# Patient Record
Sex: MALE | Race: WHITE | Employment: OTHER | URBAN - METROPOLITAN AREA
[De-identification: names, ages, dates, MRNs, and addresses within clinical notes are randomized per-mention and may not be internally consistent; named-entity substitution may affect disease eponyms.]

---

## 2017-01-19 ENCOUNTER — OFFICE VISIT (OUTPATIENT)
Dept: ENT CLINIC | Age: 67
End: 2017-01-19

## 2017-01-19 VITALS
DIASTOLIC BLOOD PRESSURE: 98 MMHG | RESPIRATION RATE: 18 BRPM | SYSTOLIC BLOOD PRESSURE: 167 MMHG | WEIGHT: 156 LBS | BODY MASS INDEX: 23.64 KG/M2 | HEIGHT: 68 IN

## 2017-01-19 DIAGNOSIS — R13.19 ESOPHAGEAL DYSPHAGIA: Primary | ICD-10-CM

## 2017-01-19 DIAGNOSIS — R49.0 HOARSENESS, CHRONIC: ICD-10-CM

## 2017-01-19 PROCEDURE — 4004F PT TOBACCO SCREEN RCVD TLK: CPT | Performed by: OTOLARYNGOLOGY

## 2017-01-19 PROCEDURE — 99203 OFFICE O/P NEW LOW 30 MIN: CPT | Performed by: OTOLARYNGOLOGY

## 2017-01-19 PROCEDURE — 3017F COLORECTAL CA SCREEN DOC REV: CPT | Performed by: OTOLARYNGOLOGY

## 2017-01-19 PROCEDURE — G8420 CALC BMI NORM PARAMETERS: HCPCS | Performed by: OTOLARYNGOLOGY

## 2017-01-19 PROCEDURE — G8427 DOCREV CUR MEDS BY ELIG CLIN: HCPCS | Performed by: OTOLARYNGOLOGY

## 2017-01-19 PROCEDURE — G8484 FLU IMMUNIZE NO ADMIN: HCPCS | Performed by: OTOLARYNGOLOGY

## 2017-01-19 RX ORDER — PROPRANOLOL HYDROCHLORIDE 10 MG/1
10 TABLET ORAL 4 TIMES DAILY
COMMUNITY
End: 2019-03-15

## 2017-01-19 RX ORDER — CLONIDINE HYDROCHLORIDE 0.1 MG/1
0.3 TABLET ORAL 2 TIMES DAILY
Status: ON HOLD | COMMUNITY
End: 2022-06-12 | Stop reason: HOSPADM

## 2017-01-19 RX ORDER — ASPIRIN 325 MG
650 TABLET ORAL 2 TIMES DAILY PRN
Status: ON HOLD | COMMUNITY
End: 2020-12-31 | Stop reason: SINTOL

## 2017-01-19 RX ORDER — BUDESONIDE AND FORMOTEROL FUMARATE DIHYDRATE 80; 4.5 UG/1; UG/1
2 AEROSOL RESPIRATORY (INHALATION) 2 TIMES DAILY
Status: ON HOLD | COMMUNITY
End: 2019-03-15

## 2017-01-19 RX ORDER — TRAZODONE HYDROCHLORIDE 50 MG/1
50 TABLET ORAL WEEKLY
COMMUNITY
End: 2019-03-15

## 2017-01-19 RX ORDER — OMEPRAZOLE 10 MG/1
10 CAPSULE, DELAYED RELEASE ORAL DAILY
Status: ON HOLD | COMMUNITY
End: 2019-03-15

## 2017-01-19 ASSESSMENT — ENCOUNTER SYMPTOMS
VOICE CHANGE: 0
RESPIRATORY NEGATIVE: 1
TROUBLE SWALLOWING: 1
FACIAL SWELLING: 0
EYES NEGATIVE: 1
ALLERGIC/IMMUNOLOGIC NEGATIVE: 1

## 2019-03-15 ENCOUNTER — HOSPITAL ENCOUNTER (INPATIENT)
Age: 69
LOS: 1 days | Discharge: HOME | DRG: 193 | End: 2019-03-16
Attending: FAMILY MEDICINE | Admitting: HOSPITALIST
Payer: COMMERCIAL

## 2019-03-15 ENCOUNTER — APPOINTMENT (OUTPATIENT)
Dept: GENERAL RADIOLOGY | Age: 69
DRG: 193 | End: 2019-03-15
Payer: COMMERCIAL

## 2019-03-15 ENCOUNTER — APPOINTMENT (OUTPATIENT)
Dept: CT IMAGING | Age: 69
DRG: 193 | End: 2019-03-15
Payer: COMMERCIAL

## 2019-03-15 DIAGNOSIS — R09.02 HYPOXEMIA: ICD-10-CM

## 2019-03-15 DIAGNOSIS — J18.9 PNEUMONIA OF RIGHT LOWER LOBE DUE TO INFECTIOUS ORGANISM: ICD-10-CM

## 2019-03-15 DIAGNOSIS — A41.9 SEPTICEMIA (HCC): Primary | ICD-10-CM

## 2019-03-15 DIAGNOSIS — E87.20 LACTIC ACIDOSIS: ICD-10-CM

## 2019-03-15 LAB
A/G RATIO: 1.4 (ref 1.1–2.2)
ALBUMIN SERPL-MCNC: 3.9 G/DL (ref 3.4–5)
ALP BLD-CCNC: 93 U/L (ref 40–129)
ALT SERPL-CCNC: 16 U/L (ref 10–40)
ANION GAP SERPL CALCULATED.3IONS-SCNC: 15 MMOL/L (ref 3–16)
AST SERPL-CCNC: 15 U/L (ref 15–37)
BASE EXCESS VENOUS: 1.5 MMOL/L
BASOPHILS ABSOLUTE: 0.2 K/UL (ref 0–0.2)
BASOPHILS RELATIVE PERCENT: 0.7 %
BILIRUB SERPL-MCNC: <0.2 MG/DL (ref 0–1)
BILIRUBIN URINE: NEGATIVE
BLOOD, URINE: NEGATIVE
BUN BLDV-MCNC: 13 MG/DL (ref 7–20)
CALCIUM SERPL-MCNC: 9.5 MG/DL (ref 8.3–10.6)
CARBOXYHEMOGLOBIN: 3.3 %
CHLORIDE BLD-SCNC: 100 MMOL/L (ref 99–110)
CLARITY: CLEAR
CO2: 23 MMOL/L (ref 21–32)
COLOR: YELLOW
CREAT SERPL-MCNC: 1 MG/DL (ref 0.8–1.3)
EKG ATRIAL RATE: 106 BPM
EKG DIAGNOSIS: NORMAL
EKG P AXIS: 84 DEGREES
EKG P-R INTERVAL: 114 MS
EKG Q-T INTERVAL: 326 MS
EKG QRS DURATION: 80 MS
EKG QTC CALCULATION (BAZETT): 433 MS
EKG R AXIS: 74 DEGREES
EKG T AXIS: 48 DEGREES
EKG VENTRICULAR RATE: 106 BPM
EOSINOPHILS ABSOLUTE: 0.4 K/UL (ref 0–0.6)
EOSINOPHILS RELATIVE PERCENT: 1.7 %
GFR AFRICAN AMERICAN: >60
GFR NON-AFRICAN AMERICAN: >60
GLOBULIN: 2.8 G/DL
GLUCOSE BLD-MCNC: 114 MG/DL (ref 70–99)
GLUCOSE URINE: NEGATIVE MG/DL
HCO3 VENOUS: 24 MMOL/L (ref 23–29)
HCT VFR BLD CALC: 45.3 % (ref 40.5–52.5)
HEMOGLOBIN: 14.8 G/DL (ref 13.5–17.5)
KETONES, URINE: NEGATIVE MG/DL
L. PNEUMOPHILA SEROGP 1 UR AG: NORMAL
LACTIC ACID: 2.5 MMOL/L (ref 0.4–2)
LACTIC ACID: 2.9 MMOL/L (ref 0.4–2)
LACTIC ACID: 5.7 MMOL/L (ref 0.4–2)
LACTIC ACID: 5.9 MMOL/L (ref 0.4–2)
LEUKOCYTE ESTERASE, URINE: NEGATIVE
LYMPHOCYTES ABSOLUTE: 3.2 K/UL (ref 1–5.1)
LYMPHOCYTES RELATIVE PERCENT: 12.5 %
MCH RBC QN AUTO: 30 PG (ref 26–34)
MCHC RBC AUTO-ENTMCNC: 32.8 G/DL (ref 31–36)
MCV RBC AUTO: 91.7 FL (ref 80–100)
METHEMOGLOBIN VENOUS: 0.7 %
MICROSCOPIC EXAMINATION: NORMAL
MONOCYTES ABSOLUTE: 1.1 K/UL (ref 0–1.3)
MONOCYTES RELATIVE PERCENT: 4.3 %
NEUTROPHILS ABSOLUTE: 20.3 K/UL (ref 1.7–7.7)
NEUTROPHILS RELATIVE PERCENT: 80.8 %
NITRITE, URINE: NEGATIVE
O2 CONTENT, VEN: 20 ML/DL
O2 SAT, VEN: 97 %
O2 THERAPY: ABNORMAL
PCO2, VEN: 34.9 MMHG (ref 40–50)
PDW BLD-RTO: 14.7 % (ref 12.4–15.4)
PH UA: 6 (ref 5–8)
PH VENOUS: 7.46 (ref 7.35–7.45)
PLATELET # BLD: 203 K/UL (ref 135–450)
PMV BLD AUTO: 8.6 FL (ref 5–10.5)
PO2, VEN: 142 MMHG
POTASSIUM SERPL-SCNC: 4.4 MMOL/L (ref 3.5–5.1)
PRO-BNP: 9 PG/ML (ref 0–124)
PROCALCITONIN: 0.23 NG/ML (ref 0–0.15)
PROTEIN UA: NEGATIVE MG/DL
RAPID INFLUENZA  B AGN: NEGATIVE
RAPID INFLUENZA A AGN: NEGATIVE
RBC # BLD: 4.94 M/UL (ref 4.2–5.9)
SODIUM BLD-SCNC: 138 MMOL/L (ref 136–145)
SPECIFIC GRAVITY UA: 1.02 (ref 1–1.03)
STREP PNEUMONIAE ANTIGEN, URINE: NORMAL
TCO2 CALC VENOUS: 26 MMOL/L
TOTAL PROTEIN: 6.7 G/DL (ref 6.4–8.2)
TROPONIN: <0.01 NG/ML
URINE REFLEX TO CULTURE: NORMAL
URINE TYPE: NORMAL
UROBILINOGEN, URINE: 0.2 E.U./DL
WBC # BLD: 25.2 K/UL (ref 4–11)

## 2019-03-15 PROCEDURE — 87040 BLOOD CULTURE FOR BACTERIA: CPT

## 2019-03-15 PROCEDURE — 87633 RESP VIRUS 12-25 TARGETS: CPT

## 2019-03-15 PROCEDURE — 2580000003 HC RX 258: Performed by: HOSPITALIST

## 2019-03-15 PROCEDURE — 85025 COMPLETE CBC W/AUTO DIFF WBC: CPT

## 2019-03-15 PROCEDURE — 93005 ELECTROCARDIOGRAM TRACING: CPT | Performed by: NURSE PRACTITIONER

## 2019-03-15 PROCEDURE — 1200000000 HC SEMI PRIVATE

## 2019-03-15 PROCEDURE — 6370000000 HC RX 637 (ALT 250 FOR IP): Performed by: HOSPITALIST

## 2019-03-15 PROCEDURE — 6360000004 HC RX CONTRAST MEDICATION: Performed by: NURSE PRACTITIONER

## 2019-03-15 PROCEDURE — 82803 BLOOD GASES ANY COMBINATION: CPT

## 2019-03-15 PROCEDURE — 94760 N-INVAS EAR/PLS OXIMETRY 1: CPT

## 2019-03-15 PROCEDURE — 87486 CHLMYD PNEUM DNA AMP PROBE: CPT

## 2019-03-15 PROCEDURE — 87798 DETECT AGENT NOS DNA AMP: CPT

## 2019-03-15 PROCEDURE — 92610 EVALUATE SWALLOWING FUNCTION: CPT

## 2019-03-15 PROCEDURE — 2580000003 HC RX 258: Performed by: NURSE PRACTITIONER

## 2019-03-15 PROCEDURE — 2700000000 HC OXYGEN THERAPY PER DAY

## 2019-03-15 PROCEDURE — 36415 COLL VENOUS BLD VENIPUNCTURE: CPT

## 2019-03-15 PROCEDURE — 73030 X-RAY EXAM OF SHOULDER: CPT

## 2019-03-15 PROCEDURE — 6370000000 HC RX 637 (ALT 250 FOR IP): Performed by: NURSE PRACTITIONER

## 2019-03-15 PROCEDURE — 94762 N-INVAS EAR/PLS OXIMTRY CONT: CPT

## 2019-03-15 PROCEDURE — 84145 PROCALCITONIN (PCT): CPT

## 2019-03-15 PROCEDURE — 71260 CT THORAX DX C+: CPT

## 2019-03-15 PROCEDURE — 96375 TX/PRO/DX INJ NEW DRUG ADDON: CPT

## 2019-03-15 PROCEDURE — 99255 IP/OBS CONSLTJ NEW/EST HI 80: CPT | Performed by: INTERNAL MEDICINE

## 2019-03-15 PROCEDURE — 87581 M.PNEUMON DNA AMP PROBE: CPT

## 2019-03-15 PROCEDURE — 6360000002 HC RX W HCPCS: Performed by: NURSE PRACTITIONER

## 2019-03-15 PROCEDURE — 94664 DEMO&/EVAL PT USE INHALER: CPT

## 2019-03-15 PROCEDURE — 96365 THER/PROPH/DIAG IV INF INIT: CPT

## 2019-03-15 PROCEDURE — 83880 ASSAY OF NATRIURETIC PEPTIDE: CPT

## 2019-03-15 PROCEDURE — 96367 TX/PROPH/DG ADDL SEQ IV INF: CPT

## 2019-03-15 PROCEDURE — 87449 NOS EACH ORGANISM AG IA: CPT

## 2019-03-15 PROCEDURE — 99285 EMERGENCY DEPT VISIT HI MDM: CPT

## 2019-03-15 PROCEDURE — 80053 COMPREHEN METABOLIC PANEL: CPT

## 2019-03-15 PROCEDURE — 6370000000 HC RX 637 (ALT 250 FOR IP): Performed by: INTERNAL MEDICINE

## 2019-03-15 PROCEDURE — 81003 URINALYSIS AUTO W/O SCOPE: CPT

## 2019-03-15 PROCEDURE — 6360000002 HC RX W HCPCS: Performed by: HOSPITALIST

## 2019-03-15 PROCEDURE — 93010 ELECTROCARDIOGRAM REPORT: CPT | Performed by: INTERNAL MEDICINE

## 2019-03-15 PROCEDURE — 83605 ASSAY OF LACTIC ACID: CPT

## 2019-03-15 PROCEDURE — 84484 ASSAY OF TROPONIN QUANT: CPT

## 2019-03-15 PROCEDURE — 71046 X-RAY EXAM CHEST 2 VIEWS: CPT

## 2019-03-15 PROCEDURE — 87804 INFLUENZA ASSAY W/OPTIC: CPT

## 2019-03-15 PROCEDURE — 94640 AIRWAY INHALATION TREATMENT: CPT

## 2019-03-15 RX ORDER — CETIRIZINE HYDROCHLORIDE 10 MG/1
10 TABLET ORAL DAILY
Status: DISCONTINUED | OUTPATIENT
Start: 2019-03-15 | End: 2019-03-16 | Stop reason: HOSPADM

## 2019-03-15 RX ORDER — SODIUM CHLORIDE 9 MG/ML
INJECTION, SOLUTION INTRAVENOUS CONTINUOUS
Status: DISCONTINUED | OUTPATIENT
Start: 2019-03-15 | End: 2019-03-16 | Stop reason: HOSPADM

## 2019-03-15 RX ORDER — CLONIDINE HYDROCHLORIDE 0.1 MG/1
0.1 TABLET ORAL DAILY
Status: DISCONTINUED | OUTPATIENT
Start: 2019-03-15 | End: 2019-03-16

## 2019-03-15 RX ORDER — BUTALBITAL, ACETAMINOPHEN AND CAFFEINE 50; 325; 40 MG/1; MG/1; MG/1
1 TABLET ORAL EVERY 4 HOURS PRN
COMMUNITY

## 2019-03-15 RX ORDER — GABAPENTIN 300 MG/1
300 CAPSULE ORAL 2 TIMES DAILY
COMMUNITY
End: 2020-12-29

## 2019-03-15 RX ORDER — ATORVASTATIN CALCIUM 10 MG/1
10 TABLET, FILM COATED ORAL DAILY
COMMUNITY
End: 2020-12-29

## 2019-03-15 RX ORDER — SODIUM CHLORIDE 0.9 % (FLUSH) 0.9 %
10 SYRINGE (ML) INJECTION PRN
Status: DISCONTINUED | OUTPATIENT
Start: 2019-03-15 | End: 2019-03-16 | Stop reason: HOSPADM

## 2019-03-15 RX ORDER — BUTALBITAL, ACETAMINOPHEN AND CAFFEINE 50; 325; 40 MG/1; MG/1; MG/1
1 TABLET ORAL EVERY 4 HOURS PRN
Status: DISCONTINUED | OUTPATIENT
Start: 2019-03-15 | End: 2019-03-16 | Stop reason: HOSPADM

## 2019-03-15 RX ORDER — IPRATROPIUM BROMIDE AND ALBUTEROL SULFATE 2.5; .5 MG/3ML; MG/3ML
1 SOLUTION RESPIRATORY (INHALATION) ONCE
Status: COMPLETED | OUTPATIENT
Start: 2019-03-15 | End: 2019-03-15

## 2019-03-15 RX ORDER — IPRATROPIUM BROMIDE AND ALBUTEROL SULFATE 2.5; .5 MG/3ML; MG/3ML
1 SOLUTION RESPIRATORY (INHALATION)
Status: DISCONTINUED | OUTPATIENT
Start: 2019-03-15 | End: 2019-03-16 | Stop reason: HOSPADM

## 2019-03-15 RX ORDER — PREDNISONE 20 MG/1
40 TABLET ORAL DAILY
Status: DISCONTINUED | OUTPATIENT
Start: 2019-03-15 | End: 2019-03-15

## 2019-03-15 RX ORDER — 0.9 % SODIUM CHLORIDE 0.9 %
1000 INTRAVENOUS SOLUTION INTRAVENOUS ONCE
Status: DISCONTINUED | OUTPATIENT
Start: 2019-03-15 | End: 2019-03-15

## 2019-03-15 RX ORDER — ASPIRIN 325 MG
325 TABLET ORAL DAILY
Status: DISCONTINUED | OUTPATIENT
Start: 2019-03-15 | End: 2019-03-16 | Stop reason: HOSPADM

## 2019-03-15 RX ORDER — OMEPRAZOLE 40 MG/1
40 CAPSULE, DELAYED RELEASE ORAL 2 TIMES DAILY
COMMUNITY

## 2019-03-15 RX ORDER — ONDANSETRON 2 MG/ML
4 INJECTION INTRAMUSCULAR; INTRAVENOUS EVERY 6 HOURS PRN
Status: DISCONTINUED | OUTPATIENT
Start: 2019-03-15 | End: 2019-03-16 | Stop reason: HOSPADM

## 2019-03-15 RX ORDER — ACETAMINOPHEN 325 MG/1
650 TABLET ORAL EVERY 4 HOURS PRN
Status: DISCONTINUED | OUTPATIENT
Start: 2019-03-15 | End: 2019-03-16 | Stop reason: HOSPADM

## 2019-03-15 RX ORDER — ZOLPIDEM TARTRATE 5 MG/1
5 TABLET ORAL NIGHTLY PRN
Status: DISCONTINUED | OUTPATIENT
Start: 2019-03-15 | End: 2019-03-16

## 2019-03-15 RX ORDER — METHYLPREDNISOLONE SODIUM SUCCINATE 125 MG/2ML
125 INJECTION, POWDER, LYOPHILIZED, FOR SOLUTION INTRAMUSCULAR; INTRAVENOUS ONCE
Status: COMPLETED | OUTPATIENT
Start: 2019-03-15 | End: 2019-03-15

## 2019-03-15 RX ORDER — ALBUTEROL SULFATE 2.5 MG/3ML
2.5 SOLUTION RESPIRATORY (INHALATION) ONCE
Status: COMPLETED | OUTPATIENT
Start: 2019-03-15 | End: 2019-03-15

## 2019-03-15 RX ORDER — 0.9 % SODIUM CHLORIDE 0.9 %
30 INTRAVENOUS SOLUTION INTRAVENOUS ONCE
Status: COMPLETED | OUTPATIENT
Start: 2019-03-15 | End: 2019-03-15

## 2019-03-15 RX ORDER — HYDROXYZINE 50 MG/1
50 TABLET, FILM COATED ORAL EVERY 8 HOURS PRN
COMMUNITY
End: 2020-12-29

## 2019-03-15 RX ORDER — QUETIAPINE FUMARATE 25 MG/1
100 TABLET, FILM COATED ORAL NIGHTLY
COMMUNITY
End: 2020-12-29

## 2019-03-15 RX ORDER — ZOLPIDEM TARTRATE 10 MG/1
10 TABLET ORAL NIGHTLY PRN
COMMUNITY
End: 2020-12-29

## 2019-03-15 RX ORDER — SODIUM CHLORIDE 0.9 % (FLUSH) 0.9 %
10 SYRINGE (ML) INJECTION EVERY 12 HOURS SCHEDULED
Status: DISCONTINUED | OUTPATIENT
Start: 2019-03-15 | End: 2019-03-16 | Stop reason: HOSPADM

## 2019-03-15 RX ORDER — PANTOPRAZOLE SODIUM 40 MG/1
40 TABLET, DELAYED RELEASE ORAL
Status: DISCONTINUED | OUTPATIENT
Start: 2019-03-15 | End: 2019-03-16 | Stop reason: HOSPADM

## 2019-03-15 RX ORDER — NICOTINE 21 MG/24HR
1 PATCH, TRANSDERMAL 24 HOURS TRANSDERMAL DAILY
Status: DISCONTINUED | OUTPATIENT
Start: 2019-03-15 | End: 2019-03-16 | Stop reason: HOSPADM

## 2019-03-15 RX ORDER — CLONAZEPAM 0.5 MG/1
0.5 TABLET ORAL DAILY PRN
Status: ON HOLD | COMMUNITY
End: 2022-06-11

## 2019-03-15 RX ORDER — LACTOBACILLUS RHAMNOSUS GG 10B CELL
1 CAPSULE ORAL 2 TIMES DAILY WITH MEALS
Status: DISCONTINUED | OUTPATIENT
Start: 2019-03-15 | End: 2019-03-16 | Stop reason: HOSPADM

## 2019-03-15 RX ADMIN — IPRATROPIUM BROMIDE AND ALBUTEROL SULFATE 1 AMPULE: .5; 3 SOLUTION RESPIRATORY (INHALATION) at 11:37

## 2019-03-15 RX ADMIN — PREDNISONE 40 MG: 20 TABLET ORAL at 08:38

## 2019-03-15 RX ADMIN — SODIUM CHLORIDE: 9 INJECTION, SOLUTION INTRAVENOUS at 19:08

## 2019-03-15 RX ADMIN — METHYLPREDNISOLONE SODIUM SUCCINATE 125 MG: 125 INJECTION, POWDER, FOR SOLUTION INTRAMUSCULAR; INTRAVENOUS at 02:57

## 2019-03-15 RX ADMIN — BUTALBITAL, ACETAMINOPHEN, AND CAFFEINE 1 TABLET: 50; 325; 40 TABLET ORAL at 23:24

## 2019-03-15 RX ADMIN — IPRATROPIUM BROMIDE AND ALBUTEROL SULFATE 1 AMPULE: .5; 3 SOLUTION RESPIRATORY (INHALATION) at 08:55

## 2019-03-15 RX ADMIN — IPRATROPIUM BROMIDE AND ALBUTEROL SULFATE 1 AMPULE: .5; 3 SOLUTION RESPIRATORY (INHALATION) at 15:51

## 2019-03-15 RX ADMIN — SODIUM CHLORIDE: 9 INJECTION, SOLUTION INTRAVENOUS at 11:00

## 2019-03-15 RX ADMIN — CEFTRIAXONE 1 G: 1 INJECTION, POWDER, FOR SOLUTION INTRAMUSCULAR; INTRAVENOUS at 03:01

## 2019-03-15 RX ADMIN — SODIUM CHLORIDE 2331 ML: 9 INJECTION, SOLUTION INTRAVENOUS at 03:30

## 2019-03-15 RX ADMIN — ACETAMINOPHEN 650 MG: 325 TABLET, FILM COATED ORAL at 20:41

## 2019-03-15 RX ADMIN — CLONIDINE HYDROCHLORIDE 0.1 MG: 0.1 TABLET ORAL at 08:38

## 2019-03-15 RX ADMIN — ONDANSETRON 4 MG: 2 INJECTION INTRAMUSCULAR; INTRAVENOUS at 17:27

## 2019-03-15 RX ADMIN — ACETAMINOPHEN 650 MG: 325 TABLET, FILM COATED ORAL at 05:42

## 2019-03-15 RX ADMIN — CEFTRIAXONE 1 G: 1 INJECTION, POWDER, FOR SOLUTION INTRAMUSCULAR; INTRAVENOUS at 23:24

## 2019-03-15 RX ADMIN — ASPIRIN 325 MG ORAL TABLET 325 MG: 325 PILL ORAL at 08:38

## 2019-03-15 RX ADMIN — PANTOPRAZOLE SODIUM 40 MG: 40 TABLET, DELAYED RELEASE ORAL at 05:37

## 2019-03-15 RX ADMIN — Medication 10 ML: at 08:39

## 2019-03-15 RX ADMIN — ENOXAPARIN SODIUM 40 MG: 40 INJECTION SUBCUTANEOUS at 08:38

## 2019-03-15 RX ADMIN — CETIRIZINE HYDROCHLORIDE 10 MG: 10 TABLET, FILM COATED ORAL at 18:06

## 2019-03-15 RX ADMIN — MOMETASONE FUROATE AND FORMOTEROL FUMARATE DIHYDRATE 2 PUFF: 100; 5 AEROSOL RESPIRATORY (INHALATION) at 08:55

## 2019-03-15 RX ADMIN — SODIUM CHLORIDE 1000 ML: 9 INJECTION, SOLUTION INTRAVENOUS at 02:59

## 2019-03-15 RX ADMIN — Medication 2 MG: at 23:31

## 2019-03-15 RX ADMIN — Medication 1 CAPSULE: at 17:30

## 2019-03-15 RX ADMIN — ACETAMINOPHEN 650 MG: 325 TABLET, FILM COATED ORAL at 10:20

## 2019-03-15 RX ADMIN — IPRATROPIUM BROMIDE AND ALBUTEROL SULFATE 1 AMPULE: .5; 3 SOLUTION RESPIRATORY (INHALATION) at 02:19

## 2019-03-15 RX ADMIN — BUTALBITAL, ACETAMINOPHEN, AND CAFFEINE 1 TABLET: 50; 325; 40 TABLET ORAL at 10:18

## 2019-03-15 RX ADMIN — ALBUTEROL SULFATE 2.5 MG: 2.5 SOLUTION RESPIRATORY (INHALATION) at 02:19

## 2019-03-15 RX ADMIN — AZITHROMYCIN DIHYDRATE 500 MG: 500 INJECTION, POWDER, LYOPHILIZED, FOR SOLUTION INTRAVENOUS at 03:49

## 2019-03-15 RX ADMIN — BUTALBITAL, ACETAMINOPHEN, AND CAFFEINE 1 TABLET: 50; 325; 40 TABLET ORAL at 19:02

## 2019-03-15 RX ADMIN — ZOLPIDEM TARTRATE 5 MG: 5 TABLET ORAL at 20:37

## 2019-03-15 RX ADMIN — IOPAMIDOL 75 ML: 755 INJECTION, SOLUTION INTRAVENOUS at 03:11

## 2019-03-15 ASSESSMENT — ENCOUNTER SYMPTOMS
NAUSEA: 0
DIARRHEA: 0
VOMITING: 0
BACK PAIN: 0
ABDOMINAL DISTENTION: 0
ABDOMINAL PAIN: 0
COUGH: 1
SHORTNESS OF BREATH: 1
CONSTIPATION: 0

## 2019-03-15 ASSESSMENT — PAIN DESCRIPTION - ORIENTATION
ORIENTATION: INNER
ORIENTATION: RIGHT
ORIENTATION: RIGHT

## 2019-03-15 ASSESSMENT — PAIN DESCRIPTION - FREQUENCY
FREQUENCY: INTERMITTENT
FREQUENCY: CONTINUOUS
FREQUENCY: CONTINUOUS

## 2019-03-15 ASSESSMENT — PAIN DESCRIPTION - PAIN TYPE
TYPE: ACUTE PAIN;CHRONIC PAIN
TYPE: ACUTE PAIN
TYPE: ACUTE PAIN;CHRONIC PAIN
TYPE: ACUTE PAIN

## 2019-03-15 ASSESSMENT — PAIN SCALES - GENERAL
PAINLEVEL_OUTOF10: 7
PAINLEVEL_OUTOF10: 10
PAINLEVEL_OUTOF10: 4
PAINLEVEL_OUTOF10: 10
PAINLEVEL_OUTOF10: 5
PAINLEVEL_OUTOF10: 8
PAINLEVEL_OUTOF10: 10
PAINLEVEL_OUTOF10: 4
PAINLEVEL_OUTOF10: 5
PAINLEVEL_OUTOF10: 9

## 2019-03-15 ASSESSMENT — PAIN DESCRIPTION - PROGRESSION
CLINICAL_PROGRESSION: NOT CHANGED
CLINICAL_PROGRESSION: RAPIDLY IMPROVING

## 2019-03-15 ASSESSMENT — PAIN DESCRIPTION - ONSET
ONSET: AWAKENED FROM SLEEP
ONSET: ON-GOING

## 2019-03-15 ASSESSMENT — PAIN DESCRIPTION - DESCRIPTORS
DESCRIPTORS: CONSTANT;DISCOMFORT;DULL;HEADACHE
DESCRIPTORS: SHARP
DESCRIPTORS: THROBBING

## 2019-03-15 ASSESSMENT — PAIN DESCRIPTION - LOCATION
LOCATION: SHOULDER
LOCATION: HEAD
LOCATION: HEAD
LOCATION: SHOULDER

## 2019-03-15 ASSESSMENT — PAIN - FUNCTIONAL ASSESSMENT: PAIN_FUNCTIONAL_ASSESSMENT: ACTIVITIES ARE NOT PREVENTED

## 2019-03-16 VITALS
BODY MASS INDEX: 26.3 KG/M2 | HEIGHT: 68 IN | SYSTOLIC BLOOD PRESSURE: 153 MMHG | WEIGHT: 173.5 LBS | DIASTOLIC BLOOD PRESSURE: 88 MMHG | HEART RATE: 92 BPM | RESPIRATION RATE: 16 BRPM | OXYGEN SATURATION: 94 % | TEMPERATURE: 97.8 F

## 2019-03-16 LAB
ANION GAP SERPL CALCULATED.3IONS-SCNC: 11 MMOL/L (ref 3–16)
BUN BLDV-MCNC: 9 MG/DL (ref 7–20)
CALCIUM SERPL-MCNC: 8.3 MG/DL (ref 8.3–10.6)
CHLORIDE BLD-SCNC: 110 MMOL/L (ref 99–110)
CO2: 25 MMOL/L (ref 21–32)
CREAT SERPL-MCNC: 0.8 MG/DL (ref 0.8–1.3)
GFR AFRICAN AMERICAN: >60
GFR NON-AFRICAN AMERICAN: >60
GLUCOSE BLD-MCNC: 114 MG/DL (ref 70–99)
HCT VFR BLD CALC: 42.9 % (ref 40.5–52.5)
HEMOGLOBIN: 13.9 G/DL (ref 13.5–17.5)
LACTIC ACID: 2.1 MMOL/L (ref 0.4–2)
MCH RBC QN AUTO: 29.9 PG (ref 26–34)
MCHC RBC AUTO-ENTMCNC: 32.4 G/DL (ref 31–36)
MCV RBC AUTO: 92.1 FL (ref 80–100)
PDW BLD-RTO: 15.5 % (ref 12.4–15.4)
PLATELET # BLD: 184 K/UL (ref 135–450)
PMV BLD AUTO: 8.6 FL (ref 5–10.5)
POTASSIUM REFLEX MAGNESIUM: 3.8 MMOL/L (ref 3.5–5.1)
RBC # BLD: 4.66 M/UL (ref 4.2–5.9)
REPORT: NORMAL
RESPIRATORY PANEL PCR: NORMAL
SODIUM BLD-SCNC: 146 MMOL/L (ref 136–145)
WBC # BLD: 19.7 K/UL (ref 4–11)

## 2019-03-16 PROCEDURE — 85027 COMPLETE CBC AUTOMATED: CPT

## 2019-03-16 PROCEDURE — 6370000000 HC RX 637 (ALT 250 FOR IP): Performed by: NURSE PRACTITIONER

## 2019-03-16 PROCEDURE — 2580000003 HC RX 258: Performed by: NURSE PRACTITIONER

## 2019-03-16 PROCEDURE — 6370000000 HC RX 637 (ALT 250 FOR IP): Performed by: HOSPITALIST

## 2019-03-16 PROCEDURE — 2580000003 HC RX 258: Performed by: HOSPITALIST

## 2019-03-16 PROCEDURE — 94760 N-INVAS EAR/PLS OXIMETRY 1: CPT

## 2019-03-16 PROCEDURE — 99232 SBSQ HOSP IP/OBS MODERATE 35: CPT | Performed by: NURSE PRACTITIONER

## 2019-03-16 PROCEDURE — 6370000000 HC RX 637 (ALT 250 FOR IP): Performed by: INTERNAL MEDICINE

## 2019-03-16 PROCEDURE — 80048 BASIC METABOLIC PNL TOTAL CA: CPT

## 2019-03-16 PROCEDURE — 94640 AIRWAY INHALATION TREATMENT: CPT

## 2019-03-16 PROCEDURE — 6360000002 HC RX W HCPCS: Performed by: HOSPITALIST

## 2019-03-16 PROCEDURE — 36415 COLL VENOUS BLD VENIPUNCTURE: CPT

## 2019-03-16 PROCEDURE — 83605 ASSAY OF LACTIC ACID: CPT

## 2019-03-16 RX ORDER — CLONAZEPAM 0.5 MG/1
0.5 TABLET ORAL 2 TIMES DAILY PRN
Status: DISCONTINUED | OUTPATIENT
Start: 2019-03-16 | End: 2019-03-16 | Stop reason: HOSPADM

## 2019-03-16 RX ORDER — DIPHENHYDRAMINE HCL 25 MG
50 TABLET ORAL EVERY 6 HOURS PRN
Status: DISCONTINUED | OUTPATIENT
Start: 2019-03-16 | End: 2019-03-16 | Stop reason: HOSPADM

## 2019-03-16 RX ORDER — ZOLPIDEM TARTRATE 5 MG/1
10 TABLET ORAL NIGHTLY PRN
Status: DISCONTINUED | OUTPATIENT
Start: 2019-03-16 | End: 2019-03-16 | Stop reason: HOSPADM

## 2019-03-16 RX ORDER — PANTOPRAZOLE SODIUM 40 MG/1
40 TABLET, DELAYED RELEASE ORAL
Qty: 30 TABLET | Refills: 3 | Status: SHIPPED | OUTPATIENT
Start: 2019-03-16 | End: 2020-12-29

## 2019-03-16 RX ORDER — QUETIAPINE FUMARATE 100 MG/1
100 TABLET, FILM COATED ORAL NIGHTLY
Status: DISCONTINUED | OUTPATIENT
Start: 2019-03-16 | End: 2019-03-16 | Stop reason: HOSPADM

## 2019-03-16 RX ORDER — NICOTINE 21 MG/24HR
1 PATCH, TRANSDERMAL 24 HOURS TRANSDERMAL DAILY
Qty: 30 PATCH | Refills: 1 | Status: SHIPPED | OUTPATIENT
Start: 2019-03-17 | End: 2020-12-29

## 2019-03-16 RX ORDER — CETIRIZINE HYDROCHLORIDE 10 MG/1
10 TABLET ORAL DAILY
Qty: 10 TABLET | Refills: 0 | Status: SHIPPED | OUTPATIENT
Start: 2019-03-17 | End: 2019-03-27

## 2019-03-16 RX ORDER — CLONIDINE HYDROCHLORIDE 0.1 MG/1
0.2 TABLET ORAL EVERY 4 HOURS PRN
Status: DISCONTINUED | OUTPATIENT
Start: 2019-03-16 | End: 2019-03-16 | Stop reason: HOSPADM

## 2019-03-16 RX ORDER — LACTOBACILLUS RHAMNOSUS GG 10B CELL
1 CAPSULE ORAL 2 TIMES DAILY WITH MEALS
Qty: 14 CAPSULE | Refills: 0 | Status: SHIPPED | OUTPATIENT
Start: 2019-03-16 | End: 2019-03-23

## 2019-03-16 RX ORDER — GABAPENTIN 300 MG/1
300 CAPSULE ORAL 2 TIMES DAILY
Status: DISCONTINUED | OUTPATIENT
Start: 2019-03-16 | End: 2019-03-16 | Stop reason: HOSPADM

## 2019-03-16 RX ORDER — LEVOFLOXACIN 500 MG/1
500 TABLET, FILM COATED ORAL DAILY
Qty: 7 TABLET | Refills: 0 | Status: SHIPPED | OUTPATIENT
Start: 2019-03-16 | End: 2019-03-23

## 2019-03-16 RX ORDER — BUDESONIDE AND FORMOTEROL FUMARATE DIHYDRATE 80; 4.5 UG/1; UG/1
2 AEROSOL RESPIRATORY (INHALATION) 2 TIMES DAILY
Qty: 1 INHALER | Refills: 3 | Status: SHIPPED | OUTPATIENT
Start: 2019-03-16

## 2019-03-16 RX ADMIN — SODIUM CHLORIDE: 9 INJECTION, SOLUTION INTRAVENOUS at 08:46

## 2019-03-16 RX ADMIN — PANTOPRAZOLE SODIUM 40 MG: 40 TABLET, DELAYED RELEASE ORAL at 04:19

## 2019-03-16 RX ADMIN — IPRATROPIUM BROMIDE AND ALBUTEROL SULFATE 1 AMPULE: .5; 3 SOLUTION RESPIRATORY (INHALATION) at 11:47

## 2019-03-16 RX ADMIN — SODIUM CHLORIDE: 9 INJECTION, SOLUTION INTRAVENOUS at 00:07

## 2019-03-16 RX ADMIN — GABAPENTIN 300 MG: 300 CAPSULE ORAL at 09:41

## 2019-03-16 RX ADMIN — AZITHROMYCIN MONOHYDRATE 500 MG: 500 INJECTION, POWDER, LYOPHILIZED, FOR SOLUTION INTRAVENOUS at 00:07

## 2019-03-16 RX ADMIN — CLONIDINE HYDROCHLORIDE 0.1 MG: 0.1 TABLET ORAL at 06:26

## 2019-03-16 RX ADMIN — ASPIRIN 325 MG ORAL TABLET 325 MG: 325 PILL ORAL at 08:40

## 2019-03-16 RX ADMIN — Medication 1 CAPSULE: at 08:40

## 2019-03-16 RX ADMIN — ENOXAPARIN SODIUM 40 MG: 40 INJECTION SUBCUTANEOUS at 08:41

## 2019-03-16 RX ADMIN — BUTALBITAL, ACETAMINOPHEN, AND CAFFEINE 1 TABLET: 50; 325; 40 TABLET ORAL at 13:10

## 2019-03-16 RX ADMIN — SODIUM CHLORIDE: 9 INJECTION, SOLUTION INTRAVENOUS at 08:42

## 2019-03-16 RX ADMIN — MOMETASONE FUROATE AND FORMOTEROL FUMARATE DIHYDRATE 2 PUFF: 100; 5 AEROSOL RESPIRATORY (INHALATION) at 08:05

## 2019-03-16 RX ADMIN — IPRATROPIUM BROMIDE AND ALBUTEROL SULFATE 1 AMPULE: .5; 3 SOLUTION RESPIRATORY (INHALATION) at 08:05

## 2019-03-16 RX ADMIN — CLONIDINE HYDROCHLORIDE 0.2 MG: 0.1 TABLET ORAL at 09:41

## 2019-03-16 RX ADMIN — CETIRIZINE HYDROCHLORIDE 10 MG: 10 TABLET, FILM COATED ORAL at 08:41

## 2019-03-16 ASSESSMENT — PAIN SCALES - GENERAL
PAINLEVEL_OUTOF10: 0
PAINLEVEL_OUTOF10: 4

## 2019-03-20 LAB
BLOOD CULTURE, ROUTINE: NORMAL
CULTURE, BLOOD 2: NORMAL

## 2020-11-03 PROBLEM — J18.9 PNEUMONIA: Status: RESOLVED | Noted: 2019-03-15 | Resolved: 2020-11-03

## 2020-12-29 ENCOUNTER — APPOINTMENT (OUTPATIENT)
Dept: CT IMAGING | Age: 70
End: 2020-12-29
Payer: COMMERCIAL

## 2020-12-29 ENCOUNTER — HOSPITAL ENCOUNTER (INPATIENT)
Age: 70
LOS: 1 days | Discharge: STILL ADMITTED OR ADMINISTRATIVE | DRG: 871 | End: 2020-12-30
Attending: STUDENT IN AN ORGANIZED HEALTH CARE EDUCATION/TRAINING PROGRAM | Admitting: STUDENT IN AN ORGANIZED HEALTH CARE EDUCATION/TRAINING PROGRAM
Payer: COMMERCIAL

## 2020-12-29 ENCOUNTER — APPOINTMENT (OUTPATIENT)
Dept: GENERAL RADIOLOGY | Age: 70
DRG: 871 | End: 2020-12-29
Payer: COMMERCIAL

## 2020-12-29 ENCOUNTER — HOSPITAL ENCOUNTER (EMERGENCY)
Age: 70
Discharge: HOME | End: 2020-12-29
Attending: EMERGENCY MEDICINE
Payer: COMMERCIAL

## 2020-12-29 VITALS
HEART RATE: 125 BPM | TEMPERATURE: 99.6 F | OXYGEN SATURATION: 100 % | DIASTOLIC BLOOD PRESSURE: 96 MMHG | SYSTOLIC BLOOD PRESSURE: 157 MMHG | HEIGHT: 67 IN | WEIGHT: 178.79 LBS | BODY MASS INDEX: 28.06 KG/M2 | RESPIRATION RATE: 35 BRPM

## 2020-12-29 DIAGNOSIS — J18.9 PNEUMONIA OF BOTH LUNGS DUE TO INFECTIOUS ORGANISM, UNSPECIFIED PART OF LUNG: Primary | ICD-10-CM

## 2020-12-29 DIAGNOSIS — R93.89 ABNORMAL CT SCAN: ICD-10-CM

## 2020-12-29 DIAGNOSIS — J96.01 ACUTE RESPIRATORY FAILURE WITH HYPOXIA AND HYPERCAPNIA (HCC): Primary | ICD-10-CM

## 2020-12-29 DIAGNOSIS — Z53.29 LEFT AGAINST MEDICAL ADVICE: ICD-10-CM

## 2020-12-29 DIAGNOSIS — R00.0 TACHYCARDIA: ICD-10-CM

## 2020-12-29 DIAGNOSIS — J96.02 ACUTE RESPIRATORY FAILURE WITH HYPOXIA AND HYPERCAPNIA (HCC): Primary | ICD-10-CM

## 2020-12-29 DIAGNOSIS — R91.8 GROUND GLASS OPACITY PRESENT ON IMAGING OF LUNG: ICD-10-CM

## 2020-12-29 PROBLEM — A41.9 SEPSIS (HCC): Status: ACTIVE | Noted: 2020-12-29

## 2020-12-29 LAB
A/G RATIO: 1.2 (ref 1.1–2.2)
ALBUMIN SERPL-MCNC: 4 G/DL (ref 3.4–5)
ALP BLD-CCNC: 127 U/L (ref 40–129)
ALT SERPL-CCNC: 26 U/L (ref 10–40)
ANION GAP SERPL CALCULATED.3IONS-SCNC: 11 MMOL/L (ref 3–16)
ANION GAP SERPL CALCULATED.3IONS-SCNC: 12 MMOL/L (ref 3–16)
AST SERPL-CCNC: 26 U/L (ref 15–37)
BASE EXCESS VENOUS: 3.1 MMOL/L
BASE EXCESS VENOUS: 3.2 MMOL/L
BASOPHILS ABSOLUTE: 0.1 K/UL (ref 0–0.2)
BASOPHILS ABSOLUTE: 0.1 K/UL (ref 0–0.2)
BASOPHILS RELATIVE PERCENT: 0.5 %
BASOPHILS RELATIVE PERCENT: 0.7 %
BILIRUB SERPL-MCNC: <0.2 MG/DL (ref 0–1)
BILIRUBIN URINE: NEGATIVE
BLOOD, URINE: NEGATIVE
BUN BLDV-MCNC: 8 MG/DL (ref 7–20)
BUN BLDV-MCNC: 9 MG/DL (ref 7–20)
CALCIUM SERPL-MCNC: 8.9 MG/DL (ref 8.3–10.6)
CALCIUM SERPL-MCNC: 9 MG/DL (ref 8.3–10.6)
CARBOXYHEMOGLOBIN: 1 %
CARBOXYHEMOGLOBIN: 1.6 %
CHLORIDE BLD-SCNC: 102 MMOL/L (ref 99–110)
CHLORIDE BLD-SCNC: 102 MMOL/L (ref 99–110)
CLARITY: CLEAR
CO2: 26 MMOL/L (ref 21–32)
CO2: 28 MMOL/L (ref 21–32)
COLOR: YELLOW
CREAT SERPL-MCNC: 0.8 MG/DL (ref 0.8–1.3)
CREAT SERPL-MCNC: 0.8 MG/DL (ref 0.8–1.3)
EOSINOPHILS ABSOLUTE: 0.1 K/UL (ref 0–0.6)
EOSINOPHILS ABSOLUTE: 0.1 K/UL (ref 0–0.6)
EOSINOPHILS RELATIVE PERCENT: 0.3 %
EOSINOPHILS RELATIVE PERCENT: 0.7 %
GFR AFRICAN AMERICAN: >60
GFR AFRICAN AMERICAN: >60
GFR NON-AFRICAN AMERICAN: >60
GFR NON-AFRICAN AMERICAN: >60
GLOBULIN: 3.3 G/DL
GLUCOSE BLD-MCNC: 109 MG/DL (ref 70–99)
GLUCOSE BLD-MCNC: 120 MG/DL (ref 70–99)
GLUCOSE URINE: NEGATIVE MG/DL
HCO3 VENOUS: 32 MMOL/L (ref 23–29)
HCO3 VENOUS: 33 MMOL/L (ref 23–29)
HCT VFR BLD CALC: 47.2 % (ref 40.5–52.5)
HCT VFR BLD CALC: 48.4 % (ref 40.5–52.5)
HEMOGLOBIN: 15.4 G/DL (ref 13.5–17.5)
HEMOGLOBIN: 15.6 G/DL (ref 13.5–17.5)
KETONES, URINE: NEGATIVE MG/DL
LACTIC ACID: 2 MMOL/L (ref 0.4–2)
LEUKOCYTE ESTERASE, URINE: NEGATIVE
LYMPHOCYTES ABSOLUTE: 2.2 K/UL (ref 1–5.1)
LYMPHOCYTES ABSOLUTE: 2.4 K/UL (ref 1–5.1)
LYMPHOCYTES RELATIVE PERCENT: 11.6 %
LYMPHOCYTES RELATIVE PERCENT: 13.4 %
MCH RBC QN AUTO: 29.6 PG (ref 26–34)
MCH RBC QN AUTO: 30.3 PG (ref 26–34)
MCHC RBC AUTO-ENTMCNC: 31.8 G/DL (ref 31–36)
MCHC RBC AUTO-ENTMCNC: 33 G/DL (ref 31–36)
MCV RBC AUTO: 91.7 FL (ref 80–100)
MCV RBC AUTO: 93 FL (ref 80–100)
METHEMOGLOBIN VENOUS: 0.4 %
METHEMOGLOBIN VENOUS: 0.6 %
MICROSCOPIC EXAMINATION: NORMAL
MONOCYTES ABSOLUTE: 0.9 K/UL (ref 0–1.3)
MONOCYTES ABSOLUTE: 1.3 K/UL (ref 0–1.3)
MONOCYTES RELATIVE PERCENT: 5.4 %
MONOCYTES RELATIVE PERCENT: 6.2 %
NEUTROPHILS ABSOLUTE: 13 K/UL (ref 1.7–7.7)
NEUTROPHILS ABSOLUTE: 16.9 K/UL (ref 1.7–7.7)
NEUTROPHILS RELATIVE PERCENT: 79.8 %
NEUTROPHILS RELATIVE PERCENT: 81.4 %
NITRITE, URINE: NEGATIVE
O2 CONTENT, VEN: 16 ML/DL
O2 CONTENT, VEN: 19 ML/DL
O2 SAT, VEN: 76 %
O2 SAT, VEN: 83 %
O2 THERAPY: ABNORMAL
O2 THERAPY: ABNORMAL
PCO2, VEN: 63.9 MMHG (ref 40–50)
PCO2, VEN: 69.3 MMHG (ref 40–50)
PDW BLD-RTO: 15.4 % (ref 12.4–15.4)
PDW BLD-RTO: 15.5 % (ref 12.4–15.4)
PH UA: 5.5 (ref 5–8)
PH VENOUS: 7.28 (ref 7.35–7.45)
PH VENOUS: 7.31 (ref 7.35–7.45)
PLATELET # BLD: 246 K/UL (ref 135–450)
PLATELET # BLD: 251 K/UL (ref 135–450)
PMV BLD AUTO: 9.1 FL (ref 5–10.5)
PMV BLD AUTO: 9.2 FL (ref 5–10.5)
PO2, VEN: 43 MMHG
PO2, VEN: 50 MMHG
POTASSIUM REFLEX MAGNESIUM: 4.4 MMOL/L (ref 3.5–5.1)
POTASSIUM SERPL-SCNC: 4.5 MMOL/L (ref 3.5–5.1)
PRO-BNP: 51 PG/ML (ref 0–124)
PRO-BNP: 86 PG/ML (ref 0–124)
PROCALCITONIN: 0.06 NG/ML (ref 0–0.15)
PROTEIN UA: NEGATIVE MG/DL
RAPID INFLUENZA  B AGN: NEGATIVE
RAPID INFLUENZA A AGN: NEGATIVE
RBC # BLD: 5.14 M/UL (ref 4.2–5.9)
RBC # BLD: 5.2 M/UL (ref 4.2–5.9)
SARS-COV-2, NAAT: NOT DETECTED
SARS-COV-2, NAAT: NOT DETECTED
SODIUM BLD-SCNC: 140 MMOL/L (ref 136–145)
SODIUM BLD-SCNC: 141 MMOL/L (ref 136–145)
SPECIFIC GRAVITY UA: 1.02 (ref 1–1.03)
TCO2 CALC VENOUS: 34 MMOL/L
TCO2 CALC VENOUS: 35 MMOL/L
TOTAL PROTEIN: 7.3 G/DL (ref 6.4–8.2)
TROPONIN: <0.01 NG/ML
TROPONIN: <0.01 NG/ML
URINE REFLEX TO CULTURE: NORMAL
URINE TYPE: NORMAL
UROBILINOGEN, URINE: 0.2 E.U./DL
WBC # BLD: 16.2 K/UL (ref 4–11)
WBC # BLD: 20.8 K/UL (ref 4–11)

## 2020-12-29 PROCEDURE — 85025 COMPLETE CBC W/AUTO DIFF WBC: CPT

## 2020-12-29 PROCEDURE — 84145 PROCALCITONIN (PCT): CPT

## 2020-12-29 PROCEDURE — 81003 URINALYSIS AUTO W/O SCOPE: CPT

## 2020-12-29 PROCEDURE — 1200000000 HC SEMI PRIVATE

## 2020-12-29 PROCEDURE — 6370000000 HC RX 637 (ALT 250 FOR IP): Performed by: NURSE PRACTITIONER

## 2020-12-29 PROCEDURE — 84484 ASSAY OF TROPONIN QUANT: CPT

## 2020-12-29 PROCEDURE — 72125 CT NECK SPINE W/O DYE: CPT

## 2020-12-29 PROCEDURE — 87040 BLOOD CULTURE FOR BACTERIA: CPT

## 2020-12-29 PROCEDURE — 71045 X-RAY EXAM CHEST 1 VIEW: CPT

## 2020-12-29 PROCEDURE — 93005 ELECTROCARDIOGRAM TRACING: CPT | Performed by: EMERGENCY MEDICINE

## 2020-12-29 PROCEDURE — 2580000003 HC RX 258: Performed by: EMERGENCY MEDICINE

## 2020-12-29 PROCEDURE — 83880 ASSAY OF NATRIURETIC PEPTIDE: CPT

## 2020-12-29 PROCEDURE — 70450 CT HEAD/BRAIN W/O DYE: CPT

## 2020-12-29 PROCEDURE — 83605 ASSAY OF LACTIC ACID: CPT

## 2020-12-29 PROCEDURE — 6360000002 HC RX W HCPCS: Performed by: NURSE PRACTITIONER

## 2020-12-29 PROCEDURE — 96365 THER/PROPH/DIAG IV INF INIT: CPT

## 2020-12-29 PROCEDURE — 71250 CT THORAX DX C-: CPT

## 2020-12-29 PROCEDURE — 99282 EMERGENCY DEPT VISIT SF MDM: CPT

## 2020-12-29 PROCEDURE — 94640 AIRWAY INHALATION TREATMENT: CPT

## 2020-12-29 PROCEDURE — 82803 BLOOD GASES ANY COMBINATION: CPT

## 2020-12-29 PROCEDURE — 99285 EMERGENCY DEPT VISIT HI MDM: CPT

## 2020-12-29 PROCEDURE — 2580000003 HC RX 258: Performed by: NURSE PRACTITIONER

## 2020-12-29 PROCEDURE — 87804 INFLUENZA ASSAY W/OPTIC: CPT

## 2020-12-29 PROCEDURE — U0002 COVID-19 LAB TEST NON-CDC: HCPCS

## 2020-12-29 PROCEDURE — 80053 COMPREHEN METABOLIC PANEL: CPT

## 2020-12-29 PROCEDURE — 36415 COLL VENOUS BLD VENIPUNCTURE: CPT

## 2020-12-29 RX ORDER — DOXYCYCLINE 100 MG/1
100 TABLET ORAL 2 TIMES DAILY
Qty: 20 TABLET | Refills: 0 | Status: SHIPPED | OUTPATIENT
Start: 2020-12-29 | End: 2020-12-29 | Stop reason: SDUPTHER

## 2020-12-29 RX ORDER — ALBUTEROL SULFATE 90 UG/1
2 AEROSOL, METERED RESPIRATORY (INHALATION) EVERY 6 HOURS PRN
Status: DISCONTINUED | OUTPATIENT
Start: 2020-12-29 | End: 2020-12-29 | Stop reason: HOSPADM

## 2020-12-29 RX ORDER — CYCLOBENZAPRINE HCL 10 MG
10 TABLET ORAL 3 TIMES DAILY PRN
Status: ON HOLD | COMMUNITY
Start: 2019-01-23 | End: 2022-06-11

## 2020-12-29 RX ORDER — IPRATROPIUM BROMIDE AND ALBUTEROL SULFATE 2.5; .5 MG/3ML; MG/3ML
1 SOLUTION RESPIRATORY (INHALATION) ONCE
Status: DISCONTINUED | OUTPATIENT
Start: 2020-12-29 | End: 2020-12-29

## 2020-12-29 RX ORDER — CLONAZEPAM 0.5 MG/1
1 TABLET ORAL ONCE
Status: COMPLETED | OUTPATIENT
Start: 2020-12-29 | End: 2020-12-29

## 2020-12-29 RX ORDER — IPRATROPIUM BROMIDE AND ALBUTEROL SULFATE 2.5; .5 MG/3ML; MG/3ML
1 SOLUTION RESPIRATORY (INHALATION) ONCE
Status: COMPLETED | OUTPATIENT
Start: 2020-12-29 | End: 2020-12-29

## 2020-12-29 RX ORDER — DOXYCYCLINE 100 MG/1
100 TABLET ORAL 2 TIMES DAILY
Qty: 20 TABLET | Refills: 0 | Status: SHIPPED | OUTPATIENT
Start: 2020-12-29 | End: 2021-01-08

## 2020-12-29 RX ORDER — ALBUTEROL SULFATE 90 UG/1
2 AEROSOL, METERED RESPIRATORY (INHALATION) EVERY 6 HOURS PRN
COMMUNITY
Start: 2018-10-16

## 2020-12-29 RX ORDER — 0.9 % SODIUM CHLORIDE 0.9 %
1000 INTRAVENOUS SOLUTION INTRAVENOUS ONCE
Status: COMPLETED | OUTPATIENT
Start: 2020-12-29 | End: 2020-12-29

## 2020-12-29 RX ORDER — METHYLPREDNISOLONE SODIUM SUCCINATE 125 MG/2ML
125 INJECTION, POWDER, LYOPHILIZED, FOR SOLUTION INTRAMUSCULAR; INTRAVENOUS ONCE
Status: DISCONTINUED | OUTPATIENT
Start: 2020-12-29 | End: 2020-12-29 | Stop reason: HOSPADM

## 2020-12-29 RX ORDER — DOXEPIN HYDROCHLORIDE 25 MG/1
25 CAPSULE ORAL NIGHTLY
COMMUNITY

## 2020-12-29 RX ADMIN — CEFTRIAXONE 1 G: 1 INJECTION, POWDER, FOR SOLUTION INTRAMUSCULAR; INTRAVENOUS at 22:43

## 2020-12-29 RX ADMIN — CLONAZEPAM 1 MG: 0.5 TABLET ORAL at 22:44

## 2020-12-29 RX ADMIN — AZITHROMYCIN MONOHYDRATE 500 MG: 500 INJECTION, POWDER, LYOPHILIZED, FOR SOLUTION INTRAVENOUS at 23:36

## 2020-12-29 RX ADMIN — IPRATROPIUM BROMIDE AND ALBUTEROL SULFATE 1 AMPULE: .5; 3 SOLUTION RESPIRATORY (INHALATION) at 22:58

## 2020-12-29 RX ADMIN — SODIUM CHLORIDE 1000 ML: 9 INJECTION, SOLUTION INTRAVENOUS at 05:28

## 2020-12-29 ASSESSMENT — ENCOUNTER SYMPTOMS
WHEEZING: 0
DIARRHEA: 0
BACK PAIN: 0
CHOKING: 0
STRIDOR: 0
APNEA: 0
EYE REDNESS: 0
CHEST TIGHTNESS: 0
CONSTIPATION: 0
EYE DISCHARGE: 0
COLOR CHANGE: 0
COUGH: 1
EYE PAIN: 0
BLOOD IN STOOL: 0
SHORTNESS OF BREATH: 1
NAUSEA: 0
EYE ITCHING: 0
PHOTOPHOBIA: 0
VOMITING: 0
ABDOMINAL PAIN: 0
RECTAL PAIN: 0
ABDOMINAL DISTENTION: 0
ANAL BLEEDING: 0

## 2020-12-29 ASSESSMENT — PAIN DESCRIPTION - PROGRESSION
CLINICAL_PROGRESSION: NOT CHANGED
CLINICAL_PROGRESSION: GRADUALLY WORSENING

## 2020-12-29 ASSESSMENT — PAIN DESCRIPTION - FREQUENCY: FREQUENCY: CONTINUOUS

## 2020-12-29 ASSESSMENT — PAIN DESCRIPTION - PAIN TYPE: TYPE: CHRONIC PAIN

## 2020-12-29 ASSESSMENT — PAIN DESCRIPTION - DESCRIPTORS: DESCRIPTORS: DISCOMFORT

## 2020-12-29 ASSESSMENT — PAIN DESCRIPTION - LOCATION: LOCATION: HEAD

## 2020-12-29 NOTE — ED NOTES
Patient aware of the risks of signing out, aware he may return at any time. Patient verbalizes understanding.      Dilip Rossi RN  12/29/20 0600

## 2020-12-29 NOTE — ED NOTES
Patient alert and oriented x4. Patient states he does not want to receive any medications for his respiratory status. MD at bedside for refusal. Patient states he wants to leave and follow up with his pulmonologist. Patient states \"I came in here for a head injury and you people are running me through the gammut, I am not happy. \"     Dilip Rossi RN  12/29/20 5486

## 2020-12-29 NOTE — ED PROVIDER NOTES
[address] [facility]      Pt Name: Justino Lopez  MRN: [id number]  [place] 1950  Date of evaluation: 12/29/2020  Provider: MD Jeana Anderson       Chief Complaint   Patient presents with    Headache     pt came in with a headache that started 4 hours ago after \"horseplay with his son\". pt states he hit the back of his head on a wall       HISTORY OF PRESENT ILLNESS    Justino Lopez is a 78 y.o. male who presents to the emergency department with head trauma. Patient states he was roughhousing with family and accidentally hit the back of his head on the wall. States has had head pain since. No loss of consciousness. No nausea vomiting. Patient endorses chronic shortness of breath. Has a history of COPD. States he is supposed to wear oxygen but does not. States he does have oxygen at home. Endorses chronic cough. Dry cough. No production. Endorses acute on chronic shortness of breath. States this has been going on the last few days. No chest pain. No other associated symptoms. Nursing Notes were reviewed. Including nursing noted for FM, Surgical History, Past Medical History, Social History, vitals, and allergies; agree with all. REVIEW OF SYSTEMS       Review of Systems   Constitutional: Positive for activity change, appetite change, chills, fatigue and fever. Negative for diaphoresis and unexpected weight change. HENT: Negative for congestion, dental problem, drooling, ear discharge and ear pain. Eyes: Negative for photophobia, pain, discharge, redness, itching and visual disturbance. Respiratory: Positive for cough and shortness of breath. Negative for apnea, choking, chest tightness, wheezing and stridor. Cardiovascular: Negative for chest pain, palpitations and leg swelling.    Gastrointestinal: Negative for abdominal distention, abdominal pain, anal bleeding, blood in stool, constipation, diarrhea, nausea, rectal pain and vomiting. Endocrine: Negative for cold intolerance and heat intolerance. Genitourinary: Negative for decreased urine volume and urgency. Musculoskeletal: Negative for arthralgias and back pain. Skin: Negative for color change and pallor. Neurological: Positive for headaches. Negative for dizziness and facial asymmetry. Hematological: Negative for adenopathy. Does not bruise/bleed easily. Psychiatric/Behavioral: Negative for agitation, behavioral problems, confusion and decreased concentration. Except as noted above the remainder of the review of systems was reviewed and negative. PAST MEDICAL HISTORY     Past Medical History:   Diagnosis Date    Anxiety     Bipolar 1 disorder ([facility] [address]     Community acquired bacterial pneumonia 6/12/2015    COPD [facility] obstructive pulmonary disease) ([facility])     Depression     Fibromyalgia     Headache(784.0)     Hyperlipidemia     Manic depressive disease manic phase ([facility])     PTSD (post-traumatic stress disorder)     Seizures ([facility])     Tardive dyskinesia        SURGICAL HISTORY       Past Surgical History:   Procedure Laterality Date    MOUTH SURGERY  05/2013       CURRENT MEDICATIONS       Previous Medications    ASPIRIN 325 MG TABLET    Take 650 mg by mouth 2 times daily as needed for Pain or Fever Indications: doesnt take every day     ATORVASTATIN (LIPITOR) 10 MG TABLET    Take 10 mg by mouth daily    BUDESONIDE-FORMOTEROL (SYMBICORT) 80-4.5 MCG/ACT AERO    Inhale 2 puffs into the lungs 2 times daily    BUTALBITAL-ACETAMINOPHEN-CAFFEINE (FIORICET, ESGIC) -40 MG PER TABLET    Take 1 tablet by mouth every 4 hours as needed for Headaches    CLONAZEPAM (KLONOPIN) 0.5 MG TABLET    Take 0.5 mg by mouth daily as needed for Anxiety.     CLONIDINE (CATAPRES) 0.1 MG TABLET    Take 0.2 mg by mouth every 4 hours as needed for High Blood Pressure (anxiety / tremor)     GABAPENTIN (NEURONTIN) 300 MG CAPSULE Take 300 mg by mouth 2 times daily. HYDROXYZINE (ATARAX) 50 MG TABLET    Take 50 mg by mouth every 8 hours as needed for Itching or Anxiety    MOMETASONE-FORMOTEROL (DULERA) 200-5 MCG/ACT INHALER    Inhale 2 puffs into the lungs every 12 hours    NICOTINE (NICODERM CQ) 21 MG/24HR    Place 1 patch onto the skin daily    OMEPRAZOLE (PRILOSEC) 40 MG DELAYED RELEASE CAPSULE    Take 40 mg by mouth daily    ONDANSETRON (ZOFRAN) 4 MG TABLET    Take 1 tablet by mouth every 8 hours as needed for Nausea or Vomiting. PANTOPRAZOLE (PROTONIX) 40 MG TABLET    Take 1 tablet by mouth every morning (before breakfast)    QUETIAPINE (SEROQUEL) 25 MG TABLET    Take 100 mg by mouth nightly    ZOLPIDEM (AMBIEN) 10 MG TABLET    Take 10 mg by mouth nightly as needed for Sleep.         ALLERGIES     Prednisone, Serotonin reuptake inhibitors (ssris), and Tricyclic antidepressants    FAMILY HISTORY        Family History   Problem Relation Age of Onset    Mental Illness Mother     Mental Illness Father        SOCIAL HISTORY       Social History     Socioeconomic History    Marital status:      Spouse name: None    Number of children: None    Years of education: None    Highest education level: None   Occupational History    None   Social Needs    Financial resource strain: None    Food insecurity     Worry: None     Inability: None    Transportation needs     Medical: None     Non-medical: None   Tobacco Use    Smoking status: Current Every Day Smoker     Packs/day: 0.00     Years: 42.00     Pack years: 0.00     Types: Cigarettes    Smokeless tobacco: Never Used   Substance and Sexual Activity    Alcohol use: No     Comment: hasn't drunk in 10-12 years    Drug use: No    Sexual activity: Never   Lifestyle    Physical activity     Days per week: None     Minutes per session: None    Stress: None   Relationships    Social connections     Talks on phone: None     Gets together: None     Attends Bahai service: None     Active member of club or organization: None     Attends meetings of clubs or organizations: None     Relationship status: None    Intimate partner violence     Fear of current or ex partner: None     Emotionally abused: None     Physically abused: None     Forced sexual activity: None   Other Topics Concern    None   Social History Narrative    None       PHYSICAL EXAM       ED Triage Vitals [12/29/20 0444]   BP Temp Temp Source Pulse Resp SpO2 Height Weight   (!) 147/93 99.6 °F (37.6 °C) Oral 124 28 (!) 78 % 5' 7\" (1.702 m) 178 lb 12.7 oz (81.1 kg)       Physical Exam  Vitals signs and nursing note reviewed. Constitutional:       General: He is not in acute distress. Appearance: He is well-developed. He is ill-appearing. He is not diaphoretic. HENT:      Head: Normocephalic and atraumatic. Eyes:      General:         Right eye: No discharge. Left eye: No discharge. Pupils: Pupils are equal, round, and reactive to light. Neck:      Musculoskeletal: Normal range of motion. Thyroid: No thyromegaly. Trachea: No tracheal deviation. Cardiovascular:      Rate and Rhythm: Regular rhythm. Tachycardia present. Heart sounds: No murmur. Pulmonary:      Breath sounds: Wheezing present. No rales. Chest:      Chest wall: No tenderness. Abdominal:      General: There is no distension. Palpations: Abdomen is soft. There is no mass. Tenderness: There is no abdominal tenderness. There is no guarding or rebound. Musculoskeletal: Normal range of motion. General: No tenderness or deformity. Skin:     General: Skin is warm. Coloration: Skin is not pale. Findings: No erythema or rash. Neurological:      Mental Status: He is alert. Cranial Nerves: No cranial nerve deficit. Motor: No abnormal muscle tone. Coordination: Coordination normal.   Psychiatric:         Speech: Speech normal. He is communicative.  Speech is not rapid and pressured, delayed or tangential.         Behavior: Behavior is agitated. Thought Content: Thought content is not paranoid or delusional. Thought content does not include homicidal or suicidal ideation. Thought content does not include homicidal or suicidal plan. Cognition and Memory: Cognition and memory normal.         Judgment: Judgment is not impulsive or inappropriate. DIAGNOSTIC RESULTS     EKG: All EKG's are interpreted by the Emergency Department Physician who either signs or Co-signs this chart in the absence of acardiologist.    EKG shows sinus tachycardia normal axis deviation no ectopy no acute ST changes    RADIOLOGY:   Non-plain film images such as CT, Ultrasoundand MRI are read by the radiologist. Plain radiographic images are visualized and preliminarily interpreted by the emergency physician with the below findings:    CT head CT chest shows  Impression   1. Bilateral lower lung lobe and right middle lobe multifocal ill-defined   ground-glass consolidation.  Differential diagnostic considerations include   viral pneumonia, opportunistic infection, hypersensitivity pneumonitis, drug   toxicity.  Recommend clinical correlation. 2. Moderate centrilobular emphysema. 3. No evidence of acute chest trauma. Impression   1. No evidence of acute intracranial abnormality. 2. No acute abnormality of the cervical spine. 3. Mild cerebral white matter chronic microvascular ischemic disease. 4. Generalized reversal of cervical lordosis, as discussed above. 5. C3 on C4, C4 on C5 anterolisthesis each measuring 4 mm, likely   degenerative. 6. C5/C6 mild, C6/C7 borderline central canal stenosis secondary to   encroachment by posterior disc osteophyte complex.      ED BEDSIDE ULTRASOUND:   Performed by ED Physician - none    LABS:  Labs Reviewed   CBC WITH AUTO DIFFERENTIAL - Abnormal; Notable for the following components:       Result Value    WBC 16.2 (*)     Neutrophils Absolute 13.0 (*)     All other components within normal limits    Narrative:     Performed at:  [facility]  [address] [place], [place] [address]   Phone [phone]   BASIC METABOLIC PANEL W/ REFLEX TO MG FOR LOW K - Abnormal; Notable for the following components:    Glucose 120 (*)     All other components within normal limits    Narrative:     Performed at:  [facility]  [address] [place], [place] [address]   Phone [phone]   BLOOD GAS, VENOUS - Abnormal; Notable for the following components:    pH, Jonny 7.307 (*)     pCO2, Jonny 63.9 (*)     HCO3, Venous 32 (*)     All other components within normal limits    Narrative:     Performed at:  [facility]  [address] [place], [place] [address]   Phone [phone]   RAPID INFLUENZA A/B ANTIGENS   TROPONIN    Narrative:     Performed at:  [facility]  [address] [place], [place] [address]   Phone [phone]   BRAIN NATRIURETIC PEPTIDE    Narrative:     Performed at:  [facility]  [address] [place], [place] [address]   Phone [phone]     All other labs were withinnormal range or not returned as of this dictation. EMERGENCY DEPARTMENT COURSE and DIFFERENTIAL DIAGNOSIS/MDM:     PMH, Surgical Hx, FH, Social Hx reviewed by myself (ETOH usage, Tobacco usage, Drug usage reviewed by myself, no pertinent Hx)- No Pertinent Hx     Old records were reviewed by me    82-year-old with shortness of breath found to be hypoxic and hypercapnic. Patient was 78% on room air. Patient has low-grade temperature. Patient is also tachycardic. Sinus tachycardic. Patient does have tachypnea. Patient placed on 4 L nasal cannula with oxygen saturation improving. Patient given fluids for tachycardia and evidence of dehydration. Patient does have a leukocytosis.   CT shows groundglass opacities. I went back into the room discussed with patient about the possibility of admission. Patient states he does not want to stay and wants to leave the hospital.  Patient understands the risk of this. Patient has medical decision capacity. He is alert oriented with no signs of disorientation's or altered mental status. He is an adult and can make his own decisions. I stressed the importance of him staying in hearing to medical vice but he wants to leave. Discussed close PCP follow-up and also that he can return at any time he wants. Patient refused breathing treatment and steroids. Did write him prescription for antibiotics if he decides to take it. He got very upset and told me there is no way he would take it. States he is leaving. Knows that if he leaves he could potentially die or worse. I discussed the importance of complying and adhering to medical advice. Nevertheless, pt continues to refuse the recommendation for admission and adamantly expresses a desire to leave at this time. Pt verbalized a clear understanding that this decision could possibly lead to serious consequences. Pt accepts responsibility for any harm or any deterioration in their condition that could result from leaving against medical advice at this time. As a responsible adult, pt expresses a clear understanding and willingness to accept responsibility for this AMA decision. More importantly, pt understands that while this decision is final in the moment, they may return to the ED at anytime for any reason. The patient has decided to leave against medical advice. The patient is awake and alert, non-intoxicated, non-suicidal, nonpsychotic. There is no medical condition that prevents or inhibits their understanding of the risks/benefits of their decision. The patient understands the risks and benefits of their decision and has been given the opportunity to ask questions about their medical condition. CRITICAL CARE TIME   Total Critical Caretime was 39 minutes, excluding separately reportable procedures. There was a high probability of clinically significant/life threatening deterioration in the patient's condition which required my urgent intervention. PROCEDURES:  Unlessotherwise noted below, none    FINAL IMPRESSION      1. Acute respiratory failure with hypoxia and hypercapnia (HCC)    2. Ground glass opacity present on imaging of lung    3. Tachycardia    4. Left against medical advice    5.  Abnormal CT scan          DISPOSITION/PLAN   DISPOSITION      Patient left AGAINST MEDICAL ADVICE.    (Please note that portions ofthis note were completed with a voice recognition program.  Efforts were made to edit the dictations but occasionally words are mis-transcribed.)    Davian Leggett MD(electronically signed)  Attending Emergency Physician        Davian Leggett MD  12/29/20 2383

## 2020-12-29 NOTE — ED NOTES
Bed: B-05  Expected date: 12/29/20  Expected time: 6:35 PM  Means of arrival: [facility]  Comments:  Jose Martin Ballesteros RN  12/29/20 6515

## 2020-12-29 NOTE — ED NOTES
Patient assisted from ED via wheelchair. AMA form signed and discussed with patient. All questions answered. Patient verbalizes understanding of discharge instructions. Patient assisted into vehicle driven by spouse.      Christina Bennett RN  12/29/20 3409

## 2020-12-30 ENCOUNTER — HOSPITAL ENCOUNTER (INPATIENT)
Age: 70
LOS: 1 days | Discharge: HOME | DRG: 194 | End: 2021-01-01
Attending: EMERGENCY MEDICINE | Admitting: STUDENT IN AN ORGANIZED HEALTH CARE EDUCATION/TRAINING PROGRAM
Payer: COMMERCIAL

## 2020-12-30 ENCOUNTER — APPOINTMENT (OUTPATIENT)
Dept: GENERAL RADIOLOGY | Age: 70
DRG: 194 | End: 2020-12-30
Payer: COMMERCIAL

## 2020-12-30 VITALS
OXYGEN SATURATION: 90 % | BODY MASS INDEX: 27.45 KG/M2 | WEIGHT: 175.27 LBS | SYSTOLIC BLOOD PRESSURE: 148 MMHG | TEMPERATURE: 98.3 F | RESPIRATION RATE: 18 BRPM | DIASTOLIC BLOOD PRESSURE: 89 MMHG | HEART RATE: 118 BPM

## 2020-12-30 DIAGNOSIS — J18.9 PNEUMONIA DUE TO ORGANISM: Primary | ICD-10-CM

## 2020-12-30 LAB
A/G RATIO: 1.3 (ref 1.1–2.2)
ALBUMIN SERPL-MCNC: 3.9 G/DL (ref 3.4–5)
ALP BLD-CCNC: 117 U/L (ref 40–129)
ALT SERPL-CCNC: 25 U/L (ref 10–40)
ANION GAP SERPL CALCULATED.3IONS-SCNC: 10 MMOL/L (ref 3–16)
AST SERPL-CCNC: 27 U/L (ref 15–37)
BASOPHILS ABSOLUTE: 0.1 K/UL (ref 0–0.2)
BASOPHILS RELATIVE PERCENT: 0.6 %
BILIRUB SERPL-MCNC: 0.3 MG/DL (ref 0–1)
BUN BLDV-MCNC: 7 MG/DL (ref 7–20)
CALCIUM SERPL-MCNC: 8.6 MG/DL (ref 8.3–10.6)
CHLORIDE BLD-SCNC: 101 MMOL/L (ref 99–110)
CO2: 30 MMOL/L (ref 21–32)
CREAT SERPL-MCNC: 0.8 MG/DL (ref 0.8–1.3)
EOSINOPHILS ABSOLUTE: 0 K/UL (ref 0–0.6)
EOSINOPHILS RELATIVE PERCENT: 0.3 %
GFR AFRICAN AMERICAN: >60
GFR NON-AFRICAN AMERICAN: >60
GLOBULIN: 2.9 G/DL
GLUCOSE BLD-MCNC: 107 MG/DL (ref 70–99)
HCT VFR BLD CALC: 44.6 % (ref 40.5–52.5)
HEMOGLOBIN: 14.6 G/DL (ref 13.5–17.5)
LACTIC ACID, SEPSIS: 1.3 MMOL/L (ref 0.4–1.9)
LACTIC ACID: 1.6 MMOL/L (ref 0.4–2)
LYMPHOCYTES ABSOLUTE: 2.1 K/UL (ref 1–5.1)
LYMPHOCYTES RELATIVE PERCENT: 14 %
MCH RBC QN AUTO: 30.1 PG (ref 26–34)
MCHC RBC AUTO-ENTMCNC: 32.6 G/DL (ref 31–36)
MCV RBC AUTO: 92.2 FL (ref 80–100)
MONOCYTES ABSOLUTE: 0.9 K/UL (ref 0–1.3)
MONOCYTES RELATIVE PERCENT: 6.4 %
NEUTROPHILS ABSOLUTE: 11.5 K/UL (ref 1.7–7.7)
NEUTROPHILS RELATIVE PERCENT: 78.7 %
PDW BLD-RTO: 15.3 % (ref 12.4–15.4)
PLATELET # BLD: 211 K/UL (ref 135–450)
PMV BLD AUTO: 9.3 FL (ref 5–10.5)
POTASSIUM REFLEX MAGNESIUM: 4.2 MMOL/L (ref 3.5–5.1)
PROCALCITONIN: 0.07 NG/ML (ref 0–0.15)
RBC # BLD: 4.84 M/UL (ref 4.2–5.9)
SODIUM BLD-SCNC: 141 MMOL/L (ref 136–145)
TOTAL PROTEIN: 6.8 G/DL (ref 6.4–8.2)
WBC # BLD: 14.6 K/UL (ref 4–11)

## 2020-12-30 PROCEDURE — 6360000002 HC RX W HCPCS: Performed by: NURSE PRACTITIONER

## 2020-12-30 PROCEDURE — 36415 COLL VENOUS BLD VENIPUNCTURE: CPT

## 2020-12-30 PROCEDURE — 87040 BLOOD CULTURE FOR BACTERIA: CPT

## 2020-12-30 PROCEDURE — 82803 BLOOD GASES ANY COMBINATION: CPT

## 2020-12-30 PROCEDURE — 2580000003 HC RX 258: Performed by: NURSE PRACTITIONER

## 2020-12-30 PROCEDURE — 80053 COMPREHEN METABOLIC PANEL: CPT

## 2020-12-30 PROCEDURE — 94640 AIRWAY INHALATION TREATMENT: CPT

## 2020-12-30 PROCEDURE — 83605 ASSAY OF LACTIC ACID: CPT

## 2020-12-30 PROCEDURE — 85025 COMPLETE CBC W/AUTO DIFF WBC: CPT

## 2020-12-30 PROCEDURE — 6370000000 HC RX 637 (ALT 250 FOR IP): Performed by: NURSE PRACTITIONER

## 2020-12-30 PROCEDURE — 84484 ASSAY OF TROPONIN QUANT: CPT

## 2020-12-30 PROCEDURE — 84145 PROCALCITONIN (PCT): CPT

## 2020-12-30 PROCEDURE — 94760 N-INVAS EAR/PLS OXIMETRY 1: CPT

## 2020-12-30 PROCEDURE — 2700000000 HC OXYGEN THERAPY PER DAY

## 2020-12-30 PROCEDURE — 71045 X-RAY EXAM CHEST 1 VIEW: CPT

## 2020-12-30 PROCEDURE — 99282 EMERGENCY DEPT VISIT SF MDM: CPT

## 2020-12-30 PROCEDURE — 83880 ASSAY OF NATRIURETIC PEPTIDE: CPT

## 2020-12-30 RX ORDER — DOXEPIN HYDROCHLORIDE 10 MG/1
10 CAPSULE ORAL NIGHTLY
Status: DISCONTINUED | OUTPATIENT
Start: 2020-12-30 | End: 2020-12-30 | Stop reason: HOSPADM

## 2020-12-30 RX ORDER — ACETAMINOPHEN 325 MG/1
650 TABLET ORAL EVERY 6 HOURS PRN
Status: DISCONTINUED | OUTPATIENT
Start: 2020-12-30 | End: 2020-12-30 | Stop reason: HOSPADM

## 2020-12-30 RX ORDER — SODIUM CHLORIDE 0.9 % (FLUSH) 0.9 %
10 SYRINGE (ML) INJECTION PRN
Status: DISCONTINUED | OUTPATIENT
Start: 2020-12-30 | End: 2020-12-30 | Stop reason: HOSPADM

## 2020-12-30 RX ORDER — SODIUM CHLORIDE 9 MG/ML
INJECTION, SOLUTION INTRAVENOUS CONTINUOUS
Status: DISCONTINUED | OUTPATIENT
Start: 2020-12-30 | End: 2020-12-30 | Stop reason: HOSPADM

## 2020-12-30 RX ORDER — ALBUTEROL SULFATE 2.5 MG/3ML
2.5 SOLUTION RESPIRATORY (INHALATION) EVERY 4 HOURS PRN
Status: DISCONTINUED | OUTPATIENT
Start: 2020-12-30 | End: 2020-12-30 | Stop reason: HOSPADM

## 2020-12-30 RX ORDER — ALBUTEROL SULFATE 90 UG/1
2 AEROSOL, METERED RESPIRATORY (INHALATION) EVERY 4 HOURS PRN
Status: DISCONTINUED | OUTPATIENT
Start: 2020-12-30 | End: 2020-12-30 | Stop reason: CLARIF

## 2020-12-30 RX ORDER — LACTOBACILLUS RHAMNOSUS GG 10B CELL
1 CAPSULE ORAL
Status: DISCONTINUED | OUTPATIENT
Start: 2020-12-30 | End: 2020-12-30 | Stop reason: HOSPADM

## 2020-12-30 RX ORDER — SODIUM CHLORIDE 0.9 % (FLUSH) 0.9 %
10 SYRINGE (ML) INJECTION EVERY 12 HOURS SCHEDULED
Status: DISCONTINUED | OUTPATIENT
Start: 2020-12-30 | End: 2020-12-30 | Stop reason: HOSPADM

## 2020-12-30 RX ORDER — BUTALBITAL, ACETAMINOPHEN AND CAFFEINE 50; 325; 40 MG/1; MG/1; MG/1
1 TABLET ORAL 3 TIMES DAILY PRN
Status: DISCONTINUED | OUTPATIENT
Start: 2020-12-30 | End: 2020-12-30 | Stop reason: HOSPADM

## 2020-12-30 RX ORDER — BUDESONIDE AND FORMOTEROL FUMARATE DIHYDRATE 80; 4.5 UG/1; UG/1
2 AEROSOL RESPIRATORY (INHALATION) 2 TIMES DAILY
Status: DISCONTINUED | OUTPATIENT
Start: 2020-12-30 | End: 2020-12-30 | Stop reason: HOSPADM

## 2020-12-30 RX ORDER — TRIAZOLAM 0.12 MG/1
500 TABLET ORAL 2 TIMES DAILY
Status: DISCONTINUED | OUTPATIENT
Start: 2020-12-30 | End: 2020-12-30 | Stop reason: CLARIF

## 2020-12-30 RX ORDER — CLONAZEPAM 0.5 MG/1
0.5 TABLET ORAL DAILY PRN
Status: DISCONTINUED | OUTPATIENT
Start: 2020-12-30 | End: 2020-12-30 | Stop reason: HOSPADM

## 2020-12-30 RX ORDER — PANTOPRAZOLE SODIUM 40 MG/1
40 TABLET, DELAYED RELEASE ORAL
Status: DISCONTINUED | OUTPATIENT
Start: 2020-12-30 | End: 2020-12-30 | Stop reason: HOSPADM

## 2020-12-30 RX ORDER — CLONIDINE HYDROCHLORIDE 0.1 MG/1
0.2 TABLET ORAL EVERY 4 HOURS PRN
Status: DISCONTINUED | OUTPATIENT
Start: 2020-12-30 | End: 2020-12-30 | Stop reason: HOSPADM

## 2020-12-30 RX ORDER — IPRATROPIUM BROMIDE AND ALBUTEROL SULFATE 2.5; .5 MG/3ML; MG/3ML
1 SOLUTION RESPIRATORY (INHALATION) EVERY 4 HOURS PRN
Status: DISCONTINUED | OUTPATIENT
Start: 2020-12-30 | End: 2020-12-30 | Stop reason: HOSPADM

## 2020-12-30 RX ORDER — LORAZEPAM 0.5 MG/1
0.5 TABLET ORAL 2 TIMES DAILY
Status: DISCONTINUED | OUTPATIENT
Start: 2020-12-30 | End: 2020-12-30 | Stop reason: HOSPADM

## 2020-12-30 RX ORDER — ACETAMINOPHEN 650 MG/1
650 SUPPOSITORY RECTAL EVERY 6 HOURS PRN
Status: DISCONTINUED | OUTPATIENT
Start: 2020-12-30 | End: 2020-12-30 | Stop reason: HOSPADM

## 2020-12-30 RX ADMIN — ACETAMINOPHEN 650 MG: 325 TABLET ORAL at 02:42

## 2020-12-30 RX ADMIN — BUTALBITAL, ACETAMINOPHEN, AND CAFFEINE 1 TABLET: 50; 325; 40 TABLET ORAL at 04:42

## 2020-12-30 RX ADMIN — Medication 1 CAPSULE: at 08:22

## 2020-12-30 RX ADMIN — LORAZEPAM 0.5 MG: 0.5 TABLET ORAL at 04:42

## 2020-12-30 RX ADMIN — ACETAMINOPHEN 650 MG: 325 TABLET ORAL at 10:25

## 2020-12-30 RX ADMIN — CLONAZEPAM 0.5 MG: 0.5 TABLET ORAL at 10:25

## 2020-12-30 RX ADMIN — Medication 10 ML: at 02:42

## 2020-12-30 RX ADMIN — PANTOPRAZOLE SODIUM 40 MG: 40 TABLET, DELAYED RELEASE ORAL at 05:49

## 2020-12-30 RX ADMIN — ENOXAPARIN SODIUM 40 MG: 40 INJECTION SUBCUTANEOUS at 08:22

## 2020-12-30 RX ADMIN — SODIUM CHLORIDE: 9 INJECTION, SOLUTION INTRAVENOUS at 02:42

## 2020-12-30 RX ADMIN — BUDESONIDE AND FORMOTEROL FUMARATE DIHYDRATE 2 PUFF: 80; 4.5 AEROSOL RESPIRATORY (INHALATION) at 08:29

## 2020-12-30 ASSESSMENT — PAIN DESCRIPTION - PROGRESSION
CLINICAL_PROGRESSION: NOT CHANGED

## 2020-12-30 ASSESSMENT — PAIN DESCRIPTION - PAIN TYPE
TYPE: CHRONIC PAIN

## 2020-12-30 ASSESSMENT — PAIN DESCRIPTION - FREQUENCY
FREQUENCY: CONTINUOUS

## 2020-12-30 ASSESSMENT — PAIN SCALES - GENERAL
PAINLEVEL_OUTOF10: 3
PAINLEVEL_OUTOF10: 3
PAINLEVEL_OUTOF10: 8
PAINLEVEL_OUTOF10: 8
PAINLEVEL_OUTOF10: 5

## 2020-12-30 ASSESSMENT — PAIN DESCRIPTION - DESCRIPTORS
DESCRIPTORS: ACHING;HEADACHE
DESCRIPTORS: ACHING;HEADACHE

## 2020-12-30 ASSESSMENT — PAIN DESCRIPTION - ORIENTATION
ORIENTATION: MID

## 2020-12-30 ASSESSMENT — PAIN DESCRIPTION - ONSET
ONSET: ON-GOING

## 2020-12-30 ASSESSMENT — PAIN DESCRIPTION - LOCATION: LOCATION: HEAD

## 2020-12-30 ASSESSMENT — PAIN - FUNCTIONAL ASSESSMENT
PAIN_FUNCTIONAL_ASSESSMENT: PREVENTS OR INTERFERES SOME ACTIVE ACTIVITIES AND ADLS
PAIN_FUNCTIONAL_ASSESSMENT: PREVENTS OR INTERFERES SOME ACTIVE ACTIVITIES AND ADLS

## 2020-12-30 NOTE — PLAN OF CARE
Problem: Falls - Risk of:  Goal: Will remain free from falls  Description: Will remain free from falls  12/30/2020 1101 by Low Hernandez RN  Outcome: Ongoing  Note: Patient will remain free from falls. Will continue to monitor. 12/30/2020 0457 by Estrada Orona RN  Outcome: Ongoing  Note: Fall risk assessment completed. Fall precautions in place. Call light within reach. Pt educated on calling for assistance before getting up. Walkway free of clutter. Will continue to monitor. Goal: Absence of physical injury  Description: Absence of physical injury  12/30/2020 1101 by Low Hernandez RN  Outcome: Ongoing  Note: Patient remained free from physical injury this shift. Will continue to monitor. 12/30/2020 0457 by Estrada Orona RN  Outcome: Ongoing     Problem: Pain:  Goal: Pain level will decrease  Description: Pain level will decrease  12/30/2020 1101 by Low Hernandez RN  Outcome: Ongoing  Note: Pt assessed for pain. Pt in pain and assessed with 8/10 pain rating scale. Pt given prescribed analgesic for pain. (See eMar) Pt satisfied with pain relief thus far. Will reassess and continue to monitor. 12/30/2020 0457 by Estrada Orona RN  Outcome: Ongoing  Note: Pt assessed for pain. Pt in pain and assessed with 0-10 pain rating scale. Pt given prescribed analgesic for pain. (See eMar) Pt satisfied with pain relief thus far. Will reassess and continue to monitor. Goal: Control of acute pain  Description: Control of acute pain  12/30/2020 1101 by Low Hernandez RN  Outcome: Ongoing  Note: Pt assessed for pain. Pt in pain and assessed with 8/10 pain rating scale. Pt given prescribed analgesic for pain. (See eMar) Pt satisfied with pain relief thus far. Will reassess and continue to monitor.     12/30/2020 0457 by Estrada Orona RN  Outcome: Ongoing  Goal: Control of chronic pain  Description: Control of chronic pain  12/30/2020 1101 by Low Hernandez RN  Outcome: Ongoing  Note: Pt assessed for pain. Pt in pain and assessed with 8/10 pain rating scale. Pt given prescribed analgesic for pain. (See eMar) Pt satisfied with pain relief thus far. Will reassess and continue to monitor.     12/30/2020 0457 by Estrada Orona RN  Outcome: Ongoing

## 2020-12-30 NOTE — CARE COORDINATION
INITIAL CASE MANAGEMENT ASSESSMENT    Reviewed chart, met with patient to assess possible discharge needs. Explained Case Management role/services. Living Situation: lives at home with his wife    ADLs: independent     DME: O2 at home -prn -does not remember name of O2 company    PT/OT Recs: up ad jorgito in room     Active Services: one     Transportation: wife will transport home at [employer]     Medications: no issues    PCP: Dr Clay Mckeon     PLAN/COMMENTS: patient lives at home with his wife- CM met with patient and his wife at bedside to discuss dc needs. Wife verbalizing need for \"help\" at home  - CM inquired about this and the wife simply states that she needs help to make sure her  is ok when she leaves the house - she also states that she needs sleep and she just doesn't get any when her  gets up w/ c/o \"HA\" in the middle of the night. Explained to pt and his wife that Medicare would allow for a visiting nurse but they would only provide skilled visit -for 2-3 visits per week for only maybe 45 minutes  - they are interested in skilled home care if ordered . CM discussed COA services and provided them w/ COA brochure - pt and his wife were receptive. CM also provided private duty pamphlet for review . Patient stating that he would like to go home today - states he \"wants to sleep and he can't sleep here in the hospital\" He states he will leave AMA if the MD does not release him. Informed bedside RN     The Plan for Transition of Care is related to the following treatment goals: home    The Patient and/or patient representative was provided with a choice of provider and agrees   with the discharge plan. [x] Yes [] No    Freedom of choice list was provided with basic dialogue that supports the patient's individualized plan of care/goals, treatment preferences and shares the quality data associated with the providers.  [x] Yes [] No         SW/CM provided contact information for patient or family to call with any questions. SW/CM will follow and assist as needed.   Electronically signed by Cj Juarez on 12/30/2020 at 3:13 PM  [phone]

## 2020-12-30 NOTE — ED PROVIDER NOTES
EKG: Sinus tachycardia, rate of 106, nonspecific ST-T changes. Rhythm strip shows sinus tachycardia with a rate of 106, AK interval 120 ms, QRS of 80 ms with no other ectopy as interpreted by me. This compared to an EKG done today, no significant change noted.       Alexia Ferraro MD  12/29/20 4789

## 2020-12-30 NOTE — PROGRESS NOTES
Hospitalist Progress Note      PCP: Dallin Yan    Date of Admission: 12/29/2020    Chief Complaint: SOB    Hospital Course: 70m copd, O2 dep with inc sob, admitted septic with RLL PNA, covid neg    Subjective: Patient is seen on O2 per NC, no distress, denies worsening sob, chest pain. Complains of isome difficulty swallowing, but denies unintentional weight losss. Onset of symptoms < 24 hrs. Medications:  Reviewed    Infusion Medications    sodium chloride 100 mL/hr at 12/30/20 0242     Scheduled Medications    budesonide-formoterol  2 puff Inhalation BID    doxepin  10 mg Oral Nightly    pantoprazole  40 mg Oral QAM AC    sodium chloride flush  10 mL Intravenous 2 times per day    enoxaparin  40 mg Subcutaneous Daily    lactobacillus  1 capsule Oral Daily with breakfast    cefTRIAXone (ROCEPHIN) IV  1 g Intravenous Q24H    azithromycin  500 mg Intravenous Q24H    LORazepam  0.5 mg Oral BID     PRN Meds: clonazePAM, cloNIDine, sodium chloride flush, acetaminophen **OR** acetaminophen, ipratropium-albuterol, butalbital-acetaminophen-caffeine, albuterol      Intake/Output Summary (Last 24 hours) at 12/30/2020 0906  Last data filed at 12/30/2020 0904  Gross per 24 hour   Intake 120 ml   Output --   Net 120 ml       Physical Exam Performed:    BP (!) 147/84   Pulse 118   Temp 98.9 °F (37.2 °C) (Oral)   Resp 18   Wt 175 lb 4.3 oz (79.5 kg)   SpO2 92%   BMI 27.45 kg/m²     General appearance: No apparent distress, appears stated age and cooperative. HEENT: Pupils equal, round, and reactive to light. Conjunctivae/corneas clear. Neck: Supple, with full range of motion. No jugular venous distention. Trachea midline. Respiratory:  Normal respiratory effort. No use of accessory muscles, no intercostal retractions, equal excursion bilat  Cardiovascular: Regular rate and rhythm    Abdomen: Soft, non-tender, non-distended   Musculoskeletal: No clubbing, cyanosis or edema bilaterally.  No calf tenderness  Skin: Skin color, texture, turgor normal.  No rashes or lesions. Labs:   Recent Labs     12/29/20  0502 12/29/20 1911 12/30/20  0533   WBC 16.2* 20.8* 14.6*   HGB 15.6 15.4 14.6   HCT 47.2 48.4 44.6    251 211     Recent Labs     12/29/20  0502 12/29/20  1911 12/30/20  0533    140 141   K 4.4 4.5 4.2    102 101   CO2 28 26 30   BUN 9 8 7   CREATININE 0.8 0.8 0.8   CALCIUM 8.9 9.0 8.6     Recent Labs     12/29/20  1911 12/30/20  0533   AST 26 27   ALT 26 25   BILITOT <0.2 0.3   ALKPHOS 127 117     No results for input(s): INR in the last 72 hours. Recent Labs     12/29/20  0502 12/29/20  1911   TROPONINI <0.01 <0.01       Urinalysis:      Lab Results   Component Value Date    NITRU Negative 12/29/2020    WBCUA 4 06/05/2016    RBCUA 2 06/05/2016    BLOODU Negative 12/29/2020    SPECGRAV 1.025 12/29/2020    GLUCOSEU Negative 12/29/2020    GLUCOSEU NEGATIVE 04/27/2012       Radiology:  XR CHEST PORTABLE   Final Result   Opacity at the right lung base suggestive of focal airspace   disease/pneumonitis. Assessment/Plan:    Active Hospital Problems    Diagnosis Date Noted    Sepsis ([facility] [address] [A41.9] 12/29/2020     1) Sepsis   - started on IV CAP coverage  - follow up blood cultures  - IVFs    2) COPD  - continue symbicort, HHN    3) Dysphagia  - advised slp eval          Dispo - advised patient continued hospitalization given septic presentation, however at this time patient insists on leaving, discussed with patient and his wife, answered all questions to their satisfaction.     Lana Garcia MD

## 2020-12-30 NOTE — Clinical Note
Patient Class: Observation [104]   REQUIRED: Diagnosis: Community acquired pneumonia of right lower lobe of lung [0891312]   Estimated Length of Stay: Estimated stay of less than 2 midnights   Admitting Provider: Marjorie Ruiz [[id number]]   Telemetry Bed Required?: No

## 2020-12-30 NOTE — PROGRESS NOTES
Pt arrived to unit at [address] [place] vis stretcher from ED. Pt is alert and oriented X4. Pt oriented to unit and to room. Pt oriented to call light and to phone. White board updated. Pt denies any further needs at this time. Will continue to monitor and assess.      Electronically signed by Dennise Perez RN on 12/30/2020 at 5:04 AM

## 2020-12-30 NOTE — FLOWSHEET NOTE
12/30/20 1130   Encounter Summary   Services provided to: Family   Referral/Consult From: Nurse   Support System Spouse   Continue Visiting   (12/30 Support - AD discussion, hailey COFFMAN)   Complexity of Encounter Moderate   Length of Encounter 15 minutes   Routine   Type Initial   Assessment Coping   Intervention Explored feelings, thoughts, concerns;Nurtured hope;Warriors Mark   Outcome Expressed feelings/needs/concerns   Advance Directives (For Healthcare)   Healthcare Directive No, patient does not have an advance directive for healthcare treatment   Advance Directives Documents given;Documents explained;Pt. not interested at this time

## 2020-12-30 NOTE — PROGRESS NOTES
Patient insisted upon leaving. MD and charge nurse, Marcel Ravi, notified. Dr. James Espinoza spoke with patient. The patient continued to consist upon leaving. He refused to sign AMA papers. Charge nurse, Marcel Ravi, was in the room with this RN and witnessed the patient refusal to sign the [facility] paperwork.      Electronically signed by Cecy Suh RN on 12/30/2020 at 2:54 PM   Electronically signed by Cirilo Jones RN on 12/30/2020 at 3:10 PM

## 2020-12-30 NOTE — PROGRESS NOTES
Patient resting in bed when this RN entered the room this morning. Patient A&O X4. Assessment completed and medication administered. See MAR. Up X1 with blossom, 2/4 bed rails up, bed in lowest position, fall precautions in place, call light within reach. Patient denies any additional requests or needs at this time. Will cont to monitor and reassess.     Electronically signed by Cecy Suh RN on 12/30/2020 at 11:04 AM

## 2020-12-30 NOTE — PROGRESS NOTES
Writer accompanied RN into room to be the witness to the [facility] paperwork. Patient became very belligerent when writer was explaining to him that \"if you feel like you need additional care, please come to the ED or any other ED in the event you feel you need care\" by stating repeatedly \"don't hold that gun to my head\". When writer asked for an explanation, patient declined at this time. Patient declined to sign the AMA paperwork at this time. Upon discharge, patient sought out writer to inform me that \"I have contacted my  and coercion is illegal\".   Electronically signed by Rizwan Mccarty RN on 12/30/2020 at 3:15 PM

## 2020-12-30 NOTE — ED NOTES
RN to bedside to change abx, patient O2 sat 80's removed himself from O2. Replaced, 4L/NC at this time.  Tolerating well     Dale Wallace RN  12/29/20 9313

## 2020-12-30 NOTE — ED PROVIDER NOTES
[address]  [address] [address]  Dept: [phone]  Loc: [address] ENCOUNTER        This patient was not seen or evaluated by the attending physician. I evaluated this patient, the attending physician was available for consultation. CHIEF COMPLAINT    Chief Complaint   Patient presents with    Fatigue     85% RA but wears o2 at home fatigue x 1 day; seen this morning in the ED       HPI    Bula Osler is a 78 y.o. male who presents to the emergency department with complaints of shortness of breath at home. The patient is supposed to wear oxygen at home but he refuses. At any rate he is complaining of shortness of breath so he came back to the emergency department. He denies chest pain, lightheadedness, dizziness. He has a chronic cough. He has a history of COPD. He was evaluated in the emergency department this morning and it was recommended that he be admitted for pneumonia but the patient left [address] at that time. He is now willing to take antibiotics and to be admitted. REVIEW OF SYSTEMS    Cardiac: no chest pain, no palpitations, no syncope  Respiratory: see HPI, + cough  Neurologic: no syncope or LOC  GI: no vomiting, no diarrhea  General: denies fever  All other systems reviewed and are negative.     PAST MEDICAL & SURGICAL HISTORY    Past Medical History:   Diagnosis Date    Anxiety     Bipolar 1 disorder ([address]     Community acquired bacterial pneumonia 6/12/2015    COPD (chronic obstructive pulmonary disease) (HCC)     Depression     Fibromyalgia     Headache(784.0)     Hyperlipidemia     Manic depressive disease manic phase (HCC)     PTSD (post-traumatic stress disorder)     Seizures (HCC)     Tardive dyskinesia      Past Surgical History:   Procedure Laterality Date    MOUTH SURGERY  05/2013       CURRENT MEDICATIONS  (may include discharge medications prescribed in the ED)  Current Outpatient Rx   Medication Sig Dispense Refill    doxycycline monohydrate (ADOXA) 100 MG tablet Take 1 tablet by mouth 2 times daily for 10 days 20 tablet 0    cyclobenzaprine (FLEXERIL) 10 MG tablet Take 10 mg by mouth 3 times daily as needed      triazolam (HALCION) 0.25 MG tablet Take 2 tablets by mouth 2 times daily. 9 pm and 2 am      albuterol sulfate HFA (PROVENTIL HFA) 108 (90 Base) MCG/ACT inhaler Inhale 2 puffs into the lungs every 6 hours as needed      doxepin (SINEQUAN) 10 MG capsule Take 10 mg by mouth nightly      budesonide-formoterol (SYMBICORT) 80-4.5 MCG/ACT AERO Inhale 2 puffs into the lungs 2 times daily 1 Inhaler 3    butalbital-acetaminophen-caffeine (FIORICET, ESGIC) -40 MG per tablet Take 1 tablet by mouth every 4 hours as needed for Headaches      omeprazole (PRILOSEC) 40 MG delayed release capsule Take 40 mg by mouth 2 times daily       clonazePAM (KLONOPIN) 0.5 MG tablet Take 0.5 mg by mouth daily as needed for Anxiety.  cloNIDine (CATAPRES) 0.1 MG tablet Take 0.2 mg by mouth every 4 hours as needed for High Blood Pressure (anxiety / tremor)       aspirin 325 MG tablet Take 650 mg by mouth 2 times daily as needed for Pain or Fever Indications: doesnt take every day       ondansetron (ZOFRAN) 4 MG tablet Take 1 tablet by mouth every 8 hours as needed for Nausea or Vomiting.  30 tablet 0       ALLERGIES    Allergies   Allergen Reactions    Prednisone Anxiety    Serotonin Reuptake Inhibitors (Ssris) Anxiety    Tricyclic Antidepressants Anxiety       SOCIAL & FAMILY HISTORY    Social History     Socioeconomic History    Marital status:      Spouse name: Not on file    Number of children: Not on file    Years of education: Not on file    Highest education level: Not on file   Occupational History    Not on file   Social Needs    Financial resource strain: Not on file    Food insecurity     Worry: Not on file     Inability: Not on file    Transportation needs     Medical: Not on file     Non-medical: Not on file   Tobacco Use    Smoking status: Current Every Day Smoker     Packs/day: 0.00     Years: 42.00     Pack years: 0.00     Types: Cigarettes    Smokeless tobacco: Never Used   Substance and Sexual Activity    Alcohol use: No     Comment: hasn't drunk in 10-12 years    Drug use: No    Sexual activity: Never   Lifestyle    Physical activity     Days per week: Not on file     Minutes per session: Not on file    Stress: Not on file   Relationships    Social connections     Talks on phone: Not on file     Gets together: Not on file     Attends Advent service: Not on file     Active member of club or organization: Not on file     Attends meetings of clubs or organizations: Not on file     Relationship status: Not on file    Intimate partner violence     Fear of current or ex partner: Not on file     Emotionally abused: Not on file     Physically abused: Not on file     Forced sexual activity: Not on file   Other Topics Concern    Not on file   Social History Narrative    Not on file     Family History   Problem Relation Age of Onset    Mental Illness Mother     Mental Illness Father        PHYSICAL EXAM    VITAL SIGNS: /75   Pulse 107   Temp 99.3 °F (37.4 °C) (Oral)   Resp 23   Wt 183 lb 10.3 oz (83.3 kg)   SpO2 93%   BMI 28.76 kg/m²    Constitutional:  Well developed, frail appearing male who appears older than stated age, no acute distress   HENT:  Atraumatic, dry mucus membranes  Neck: supple, no JVD   Respiratory: Rhonchi auscultated in the bilateral lower bases. Expiratory wheezing throughout. Respirations are even and unlabored. He is mildly tachypneic breathing at around 24-25 times a minute. No distress. Cardiovascular: Tachycardic rate with a regular rhythm.   S1-S2  Vascular: Radial and DP pulses 2+ and equal bilaterally  GI:  Soft, nontender, normal bowel sounds  Musculoskeletal:  no lower extremity edema, no lower extremity asymmetry, no calf tenderness, no thigh tenderness, no acute deformities  Integument:  Skin is warm and dry, no petechiae   Neurologic:  Alert & oriented x3, no slurred speech  Psych: Periods of agitation. No hallucinations    EKG      Twelve-lead EKG reviewed by myself and interpreted by Dr. Jame Betancourt. Ventricular rate 106 bpm, KS interval 120 ms, QRS duration 80 ms,  ms  There is no ST elevation or depression noted. Interpretation is a sinus tachycardia. Today's tracing was compared to the one taken earlier this morning at 6:27 AM.  He had a ventricular rate of 123 bpm otherwise no significant changes noted. RADIOLOGY/PROCEDURES    XR CHEST PORTABLE   Final Result   Opacity at the right lung base suggestive of focal airspace   disease/pneumonitis.            Labs Reviewed   CBC WITH AUTO DIFFERENTIAL - Abnormal; Notable for the following components:       Result Value    WBC 20.8 (*)     RDW 15.5 (*)     Neutrophils Absolute 16.9 (*)     All other components within normal limits    Narrative:     Performed at:  [address] [place] [address]   Phone [phone]   COMPREHENSIVE METABOLIC PANEL - Abnormal; Notable for the following components:    Glucose 109 (*)     All other components within normal limits    Narrative:     Performed at:  [address] [place] [address]   Phone [phone]   BLOOD GAS, VENOUS - Abnormal; Notable for the following components:    pH, Jonny 7.279 (*)     pCO2, Jonny 69.3 (*)     HCO3, Venous 33 (*)     All other components within normal limits    Narrative:     Performed at:  [facility]  [address] [facility] [place] [address]   Phone [phone]   CULTURE, BLOOD 1   CULTURE, BLOOD 2   LACTIC ACID, PLASMA    Narrative:     Performed at:  [facility]  [address] [facility] [place], [place] [address]   Phone [phone]   PROCALCITONIN    Narrative:     Performed at:  [facility]  [address] [place], [place] [address]   Phone [phone]   URINE RT REFLEX TO CULTURE    Narrative:     Performed at:  [facility]  [address] [facility] [place], [place] [address]   Phone [phone]   TROPONIN    Narrative:     Performed at:  [facility]  [address] [facility] [place], [place] [address]   Phone [phone]   BRAIN NATRIURETIC PEPTIDE    Narrative:     Performed at:  [facility]  [address] [facility] [place], [place] [address]   Phone [phone]    Narrative:     Performed at:  [facility]  [address] [facility] [place], [place] [address]   Phone [phone]   POCT GLUCOSE         ED [address] [facility]    Pertinent Labs & Imaging studies reviewed and interpreted. (See chart for details)    See chart for details of medications given during the ED stay. Vitals:    12/29/20 1849 12/29/20 1857   BP: 133/75    Pulse: 108 107   Resp: 23    Temp: 99.3 °F (37.4 °C)    TempSrc: Oral    SpO2: 93%    Weight: 183 lb 10.3 oz (83.3 kg)      Medications   azithromycin (ZITHROMAX) 500 mg in D5W 250ml addavial (has no administration in time range)   cefTRIAXone (ROCEPHIN) 1 g IVPB in 50 mL D5W minibag (has no administration in time range)   clonazePAM (KLONOPIN) tablet 1 mg (has no administration in time range)   ipratropium-albuterol (DUONEB) nebulizer solution 1 ampule (has no administration in time range)     I have seen and evaluated this patient. My attending physician was available for consultation. Differential diagnosis includes but is not limited to Covid, pneumonia, influenza, heart failure, COPD, ACS, other.   He is a mildly ill-appearing male coming in tachypneic and tachycardic and around 87% on room air. O2 at 2 L per nasal cannula was applied. He left AGAINST MEDICAL ADVICE this morning refusing to take any of the prescriptions or antibiotics. He is now returning because he is ready to take the treatment. He states he was just scared earlier. He has a worsened leukocytosis of over 20,000 with a left shift. This morning his white count was 16,000. CMP is unremarkable with a glucose of 109. proBNP is 86. Troponin is less than 0.01. Blood cultures x2 was drawn in the ED. Negative Covid. Negative influenza. Is unremarkable for infection at this time. CT of the chest without contrast that was performed at 5:25 AM shows   1. Bilateral lower lung lobe and right middle lobe multifocal ill-defined   ground-glass consolidation.  Differential diagnostic considerations include   viral pneumonia, opportunistic infection, hypersensitivity pneumonitis, drug   toxicity.  Recommend clinical correlation. 2. Moderate centrilobular emphysema. 3. No evidence of acute chest trauma. He was started on azithromycin and Rocephin. I gave him his nighttime dose of Klonopin. He is currently on O2 at 2 L per nasal cannula. He does take the oxygen off and he desats into the high 80s. He is willing to stay at this time. Perfect serve out to the hospitalist for admission. CRITICAL CARE NOTE:  There was a high probability of clinically significant life-threatening deterioration of the patient's condition requiring my urgent intervention. Total critical care time is 21 minutes. This includes multiple reevaluations, vital sign monitoring, pulse oximetry monitoring, telemetry monitoring, clinical response to the IV medications, reviewing the nursing notes, consultation time, dictation/documentation time, and interpretation of the labwork. (This time excludes time spent performing procedures). FINAL IMPRESSION    1.  Pneumonia of both lungs due to infectious organism, unspecified part of lung        PLAN  Admission to the hospital    (Please note that this note was completed with a voice recognition program.  Every attempt was made to edit the dictations, but inevitably there remain words that are mis-transcribed.)       DANDRE Palacios - CNP  12/29/20 [id number] [facility] APRN - CNP  12/29/20 9930

## 2020-12-30 NOTE — PROGRESS NOTES
4 Eyes Skin Assessment     NAME:  Aleks Quinn  YOB: 1950  MEDICAL RECORD NUMBER:  [id number]    The patient is being assess for  Admission    I agree that 2 RN's have performed a thorough Head to Toe Skin Assessment on the patient. ALL assessment sites listed below have been assessed. Areas assessed by both nurses:    Head, Face, Ears, Shoulders, Back, Chest, Arms, Elbows, Hands, Sacrum. Buttock, Coccyx, Ischium and Legs. Feet and Heels        Does the Patient have a Wound?  No noted wound(s)       Kip Prevention initiated:  NA   Wound Care Orders initiated:  NA    Pressure Injury (Stage 3,4, Unstageable, DTI, NWPT, and Complex wounds) if present place consult order under [de-identified] NA    New and Established Ostomies if present place consult order under : NA      Nurse 1 eSignature: Electronically signed by Gunnar Hussein RN on 12/30/20 at 5:05 AM EST    **SHARE this note so that the co-signing nurse is able to place an eSignature**    Nurse 2 eSignature: {Esignature:[id number]}

## 2020-12-30 NOTE — H&P
Hospital Medicine History & Physical      PCP: Virgle Morning    Date of Admission: 12/29/2020    Date of Service: Pt seen/examined on 12/29/2020 and Admitted to Inpatient     Chief Complaint:  Fatigue, SOB       History Of Present Illness: The patient is a 78 y.o. male who presents to [facility] with PMHx: COPD and Oxygen dependent at home. However he states self endorses that he does not use the oxygen appropriately and he has no idea how much oxygen he supposed to be using or when he supposed to be using it. Self endorses that he is quite noncompliant. RA pulse ox 85%. Fatigue increasing over the day. Seen in ED early this morning, but he left AMA from the ED. Then returned with worsening sx's. ED workup: RLL PNA, Leukocytosis, tachypneic, tachycardic, COVID Negative. No evidence of severe sepsis or septic shock. No lactic acidosis. No encephalopathy. Patient was started on Rocephin and azithromycin for CAP. Nebulizer treatments were administered. Steroids were not initiated because the patient is highly sensitive and becomes extremely anxious. On my exam: Patient is awake and alert, tachypneic at a rate of 30 with shallow breathing. Breath sounds decreased right lower base with crackles in the left lower base. Oxygen saturation 95 to 96% on 3 L nasal cannula. He can speak in full sentences but is dyspneic. He is a bit cantankerous. He believes that we are trying to admit him out of being incentivized for Covid. Self endorses he and his wife continue to smoke.   His pulmonologist is affiliated with [facility].    CODE STATUS full  Past Medical History:        Diagnosis Date    Anxiety     B[facility] 1 disorder ([facility] [address]     Community acquired bacterial pneumonia 6/12/2015    COPD (chronic obstructive pulmonary disease) ([facility] [address]     Depression     Fibromyalgia     Headache(784.0)     Hyperlipidemia     Manic depressive disease manic phase (HCC)     PTSD (post-traumatic stress disorder)     Seizures (HCC)     Tardive dyskinesia        Past Surgical History:        Procedure Laterality Date    MOUTH SURGERY  05/2013       Medications Prior to Admission:    Prior to Admission medications    Medication Sig Start Date End Date Taking? Authorizing Provider   doxycycline monohydrate (ADOXA) 100 MG tablet Take 1 tablet by mouth 2 times daily for 10 days 12/29/20 1/8/21 Yes Cee Ordoñez MD   cyclobenzaprine (FLEXERIL) 10 MG tablet Take 10 mg by mouth 3 times daily as needed 1/23/19  Yes Historical Provider, MD   triazolam (HALCION) 0.25 MG tablet Take 2 tablets by mouth 2 times daily. 9 pm and 2 am 2/4/20  Yes Historical Provider, MD   albuterol sulfate HFA (PROVENTIL HFA) 108 (90 Base) MCG/ACT inhaler Inhale 2 puffs into the lungs every 6 hours as needed 10/16/18  Yes Historical Provider, MD   doxepin (SINEQUAN) 10 MG capsule Take 10 mg by mouth nightly   Yes Historical Provider, MD   budesonide-formoterol (SYMBICORT) 80-4.5 MCG/ACT AERO Inhale 2 puffs into the lungs 2 times daily 3/16/19  Yes Ivis Gum, APRN - NP   butalbital-acetaminophen-caffeine (FIORICET, ESGIC) -40 MG per tablet Take 1 tablet by mouth every 4 hours as needed for Headaches   Yes Historical Provider, MD   omeprazole (PRILOSEC) 40 MG delayed release capsule Take 40 mg by mouth 2 times daily    Yes Historical Provider, MD   clonazePAM (KLONOPIN) 0.5 MG tablet Take 0.5 mg by mouth daily as needed for Anxiety.    Yes Historical Provider, MD   cloNIDine (CATAPRES) 0.1 MG tablet Take 0.2 mg by mouth every 4 hours as needed for High Blood Pressure (anxiety / tremor)    Yes Historical Provider, MD   aspirin 325 MG tablet Take 650 mg by mouth 2 times daily as needed for Pain or Fever Indications: doesnt take every day    Yes Historical Provider, MD   ondansetron (ZOFRAN) 4 MG tablet Take 1 tablet by mouth every 8 hours as needed for Nausea or Vomiting. 1/29/15  Yes Sis Navarro MD       Allergies:  Prednisone, Serotonin reuptake inhibitors (ssris), and Tricyclic antidepressants    Social History:  The patient currently lives at home with wife. TOBACCO:   reports that he has been smoking cigarettes. He has been smoking about 0.00 packs per day for the past 42.00 years. He has never used smokeless tobacco.  ETOH:   reports no history of alcohol use. Family History:  Reviewed in detail and negative for DM, Early CAD, Cancer, CVA. Positive as follows:        Problem Relation Age of Onset    Mental Illness Mother     Mental Illness Father        REVIEW OF SYSTEMS:   Positive for shortness of breath and fatigue and as noted in the HPI. All other systems reviewed and negative. PHYSICAL EXAM:    /87   Pulse 120   Temp 99 °F (37.2 °C) (Axillary)   Resp 25   Wt 174 lb 6.1 oz (79.1 kg)   SpO2 98%   BMI 27.31 kg/m²     General appearance: No apparent distress appears stated age and cooperative. HEENT Normal cephalic, atraumatic without obvious deformity. Pupils equal, round, and reactive to light. Extra ocular muscles intact. Conjunctivae/corneas clear. Neck: Supple, No jugular venous distention/bruits. Trachea midline without thyromegaly or adenopathy with full range of motion. Lungs: Rapid shallow regular: Rate 30s, breath sounds decreased left lower base with crackles and decreased right lower base posteriorly. Speaks in full sentences with conversational dyspnea. Heart: Regular rate and rhythm with Normal S1/S2 without murmurs, rubs or gallops, point of maximum impulse non-displaced  Abdomen: Soft, non-tender or non-distended without rigidity or guarding and positive bowel sounds all four quadrants. Extremities: No clubbing, cyanosis, or edema bilaterally. Full range of motion without deformity and normal gait intact.   Skin: Skin color, texture, turgor normal.  No rashes or lesions. Neurologic: Alert and oriented X 3, neurovascularly intact with sensory/motor intact upper extremities/lower extremities, bilaterally. Cranial nerves: II-XII intact, grossly non-focal.  Mental status: Alert, oriented, thought content appropriate. Capillary Refill: Acceptable  < 3 seconds  Peripheral Pulses: +3 Easily felt, not easily obliterated with pressure      CXR:  I have reviewed the CXR with the following interpretation: Right lower lobe consolidation  EKG:  I have reviewed the EKG with the following interpretation: Narrow complex sinus tachycardia rate 123. Negative for ST elevation or depression. Unchanged from March 15, 2019. CBC   Recent Labs     12/29/20  0502 12/29/20 1911   WBC 16.2* 20.8*   HGB 15.6 15.4   HCT 47.2 48.4    251      RENAL  Recent Labs     12/29/20  0502 12/29/20 1911    140   K 4.4 4.5    102   CO2 28 26   BUN 9 8   CREATININE 0.8 0.8     LFT'S  Recent Labs     12/29/20 1911   AST 26   ALT 26   BILITOT <0.2   ALKPHOS 127     COAG  No results for input(s): INR in the last 72 hours.   CARDIAC ENZYMES  Recent Labs     12/29/20  0502 12/29/20 1911   TROPONINI <0.01 <0.01       U/A:    Lab Results   Component Value Date    COLORU Yellow 12/29/2020    WBCUA 4 06/05/2016    RBCUA 2 06/05/2016    CLARITYU Clear 12/29/2020    SPECGRAV 1.025 12/29/2020    LEUKOCYTESUR Negative 12/29/2020    BLOODU Negative 12/29/2020    GLUCOSEU Negative 12/29/2020    GLUCOSEU NEGATIVE 04/27/2012       ABG    Lab Results   Component Value Date    XFD8JRE 25.4 10/10/2013    BEART -1.4 10/10/2013    W6WKBXLY 93.4 10/10/2013    PHART 7.301 10/10/2013    THGBART 13.8 07/17/2012    JYX2OEO 53.2 10/10/2013    PO2ART 74.7 10/10/2013    TJX2ZSM 27.1 10/10/2013           Active Hospital Problems    Diagnosis Date Noted    Sepsis [facility]) [A41.9] 12/29/2020         PHYSICIANS CERTIFICATION:    I certify that Suzan Mann is expected to be hospitalized for more than 2 midnights based on the following assessment and plan:      ASSESSMENT/PLAN:    Sepsis: 2/2->  pneumonia  No evidence of severe sepsis or septic shock  Blood cultures x2 in process  Lactic acid 1.6  Procalcitonin 0.07  Covid negative  Leukocytosis: 20.8 with left shift: 16.9    CAP: Right lower lobe  Rocephin and azithromycin  Nebulizer and steroid inhaler  Oxygen therapy titrate maintain saturation greater than 92%    COPD likely with chronic respiratory failure  Oxygen dependent but noncompliant  Supportive treatment: Nebulizers, steroid inhaler  Patient intolerant of oral steroids  VBG: pH 7.27/69.3/43    Anxiety/depression/PTSD/manic/bipolar: Continue psych meds per home regimen          DVT Prophylaxis: Lovenox  Diet: DIET CARDIAC;  Code Status: Full Code  PT/OT Eval Status: We will likely need to be consulted to determine his level of independence and needs at home    Dispo -admit, inpatient       Linda Frye, DANDRE - CNP    Thank you Waymond Apgar for the opportunity to be involved in this patient's care. If you have any questions or concerns please feel free to contact me at [phone].

## 2020-12-30 NOTE — ED PROVIDER NOTES
EKG shows sinus tachycardia there is mild junctional ST depression this is completely normal though no axis deviation no significant ST changes     Carlos Snell MD  12/29/20 [id number]

## 2020-12-31 ENCOUNTER — APPOINTMENT (OUTPATIENT)
Dept: GENERAL RADIOLOGY | Age: 70
DRG: 194 | End: 2020-12-31
Payer: COMMERCIAL

## 2020-12-31 PROBLEM — J18.9 PNEUMONIA: Status: ACTIVE | Noted: 2020-12-31

## 2020-12-31 LAB
ANION GAP SERPL CALCULATED.3IONS-SCNC: 9 MMOL/L (ref 3–16)
BASE EXCESS VENOUS: 4.9 MMOL/L
BASOPHILS ABSOLUTE: 0.1 K/UL (ref 0–0.2)
BASOPHILS RELATIVE PERCENT: 0.8 %
BUN BLDV-MCNC: 7 MG/DL (ref 7–20)
CALCIUM SERPL-MCNC: 8.5 MG/DL (ref 8.3–10.6)
CARBOXYHEMOGLOBIN: 0.5 %
CHLORIDE BLD-SCNC: 103 MMOL/L (ref 99–110)
CO2: 30 MMOL/L (ref 21–32)
CREAT SERPL-MCNC: 0.7 MG/DL (ref 0.8–1.3)
EKG ATRIAL RATE: 106 BPM
EKG ATRIAL RATE: 123 BPM
EKG DIAGNOSIS: NORMAL
EKG DIAGNOSIS: NORMAL
EKG P AXIS: 101 DEGREES
EKG P AXIS: 59 DEGREES
EKG P-R INTERVAL: 120 MS
EKG P-R INTERVAL: 122 MS
EKG Q-T INTERVAL: 292 MS
EKG Q-T INTERVAL: 318 MS
EKG QRS DURATION: 80 MS
EKG QRS DURATION: 80 MS
EKG QTC CALCULATION (BAZETT): 418 MS
EKG QTC CALCULATION (BAZETT): 422 MS
EKG R AXIS: 80 DEGREES
EKG R AXIS: 80 DEGREES
EKG T AXIS: 64 DEGREES
EKG T AXIS: 65 DEGREES
EKG VENTRICULAR RATE: 106 BPM
EKG VENTRICULAR RATE: 123 BPM
EOSINOPHILS ABSOLUTE: 0.1 K/UL (ref 0–0.6)
EOSINOPHILS RELATIVE PERCENT: 1.1 %
GFR AFRICAN AMERICAN: >60
GFR NON-AFRICAN AMERICAN: >60
GLUCOSE BLD-MCNC: 104 MG/DL (ref 70–99)
HCO3 VENOUS: 34 MMOL/L (ref 23–29)
HCT VFR BLD CALC: 46.3 % (ref 40.5–52.5)
HEMOGLOBIN: 14.9 G/DL (ref 13.5–17.5)
LYMPHOCYTES ABSOLUTE: 2.8 K/UL (ref 1–5.1)
LYMPHOCYTES RELATIVE PERCENT: 21.6 %
MCH RBC QN AUTO: 30 PG (ref 26–34)
MCHC RBC AUTO-ENTMCNC: 32.3 G/DL (ref 31–36)
MCV RBC AUTO: 92.8 FL (ref 80–100)
METHEMOGLOBIN VENOUS: 0.3 %
MONOCYTES ABSOLUTE: 1 K/UL (ref 0–1.3)
MONOCYTES RELATIVE PERCENT: 7.4 %
NEUTROPHILS ABSOLUTE: 9.1 K/UL (ref 1.7–7.7)
NEUTROPHILS RELATIVE PERCENT: 69.1 %
O2 CONTENT, VEN: 20 ML/DL
O2 SAT, VEN: 92 %
O2 THERAPY: ABNORMAL
PCO2, VEN: 66.4 MMHG (ref 40–50)
PDW BLD-RTO: 15.7 % (ref 12.4–15.4)
PH VENOUS: 7.31 (ref 7.35–7.45)
PLATELET # BLD: 211 K/UL (ref 135–450)
PMV BLD AUTO: 9.2 FL (ref 5–10.5)
PO2, VEN: 68 MMHG
POTASSIUM REFLEX MAGNESIUM: 4.1 MMOL/L (ref 3.5–5.1)
PRO-BNP: 121 PG/ML (ref 0–124)
PROCALCITONIN: 0.07 NG/ML (ref 0–0.15)
RBC # BLD: 4.98 M/UL (ref 4.2–5.9)
SODIUM BLD-SCNC: 142 MMOL/L (ref 136–145)
TCO2 CALC VENOUS: 36 MMOL/L
TROPONIN: <0.01 NG/ML
WBC # BLD: 13.2 K/UL (ref 4–11)

## 2020-12-31 PROCEDURE — 94761 N-INVAS EAR/PLS OXIMETRY MLT: CPT

## 2020-12-31 PROCEDURE — 74230 X-RAY XM SWLNG FUNCJ C+: CPT

## 2020-12-31 PROCEDURE — 93010 ELECTROCARDIOGRAM REPORT: CPT | Performed by: INTERNAL MEDICINE

## 2020-12-31 PROCEDURE — 6370000000 HC RX 637 (ALT 250 FOR IP): Performed by: NURSE PRACTITIONER

## 2020-12-31 PROCEDURE — 94640 AIRWAY INHALATION TREATMENT: CPT

## 2020-12-31 PROCEDURE — 36415 COLL VENOUS BLD VENIPUNCTURE: CPT

## 2020-12-31 PROCEDURE — G0378 HOSPITAL OBSERVATION PER HR: HCPCS

## 2020-12-31 PROCEDURE — 6360000002 HC RX W HCPCS: Performed by: NURSE PRACTITIONER

## 2020-12-31 PROCEDURE — 2580000003 HC RX 258: Performed by: INTERNAL MEDICINE

## 2020-12-31 PROCEDURE — 87449 NOS EACH ORGANISM AG IA: CPT

## 2020-12-31 PROCEDURE — 2700000000 HC OXYGEN THERAPY PER DAY

## 2020-12-31 PROCEDURE — 94660 CPAP INITIATION&MGMT: CPT

## 2020-12-31 PROCEDURE — 6360000002 HC RX W HCPCS: Performed by: INTERNAL MEDICINE

## 2020-12-31 PROCEDURE — 1200000000 HC SEMI PRIVATE

## 2020-12-31 PROCEDURE — 92611 MOTION FLUOROSCOPY/SWALLOW: CPT

## 2020-12-31 PROCEDURE — 94150 VITAL CAPACITY TEST: CPT

## 2020-12-31 PROCEDURE — 87641 MR-STAPH DNA AMP PROBE: CPT

## 2020-12-31 PROCEDURE — 2580000003 HC RX 258: Performed by: NURSE PRACTITIONER

## 2020-12-31 PROCEDURE — 92610 EVALUATE SWALLOWING FUNCTION: CPT

## 2020-12-31 PROCEDURE — 84145 PROCALCITONIN (PCT): CPT

## 2020-12-31 RX ORDER — CYCLOBENZAPRINE HCL 10 MG
10 TABLET ORAL 3 TIMES DAILY PRN
Status: DISCONTINUED | OUTPATIENT
Start: 2020-12-31 | End: 2021-01-01 | Stop reason: HOSPADM

## 2020-12-31 RX ORDER — ASPIRIN 325 MG
650 TABLET ORAL 2 TIMES DAILY PRN
Status: DISCONTINUED | OUTPATIENT
Start: 2020-12-31 | End: 2021-01-01 | Stop reason: HOSPADM

## 2020-12-31 RX ORDER — BUTALBITAL, ACETAMINOPHEN AND CAFFEINE 50; 325; 40 MG/1; MG/1; MG/1
1 TABLET ORAL EVERY 4 HOURS PRN
Status: DISCONTINUED | OUTPATIENT
Start: 2020-12-31 | End: 2021-01-01 | Stop reason: HOSPADM

## 2020-12-31 RX ORDER — DOXYCYCLINE HYCLATE 100 MG
100 TABLET ORAL 2 TIMES DAILY
Status: DISCONTINUED | OUTPATIENT
Start: 2020-12-31 | End: 2021-01-01 | Stop reason: HOSPADM

## 2020-12-31 RX ORDER — SODIUM CHLORIDE 0.9 % (FLUSH) 0.9 %
10 SYRINGE (ML) INJECTION PRN
Status: DISCONTINUED | OUTPATIENT
Start: 2020-12-31 | End: 2021-01-01 | Stop reason: HOSPADM

## 2020-12-31 RX ORDER — CLONIDINE HYDROCHLORIDE 0.1 MG/1
0.2 TABLET ORAL EVERY 4 HOURS PRN
Status: DISCONTINUED | OUTPATIENT
Start: 2020-12-31 | End: 2021-01-01 | Stop reason: HOSPADM

## 2020-12-31 RX ORDER — PROMETHAZINE HYDROCHLORIDE 25 MG/1
12.5 TABLET ORAL EVERY 6 HOURS PRN
Status: DISCONTINUED | OUTPATIENT
Start: 2020-12-31 | End: 2021-01-01 | Stop reason: HOSPADM

## 2020-12-31 RX ORDER — PROCHLORPERAZINE EDISYLATE 5 MG/ML
10 INJECTION INTRAMUSCULAR; INTRAVENOUS EVERY 6 HOURS PRN
Status: DISCONTINUED | OUTPATIENT
Start: 2020-12-31 | End: 2021-01-01 | Stop reason: HOSPADM

## 2020-12-31 RX ORDER — POLYETHYLENE GLYCOL 3350 17 G/17G
17 POWDER, FOR SOLUTION ORAL DAILY PRN
Status: DISCONTINUED | OUTPATIENT
Start: 2020-12-31 | End: 2021-01-01 | Stop reason: HOSPADM

## 2020-12-31 RX ORDER — CLONAZEPAM 0.5 MG/1
0.5 TABLET ORAL DAILY PRN
Status: DISCONTINUED | OUTPATIENT
Start: 2020-12-31 | End: 2021-01-01 | Stop reason: HOSPADM

## 2020-12-31 RX ORDER — DOXEPIN HYDROCHLORIDE 10 MG/1
10 CAPSULE ORAL NIGHTLY
Status: DISCONTINUED | OUTPATIENT
Start: 2020-12-31 | End: 2021-01-01 | Stop reason: HOSPADM

## 2020-12-31 RX ORDER — ACETAMINOPHEN 650 MG/1
650 SUPPOSITORY RECTAL EVERY 6 HOURS PRN
Status: DISCONTINUED | OUTPATIENT
Start: 2020-12-31 | End: 2021-01-01 | Stop reason: HOSPADM

## 2020-12-31 RX ORDER — ONDANSETRON 2 MG/ML
4 INJECTION INTRAMUSCULAR; INTRAVENOUS EVERY 6 HOURS PRN
Status: DISCONTINUED | OUTPATIENT
Start: 2020-12-31 | End: 2021-01-01 | Stop reason: HOSPADM

## 2020-12-31 RX ORDER — ALBUTEROL SULFATE 2.5 MG/3ML
2.5 SOLUTION RESPIRATORY (INHALATION) EVERY 4 HOURS PRN
Status: DISCONTINUED | OUTPATIENT
Start: 2020-12-31 | End: 2021-01-01 | Stop reason: HOSPADM

## 2020-12-31 RX ORDER — NICOTINE 21 MG/24HR
1 PATCH, TRANSDERMAL 24 HOURS TRANSDERMAL DAILY
Status: DISCONTINUED | OUTPATIENT
Start: 2020-12-31 | End: 2021-01-01 | Stop reason: HOSPADM

## 2020-12-31 RX ORDER — IPRATROPIUM BROMIDE AND ALBUTEROL SULFATE 2.5; .5 MG/3ML; MG/3ML
1 SOLUTION RESPIRATORY (INHALATION) EVERY 4 HOURS PRN
Status: DISCONTINUED | OUTPATIENT
Start: 2020-12-31 | End: 2021-01-01 | Stop reason: HOSPADM

## 2020-12-31 RX ORDER — ACETAMINOPHEN 325 MG/1
650 TABLET ORAL EVERY 6 HOURS PRN
Status: DISCONTINUED | OUTPATIENT
Start: 2020-12-31 | End: 2021-01-01 | Stop reason: HOSPADM

## 2020-12-31 RX ORDER — SODIUM CHLORIDE 0.9 % (FLUSH) 0.9 %
10 SYRINGE (ML) INJECTION EVERY 12 HOURS SCHEDULED
Status: DISCONTINUED | OUTPATIENT
Start: 2020-12-31 | End: 2021-01-01 | Stop reason: HOSPADM

## 2020-12-31 RX ORDER — BUDESONIDE AND FORMOTEROL FUMARATE DIHYDRATE 80; 4.5 UG/1; UG/1
2 AEROSOL RESPIRATORY (INHALATION) 2 TIMES DAILY
Status: DISCONTINUED | OUTPATIENT
Start: 2020-12-31 | End: 2021-01-01 | Stop reason: HOSPADM

## 2020-12-31 RX ORDER — PANTOPRAZOLE SODIUM 40 MG/1
40 TABLET, DELAYED RELEASE ORAL
Status: DISCONTINUED | OUTPATIENT
Start: 2020-12-31 | End: 2021-01-01 | Stop reason: HOSPADM

## 2020-12-31 RX ADMIN — DOXYCYCLINE HYCLATE 100 MG: 100 TABLET, COATED ORAL at 20:47

## 2020-12-31 RX ADMIN — ENOXAPARIN SODIUM 40 MG: 40 INJECTION SUBCUTANEOUS at 08:14

## 2020-12-31 RX ADMIN — BUDESONIDE AND FORMOTEROL FUMARATE DIHYDRATE 2 PUFF: 80; 4.5 AEROSOL RESPIRATORY (INHALATION) at 08:29

## 2020-12-31 RX ADMIN — BUDESONIDE AND FORMOTEROL FUMARATE DIHYDRATE 2 PUFF: 80; 4.5 AEROSOL RESPIRATORY (INHALATION) at 22:21

## 2020-12-31 RX ADMIN — CYCLOBENZAPRINE 10 MG: 10 TABLET, FILM COATED ORAL at 03:00

## 2020-12-31 RX ADMIN — BUTALBITAL, ACETAMINOPHEN, AND CAFFEINE 1 TABLET: 50; 325; 40 TABLET ORAL at 08:14

## 2020-12-31 RX ADMIN — CLONAZEPAM 0.5 MG: 0.5 TABLET ORAL at 03:36

## 2020-12-31 RX ADMIN — BUTALBITAL, ACETAMINOPHEN, AND CAFFEINE 1 TABLET: 50; 325; 40 TABLET ORAL at 21:55

## 2020-12-31 RX ADMIN — PROMETHAZINE HYDROCHLORIDE 12.5 MG: 25 TABLET ORAL at 20:47

## 2020-12-31 RX ADMIN — PROCHLORPERAZINE EDISYLATE 10 MG: 5 INJECTION INTRAMUSCULAR; INTRAVENOUS at 14:55

## 2020-12-31 RX ADMIN — PANTOPRAZOLE SODIUM 40 MG: 40 TABLET, DELAYED RELEASE ORAL at 05:03

## 2020-12-31 RX ADMIN — DOXYCYCLINE HYCLATE 100 MG: 100 TABLET, COATED ORAL at 01:56

## 2020-12-31 RX ADMIN — SODIUM CHLORIDE, PRESERVATIVE FREE 10 ML: 5 INJECTION INTRAVENOUS at 08:15

## 2020-12-31 RX ADMIN — CLONIDINE HYDROCHLORIDE 0.2 MG: 0.1 TABLET ORAL at 18:29

## 2020-12-31 RX ADMIN — SODIUM CHLORIDE, PRESERVATIVE FREE 10 ML: 5 INJECTION INTRAVENOUS at 20:47

## 2020-12-31 RX ADMIN — DOXEPIN HYDROCHLORIDE 10 MG: 10 CAPSULE ORAL at 20:47

## 2020-12-31 RX ADMIN — DOXYCYCLINE HYCLATE 100 MG: 100 TABLET, COATED ORAL at 08:13

## 2020-12-31 RX ADMIN — CEFTRIAXONE 1 G: 1 INJECTION, POWDER, FOR SOLUTION INTRAMUSCULAR; INTRAVENOUS at 11:17

## 2020-12-31 RX ADMIN — BUTALBITAL, ACETAMINOPHEN, AND CAFFEINE 1 TABLET: 50; 325; 40 TABLET ORAL at 01:56

## 2020-12-31 RX ADMIN — DOXEPIN HYDROCHLORIDE 10 MG: 10 CAPSULE ORAL at 01:56

## 2020-12-31 RX ADMIN — SODIUM CHLORIDE, PRESERVATIVE FREE 10 ML: 5 INJECTION INTRAVENOUS at 11:18

## 2020-12-31 RX ADMIN — CLONIDINE HYDROCHLORIDE 0.2 MG: 0.1 TABLET ORAL at 05:03

## 2020-12-31 RX ADMIN — CLONIDINE HYDROCHLORIDE 0.2 MG: 0.1 TABLET ORAL at 23:35

## 2020-12-31 ASSESSMENT — PAIN SCALES - GENERAL
PAINLEVEL_OUTOF10: 10
PAINLEVEL_OUTOF10: 9
PAINLEVEL_OUTOF10: 0

## 2020-12-31 ASSESSMENT — PAIN DESCRIPTION - ORIENTATION
ORIENTATION: MID

## 2020-12-31 ASSESSMENT — PAIN DESCRIPTION - PROGRESSION
CLINICAL_PROGRESSION: NOT CHANGED

## 2020-12-31 ASSESSMENT — PAIN DESCRIPTION - PAIN TYPE: TYPE: CHRONIC PAIN

## 2020-12-31 ASSESSMENT — PAIN DESCRIPTION - FREQUENCY: FREQUENCY: CONTINUOUS

## 2020-12-31 ASSESSMENT — PAIN DESCRIPTION - DESCRIPTORS
DESCRIPTORS: THROBBING
DESCRIPTORS: THROBBING

## 2020-12-31 ASSESSMENT — PAIN DESCRIPTION - LOCATION
LOCATION: HEAD
LOCATION: HEAD

## 2020-12-31 ASSESSMENT — PAIN DESCRIPTION - ONSET: ONSET: ON-GOING

## 2020-12-31 NOTE — PLAN OF CARE
Problem: Pain:  Goal: Pain level will decrease  Description: Pain level will decrease  Outcome: Ongoing  Note: Able to rate pain using a 1-10 scale, medicated per prn orders, see MAR.  Able to verbalize a reduction in pain and/or able to fall asleep and remain asleep without any s/s of pain

## 2020-12-31 NOTE — PROGRESS NOTES
Pt admitted to room 3275 via stretcher. Able to slide into bed independently with extra time. Pt admitted with  COPD and pneumonia. On 3L O2, is home dependent although pt admits he does not wear his O2 as he always should. Lungs diminished with crackles at the bases. HR regular. Abdomen with some bloating and discomfort. Pt reports BM yesterday. Bowel sounds are active. Pt c/o headache that is chronic, medicated per PRN orders. Also requesting a nicotine patch, perfect serve sent to MD. Oriented to call light and room, pt verbalizes understanding. Urinal placed at the bedside per pt request. Will continue to monitor.  Isaura Martines RN

## 2020-12-31 NOTE — ED NOTES
ED SBAR report provider to Ruth Bender RN. Patient to be transported to Room 3275 via stretcher by transport tech. Patient transported with bedside cardiac monitor and with IV medications infusing. IV site clean, dry, and intact. MEWS score and pain assessed as 0/10 and documented. Patient's score on the STARKS Fall scale reviewed with receiving RN.  Updated patient on plan of care.'     Jeffry Lujan RN  12/31/20 [id number]

## 2020-12-31 NOTE — ED PROVIDER NOTES
[facility]      Pt Name: Dao Cerna  MRN: [id number]  [place] 1950  Date of evaluation: 12/30/2020  Provider: Zee Rubi MD    CHIEF COMPLAINT       Chief Complaint   Patient presents with    Other     Left AMA earlier today, pt states they wanted to admit him for sepsis due to pneumonia       HISTORY OF PRESENT ILLNESS    Dao Cerna is a 78 y.o. male who presents to the emergency department with shortness of breath. Patient has left AMA twice in the last 2 days. States each time he was discharged to home became short of breath again so came back. Endorses a dry cough. States has general fatigue. States the doctors are trying to kill him. He has been given antibiotics twice now patient has not taken them. Denies any other associated symptoms. Nursing Notes were reviewed. Including nursing noted for FM, Surgical History, Past Medical History, Social History, vitals, and allergies; agree with all. REVIEW OF SYSTEMS       Review of Systems   Constitutional: Positive for activity change, appetite change, chills and fatigue. Negative for diaphoresis, fever and unexpected weight change. HENT: Negative for congestion, dental problem, drooling, ear discharge and ear pain. Eyes: Negative for photophobia, pain, discharge, redness, itching and visual disturbance. Respiratory: Positive for cough and shortness of breath. Negative for apnea, choking, chest tightness, wheezing and stridor. Cardiovascular: Negative for chest pain, palpitations and leg swelling. Gastrointestinal: Negative for abdominal distention, abdominal pain, anal bleeding, blood in stool, constipation, diarrhea, nausea, rectal pain and vomiting. Endocrine: Negative for cold intolerance and heat intolerance. Genitourinary: Negative for decreased urine volume and urgency. Musculoskeletal: Negative for arthralgias and back pain.    Skin: Negative for color change and pallor. Neurological: Negative for dizziness and facial asymmetry. Hematological: Negative for adenopathy. Does not bruise/bleed easily. Psychiatric/Behavioral: Negative for agitation, behavioral problems, confusion and decreased concentration. Except as noted above the remainder of the review of systems was reviewed and negative. PAST MEDICAL HISTORY     Past Medical History:   Diagnosis Date    Anxiety     [facility] [address]     Community acquired bacterial pneumonia 6/12/2015    COPD (chronic obstructive pulmonary disease) (HCC)     Depression     Fibromyalgia     Headache(784.0)     Hyperlipidemia     Manic depressive disease manic phase (HCC)     PTSD (post-traumatic stress disorder)     Seizures ([facility] [address]     Tardive dyskinesia        SURGICAL HISTORY       Past Surgical History:   Procedure Laterality Date    MOUTH SURGERY  05/2013       CURRENT MEDICATIONS       Current Discharge Medication List      CONTINUE these medications which have NOT CHANGED    Details   cyclobenzaprine (FLEXERIL) 10 MG tablet Take 10 mg by mouth 3 times daily as needed      triazolam (HALCION) 0.25 MG tablet Take 2 tablets by mouth 2 times daily. 9 pm and 2 am      albuterol sulfate HFA (PROVENTIL HFA) 108 (90 Base) MCG/ACT inhaler Inhale 2 puffs into the lungs every 6 hours as needed      budesonide-formoterol (SYMBICORT) 80-4.5 MCG/ACT AERO Inhale 2 puffs into the lungs 2 times daily  Qty: 1 Inhaler, Refills: 3      butalbital-acetaminophen-caffeine (FIORICET, ESGIC) -40 MG per tablet Take 1 tablet by mouth every 4 hours as needed for Headaches      omeprazole (PRILOSEC) 40 MG delayed release capsule Take 40 mg by mouth 2 times daily       clonazePAM (KLONOPIN) 0.5 MG tablet Take 0.5 mg by mouth daily as needed for Anxiety.       cloNIDine (CATAPRES) 0.1 MG tablet Take 0.2 mg by mouth every 4 hours as needed for High Blood Pressure (anxiety / tremor)       ondansetron (ZOFRAN) 4 MG tablet Take 1 tablet by mouth every 8 hours as needed for Nausea or Vomiting.   Qty: 30 tablet, Refills: 0      doxycycline monohydrate (ADOXA) 100 MG tablet Take 1 tablet by mouth 2 times daily for 10 days  Qty: 20 tablet, Refills: 0      doxepin (SINEQUAN) 10 MG capsule Take 10 mg by mouth nightly             ALLERGIES     Prednisone, Serotonin reuptake inhibitors (ssris), and Tricyclic antidepressants    FAMILY HISTORY        Family History   Problem Relation Age of Onset    Mental Illness Mother     Mental Illness Father        SOCIAL HISTORY       Social History     Socioeconomic History    Marital status:      Spouse name: None    Number of children: None    Years of education: None    Highest education level: None   Occupational History    None   Social Needs    Financial resource strain: None    Food insecurity     Worry: None     Inability: None    Transportation needs     Medical: None     Non-medical: None   Tobacco Use    Smoking status: Current Every Day Smoker     Packs/day: 0.00     Years: 42.00     Pack years: 0.00     Types: Cigarettes    Smokeless tobacco: Never Used   Substance and Sexual Activity    Alcohol use: No     Comment: hasn't drunk in 10-12 years    Drug use: No    Sexual activity: Never   Lifestyle    Physical activity     Days per week: None     Minutes per session: None    Stress: None   Relationships    Social connections     Talks on phone: None     Gets together: None     Attends Mandaeism service: None     Active member of club or organization: None     Attends meetings of clubs or organizations: None     Relationship status: None    Intimate partner violence     Fear of current or ex partner: None     Emotionally abused: None     Physically abused: None     Forced sexual activity: None   Other Topics Concern    None   Social History Narrative    None       PHYSICAL EXAM       ED Triage Vitals [12/30/20 2208]   BP Temp Temp Source Pulse Resp SpO2 Height Weight (!) 158/91 98.1 °F (36.7 °C) Temporal 115 20 92 % 5' 7\" (1.702 m) 177 lb 7.5 oz (80.5 kg)       Physical Exam  Vitals signs and nursing note reviewed. Constitutional:       General: He is not in acute distress. Appearance: He is well-developed. He is not diaphoretic. HENT:      Head: Normocephalic and atraumatic. Eyes:      General:         Right eye: No discharge. Left eye: No discharge. Pupils: Pupils are equal, round, and reactive to light. Neck:      Musculoskeletal: Normal range of motion. Thyroid: No thyromegaly. Trachea: No tracheal deviation. Cardiovascular:      Rate and Rhythm: Normal rate and regular rhythm. Heart sounds: No murmur. Pulmonary:      Breath sounds: Wheezing present. No rales. Chest:      Chest wall: No tenderness. Abdominal:      General: There is no distension. Palpations: Abdomen is soft. There is no mass. Tenderness: There is no abdominal tenderness. There is no guarding or rebound. Musculoskeletal: Normal range of motion. General: No tenderness or deformity. Skin:     General: Skin is warm. Coloration: Skin is not pale. Findings: No erythema or rash. Neurological:      Mental Status: He is alert. Cranial Nerves: No cranial nerve deficit. Motor: No abnormal muscle tone.       Coordination: Coordination normal.         DIAGNOSTIC RESULTS     RADIOLOGY:   Non-plain film images such as CT, Ultrasoundand MRI are read by the radiologist. Plain radiographic images are visualized and preliminarily interpreted by the emergency physician with the below findings:    Chest x-ray shows bibasilar airspace infiltrate    ED BEDSIDE ULTRASOUND:   Performed by ED Physician - none    LABS:  Labs Reviewed   CBC WITH AUTO DIFFERENTIAL - Abnormal; Notable for the following components:       Result Value    WBC 13.2 (*)     RDW 15.7 (*)     Neutrophils Absolute 9.1 (*)     All other components within normal limits Narrative:     Performed at:  [facility]  [address] [facility] [address]   Phone [phone]   BASIC METABOLIC PANEL W/ REFLEX TO MG FOR LOW K - Abnormal; Notable for the following components:    Glucose 104 (*)     CREATININE 0.7 (*)     All other components within normal limits    Narrative:     Performed at:  [facility]  [address] [facility] [address]   Phone [phone]   BLOOD GAS, VENOUS - Abnormal; Notable for the following components:    pH, Jonny 7.314 (*)     pCO2, Jonny 66.4 (*)     HCO3, Venous 34 (*)     All other components within normal limits    Narrative:     Performed at:  [facility]  [address] [facility] [address]   Phone [phone]   TROPONIN    Narrative:     Performed at:  [facility]  [address] [facility] [address]   Phone [phone]   BRAIN NATRIURETIC PEPTIDE    Narrative:     Performed at:  [facility]  [address] [facility] [address]   Phone [phone]       All other labs were withinnormal range or not returned as of this dictation. EMERGENCY DEPARTMENT COURSE and DIFFERENTIAL DIAGNOSIS/MDM:     PMH, Surgical Hx, FH, Social Hx reviewed by myself (ETOH usage, Tobacco usage, Drug usage reviewed by myself, no pertinent Hx)- No Pertinent Hx     Old records were reviewed by me    Patient admitted for further inpatient evaluation. We'll defer to hospitalist for antibiotic coverage. CRITICAL CARE TIME   Total Critical Caretime was 39 minutes, excluding separately reportable procedures. There was a high probability of clinically significant/life threatening deterioration in the patient's condition which required my urgent intervention. PROCEDURES:  Unlessotherwise noted below, none    FINAL IMPRESSION      1.  Pneumonia due to organism DISPOSITION/PLAN   DISPOSITION Admitted 12/31/2020 12:00:26 AM    (Please note that portions ofthis note were completed with a voice recognition program.  Efforts were made to edit the dictations but occasionally words are mis-transcribed.)    Rosa Rodriguez MD(electronically signed)  Attending Emergency Physician        Rosa Rodriguez MD  12/31/20 5366

## 2020-12-31 NOTE — PROGRESS NOTES
Speech Language Pathology  Facility/Department: [address] [facility]   CLINICAL BEDSIDE SWALLOW EVALUATION    NAME: Maureen Monte  : 1950  MRN: [id number]    ADMISSION DATE: 2020  ADMITTING DIAGNOSIS: Community acquired pneumonia of right lower lobe of lung [J18.9]     Maureen Monte has Tardive dyskinesia; Community acquired bacterial pneumonia; Esophageal dysphagia; Hoarseness, chronic; Acute respiratory failure with hypoxia ([address] Community acquired pneumonia of right lower lobe of lung; Centrilobular emphysema ([address] Lactic acidosis; Postnasal drip; Tobacco use; and Sepsis ([address] on their problem list.    ONSET DATE: 2020    CHART REVIEW:  2020 admitted with pneumonia    2020 Internal Medicine:  HOSPITAL COURSE:  78 y. o. male who presents to [facility] with PMH COPD and Oxygen dependent at home, depression, bipolar, HLD, seizures.  However he states self endorses that he does not use the oxygen appropriately and he has no idea how much oxygen he supposed to be using or when he supposed to be using it.  Self endorses that he is quite noncompliant. RA pulse ox 85%. Fatigue increasing over the day. Seen in ED early this morning, but he left AMA from the ED. Then returned with worsening sx's. Was admitted and then discharged the afternoon of  b/c the patient insisted. He did not  antibiotic RX sent to pharmacy because he said he did not know it was there or prescribed. He contacted his brother, whom is a MD, discussed his condition with him. His brother encouraged Nisa Wallace to return and complete the recommended course of care. ED work-up this evening indicates improvement and WBC from 20-13. Metabolic panel is unremarkable.  VBG is improved from pH 7.272 pH 7.3.  He remains tachypneic and he is continually requiring oxygen therapy at 2 to 3 L and pulse rate remains mildly elevated at 106.    2020 CERVICAL SPINE CT:  Impression   1.  No evidence of acute intracranial abnormality. 2. No acute abnormality of the cervical spine. 3. Mild cerebral white matter chronic microvascular ischemic disease. 4. Generalized reversal of cervical lordosis, as discussed above. 5. C3 on C4, C4 on C5 anterolisthesis each measuring 4 mm, likely   degenerative. 6. C5/C6 mild, C6/C7 borderline central canal stenosis secondary to   encroachment by posterior disc osteophyte complex.         12/29/2020 CT CHEST:  Impression   1. Bilateral lower lung lobe and right middle lobe multifocal ill-defined   ground-glass consolidation.  Differential diagnostic considerations include   viral pneumonia, opportunistic infection, hypersensitivity pneumonitis, drug   toxicity.  Recommend clinical correlation. 2. Moderate centrilobular emphysema. 3. No evidence of acute chest trauma. 12/30/2020 CXR:  Impression   Bibasilar airspace opacity as seen on the prior study, which may reflect   underlying pneumonia in the correct clinical setting.  Follow-up imaging   resolution is recommended. Date of Eval: 12/31/2020  Evaluating Therapist: Shamika Alan    Current Diet level:  Current Diet : Regular  Current Liquid Diet : Thin      Primary Complaint  Patient Complaint: patient reports difficulty swallowing characterized by coughing with solids; patient does endorse prior stricture with dilatation approximately 3 years ago as well as hiatal hernia and GERD    Pain:  Pain: 0    Reason for Referral  Wilfred Leblanc was referred for a bedside swallow evaluation to assess the efficiency of his swallow function, identify signs and symptoms of aspiration and make recommendations regarding safe dietary consistencies, effective compensatory strategies, and safe eating environment. Impression  Dysphagia Diagnosis: Mild oral stage dysphagia  · Accepted and tolerated evaluation at bedside.     · Patient alert, cooperative, pleasant; oriented x4; follows dx; verbally responsive and appropriate. · Patient with h/o prior clinical swallow evaluation with recs for regular/thin as well as h/o esophageal involvements (stricture, hiatal hernia, GERD). Patient with c/o solid > liquid dysphagia. · Mild oral stage dysphagia characterized by decreased mastication r/t edentulism. Clinically, pharyngeal phase appears grossly WFL; however, delayed swallow, decreased laryngeal elevation, and/or decreased pharyngeal peristalsis, etc cannot be r/o clinically. · MBS may be beneficial prior to proceeding with GI consult to r/o pharyngeal residue and/or penetration/aspiration during the swallow - if MBS is indicated, please order. MBS can be completed as OP if patient prefers OP work-up. If MBS is deemed not indicated or if MBS is completed without significant pharyngeal impairments, it is recommended to consider GI consult/follow-up. Dysphagia Outcome Severity Scale: Level 5: Mild dysphagia- Distant supervision. May need one diet consistency restricted     Treatment Plan  Requires SLP Intervention: Yes  Duration/Frequency of Treatment: ST to follow-up 1-3 times for dysphaiga during acute admission unless otherwise noted  D/C Recommendations: To be determined    Recommended Diet and Intervention  Diet Solids Recommendation: Regular  Liquid Consistency Recommendation: Thin  Recommended Form of Meds: PO  Compensatory Swallowing Strategies: Upright as possible for all oral intake;Remain upright for 30-45 minutes after meals;Eat/Feed slowly; Small bites/sips;Swallow 2 times per bite/sip    Therapeutic Interventions: Diet tolerance monitoring;Patient/Family education      Treatment/Goals  Dysphagia Goals: The patient will tolerate recommended diet without observed clinical signs of aspiration; The patient/caregiver will demonstrate understanding of compensatory strategies for improved swallowing safety. General  Chart Reviewed: Yes  Behavior/Cognition: Alert; Cooperative;Pleasant mood  Communication Observation: Functional  Follows Directions: Simple  Dentition: Edentulous(accustomed to edentulous PO intake)  Patient Positioning: Upright in bed  Baseline Vocal Quality: Normal  Volitional Cough: Strong  Consistencies Administered: Dysphagia Pureed (Dysphagia I); Honey - straw;Nectar - straw; Thin - straw;Dysphagia Soft and Bite-Sized (Dysphagia III); Reg solid    Vision/Hearing  Vision: Within Functional Limits  Hearing: Within functional limits    Oral Motor Deficits  Labial ROM: (reduced)  Labial Strength: Reduced  Lingual ROM: (reduced)  Lingual Strength: Reduced  Mandible: Impaired    Oral Phase Dysfunction  Impaired Mastication: Soft Solid; Reg Solid(appears prolonged but adequate despite edentulism)     Indicators of Pharyngeal Phase Dysfunction  Pharyngeal Phase: (appears grossly WFL, but with potential for delayed swallow and/or reduced laryngeal elevation)  Throat Clearing - Delayed: Honey - straw(only noted with honey thick liquid trials - suspect increased potential for esophageal involvements > pharyngeal involvements d/t nature of observed dysphagia with denser consistencies and patient reported increased difficulty with coarse solids)  Pharyngeal Phase - COMMENT: MBS may be beneficial prior to proceeding with GI consult to r/o pharyngeal residue and/or penetration/aspiration during the swallow - if MBS is indicated, please order. MBS can be completed as OP if patient prefers OP work-up. If MBS is deemed not indicated or if MBS is completed without significant pharyngeal impairments, it is recommended to consider GI consult/follow-up    Prognosis  Prognosis for safe diet advancement: good  Consulted and agree with results and recommendations: Patient; Family member;RN;MD  Family member consulted: wife    Education  Patient Education: completed on results/recs/plan  Patient Education Response: Verbalizes understanding     Safety Devices in place: Yes  Type of devices:  All fall risk precautions in place         Therapy Time  SLP Individual Minutes  Time In: 1225  Time Out: [address]  Minutes: [address] [place], [id number] [facility], [id number]  Speech-Language Pathologist  12/31/2020 1:10 PM

## 2020-12-31 NOTE — PROCEDURES
INSTRUMENTAL SWALLOW REPORT  MODIFIED BARIUM SWALLOW    NAME: Estrella Kenny   : 1950  MRN: [id number]       Date of Eval: 2020     Ordering Physician: Apple Calzada  Radiologist: Artis    Referring Diagnosis: oropharyngeal dysphagia; r 13.12    Estrella Kenny has a past medical history of Anxiety, Bipolar 1 disorder ([facility] Community acquired bacterial pneumonia, COPD (chronic obstructive pulmonary disease) ([address] Depression, Fibromyalgia, Headache(784.0), Hyperlipidemia, Manic depressive disease manic phase ([facility] PTSD (post-traumatic stress disorder), Seizures ([facility] and Tardive dyskinesia. He has a past surgical history that includes Mouth surgery (2013). Current Diet Solid Consistency: Regular  Current Diet Liquid Consistency: Thin      CHART REVIEW:  2020 admitted with pneumonia     2020 Internal Medicine:  HOSPITAL COURSE:  78 y. o. male who presents to [facility] with PMH COPD and Oxygen dependent at home, depression, bipolar, HLD, seizures.  However he states self endorses that he does not use the oxygen appropriately and he has no idea how much oxygen he supposed to be using or when he supposed to be using it.  Self endorses that he is quite noncompliant. RA pulse ox 85%. Fatigue increasing over the day. Seen in ED early this morning, but he left AMA from the ED. Then returned with worsening sx's. Was admitted and then discharged the afternoon of  b/c the patient insisted. He did not  antibiotic RX sent to pharmacy because he said he did not know it was there or prescribed. He contacted his brother, whom is a MD, discussed his condition with him. His brother encouraged Candelario Kraus to return and complete the recommended course of care. ED work-up this evening indicates improvement and WBC from 20-13.  Metabolic panel is unremarkable.  VBG is improved from pH 7.272 pH 7.3.  He remains tachypneic and he is continually requiring oxygen therapy at 2 to 3 L and pulse rate remains mildly elevated at 106.     12/29/2020 CERVICAL SPINE CT:  Impression   1. No evidence of acute intracranial abnormality. 2. No acute abnormality of the cervical spine. 3. Mild cerebral white matter chronic microvascular ischemic disease. 4. Generalized reversal of cervical lordosis, as discussed above. 5. C3 on C4, C4 on C5 anterolisthesis each measuring 4 mm, likely   degenerative. 6. C5/C6 mild, C6/C7 borderline central canal stenosis secondary to   encroachment by posterior disc osteophyte complex.          12/29/2020 CT CHEST:  Impression   1. Bilateral lower lung lobe and right middle lobe multifocal ill-defined   ground-glass consolidation.  Differential diagnostic considerations include   viral pneumonia, opportunistic infection, hypersensitivity pneumonitis, drug   toxicity.  Recommend clinical correlation. 2. Moderate centrilobular emphysema. 3. No evidence of acute chest trauma.      12/30/2020 CXR:  Impression   Bibasilar airspace opacity as seen on the prior study, which may reflect   underlying pneumonia in the correct clinical setting.  Follow-up imaging   resolution is recommended.        Patient Complaints/Reason for Referral:  · Liset Amor was referred for a MBS to assess the efficiency of his/her swallow function, assess for aspiration, and to make recommendations regarding safe dietary consistencies, effective compensatory strategies, and safe eating environment. · Patient complaints: patient reports difficulty swallowing characterized by coughing with solids; patient does endorse prior stricture with dilatation approximately 3 years ago as well as hiatal hernia and GERD    Onset of problem:   Date of Onset: 12/30/2020    Pain   Patient Currently in Pain: No    Behavior/Cognition/Vision/Hearing:  Behavior/Cognition: Alert; Cooperative;Pleasant mood  Vision: Within Functional Limits  Hearing: Within functional limits    Patient Position: Lateral   Patient Degrees: Fully upright in VSE chair  Consistencies Administered: Dysphagia Pureed (Dysphagia I); Dysphagia Minced and Moist (Dysphagia II); Dysphagia Soft and Bite-Sized (Dysphagia III); Reg solid; Nectar cup; Thin cup; Thin straw    Impressions:  Treatment Dx and ICD 10: oropharyngeal dysphagia; r 13.12    Mild oral stage dysphagia characterized by decreased mastication r/t edentulism. · Mastication does appear to be adequate despite prolonged duration. Mild pharyngeal stage dysphagia characterized by mildly delayed swallow and decreased pharyngeal peristalsis. · No premature spillage or pooling. · Minimal vallecular residue post-swallow across trials is cleared spontaneously. · No observed penetration/aspiration. Suspect reported symptoms of dysphagia may be r/t prior esophageal involvements. Recommend patient follow-up with established GI specialist for re-assessment/examination. Dysphagia Outcome Severity Scale: Level 5: Mild dysphagia- Distant supervision. May need one diet consistency restricted  Penetration-Aspiration Scale (PAS): 1 - Material does not enter the airway    Recommended Diet:  Solid consistency: Regular  Liquid consistency: Thin  Liquid administration via: Cup;Straw  Medication administration: PO  Compensatory Swallowing Strategies: Upright as possible for all oral intake;Remain upright for 30-45 minutes after meals;Eat/Feed slowly; Small bites/sips;Swallow 2 times per bite/sip    Recommendations/Treatment  Requires SLP Intervention: Yes  Duration/Frequency of Treatment: ST to follow-up 1-3 times for dysphagia during acute admission unless otherwise noted  Therapeutic Interventions: Diet tolerance monitoring;Patient/Family education  D/C Recommendations: (suspect no skilled ST needs upon discharge)    Education:   Patient Education: completed on results/recs/plan  Patient Education Response: Verbalizes understanding    Prognosis  Prognosis for safe diet advancement: good    Safety Devices in place: Yes  Type of devices: All fall risk precautions in place    Goals:    Dysphagia Goals: The patient will tolerate recommended diet without observed clinical signs of aspiration; The patient/caregiver will demonstrate understanding of compensatory strategies for improved swallowing safety. Oral Preparation / Oral Phase  Oral Phase - Major Contributing Deficits  Poor Mastication: Mechanical soft solid; Soft solid;Reg solid(prolonged but adequate d/t edentulism)    Pharyngeal Phase  Pharyngeal Phase - Major Contributing Deficits  Delayed Swallow Initiation: All  Reduced Pharyngeal Peristalsis: All  Pharyngeal Residue - Valleculae: (minimal post-swallow across all trials; clears spontaneously)    Upper Esophageal Phase  Esophageal Screen: (no apparent imps for upper 1/3 - patient with prior h/o esophageal involvements - rec GI follow-up with established GI MD)      Therapy Time:   Individual   Time In 1400   Time Out 1445   Minutes 45       [address], [id number]  Speech-Language Pathologist  12/31/2020, 2:45 PM

## 2020-12-31 NOTE — PROGRESS NOTES
Hospitalist Progress Note      PCP: Marysue Runner, MD    Date of Admission: 12/30/2020    Chief Complaint:   Chief Complaint   Patient presents with    Other     Left AMA earlier today, pt states they wanted to admit him for sepsis due to pneumonia     Hospital Course: 78 y.o. male who presents to [facility] with PMH COPD and Oxygen dependent at home, depression, bipolar, HLD, seizures.  However he states self endorses that he does not use the oxygen appropriately and he has no idea how much oxygen he supposed to be using or when he supposed to be using it.  Self endorses that he is quite noncompliant.   RA pulse ox 85%. Fatigue increasing over the day. Seen in ED early this morning, but he left AMA from the ED. Then returned with worsening sx's. Was admitted and then discharged the afternoon of 12/30 b/c the patient insisted. He did not  antibiotic RX sent to pharmacy because he said he did not know it was there or prescribed.      He contacted his brother, whom is a MD, discussed his condition with him. His brother encouraged Rah Lopez to return and complete the recommended course of care. ED work-up this evening indicates improvement and WBC from 20-13. Metabolic panel is unremarkable. VBG is improved from pH 7.272 pH 7.3. He remains tachypneic and he is continually requiring oxygen therapy at 2 to 3 L and pulse rate remains mildly elevated at 106.       Subjective: Feels better, still some SOB but breathing improved. Headache this AM. Notes coughing with eating. Apologetic for behavior yesterday, says that is not like him.      Medications:  Reviewed    Infusion Medications   Scheduled Medications    budesonide-formoterol  2 puff Inhalation BID    doxycycline hyclate  100 mg Oral BID    doxepin  10 mg Oral Nightly    pantoprazole  40 mg Oral QAM AC    triazolam  500 mcg Oral BID    sodium chloride flush  10 mL Intravenous 2 times per day    enoxaparin  40 mg Subcutaneous Daily    nicotine  1 patch Transdermal Daily     PRN Meds: albuterol, aspirin, butalbital-acetaminophen-caffeine, clonazePAM, cloNIDine, cyclobenzaprine, sodium chloride flush, promethazine **OR** ondansetron, polyethylene glycol, acetaminophen **OR** acetaminophen, ipratropium-albuterol      Intake/Output Summary (Last 24 hours) at 12/31/2020 0857  Last data filed at 12/31/2020 0616  Gross per 24 hour   Intake 360 ml   Output 400 ml   Net -40 ml       Physical Exam Performed:    BP (!) 150/74   Pulse 118   Temp 98.5 °F (36.9 °C) (Oral)   Resp 17   Ht 5' 7\" (1.702 m)   Wt 177 lb 7.5 oz (80.5 kg)   SpO2 95%   BMI 27.80 kg/m²     General appearance: No apparent distress, appears stated age and cooperative. HEENT: Pupils equal, round, and reactive to light. Conjunctivae/corneas clear. Neck: Supple, with full range of motion. Trachea midline. Respiratory:  Normal respiratory effort. Clear to auscultation, bilaterally without Rales/Wheezes/Rhonchi. Cardiovascular: Regular rate and rhythm with normal S1/S2 without murmurs, rubs or gallops. Abdomen: Soft, non-tender, non-distended with normal bowel sounds. Musculoskeletal: No clubbing, cyanosis or edema bilaterally. Full range of motion without deformity. Skin: Skin color, texture, turgor normal.  No rashes or lesions. Neurologic:  Neurovascularly intact without any focal sensory/motor deficits.  Cranial nerves: II-XII intact, grossly non-focal.  Psychiatric: Alert and oriented, thought content appropriate, normal insight  Capillary Refill: Brisk,< 3 seconds   Peripheral Pulses: +2 palpable, equal bilaterally       Labs:   Recent Labs     12/29/20 1911 12/30/20  0533 12/30/20  2358   WBC 20.8* 14.6* 13.2*   HGB 15.4 14.6 14.9   HCT 48.4 44.6 46.3    211 211     Recent Labs     12/29/20 1911 12/30/20  0533 12/30/20  2358    141 142   K 4.5 4.2 4.1    101 103   CO2 26 30 30   BUN 8 7 7   CREATININE 0.8 0.8 0.7*   CALCIUM 9.0 8.6 8.5 Recent Labs     12/29/20  1911 12/30/20  0533   AST 26 27   ALT 26 25   BILITOT <0.2 0.3   ALKPHOS 127 117     No results for input(s): INR in the last 72 hours. Recent Labs     12/29/20  0502 12/29/20  1911 12/30/20  2358   TROPONINI <0.01 <0.01 <0.01       Urinalysis:      Lab Results   Component Value Date    NITRU Negative 12/29/2020    WBCUA 4 06/05/2016    RBCUA 2 06/05/2016    BLOODU Negative 12/29/2020    SPECGRAV 1.025 12/29/2020    GLUCOSEU Negative 12/29/2020    GLUCOSEU NEGATIVE 04/27/2012       Radiology:  XR CHEST PORTABLE   Final Result   Bibasilar airspace opacity as seen on the prior study, which may reflect   underlying pneumonia in the correct clinical setting. Follow-up imaging   resolution is recommended. Assessment/Plan:    Active Hospital Problems    Diagnosis    Community acquired pneumonia of right lower lobe of lung [J18.9]     CAP  Right lower lobe: dx at prior admission, left AMA. COVID negative. Continue Doxycycline, ceftriaxone  Nebulizer and steroid inhaler  Oxygen therapy titrate maintain saturation greater than 92%  WBC: 20.8-> 14 -> 13    Left shift improvement: 16.9->9.1  He is afebrile. No evidence of severe sepsis or septic shock. Clinical impression has improved from yesterday.      COPD likely with chronic respiratory failure  Oxygen dependent but noncompliant  Supportive treatment: Nebulizers, steroid inhaler  Patient intolerant of oral steroids  VBG: pH 7.27/69.3/43/33 -> improvement-> pH: 7.3/66/68/34    Dysphagia  SLP eval-- suspect esophageal.   - MBS, pending results may benefit from GI eval inpatient vs outpatient     Anxiety/depression/PTSD/manic/bipolar: Continue psych meds per home regimen     DVT Prophylaxis: Lovenox  Diet: DIET GENERAL;  Code Status: Full Code    PT/OT Eval Status: ordered    Dispo - pending    Huyen Griffin MD    This note was transcribed using [id number] [employer]. Please disregard any translational errors.

## 2020-12-31 NOTE — H&P
Hospital Medicine History & Physical      PCP: Karen Jiang MD    Date of Admission: 12/30/2020    Date of Service: Pt seen/examined on 12/30/2020 and Admitted to Inpatient  Placed in Observation. Chief Complaint:  PNA, told to come back by his brother, who is a MD      History Of Present Illness: The patient is a 78 y.o. male who presents to [facility] with COPD and Oxygen dependent at home. However he states self endorses that he does not use the oxygen appropriately and he has no idea how much oxygen he supposed to be using or when he supposed to be using it. Self endorses that he is quite noncompliant. RA pulse ox 85%. Fatigue increasing over the day. Seen in ED early this morning, but he left AMA from the ED. Then returned with worsening sx's. Was admitted and then discharged the afternoon of 12/30 b/c the patient insisted. He did not  antibiotic RX sent to pharmacy because he said he did not know it was there or prescribed. He contacted his brother, whom is a MD, discussed his condition with him. His brother encouraged Deann Garcia to return and complete the recommended course of care. On my exam this evening he is quite pleasant in comparison to last evening. And clinically he remains tachypneic and oxygen BUT appears nearly 50-75% improved. He is able to sit up, carry conversation without evidence of conversational dyspnea and BS improved throughout, but remains decreased bilateral bases: R > L. ED work-up this evening indicates improvement and WBC from 20-13. Metabolic panel is unremarkable. VBG is improved from pH 7.272 pH 7.3. He remains tachypneic and he is continually requiring oxygen therapy at 2 to 3 L and pulse rate remains mildly elevated at 106.     Past Medical History:        Diagnosis Date    Anxiety     Bipolar 1 disorder ([facility]     Community acquired bacterial pneumonia 6/12/2015    COPD [facility]ch[facility] obstructive [facility] disease) [facility]     Depression     Fibromyalgia     Headache(784.0)     Hyperlipidemia     Manic depressive disease manic phase ([facility])     PTSD (post-traumatic stress disorder)     Seizures ([facility]     Tardive dyskinesia        Past Surgical History:        Procedure Laterality Date    MOUTH SURGERY  05/2013       Medications Prior to Admission:    Prior to Admission medications    Medication Sig Start Date End Date Taking? Authorizing Provider   doxycycline monohydrate (ADOXA) 100 MG tablet Take 1 tablet by mouth 2 times daily for 10 days 12/29/20 1/8/21  Albert Kendall MD   cyclobenzaprine (FLEXERIL) 10 MG tablet Take 10 mg by mouth 3 times daily as needed 1/23/19   Historical Provider, MD   triazolam (HALCION) 0.25 MG tablet Take 2 tablets by mouth 2 times daily. 9 pm and 2 am 2/4/20   Historical Provider, MD   albuterol sulfate HFA (PROVENTIL HFA) 108 (90 Base) MCG/ACT inhaler Inhale 2 puffs into the lungs every 6 hours as needed 10/16/18   Historical Provider, MD   doxepin (SINEQUAN) 10 MG capsule Take 10 mg by mouth nightly    Historical Provider, MD   budesonide-formoterol (SYMBICORT) 80-4.5 MCG/ACT AERO Inhale 2 puffs into the lungs 2 times daily 3/16/19   DANDRE Carson - NP   butalbital-acetaminophen-caffeine (FIORICET, ESGIC) -40 MG per tablet Take 1 tablet by mouth every 4 hours as needed for Headaches    Historical Provider, MD   omeprazole (PRILOSEC) 40 MG delayed release capsule Take 40 mg by mouth 2 times daily     Historical Provider, MD   clonazePAM (KLONOPIN) 0.5 MG tablet Take 0.5 mg by mouth daily as needed for Anxiety.     Historical Provider, MD   cloNIDine (CATAPRES) 0.1 MG tablet Take 0.2 mg by mouth every 4 hours as needed for High Blood Pressure (anxiety / tremor)     Historical Provider, MD   aspirin 325 MG tablet Take 650 mg by mouth 2 times daily as needed for Pain or Fever Indications: doesnt take every day     Historical Provider, MD   ondansetron (ZOFRAN) 4 MG tablet Take 1 tablet by mouth every 8 hours as needed for Nausea or Vomiting. 1/29/15   Cherelle Nevarez MD       Allergies:  Prednisone, Serotonin reuptake inhibitors (ssris), and Tricyclic antidepressants    Social History:  The patient currently lives at home with wife. TOBACCO:   reports that he has been smoking cigarettes. He has been smoking about 0.00 packs per day for the past 42.00 years. He has never used smokeless tobacco.  ETOH:   reports no history of alcohol use. Family History:  Reviewed in detail and negative for DM, Early CAD, Cancer, CVA. Positive as follows:        Problem Relation Age of Onset    Mental Illness Mother     Mental Illness Father        REVIEW OF SYSTEMS:   Positive for shortness of breath and as noted in the HPI. All other systems reviewed and negative. PHYSICAL EXAM:    BP (!) 160/99   Pulse 109   Temp 97.9 °F (36.6 °C)   Resp 23   Ht 5' 7\" (1.702 m)   Wt 177 lb 7.5 oz (80.5 kg)   SpO2 95%   BMI 27.80 kg/m²     General appearance: No apparent distress appears stated age and cooperative. HEENT Normal cephalic, atraumatic without obvious deformity. Pupils equal, round, and reactive to light. Extra ocular muscles intact. Conjunctivae/corneas clear. Neck: Supple, No jugular venous distention/bruits. Trachea midline without thyromegaly or adenopathy with full range of motion. Lungs: Positive tachypnea rate 30s. Can speak in full sentences without evidence of distress conversational or exertional dyspnea. Pulse oximetry 9596 on 2 to 3 L nasal cannula. Lung fields decreased bilateral bases with crackles. Right greater than left.    Heart: Mild tachycardia regular  rhythm with Normal S1/S2 without murmurs, rubs or gallops, point of maximum impulse non-displaced  Abdomen: Soft, non-tender or non-distended without rigidity or guarding and positive bowel sounds all four quadrants. Extremities: No clubbing, cyanosis, or edema bilaterally. Full range of motion without deformity and normal gait intact. Skin: Skin color, texture, turgor normal.  No rashes or lesions. Neurologic: Alert and oriented X 3, neurovascularly intact with sensory/motor intact upper extremities/lower extremities, bilaterally. Cranial nerves: II-XII intact, grossly non-focal.  Mental status: Alert, oriented, thought content appropriate. Capillary Refill: Acceptable  < 3 seconds  Peripheral Pulses: +3 Easily felt, not easily obliterated with pressure      CXR:  I have reviewed the CXR with the following interpretation: Right lower lobe pneumonia  EKG:  I have reviewed the EKG with the following interpretation: N/A    CBC   Recent Labs     12/29/20 1911 12/30/20  0533 12/30/20  2358   WBC 20.8* 14.6* 13.2*   HGB 15.4 14.6 14.9   HCT 48.4 44.6 46.3    211 211      RENAL  Recent Labs     12/29/20 1911 12/30/20  0533 12/30/20  2358    141 142   K 4.5 4.2 4.1    101 103   CO2 26 30 30   BUN 8 7 7   CREATININE 0.8 0.8 0.7*     LFT'S  Recent Labs     12/29/20 1911 12/30/20  0533   AST 26 27   ALT 26 25   BILITOT <0.2 0.3   ALKPHOS 127 117     COAG  No results for input(s): INR in the last 72 hours.   CARDIAC ENZYMES  Recent Labs     12/29/20  0502 12/29/20 1911 12/30/20  2358   TROPONINI <0.01 <0.01 <0.01       U/A:    Lab Results   Component Value Date    COLORU Yellow 12/29/2020    WBCUA 4 06/05/2016    RBCUA 2 06/05/2016    CLARITYU Clear 12/29/2020    SPECGRAV 1.025 12/29/2020    LEUKOCYTESUR Negative 12/29/2020    BLOODU Negative 12/29/2020    GLUCOSEU Negative 12/29/2020    GLUCOSEU NEGATIVE 04/27/2012       ABG    Lab Results   Component Value Date    BAC4VRW 25.4 10/10/2013    BEART -1.4 10/10/2013    Q4AQIHMQ 93.4 10/10/2013    PHART 7.301 10/10/2013    THGBART 13.8 07/17/2012    BZK9GGV 53.2 10/10/2013    PO2ART 74.7 10/10/2013    GPY9TMU 27.1 10/10/2013           Active Hospital Problems    Diagnosis Date Noted    Community acquired pneumonia of right lower lobe of lung [J18.9]          PHYSICIANS CERTIFICATION:    I certify that Ilya Dempsey is expected to be hospitalized for less than than 2 midnights based on the following assessment and plan:      ASSESSMENT/PLAN:    CAP: Right lower lobe: dx yesterday  Continue Doxycycline  Nebulizer and steroid inhaler  Oxygen therapy titrate maintain saturation greater than 92%  WBC: 20.8-> 14 -> 13    Left shift improvement: 16.9->9.1  He is afebrile. No evidence of severe sepsis or septic shock. Clinical impression has improved from yesterday.      COPD likely with chronic respiratory failure  Oxygen dependent but noncompliant  Supportive treatment: Nebulizers, steroid inhaler  Patient intolerant of oral steroids  VBG: pH 7.27/69.3/43/33 -> improvement-> pH: 7.3/66/68/34     Anxiety/depression/PTSD/manic/bipolar: Continue psych meds per home regimen       DVT Prophylaxis: Lovenox  Diet: DIET GENERAL;  Code Status: Full Code  PT/OT Eval Status: Independent    Dispo - Admit, obs       Nikki Frye, APRN - CNP    Thank you Zen Dorman MD for the opportunity to be involved in this patient's care. If you have any questions or concerns please feel free to contact me at [phone].

## 2020-12-31 NOTE — ED NOTES
Called pt's wife and updated her on pt status. Denies questions.      Frankey Janus, RN  12/31/20 [id number]

## 2020-12-31 NOTE — ED TRIAGE NOTES
Pt arrived to ED after leaving AMA yesterday (12/30/20) after his family convinced him to come back. Pt is complaining of \"not feeling well\" after being diagnosed with sepsis due to previously diagnosed pneumonia. VS noted, blood pressure elevated and stable. Patient A&Ox4. Respirations easy and unlabored. Skin warm and dry and appropriate for ethnicity. No acute distress noted at this time. Patient placed on continuous telemetry and pulse ox upon arrival to room.

## 2021-01-01 VITALS
DIASTOLIC BLOOD PRESSURE: 80 MMHG | TEMPERATURE: 98.3 F | RESPIRATION RATE: 18 BRPM | HEART RATE: 79 BPM | OXYGEN SATURATION: 96 % | WEIGHT: 175.27 LBS | SYSTOLIC BLOOD PRESSURE: 118 MMHG | BODY MASS INDEX: 27.51 KG/M2 | HEIGHT: 67 IN

## 2021-01-01 LAB
ALBUMIN SERPL-MCNC: 3.4 G/DL (ref 3.4–5)
ANION GAP SERPL CALCULATED.3IONS-SCNC: 12 MMOL/L (ref 3–16)
BUN BLDV-MCNC: 7 MG/DL (ref 7–20)
CALCIUM SERPL-MCNC: 8.4 MG/DL (ref 8.3–10.6)
CHLORIDE BLD-SCNC: 97 MMOL/L (ref 99–110)
CO2: 29 MMOL/L (ref 21–32)
CREAT SERPL-MCNC: 0.7 MG/DL (ref 0.8–1.3)
GFR AFRICAN AMERICAN: >60
GFR NON-AFRICAN AMERICAN: >60
GLUCOSE BLD-MCNC: 105 MG/DL (ref 70–99)
HCT VFR BLD CALC: 42.4 % (ref 40.5–52.5)
HEMOGLOBIN: 14.1 G/DL (ref 13.5–17.5)
L. PNEUMOPHILA SEROGP 1 UR AG: NORMAL
MAGNESIUM: 2.1 MG/DL (ref 1.8–2.4)
MCH RBC QN AUTO: 30.5 PG (ref 26–34)
MCHC RBC AUTO-ENTMCNC: 33.3 G/DL (ref 31–36)
MCV RBC AUTO: 91.7 FL (ref 80–100)
MRSA SCREEN RT-PCR: NORMAL
PDW BLD-RTO: 15.1 % (ref 12.4–15.4)
PHOSPHORUS: 1.9 MG/DL (ref 2.5–4.9)
PLATELET # BLD: 175 K/UL (ref 135–450)
PMV BLD AUTO: 8.9 FL (ref 5–10.5)
POTASSIUM SERPL-SCNC: 4 MMOL/L (ref 3.5–5.1)
RBC # BLD: 4.63 M/UL (ref 4.2–5.9)
SODIUM BLD-SCNC: 138 MMOL/L (ref 136–145)
STREP PNEUMONIAE ANTIGEN, URINE: NORMAL
WBC # BLD: 10.7 K/UL (ref 4–11)

## 2021-01-01 PROCEDURE — 6360000002 HC RX W HCPCS: Performed by: INTERNAL MEDICINE

## 2021-01-01 PROCEDURE — 94640 AIRWAY INHALATION TREATMENT: CPT

## 2021-01-01 PROCEDURE — 85027 COMPLETE CBC AUTOMATED: CPT

## 2021-01-01 PROCEDURE — 6370000000 HC RX 637 (ALT 250 FOR IP): Performed by: NURSE PRACTITIONER

## 2021-01-01 PROCEDURE — 2580000003 HC RX 258: Performed by: NURSE PRACTITIONER

## 2021-01-01 PROCEDURE — 2700000000 HC OXYGEN THERAPY PER DAY

## 2021-01-01 PROCEDURE — 87205 SMEAR GRAM STAIN: CPT

## 2021-01-01 PROCEDURE — 6360000002 HC RX W HCPCS: Performed by: NURSE PRACTITIONER

## 2021-01-01 PROCEDURE — 2580000003 HC RX 258: Performed by: INTERNAL MEDICINE

## 2021-01-01 PROCEDURE — 80069 RENAL FUNCTION PANEL: CPT

## 2021-01-01 PROCEDURE — 36415 COLL VENOUS BLD VENIPUNCTURE: CPT

## 2021-01-01 PROCEDURE — 94760 N-INVAS EAR/PLS OXIMETRY 1: CPT

## 2021-01-01 PROCEDURE — 83735 ASSAY OF MAGNESIUM: CPT

## 2021-01-01 PROCEDURE — 87070 CULTURE OTHR SPECIMN AEROBIC: CPT

## 2021-01-01 RX ADMIN — CEFTRIAXONE 1 G: 1 INJECTION, POWDER, FOR SOLUTION INTRAMUSCULAR; INTRAVENOUS at 10:29

## 2021-01-01 RX ADMIN — CLONAZEPAM 0.5 MG: 0.5 TABLET ORAL at 07:39

## 2021-01-01 RX ADMIN — SODIUM CHLORIDE, PRESERVATIVE FREE 10 ML: 5 INJECTION INTRAVENOUS at 08:58

## 2021-01-01 RX ADMIN — PANTOPRAZOLE SODIUM 40 MG: 40 TABLET, DELAYED RELEASE ORAL at 06:44

## 2021-01-01 RX ADMIN — DOXYCYCLINE HYCLATE 100 MG: 100 TABLET, COATED ORAL at 07:39

## 2021-01-01 RX ADMIN — BUDESONIDE AND FORMOTEROL FUMARATE DIHYDRATE 2 PUFF: 80; 4.5 AEROSOL RESPIRATORY (INHALATION) at 09:33

## 2021-01-01 RX ADMIN — ONDANSETRON 4 MG: 2 INJECTION INTRAMUSCULAR; INTRAVENOUS at 13:02

## 2021-01-01 RX ADMIN — ENOXAPARIN SODIUM 40 MG: 40 INJECTION SUBCUTANEOUS at 07:40

## 2021-01-01 RX ADMIN — BUTALBITAL, ACETAMINOPHEN, AND CAFFEINE 1 TABLET: 50; 325; 40 TABLET ORAL at 06:46

## 2021-01-01 ASSESSMENT — PAIN DESCRIPTION - ONSET: ONSET: ON-GOING

## 2021-01-01 ASSESSMENT — PAIN DESCRIPTION - ORIENTATION: ORIENTATION: MID

## 2021-01-01 ASSESSMENT — PAIN DESCRIPTION - DESCRIPTORS: DESCRIPTORS: THROBBING

## 2021-01-01 ASSESSMENT — PAIN DESCRIPTION - PAIN TYPE: TYPE: CHRONIC PAIN

## 2021-01-01 NOTE — DISCHARGE SUMMARY
Hospitalist Discharge Summary    Patient ID:  Bula Osler  [id number]  47 y.o.  1950    Admit date: 12/30/2020    Discharge date: 1/1/2021    Disposition: home    Admission Diagnoses:   Patient Active Problem List   Diagnosis    Tardive dyskinesia    Community acquired bacterial pneumonia    Esophageal dysphagia    Hoarseness, chronic    [facility] with hypoxia [facility]    Community acquired pneumonia of right lower lobe of lung    Centrilobular emphysema (H[facility])    Lactic acidosis    Postnasal drip    Tobacco use    Sepsis [facility]    Pneumonia       Discharge Diagnoses: Active Problems:    Community acquired pneumonia of right lower lobe of lung    Pneumonia  Resolved Problems:    * No resolved hospital problems. *      Code Status:  Full Code    Condition:  Stable    Discharge Diet: Diet:  DIET GENERAL;    PCP to do list:  GI follow up for dysphagia, follow up CXR    Hospital Course:     Pneumonia, chronic hypoxic resp failure  Initially presented 12/30 for PNA, sepsis but left AMA twice. Did get intermittent doses of abx in the ED and during very short prior admission before leaving AMA. Says he spoke to his brother who is a physician who told him to return for admission. On repeat presentation he was no longer septic, WBC had significantly improved, blood gas improved and clinically overall doing better. He was treated with ceftriaxone and doxy with ongoing improvement. Procal negative. He has O2 at home which he mostly wears only at night but agreeable to monitor with pulse ox at home and wear during the day if needed. Zulmea Pavon for discharge. Will complete course of doxycycline. Dysphagia  Noted coughing with eating. Seen by SLP with MBS which was overall unremarkable. Does have history of unspecified esophageal issues. He will follow up with his outpatient GI doctor for further evaluation. COPD  No wheezing on exam though treated for PNA as above.  Intolerant Fall TECHNOLOGIST PROVIDED HISTORY: Reason for exam:->Fall Reason for Exam: Fall Acuity: Acute Type of Exam: Initial FINDINGS: CT head: BRAIN/VENTRICLES: There is no acute intracranial hemorrhage, mass effect or midline shift. No abnormal extra-axial fluid collection. The gray-white differentiation is maintained without evidence of an acute infarct. There is no evidence of hydrocephalus. Right-sided small choroid fissure cyst is present. Ill-defined hypoattenuation is noted within cerebral white matter consistent with mild microvascular ischemic change. ORBITS: The visualized portion of the orbits demonstrate no acute abnormality. SINUSES: The visualized paranasal sinuses and mastoid air cells demonstrate no acute abnormality. SOFT TISSUES/SKULL: No acute abnormality of the visualized skull or soft tissues. CT cervical spine: BONES/ALIGNMENT: Generalized reversal of cervical lordosis is noted. C3 on C4 anterolisthesis is noted measuring 4 mm. C4 on C5 anterolisthesis measuring 4 mm is noted. DEGENERATIVE CHANGES: C5/C6: Severe disc height loss is noted with vacuum disc phenomenon in association with posterior disc osteophyte complex which narrows the midline AP thecal sac diameter to 9 mm consistent with mild central canal stenosis. Disc osteophyte complex encroaches upon and causes mild right neural foraminal narrowing. C6/C7: Severe disc height loss is noted at this level in association with posterior disc osteophyte complex which indents the ventral thecal sac narrowing the midline AP thecal sac diameter to 10 mm consistent with borderline central canal stenosis. Disc osteophyte complex encroaches upon and causes mild bilateral neural foraminal narrowing. Otherwise, multilevel minimal-to-mild spondylosis is noted without further evidence of central canal stenosis/compromise. SOFT TISSUES: There is no prevertebral soft tissue swelling. 1. No evidence of acute intracranial abnormality.  2. No acute pneumonitis, drug toxicity. Recommend clinical correlation. 2. Moderate centrilobular emphysema. 3. No evidence of acute chest trauma. Ct Cervical Spine Wo Contrast    Result Date: 12/29/2020  EXAMINATION: CT OF THE HEAD WITHOUT CONTRAST; CT OF THE CERVICAL SPINE WITHOUT CONTRAST 12/29/2020 4:49 am TECHNIQUE: CT of the head was performed without the administration of intravenous contrast. Dose modulation, iterative reconstruction, and/or weight based adjustment of the mA/kV was utilized to reduce the radiation dose to as low as reasonably achievable.; CT of the cervical spine was performed without the administration of intravenous contrast. Multiplanar reformatted images are provided for review. Dose modulation, iterative reconstruction, and/or weight based adjustment of the mA/kV was utilized to reduce the radiation dose to as low as reasonably achievable. COMPARISON: CT head without contrast June 4, 2016. HISTORY: ORDERING SYSTEM PROVIDED HISTORY: TRAUMA TECHNOLOGIST PROVIDED HISTORY: Has a \"code stroke\" or \"stroke alert\" been called? ->No Reason for exam:->TRAUMA Reason for Exam: fall/trauma Acuity: Acute Type of Exam: Initial; ORDERING SYSTEM PROVIDED HISTORY: Fall TECHNOLOGIST PROVIDED HISTORY: Reason for exam:->Fall Reason for Exam: Fall Acuity: Acute Type of Exam: Initial FINDINGS: CT head: BRAIN/VENTRICLES: There is no acute intracranial hemorrhage, mass effect or midline shift. No abnormal extra-axial fluid collection. The gray-white differentiation is maintained without evidence of an acute infarct. There is no evidence of hydrocephalus. Right-sided small choroid fissure cyst is present. Ill-defined hypoattenuation is noted within cerebral white matter consistent with mild microvascular ischemic change. ORBITS: The visualized portion of the orbits demonstrate no acute abnormality. SINUSES: The visualized paranasal sinuses and mastoid air cells demonstrate no acute abnormality.  SOFT TISSUES/SKULL: No acute abnormality of the visualized skull or soft tissues. CT cervical spine: BONES/ALIGNMENT: Generalized reversal of cervical lordosis is noted. C3 on C4 anterolisthesis is noted measuring 4 mm. C4 on C5 anterolisthesis measuring 4 mm is noted. DEGENERATIVE CHANGES: C5/C6: Severe disc height loss is noted with vacuum disc phenomenon in association with posterior disc osteophyte complex which narrows the midline AP thecal sac diameter to 9 mm consistent with mild central canal stenosis. Disc osteophyte complex encroaches upon and causes mild right neural foraminal narrowing. C6/C7: Severe disc height loss is noted at this level in association with posterior disc osteophyte complex which indents the ventral thecal sac narrowing the midline AP thecal sac diameter to 10 mm consistent with borderline central canal stenosis. Disc osteophyte complex encroaches upon and causes mild bilateral neural foraminal narrowing. Otherwise, multilevel minimal-to-mild spondylosis is noted without further evidence of central canal stenosis/compromise. SOFT TISSUES: There is no prevertebral soft tissue swelling. 1. No evidence of acute intracranial abnormality. 2. No acute abnormality of the cervical spine. 3. Mild cerebral white matter chronic microvascular ischemic disease. 4. Generalized reversal of cervical lordosis, as discussed above. 5. C3 on C4, C4 on C5 anterolisthesis each measuring 4 mm, likely degenerative. 6. C5/C6 mild, C6/C7 borderline central canal stenosis secondary to encroachment by posterior disc osteophyte complex. Xr Chest Portable    Result Date: 12/31/2020  EXAMINATION: ONE XRAY VIEW OF THE CHEST 12/30/2020 11:32 pm COMPARISON: 12/29/2020. Correlation was also made to the chest CT dated 12/29/2020. HISTORY: ORDERING SYSTEM PROVIDED HISTORY: SOB TECHNOLOGIST PROVIDED HISTORY: Reason for exam:->SOB Reason for Exam: sob FINDINGS: The cardiac silhouette appears within normal limits.   There is redemonstration of bibasilar opacities, suspicious for underlying pneumonia as seen on the prior study. No large pleural effusion or perceptible pneumothorax is seen. Bibasilar airspace opacity as seen on the prior study, which may reflect underlying pneumonia in the correct clinical setting. Follow-up imaging resolution is recommended. Xr Chest Portable    Result Date: 12/29/2020  EXAMINATION: ONE XRAY VIEW OF THE CHEST 12/29/2020 7:03 pm COMPARISON: None. HISTORY: ORDERING SYSTEM PROVIDED HISTORY: SOB TECHNOLOGIST PROVIDED HISTORY: Reason for exam:->SOB Reason for Exam: SOB, fatigue Acuity: Acute Type of Exam: Initial FINDINGS: AP portable view of the chest time stamped at 1905 hours demonstrates overlying cardiac monitoring electrodes. Heart size is within upper limits of normal.  Opacity is present at the right lung base suspicious for focal airspace disease. Osseous and mediastinal structures are age-appropriate. No extrapleural air is noted. Opacity at the right lung base suggestive of focal airspace disease/pneumonitis. Fl Modified Barium Swallow W Video    Result Date: 12/31/2020  EXAMINATION: MODIFIED BARIUM SWALLOW WAS PERFORMED IN CONJUNCTION WITH SPEECH PATHOLOGY SERVICES TECHNIQUE: Fluoroscopic evaluation of the swallowing mechanism was performed with multiple consistency of barium product. FLUOROSCOPY DOSE AND TYPE OR TIME AND EXPOSURES: 3.1 minutes. No exposures COMPARISON: None HISTORY: ORDERING SYSTEM PROVIDED HISTORY: dysphagia TECHNOLOGIST PROVIDED HISTORY: Reason for exam:->dysphagia FINDINGS: The patient swallowed various consistencies mixed with barium and was evaluated alongside the speech pathologist. There was no aspiration or penetration. No evidence of aspiration or penetration. Please see separate speech pathologist's report for full discussion of findings and recommendations.        Last Labs on Discharge:     Recent Results (from the past 24 hour(s))   MRSA DNA Probe, Nasal    Collection Time: 12/31/20  6:29 PM    Specimen: Nares; Nose   Result Value Ref Range    MRSA SCREEN RT-PCR       Negative  MRSA DNA not detected. Normal Range: Not detected     Procalcitonin    Collection Time: 12/31/20  7:30 PM   Result Value Ref Range    Procalcitonin 0.07 0.00 - 0.15 ng/mL   Strep Pneumoniae Antigen    Collection Time: 12/31/20  8:35 PM    Specimen: Urine, clean catch   Result Value Ref Range    STREP PNEUMONIAE ANTIGEN, URINE       Presumptive Negative  Presumptive negative suggests no current or recent  pneumococcal infection. Infection due to Strep pneumoniae  cannot be ruled out since the antigen present in the sample  may be below the detection limit of the test.  Normal Range:Presumptive Negative     Legionella antigen, urine    Collection Time: 12/31/20  8:35 PM    Specimen: Urine, clean catch   Result Value Ref Range    L. pneumophila Serogp 1 Ur Ag       Presumptive Negative  No Legionella pneumophila serogroup 1 antigens detected. A negative result does not exclude infection with  Legionella pneumophila serogroup 1 nor does it rule out  other microbial-caused respiratory infections or  disease caused by other serogroups of  Legionella pneumophila.   Normal Range: Presumptive Negative     CBC    Collection Time: 01/01/21  9:30 AM   Result Value Ref Range    WBC 10.7 4.0 - 11.0 K/uL    RBC 4.63 4.20 - 5.90 M/uL    Hemoglobin 14.1 13.5 - 17.5 g/dL    Hematocrit 42.4 40.5 - 52.5 %    MCV 91.7 80.0 - 100.0 fL    MCH 30.5 26.0 - 34.0 pg    MCHC 33.3 31.0 - 36.0 g/dL    RDW 15.1 12.4 - 15.4 %    Platelets 962 848 - 280 K/uL    MPV 8.9 5.0 - 10.5 fL   Magnesium    Collection Time: 01/01/21  9:30 AM   Result Value Ref Range    Magnesium 2.10 1.80 - 2.40 mg/dL   Renal Function Panel    Collection Time: 01/01/21  9:30 AM   Result Value Ref Range    Sodium 138 136 - 145 mmol/L    Potassium 4.0 3.5 - 5.1 mmol/L    Chloride 97 (L) 99 - 110 mmol/L    CO2 29 21 - 32 mmol/L    Anion Gap 12 3 - 16    Glucose 105 (H) 70 - 99 mg/dL    BUN 7 7 - 20 mg/dL    CREATININE 0.7 (L) 0.8 - 1.3 mg/dL    GFR Non-African American >60 >60    GFR African American >60 >60    Calcium 8.4 8.3 - 10.6 mg/dL    Phosphorus 1.9 (L) 2.5 - 4.9 mg/dL    Alb 3.4 3.4 - 5.0 g/dL         Follow up: with Shanita Muse MD    Note that more  than 30 minutes was spent in preparing discharge papers, discussing discharge with patient, medication review, etc.    Thank you Shanita Muse MD for the opportunity to be involved in this patient's care. If you have any questions or concerns please feel free to contact me at [phone]. Electronically signed by Mariela Bhardwaj MD on 1/1/2021 at 12:43 PM    This note was transcribed using [id number] [employer]. Please disregard any translational errors.

## 2021-01-01 NOTE — DISCHARGE INSTR - COC
Continuity of Care Form    Patient Name: Kristie Pérez   :  1950  MRN:  [id number]    Admit date:  2020  Discharge date: 2021    Code Status Order: Full Code   Advance Directives:   [facility] Documentation     Date/Time Healthcare Directive Type of Healthcare Directive Copy in [address] [facility] [place] [address] Agent's Name Healthcare Agent's Phone Number    20 0085  No, patient does not have an advance directive for healthcare treatment -- -- -- -- --          Admitting Physician:  Beverley Smith DO  PCP: Paul Rodriguez MD    Discharging Nurse: Varinder Gage 23 Unit/Room#: N7Z-3637/4913-75  Discharging Unit Phone Number: [phone]    Emergency Contact:   Extended Emergency Contact Information  Primary Emergency Contact: Beba Pal  Address: [address] [facility]           [address] [place] [address], [address] [place] [address] Phone: [phone]  Relation: Spouse  Secondary Emergency Contact: Beba Pal  Address: [address] Phone: [phone]  Relation: Spouse    Past Surgical History:  Past Surgical History:   Procedure Laterality Date    MOUTH SURGERY  2013       Immunization History: There is no immunization history on file for this patient.     Active Problems:  Patient Active Problem List   Diagnosis Code    Tardive dyskinesia G24.01    Community acquired bacterial pneumonia J15.9    Esophageal dysphagia R13.10    Hoarseness, chronic R49.0    Acute respiratory failure with hypoxia ([address] J96.01    Community acquired pneumonia of right lower lobe of lung J18.9    Centrilobular emphysema ([address] J43.2    Lactic acidosis E87.2    Postnasal drip R09.82    Tobacco use Z72.0    Sepsis ([address] A41.9    Pneumonia J18.9       Isolation/Infection:   Isolation          No Isolation        Patient Infection Status     Infection Onset Added Last Indicated Last Indicated By Review Planned Expiration Resolved Resolved By    None active    Resolved    COVID-19 Rule Out 12/29/20 12/29/20 12/29/20 COVID-19 (Ordered)   12/29/20 Rule-Out Test Resulted    COVID-19 Rule Out 12/29/20 12/29/20 12/29/20 COVID-19 (Ordered)   12/29/20 Rule-Out Test Resulted          Nurse Assessment:  Last Vital Signs: /80   Pulse 79   Temp 98.3 °F (36.8 °C) (Oral)   Resp 18   Ht 5' 7\" (1.702 m)   Wt 175 lb 4.3 oz (79.5 kg)   SpO2 96%   BMI 27.45 kg/m²     Last documented pain score (0-10 scale): Pain Level: 0  Last Weight:   Wt Readings from Last 1 Encounters:   01/01/21 175 lb 4.3 oz (79.5 kg)     Mental Status:  oriented and alert    IV Access:  - None    Nursing Mobility/ADLs:  Walking   Independent  Transfer  Independent  Bathing  Independent  Dressing  Independent  Toileting  Independent  Feeding  [address]  Independent  Med Delivery   none    Wound Care Documentation and Therapy:        Elimination:  Continence:   · Bowel: Yes  · Bladder: Yes  Urinary Catheter: None   Colostomy/Ileostomy/Ileal Conduit: No       Date of Last BM: 12/31/20    Intake/Output Summary (Last 24 hours) at 1/1/2021 1104  Last data filed at 1/1/2021 0935  Gross per 24 hour   Intake 1150 ml   Output --   Net 1150 ml     I/O last 3 completed shifts: In: 1150 [P.O.:1140; I.V.:10]  Out: -     Safety Concerns:     None    Impairments/Disabilities:      None    Nutrition Therapy:  Current Nutrition Therapy:   - Oral Diet:  General    Routes of Feeding: Oral  Liquids: Thin Liquids  Daily Fluid Restriction: no  Last Modified Barium Swallow with Video (Video Swallowing Test): not done    Treatments at the Time of Hospital Discharge:   Respiratory Treatments: Albuterol as needed  Oxygen Therapy:  is on oxygen at 2 L/min per nasal cannula.   Ventilator:    - No ventilator support    Rehab Therapies: NA  Weight Bearing Status/Restrictions: No weight bearing restirctions  Other Medical Equipment (for information only, NOT a DME order):  NA  Other Treatments: NA    Patient's personal belongings (please select all that are sent with patient):  Personal items/cellphone//clothing/footwear    RN SIGNATURE:  Electronically signed by Sagar Segundo RN on 1/1/21 at 11:10 AM EST    CASE MANAGEMENT/SOCIAL WORK SECTION    Inpatient Status Date: ***    Readmission Risk Assessment Score:  Readmission Risk              Risk of Unplanned Readmission:        19           Discharging to Facility/ Agency   · Name:   · Address:  · Phone:  · Fax:    Dialysis Facility (if applicable)   · Name:  · Address:  · Dialysis Schedule:  · Phone:  · Fax:    / signature: {Esignature:[id number]}    PHYSICIAN SECTION    Prognosis: {Prognosis:6805284632}    Condition at Discharge: [address] [place] [address] Patient Condition:[id number]}    Rehab Potential (if transferring to Rehab): {Prognosis:1385089449}    Recommended Labs or Other Treatments After Discharge: ***    Physician Certification: I certify the above information and transfer of Cathy Haney  is necessary for the continuing treatment of the diagnosis listed and that he requires {Admit to Appropriate Level of Care:39649} for {GREATER/LESS:600149654} 30 days.      Update Admission H&P: {CHP DME Changes in CJZUI:035415792}    PHYSICIAN SIGNATURE:  {Esignature:026052143}

## 2021-01-01 NOTE — PROGRESS NOTES
Pt has slept fairly well overnight. Will occasionally yell out, states this is unchanged from home as he has severe night terrors. Pt medicated multiple times overnight for anxiety. Admitted with pneumonia. On 3L O2, home dependent. Crackles in the right lung base. Denies any SOB. HR regular. Abdomen non tender with active bowel sounds. Pt c/o some nausea and headache pain last evening and was medicated per PRN orders, medication effective. All needs assessed with Q2H rounding, call light in reach at all times, will monitor.  Missy Bravo RN

## 2021-01-01 NOTE — PROGRESS NOTES
Patient alert and oriented x 4. Able to voice all needs. Personal items and call light within reach. LDA. O2 @ 2 lpm/nc. No productive cough noted; able to obtain sputum speciment and sent to lab. Fall precautions in place. No complaint of pain. Will continue to monitor.

## 2021-01-01 NOTE — PROGRESS NOTES
Patient discharged to home. Wife here to transport. Discharge instructions given; questions asked and answered. Taken to vehicle via staff assisted w/c. Personal items with him.

## 2021-01-02 LAB — CULTURE, BLOOD 2: NORMAL

## 2021-01-02 NOTE — DISCHARGE SUMMARY
[facility]   Discharge Summary    Patient:  Parviz Hernandez  YOB: 1950    MRN: [id number]   Acct: [de-identified]    Primary Care Physician: Perla Spencer MD    Admit date:  12/29/2020    Discharge date:  12/30/2020       Discharge Diagnoses:   <principal problem not specified>  Active Problems:    Sepsis [facility])  Resolved Problems:    * No resolved hospital problems. *        Admitted for: (HPI) pna, sepsis    Hospital Course:patient admitted with above, was advised to remain hospitalized however patient decided to leave ama. Discharge Medications:       Medication List      ASK your doctor about these medications    aspirin 325 MG tablet     budesonide-formoterol 80-4.5 MCG/ACT Aero  Commonly known as: Symbicort  Inhale 2 puffs into the lungs 2 times daily     butalbital-acetaminophen-caffeine -40 MG per tablet  Commonly known as: FIORICET, ESGIC     clonazePAM 0.5 MG tablet  Commonly known as: KLONOPIN     cloNIDine 0.1 MG tablet  Commonly known as: CATAPRES     cyclobenzaprine 10 MG tablet  Commonly known as: FLEXERIL     doxepin 10 MG capsule  Commonly known as: SINEQUAN     doxycycline monohydrate 100 MG tablet  Commonly known as: ADOXA  Take 1 tablet by mouth 2 times daily for 10 days     omeprazole 40 MG delayed release capsule  Commonly known as: PRILOSEC     ondansetron 4 MG tablet  Commonly known as: Zofran  Take 1 tablet by mouth every 8 hours as needed for Nausea or Vomiting.      Proventil  (90 Base) MCG/ACT inhaler  Generic drug: albuterol sulfate HFA     triazolam 0.25 MG tablet  Commonly known as: HALCION              Physical Exam:    Vitals:  Vitals:    12/30/20 0726 12/30/20 0730 12/30/20 0830 12/30/20 1143   BP: (!) 147/84   (!) 148/89   Pulse: 118   118   Resp: 18   18   Temp: 98.9 °F (37.2 °C)   98.3 °F (36.8 °C)   TempSrc: Oral   Oral   SpO2: 96%  92% 90%   Weight:  175 lb 4.3 oz (79.5 kg)       Weight: Weight: 175 lb 4.3 oz (79.5 kg) Radiology reports as per the Radiologist  Radiology: Xr Chest Portable    Result Date: 12/31/2020  EXAMINATION: ONE XRAY VIEW OF THE CHEST 12/30/2020 11:32 pm COMPARISON: 12/29/2020. Correlation was also made to the chest CT dated 12/29/2020. HISTORY: ORDERING SYSTEM PROVIDED HISTORY: SOB TECHNOLOGIST PROVIDED HISTORY: Reason for exam:->SOB Reason for Exam: sob FINDINGS: The cardiac silhouette appears within normal limits. There is redemonstration of bibasilar opacities, suspicious for underlying pneumonia as seen on the prior study. No large pleural effusion or perceptible pneumothorax is seen. Bibasilar airspace opacity as seen on the prior study, which may reflect underlying pneumonia in the correct clinical setting. Follow-up imaging resolution is recommended. Fl Modified Barium Swallow W Video    Result Date: 12/31/2020  EXAMINATION: MODIFIED BARIUM SWALLOW WAS PERFORMED IN CONJUNCTION WITH SPEECH PATHOLOGY SERVICES TECHNIQUE: Fluoroscopic evaluation of the swallowing mechanism was performed with multiple consistency of barium product. FLUOROSCOPY DOSE AND TYPE OR TIME AND EXPOSURES: 3.1 minutes. No exposures COMPARISON: None HISTORY: ORDERING SYSTEM PROVIDED HISTORY: dysphagia TECHNOLOGIST PROVIDED HISTORY: Reason for exam:->dysphagia FINDINGS: The patient swallowed various consistencies mixed with barium and was evaluated alongside the speech pathologist. There was no aspiration or penetration. No evidence of aspiration or penetration. Please see separate speech pathologist's report for full discussion of findings and recommendations. Results for orders placed or performed during the hospital encounter of 12/29/20   Culture, Blood 1    Specimen: Blood   Result Value Ref Range    Blood Culture, Routine       No Growth to date. Any change in status will be called. Culture, Blood 2    Specimen: Blood   Result Value Ref Range    Culture, Blood 2       No Growth to date.   Any change in status will be called. CBC Auto Differential   Result Value Ref Range    WBC 20.8 (H) 4.0 - 11.0 K/uL    RBC 5.20 4. 20 - 5.90 M/uL    Hemoglobin 15.4 13.5 - 17.5 g/dL    Hematocrit 48.4 40.5 - 52.5 %    MCV 93.0 80.0 - 100.0 fL    MCH 29.6 26.0 - 34.0 pg    MCHC 31.8 31.0 - 36.0 g/dL    RDW 15.5 (H) 12.4 - 15.4 %    Platelets 087 889 - 510 K/uL    MPV 9.2 5.0 - 10.5 fL    Neutrophils % 81.4 %    Lymphocytes % 11.6 %    Monocytes % 6.2 %    Eosinophils % 0.3 %    Basophils % 0.5 %    Neutrophils Absolute 16.9 (H) 1.7 - 7.7 K/uL    Lymphocytes Absolute 2.4 1.0 - 5.1 K/uL    Monocytes Absolute 1.3 0.0 - 1.3 K/uL    Eosinophils Absolute 0.1 0.0 - 0.6 K/uL    Basophils Absolute 0.1 0.0 - 0.2 K/uL   Comprehensive Metabolic Panel   Result Value Ref Range    Sodium 140 136 - 145 mmol/L    Potassium 4.5 3.5 - 5.1 mmol/L    Chloride 102 99 - 110 mmol/L    CO2 26 21 - 32 mmol/L    Anion Gap 12 3 - 16    Glucose 109 (H) 70 - 99 mg/dL    BUN 8 7 - 20 mg/dL    CREATININE 0.8 0.8 - 1.3 mg/dL    GFR Non-African American >60 >60    GFR African American >60 >60    Calcium 9.0 8.3 - 10.6 mg/dL    Total Protein 7.3 6.4 - 8.2 g/dL    Alb 4.0 3.4 - 5.0 g/dL    Albumin/Globulin Ratio 1.2 1.1 - 2.2    Total Bilirubin <0.2 0.0 - 1.0 mg/dL    Alkaline Phosphatase 127 40 - 129 U/L    ALT 26 10 - 40 U/L    AST 26 15 - 37 U/L    Globulin 3.3 g/dL   Lactic Acid, Plasma   Result Value Ref Range    Lactic Acid 2.0 0.4 - 2.0 mmol/L   Blood Gas, Venous   Result Value Ref Range    pH, Jonny 7.279 (L) 7.350 - 7.450    pCO2, Jonny 69.3 (H) 40.0 - 50.0 mmHg    pO2, Jonny 43 Not Established mmHg    HCO3, Venous 33 (H) 23 - 29 mmol/L    Base Excess, Jonny 3.1 Not Established mmol/L    O2 Sat, Jonny 76 Not Established %    Carboxyhemoglobin 1.6 %    MetHgb, Jonny 0.6 <1.5 %    TC02 (Calc), Jonny 35 Not Established mmol/L    O2 Content, Jonny 16 Not Established mL/dL    O2 Therapy Unknown    Procalcitonin   Result Value Ref Range    Procalcitonin 0.06 0.00 - 0.15 mmol/L    Chloride 101 99 - 110 mmol/L    CO2 30 21 - 32 mmol/L    Anion Gap 10 3 - 16    Glucose 107 (H) 70 - 99 mg/dL    BUN 7 7 - 20 mg/dL    CREATININE 0.8 0.8 - 1.3 mg/dL    GFR Non-African American >60 >60    GFR African American >60 >60    Calcium 8.6 8.3 - 10.6 mg/dL    Total Protein 6.8 6.4 - 8.2 g/dL    Alb 3.9 3.4 - 5.0 g/dL    Albumin/Globulin Ratio 1.3 1.1 - 2.2    Total Bilirubin 0.3 0.0 - 1.0 mg/dL    Alkaline Phosphatase 117 40 - 129 U/L    ALT 25 10 - 40 U/L    AST 27 15 - 37 U/L    Globulin 2.9 g/dL   EKG 12 Lead   Result Value Ref Range    Ventricular Rate 106 BPM    Atrial Rate 106 BPM    P-R Interval 120 ms    QRS Duration 80 ms    Q-T Interval 318 ms    QTc Calculation (Bazett) 422 ms    P Axis 101 degrees    R Axis 80 degrees    T Axis 64 degrees    Diagnosis       Sinus tachycardia* ** Poor data quality, interpretation may be adversely affectedBorderline ECGWhen compared with ECG of 29-DEC-2020 05:31, (unconfirmed)Poor data quality both ekgs but unlikely a changeConfirmed by [facility] Vamsi GOMEZ MD ([id number]) on 12/31/2020 2:20:40 PM         Patient left ama, advised to stay.  No discharge oder placed as patient left ama     Electronically signed by Lula Lim MD on 1/2/2021 at 11:23 AM    Discharging Hospitalist

## 2021-01-03 LAB
BLOOD CULTURE, ROUTINE: NORMAL
CULTURE, RESPIRATORY: NORMAL
GRAM STAIN RESULT: NORMAL

## 2021-01-25 ENCOUNTER — APPOINTMENT (OUTPATIENT)
Dept: CT IMAGING | Age: 71
End: 2021-01-25
Payer: COMMERCIAL

## 2021-01-25 ENCOUNTER — APPOINTMENT (OUTPATIENT)
Dept: GENERAL RADIOLOGY | Age: 71
End: 2021-01-25
Payer: COMMERCIAL

## 2021-01-25 ENCOUNTER — TELEPHONE (OUTPATIENT)
Dept: OTHER | Facility: CLINIC | Age: 71
End: 2021-01-25

## 2021-01-25 ENCOUNTER — HOSPITAL ENCOUNTER (EMERGENCY)
Age: 71
Discharge: LEFT BEFORE DISCHARGE | End: 2021-01-26
Attending: EMERGENCY MEDICINE | Admitting: STUDENT IN AN ORGANIZED HEALTH CARE EDUCATION/TRAINING PROGRAM
Payer: COMMERCIAL

## 2021-01-25 DIAGNOSIS — A41.9 SEPTICEMIA (HCC): Primary | ICD-10-CM

## 2021-01-25 DIAGNOSIS — J18.9 PNEUMONIA OF RIGHT LOWER LOBE DUE TO INFECTIOUS ORGANISM: ICD-10-CM

## 2021-01-25 DIAGNOSIS — J44.9 CHRONIC OBSTRUCTIVE PULMONARY DISEASE, UNSPECIFIED COPD TYPE (HCC): ICD-10-CM

## 2021-01-25 DIAGNOSIS — F17.210 CIGARETTE SMOKER: ICD-10-CM

## 2021-01-25 DIAGNOSIS — D72.825 BANDEMIA: ICD-10-CM

## 2021-01-25 DIAGNOSIS — J96.01 ACUTE RESPIRATORY FAILURE WITH HYPOXEMIA (HCC): ICD-10-CM

## 2021-01-25 LAB
A/G RATIO: 1.4 (ref 1.1–2.2)
ALBUMIN SERPL-MCNC: 4.2 G/DL (ref 3.4–5)
ALP BLD-CCNC: 121 U/L (ref 40–129)
ALT SERPL-CCNC: 25 U/L (ref 10–40)
ANION GAP SERPL CALCULATED.3IONS-SCNC: 9 MMOL/L (ref 3–16)
ANISOCYTOSIS: ABNORMAL
AST SERPL-CCNC: 21 U/L (ref 15–37)
BANDED NEUTROPHILS RELATIVE PERCENT: 8 % (ref 0–7)
BASOPHILS ABSOLUTE: 0 K/UL (ref 0–0.2)
BASOPHILS RELATIVE PERCENT: 0 %
BILIRUB SERPL-MCNC: <0.2 MG/DL (ref 0–1)
BILIRUBIN URINE: NEGATIVE
BLOOD, URINE: NEGATIVE
BUN BLDV-MCNC: 9 MG/DL (ref 7–20)
CALCIUM SERPL-MCNC: 9 MG/DL (ref 8.3–10.6)
CHLORIDE BLD-SCNC: 102 MMOL/L (ref 99–110)
CLARITY: CLEAR
CO2: 27 MMOL/L (ref 21–32)
COLOR: YELLOW
CREAT SERPL-MCNC: 0.8 MG/DL (ref 0.8–1.3)
D DIMER: 536 NG/ML DDU (ref 0–229)
EOSINOPHILS ABSOLUTE: 0 K/UL (ref 0–0.6)
EOSINOPHILS RELATIVE PERCENT: 0 %
GFR AFRICAN AMERICAN: >60
GFR NON-AFRICAN AMERICAN: >60
GLOBULIN: 3 G/DL
GLUCOSE BLD-MCNC: 124 MG/DL (ref 70–99)
GLUCOSE URINE: NEGATIVE MG/DL
HCT VFR BLD CALC: 48.1 % (ref 40.5–52.5)
HEMOGLOBIN: 15.9 G/DL (ref 13.5–17.5)
KETONES, URINE: NEGATIVE MG/DL
LACTIC ACID: 2.5 MMOL/L (ref 0.4–2)
LEUKOCYTE ESTERASE, URINE: NEGATIVE
LIPASE: 12 U/L (ref 13–60)
LYMPHOCYTES ABSOLUTE: 1.1 K/UL (ref 1–5.1)
LYMPHOCYTES RELATIVE PERCENT: 3 %
MCH RBC QN AUTO: 30.1 PG (ref 26–34)
MCHC RBC AUTO-ENTMCNC: 33 G/DL (ref 31–36)
MCV RBC AUTO: 91.1 FL (ref 80–100)
MICROSCOPIC EXAMINATION: NORMAL
MONOCYTES ABSOLUTE: 1.8 K/UL (ref 0–1.3)
MONOCYTES RELATIVE PERCENT: 5 %
NEUTROPHILS ABSOLUTE: 33.7 K/UL (ref 1.7–7.7)
NEUTROPHILS RELATIVE PERCENT: 84 %
NITRITE, URINE: NEGATIVE
PDW BLD-RTO: 15.6 % (ref 12.4–15.4)
PH UA: 5 (ref 5–8)
PLATELET # BLD: 225 K/UL (ref 135–450)
PLATELET SLIDE REVIEW: ADEQUATE
PMV BLD AUTO: 10.1 FL (ref 5–10.5)
POLYCHROMASIA: ABNORMAL
POTASSIUM REFLEX MAGNESIUM: 5.3 MMOL/L (ref 3.5–5.1)
PRO-BNP: 42 PG/ML (ref 0–124)
PROCALCITONIN: 0.19 NG/ML (ref 0–0.15)
PROTEIN UA: NEGATIVE MG/DL
RAPID INFLUENZA  B AGN: NEGATIVE
RAPID INFLUENZA A AGN: NEGATIVE
RBC # BLD: 5.27 M/UL (ref 4.2–5.9)
REASON FOR REJECTION: NORMAL
REJECTED TEST: NORMAL
SARS-COV-2, NAAT: NOT DETECTED
SLIDE REVIEW: ABNORMAL
SODIUM BLD-SCNC: 138 MMOL/L (ref 136–145)
SPECIFIC GRAVITY UA: 1.03 (ref 1–1.03)
TOTAL PROTEIN: 7.2 G/DL (ref 6.4–8.2)
TROPONIN: <0.01 NG/ML
URINE REFLEX TO CULTURE: NORMAL
URINE TYPE: 4
UROBILINOGEN, URINE: 0.2 E.U./DL
VACUOLATED NEUTROPHILS: PRESENT
WBC # BLD: 36.6 K/UL (ref 4–11)

## 2021-01-25 PROCEDURE — 71260 CT THORAX DX C+: CPT

## 2021-01-25 PROCEDURE — 6360000002 HC RX W HCPCS: Performed by: NURSE PRACTITIONER

## 2021-01-25 PROCEDURE — 85025 COMPLETE CBC W/AUTO DIFF WBC: CPT

## 2021-01-25 PROCEDURE — 87040 BLOOD CULTURE FOR BACTERIA: CPT

## 2021-01-25 PROCEDURE — 6370000000 HC RX 637 (ALT 250 FOR IP): Performed by: NURSE PRACTITIONER

## 2021-01-25 PROCEDURE — 83880 ASSAY OF NATRIURETIC PEPTIDE: CPT

## 2021-01-25 PROCEDURE — 85379 FIBRIN DEGRADATION QUANT: CPT

## 2021-01-25 PROCEDURE — U0003 INFECTIOUS AGENT DETECTION BY NUCLEIC ACID (DNA OR RNA); SEVERE ACUTE RESPIRATORY SYNDROME CORONAVIRUS 2 (SARS-COV-2) (CORONAVIRUS DISEASE [COVID-19]), AMPLIFIED PROBE TECHNIQUE, MAKING USE OF HIGH THROUGHPUT TECHNOLOGIES AS DESCRIBED BY CMS-2020-01-R: HCPCS

## 2021-01-25 PROCEDURE — 94761 N-INVAS EAR/PLS OXIMETRY MLT: CPT

## 2021-01-25 PROCEDURE — 36600 WITHDRAWAL OF ARTERIAL BLOOD: CPT

## 2021-01-25 PROCEDURE — 96368 THER/DIAG CONCURRENT INF: CPT

## 2021-01-25 PROCEDURE — 96365 THER/PROPH/DIAG IV INF INIT: CPT

## 2021-01-25 PROCEDURE — 6370000000 HC RX 637 (ALT 250 FOR IP): Performed by: EMERGENCY MEDICINE

## 2021-01-25 PROCEDURE — 93005 ELECTROCARDIOGRAM TRACING: CPT | Performed by: NURSE PRACTITIONER

## 2021-01-25 PROCEDURE — 6360000004 HC RX CONTRAST MEDICATION: Performed by: NURSE PRACTITIONER

## 2021-01-25 PROCEDURE — U0002 COVID-19 LAB TEST NON-CDC: HCPCS

## 2021-01-25 PROCEDURE — 84145 PROCALCITONIN (PCT): CPT

## 2021-01-25 PROCEDURE — 80053 COMPREHEN METABOLIC PANEL: CPT

## 2021-01-25 PROCEDURE — 71045 X-RAY EXAM CHEST 1 VIEW: CPT

## 2021-01-25 PROCEDURE — 2700000000 HC OXYGEN THERAPY PER DAY

## 2021-01-25 PROCEDURE — 84484 ASSAY OF TROPONIN QUANT: CPT

## 2021-01-25 PROCEDURE — 83690 ASSAY OF LIPASE: CPT

## 2021-01-25 PROCEDURE — 99291 CRITICAL CARE FIRST HOUR: CPT

## 2021-01-25 PROCEDURE — 83605 ASSAY OF LACTIC ACID: CPT

## 2021-01-25 PROCEDURE — 96375 TX/PRO/DX INJ NEW DRUG ADDON: CPT

## 2021-01-25 PROCEDURE — 96366 THER/PROPH/DIAG IV INF ADDON: CPT

## 2021-01-25 PROCEDURE — 6370000000 HC RX 637 (ALT 250 FOR IP)

## 2021-01-25 PROCEDURE — 87804 INFLUENZA ASSAY W/OPTIC: CPT

## 2021-01-25 PROCEDURE — 81003 URINALYSIS AUTO W/O SCOPE: CPT

## 2021-01-25 PROCEDURE — 2580000003 HC RX 258: Performed by: NURSE PRACTITIONER

## 2021-01-25 PROCEDURE — 36415 COLL VENOUS BLD VENIPUNCTURE: CPT

## 2021-01-25 PROCEDURE — 94640 AIRWAY INHALATION TREATMENT: CPT

## 2021-01-25 PROCEDURE — 6360000002 HC RX W HCPCS

## 2021-01-25 RX ORDER — CYCLOBENZAPRINE HCL 10 MG
10 TABLET ORAL 3 TIMES DAILY PRN
Status: CANCELLED | OUTPATIENT
Start: 2021-01-25

## 2021-01-25 RX ORDER — CLONAZEPAM 0.5 MG/1
0.5 TABLET ORAL DAILY PRN
Status: CANCELLED | OUTPATIENT
Start: 2021-01-25

## 2021-01-25 RX ORDER — BUDESONIDE AND FORMOTEROL FUMARATE DIHYDRATE 80; 4.5 UG/1; UG/1
2 AEROSOL RESPIRATORY (INHALATION) 2 TIMES DAILY
Status: CANCELLED | OUTPATIENT
Start: 2021-01-25

## 2021-01-25 RX ORDER — ALBUTEROL SULFATE 2.5 MG/3ML
5 SOLUTION RESPIRATORY (INHALATION) ONCE
Status: COMPLETED | OUTPATIENT
Start: 2021-01-25 | End: 2021-01-25

## 2021-01-25 RX ORDER — ACETAMINOPHEN 325 MG/1
650 TABLET ORAL EVERY 6 HOURS PRN
Status: CANCELLED | OUTPATIENT
Start: 2021-01-25

## 2021-01-25 RX ORDER — ACETAMINOPHEN 325 MG/1
650 TABLET ORAL ONCE
Status: COMPLETED | OUTPATIENT
Start: 2021-01-25 | End: 2021-01-25

## 2021-01-25 RX ORDER — IPRATROPIUM BROMIDE AND ALBUTEROL SULFATE 2.5; .5 MG/3ML; MG/3ML
1 SOLUTION RESPIRATORY (INHALATION) ONCE
Status: COMPLETED | OUTPATIENT
Start: 2021-01-25 | End: 2021-01-25

## 2021-01-25 RX ORDER — SODIUM CHLORIDE 0.9 % (FLUSH) 0.9 %
10 SYRINGE (ML) INJECTION PRN
Status: CANCELLED | OUTPATIENT
Start: 2021-01-25

## 2021-01-25 RX ORDER — DEXAMETHASONE SODIUM PHOSPHATE 10 MG/ML
6 INJECTION, SOLUTION INTRAMUSCULAR; INTRAVENOUS ONCE
Status: COMPLETED | OUTPATIENT
Start: 2021-01-25 | End: 2021-01-25

## 2021-01-25 RX ORDER — 0.9 % SODIUM CHLORIDE 0.9 %
30 INTRAVENOUS SOLUTION INTRAVENOUS ONCE
Status: CANCELLED | OUTPATIENT
Start: 2021-01-25 | End: 2021-01-25

## 2021-01-25 RX ORDER — BUTALBITAL, ACETAMINOPHEN AND CAFFEINE 50; 325; 40 MG/1; MG/1; MG/1
1 TABLET ORAL EVERY 4 HOURS PRN
Status: CANCELLED | OUTPATIENT
Start: 2021-01-25

## 2021-01-25 RX ORDER — IPRATROPIUM BROMIDE AND ALBUTEROL SULFATE 2.5; .5 MG/3ML; MG/3ML
SOLUTION RESPIRATORY (INHALATION)
Status: COMPLETED
Start: 2021-01-25 | End: 2021-01-25

## 2021-01-25 RX ORDER — IPRATROPIUM BROMIDE AND ALBUTEROL SULFATE 2.5; .5 MG/3ML; MG/3ML
1 SOLUTION RESPIRATORY (INHALATION)
Status: CANCELLED | OUTPATIENT
Start: 2021-01-26

## 2021-01-25 RX ORDER — ACETAMINOPHEN 650 MG/1
650 SUPPOSITORY RECTAL EVERY 6 HOURS PRN
Status: CANCELLED | OUTPATIENT
Start: 2021-01-25

## 2021-01-25 RX ORDER — SODIUM CHLORIDE 0.9 % (FLUSH) 0.9 %
10 SYRINGE (ML) INJECTION EVERY 12 HOURS SCHEDULED
Status: CANCELLED | OUTPATIENT
Start: 2021-01-25

## 2021-01-25 RX ORDER — CLONIDINE HYDROCHLORIDE 0.1 MG/1
0.2 TABLET ORAL EVERY 4 HOURS PRN
Status: CANCELLED | OUTPATIENT
Start: 2021-01-25

## 2021-01-25 RX ORDER — LEVOFLOXACIN 5 MG/ML
750 INJECTION, SOLUTION INTRAVENOUS EVERY 24 HOURS
Status: CANCELLED | OUTPATIENT
Start: 2021-01-26

## 2021-01-25 RX ORDER — ALBUTEROL SULFATE 2.5 MG/3ML
SOLUTION RESPIRATORY (INHALATION)
Status: COMPLETED
Start: 2021-01-25 | End: 2021-01-25

## 2021-01-25 RX ORDER — TRIAZOLAM 0.12 MG/1
500 TABLET ORAL 2 TIMES DAILY
Status: CANCELLED | OUTPATIENT
Start: 2021-01-25

## 2021-01-25 RX ORDER — DOXEPIN HYDROCHLORIDE 10 MG/1
10 CAPSULE ORAL NIGHTLY
Status: CANCELLED | OUTPATIENT
Start: 2021-01-25

## 2021-01-25 RX ORDER — LEVOFLOXACIN 5 MG/ML
750 INJECTION, SOLUTION INTRAVENOUS ONCE
Status: COMPLETED | OUTPATIENT
Start: 2021-01-25 | End: 2021-01-26

## 2021-01-25 RX ADMIN — VANCOMYCIN HYDROCHLORIDE 1250 MG: 5 INJECTION, POWDER, LYOPHILIZED, FOR SOLUTION INTRAVENOUS at 21:47

## 2021-01-25 RX ADMIN — IPRATROPIUM BROMIDE AND ALBUTEROL SULFATE 1 AMPULE: 2.5; .5 SOLUTION RESPIRATORY (INHALATION) at 20:09

## 2021-01-25 RX ADMIN — CEFEPIME HYDROCHLORIDE 2000 MG: 2 INJECTION, POWDER, FOR SOLUTION INTRAVENOUS at 20:44

## 2021-01-25 RX ADMIN — ALBUTEROL SULFATE 5 MG: 2.5 SOLUTION RESPIRATORY (INHALATION) at 20:08

## 2021-01-25 RX ADMIN — IPRATROPIUM BROMIDE AND ALBUTEROL SULFATE 1 AMPULE: .5; 3 SOLUTION RESPIRATORY (INHALATION) at 20:09

## 2021-01-25 RX ADMIN — DEXAMETHASONE SODIUM PHOSPHATE 6 MG: 10 INJECTION, SOLUTION INTRAMUSCULAR; INTRAVENOUS at 19:54

## 2021-01-25 RX ADMIN — ACETAMINOPHEN 650 MG: 325 TABLET ORAL at 19:53

## 2021-01-25 RX ADMIN — IOPAMIDOL 75 ML: 755 INJECTION, SOLUTION INTRAVENOUS at 21:31

## 2021-01-25 ASSESSMENT — PAIN SCALES - GENERAL: PAINLEVEL_OUTOF10: 0

## 2021-01-25 NOTE — LETTER
[facility]  [address] [address]  Phone: [phone]    Patient: Abi Rodriguez  YOB: 1950  Date: 1/26/2021 Time: 12:19 AM    Leaving the Hospital Against Medical Advice    Chart #:[id number]    This will certify that I, the undersigned,    ______________________________________________________________________    A patient in the above named medical center, having requested discharge and removal from the medical center against the advice of my attending physician(s), hereby release the Emergency Department, its physicians, officers and employees, severally and individually, from any and all liability of any nature whatsoever for any injury or harm or complication of any kind that may result directly or indirectly, by reason of my terminating my stay as a patient from [facility], and hereby waive any and all rights of action I may now have or later acquire as a result of my voluntary departure from [facility] and the termination of my stay as a patient therein. This release is made with the full knowledge of the danger that may result from the action which I am taking.       Date:_______________________                         ___________________________                                                                                    Patient/Legal Representative    Witness:        ____________________________                          ___________________________  Nurse                                                                        Physician

## 2021-01-25 NOTE — Clinical Note
Patient Class: Inpatient [101]   REQUIRED: Diagnosis: Sepsis ([facility] [address] [[id number]]   Estimated Length of Stay: Estimated stay of more than 2 midnights   Admitting Provider: Trinity Bhakta [[id number]]   Telemetry Bed Required?: Yes

## 2021-01-26 VITALS
HEIGHT: 66 IN | HEART RATE: 123 BPM | BODY MASS INDEX: 28.42 KG/M2 | RESPIRATION RATE: 23 BRPM | OXYGEN SATURATION: 98 % | WEIGHT: 176.81 LBS | TEMPERATURE: 100.5 F | SYSTOLIC BLOOD PRESSURE: 154 MMHG | DIASTOLIC BLOOD PRESSURE: 101 MMHG

## 2021-01-26 LAB
EKG ATRIAL RATE: 147 BPM
EKG DIAGNOSIS: NORMAL
EKG P AXIS: 67 DEGREES
EKG P-R INTERVAL: 134 MS
EKG Q-T INTERVAL: 266 MS
EKG QRS DURATION: 90 MS
EKG QTC CALCULATION (BAZETT): 416 MS
EKG R AXIS: 78 DEGREES
EKG T AXIS: 44 DEGREES
EKG VENTRICULAR RATE: 147 BPM
SARS-COV-2: NOT DETECTED

## 2021-01-26 PROCEDURE — 96366 THER/PROPH/DIAG IV INF ADDON: CPT

## 2021-01-26 PROCEDURE — 96367 TX/PROPH/DG ADDL SEQ IV INF: CPT

## 2021-01-26 PROCEDURE — 6360000002 HC RX W HCPCS: Performed by: NURSE PRACTITIONER

## 2021-01-26 RX ORDER — LEVOFLOXACIN 750 MG/1
750 TABLET ORAL DAILY
Qty: 7 TABLET | Refills: 0 | Status: SHIPPED | OUTPATIENT
Start: 2021-01-26 | End: 2021-02-02

## 2021-01-26 RX ORDER — CEFDINIR 300 MG/1
300 CAPSULE ORAL 2 TIMES DAILY
Qty: 14 CAPSULE | Refills: 0 | Status: SHIPPED | OUTPATIENT
Start: 2021-01-26 | End: 2021-02-02

## 2021-01-26 RX ADMIN — LEVOFLOXACIN 750 MG: 5 INJECTION, SOLUTION INTRAVENOUS at 00:19

## 2021-01-26 NOTE — ED NOTES
Pt left against medical advice with paperwork signed by Omar Garcia NP at bedside.      Rosa Quezada RN  01/26/21 2521

## 2021-01-26 NOTE — ED PROVIDER NOTES
EKG Interpretation    Interpreted by emergency department physician  Time performed: 6418  Time read: 1840    Rhythm: Sinus tachycardia  Ventricular Rate: 147  QRS Axis: 78  Ectopy: None  Conduction: Sinus tachycardia with nonspecific interventricular conduction delay  ST Segments: normal  T Waves: normal  Q Waves: None    Other findings: Motion artifact making it difficult to read EKG    Compared to EKG on: 12/29/2020    Clinical Impression: Sinus tachycardia with nonspecific interventricular conduction delay unchanged from the previous EKG on 12/29/2020    Cali Wilkins DO  01/25/21 1935

## 2021-01-26 NOTE — CONSULTS
Clinical Pharmacy Note  Vancomycin Consult    Pharmacy consult received for one-time dose of vancomycin in the Emergency Department per terri munoz. Ht Readings from Last 1 Encounters:   01/25/21 5' 6\" (1.676 m)        Wt Readings from Last 1 Encounters:   01/25/21 176 lb 12.9 oz (80.2 kg)         Assessment/Plan:   Vancomycin 1250 mg x 1 in ED.  If Vancomycin is to continue on admission and pharmacy is to manage dosing, please re-consult with admission orders.

## 2021-01-26 NOTE — TELEPHONE ENCOUNTER
Writer contacted Dr Michelle Menjivar to inform of 30 day readmission risk. Dr. Michelle Menjivar informed [employer] informed no decision on disposition at this time.

## 2021-01-26 NOTE — ED PROVIDER NOTES
[address] [facility]        Pt Name: Aarti Fontaine  MRN: [id number]  [place] 1950  Date of evaluation: 1/25/2021  Provider: DANDRE Canales - CNP  PCP: Margarita Cerda MD     I have seen and evaluated this patient with my supervising physician Franci Zamora MD.    [address] [place] [address]       Chief Complaint   Patient presents with    Extremity Weakness     since he was admitted two weeks ago    Shortness of Breath     81% on RA       HISTORY OF PRESENT ILLNESS   (Location, Timing/Onset, Context/Setting, Quality, Duration, Modifying Factors, Severity, Associated Signs and Symptoms)  Note limiting factors. Aarti Fontaine is a 73 y.o. male with PMH significant for oxygen dependent COPD and cigarette smoking who presents to the emergency department today for shortness of breath. On arrival to ER he is hypoxic, febrile, and tachycardic. He is a very poor historian. HPI limited due to respiratory distress. Nursing Notes were all reviewed and agreed with or any disagreements were addressed in the HPI. REVIEW OF SYSTEMS    (2-9 systems for level 4, 10 or more for level 5)     Review of Systems   Unable to perform ROS: Severe respiratory distress       Positives and Pertinent negatives as per HPI. Except as noted above in the ROS, all other systems were reviewed and negative.        PAST MEDICAL HISTORY     Past Medical History:   Diagnosis Date    Anxiety     Bipolar 1 disorder ([facility] [address]     Community acquired bacterial pneumonia 6/12/2015    COPD (chronic obstructive pulmonary disease) (HCC)     Depression     Fibromyalgia     Headache(784.0)     Hyperlipidemia     Manic depressive disease manic phase (HCC)     PTSD (post-traumatic stress disorder)     Seizures ([address]     Tardive dyskinesia          SURGICAL HISTORY     Past Surgical History:   Procedure Laterality Date    MOUTH SURGERY  05/2013 CURRENTMEDICATIONS       Previous Medications    ALBUTEROL SULFATE HFA (PROVENTIL HFA) 108 (90 BASE) MCG/ACT INHALER    Inhale 2 puffs into the lungs every 6 hours as needed    BUDESONIDE-FORMOTEROL (SYMBICORT) 80-4.5 MCG/ACT AERO    Inhale 2 puffs into the lungs 2 times daily    BUTALBITAL-ACETAMINOPHEN-CAFFEINE (FIORICET, ESGIC) -40 MG PER TABLET    Take 1 tablet by mouth every 4 hours as needed for Headaches    CLONAZEPAM (KLONOPIN) 0.5 MG TABLET    Take 0.5 mg by mouth daily as needed for Anxiety. CLONIDINE (CATAPRES) 0.1 MG TABLET    Take 0.2 mg by mouth every 4 hours as needed for High Blood Pressure (anxiety / tremor)     CYCLOBENZAPRINE (FLEXERIL) 10 MG TABLET    Take 10 mg by mouth 3 times daily as needed    DOXEPIN (SINEQUAN) 10 MG CAPSULE    Take 10 mg by mouth nightly    OMEPRAZOLE (PRILOSEC) 40 MG DELAYED RELEASE CAPSULE    Take 40 mg by mouth 2 times daily     ONDANSETRON (ZOFRAN) 4 MG TABLET    Take 1 tablet by mouth every 8 hours as needed for Nausea or Vomiting. TRIAZOLAM (HALCION) 0.25 MG TABLET    Take 2 tablets by mouth 2 times daily.  9 pm and 2 am         ALLERGIES     Prednisone, Serotonin reuptake inhibitors (ssris), and Tricyclic antidepressants    FAMILYHISTORY       Family History   Problem Relation Age of Onset    Mental Illness Mother     Mental Illness Father           SOCIAL HISTORY       Social History     Tobacco Use    Smoking status: Current Every Day Smoker     Packs/day: 0.00     Years: 42.00     Pack years: 0.00     Types: Cigarettes    Smokeless tobacco: Never Used   Substance Use Topics    Alcohol use: No     Comment: hasn't drunk in 10-12 years    Drug use: No       SCREENINGS             PHYSICAL EXAM    (up to 7 for level 4, 8 or more for level 5)     ED Triage Vitals   BP Temp Temp Source Pulse Resp SpO2 Height Weight   01/25/21 1836 01/25/21 1836 01/25/21 1836 01/25/21 1836 01/25/21 1836 01/25/21 1836 01/25/21 2031 01/25/21 2031   (!) 161/95 99.4 °F (37.4 °C) Oral 149 (!) 32 (!) 82 % 5' 6\" (1.676 m) 176 lb 12.9 oz (80.2 kg)       Physical Exam  Vitals signs and nursing note reviewed. Constitutional:       General: He is not in acute distress. Appearance: Normal appearance. He is well-developed. He is ill-appearing. He is not toxic-appearing. HENT:      Head: Normocephalic and atraumatic. Mouth/Throat:      Mouth: Mucous membranes are dry. Eyes:      General: No scleral icterus. Conjunctiva/sclera: Conjunctivae normal.   Neck:      Musculoskeletal: Normal range of motion and neck supple. No neck rigidity. Vascular: No JVD. Cardiovascular:      Rate and Rhythm: Regular rhythm. Tachycardia present. Heart sounds: Normal heart sounds. Pulmonary:      Effort: Tachypnea and respiratory distress present. Breath sounds: Rhonchi and rales present. Abdominal:      General: There is no distension. Palpations: Abdomen is soft. Abdomen is not rigid. Tenderness: There is no abdominal tenderness. Musculoskeletal: Normal range of motion. Skin:     General: Skin is warm and dry. Capillary Refill: Capillary refill takes less than 2 seconds. Findings: No rash. Neurological:      General: No focal deficit present. Mental Status: He is alert and oriented to person, place, and time. Comments: Alert and can tell me who he is and what year it is, but is a very poor historian.    Psychiatric:         Mood and Affect: Mood normal.         DIAGNOSTIC RESULTS   LABS:    Labs Reviewed   CBC WITH AUTO DIFFERENTIAL - Abnormal; Notable for the following components:       Result Value    WBC 36.6 (*)     RDW 15.6 (*)     Neutrophils Absolute 33.7 (*)     Monocytes Absolute 1.8 (*)     Bands Relative 8 (*)     Vacuolated Neutrophils Present (*)     Anisocytosis 1+ (*)     Polychromasia Occasional (*)     All other components within normal limits    Narrative:     Karla Rodríguez tel. [phone], Rejected Test Name [id number]   CULTURE, BLOOD 1   CULTURE, BLOOD 2   MRSA DNA PROBE, NASAL   URINE RT REFLEX TO CULTURE    Narrative:     Performed at:  [facility]  [address] [facility] [place] [address]   Phone [phone]    Narrative:     Performed at:  [facility]  [address] [facility] [address]   Phone [phone]   SPECIMEN REJECTION    Narrative:     Enedelia Abdi tel. [phone], Rejected Test Name CMPX, TROP, [place],  [address] [facility],[address], PROCT/Called to Carlie Bloom RN  Rejected Test Name [address], [address] [facility], [place], [address] [facility],[address], PROCT/Called to Carlie Bloom RN, 01/25/2021 20:16, by Olga Horn  Performed at:  [facility]  [address] [facility] [address] [facility] [place] [address]   Phone [phone]   TROPONIN    Narrative:     Performed at:  [facility]  [address] [facility] [address]   Phone [phone]   BRAIN NATRIURETIC PEPTIDE    Narrative:     Performed at:  [facility]  [address] [place] [address]   Phone [phone]   BLOOD GAS, ARTERIAL   COVID-19       All other labs were within normal range or not returned as of this dictation. EKG: All EKG's are interpreted by the Emergency Department Physician in the absence of a cardiologist.  Please see their note for interpretation of EKG. RADIOLOGY:   Non-plain film images such as CT, Ultrasound and MRI are read by the radiologist. Plain radiographic images are visualized and preliminarily interpreted by the ED Provider with the below findings:        Interpretation per the Radiologist below, if available at the time of this note:    CT CHEST PULMONARY EMBOLISM W CONTRAST   Final Result   1. No evidence of acute pulmonary embolism.    2. Moderate centrilobular emphysema with scattered interlobular septal   thickening/scarring and medial left lower lung lobe hyperlucency suggesting bullae formation versus air trapping. 3. Right lower lung lobe multifocal ill-defined consolidation consistent with   pneumonia. XR CHEST PORTABLE   Final Result   Mild bibasilar atelectasis or infiltrates or less likely residual scarring   which is less prominent. Suggest short-term follow-up. Xr Chest Portable    Result Date: 1/25/2021  EXAMINATION: ONE XRAY VIEW OF THE CHEST 1/25/2021 7:15 pm COMPARISON: 12/30/2020 HISTORY: ORDERING SYSTEM PROVIDED HISTORY: SOB TECHNOLOGIST PROVIDED HISTORY: Reason for exam:->SOB Reason for Exam: extremity weakness, SOB Acuity: Acute Type of Exam: Initial FINDINGS: The heart is normal.  The pulmonary vessels are normal.  The lungs are hypoinflated with some hazy subsegmental bibasilar opacities which are less prominent. No effusion is seen. Mild bibasilar atelectasis or infiltrates or less likely residual scarring which is less prominent. Suggest short-term follow-up. Ct Chest Pulmonary Embolism W Contrast    Result Date: 1/25/2021  EXAMINATION: CTA OF THE CHEST 1/25/2021 9:12 pm TECHNIQUE: CTA of the chest was performed after the administration of intravenous contrast.  Multiplanar reformatted images are provided for review. MIP images are provided for review. Dose modulation, iterative reconstruction, and/or weight based adjustment of the mA/kV was utilized to reduce the radiation dose to as low as reasonably achievable. COMPARISON: Chest portable January 25, 2021. CT chest without contrast December 29, 2020. HISTORY: ORDERING SYSTEM PROVIDED HISTORY: sob, hypoxia, elevated dimer TECHNOLOGIST PROVIDED HISTORY: Reason for exam:->sob, hypoxia, elevated dimer Decision Support Exception->Emergency Medical Condition (MA) Reason for Exam: sob, hypoxia, elevated dimer FINDINGS: Pulmonary Arteries: Pulmonary arteries are adequately opacified for evaluation. No evidence of intraluminal filling defect to suggest pulmonary embolism.   Main pulmonary artery is normal in caliber. Mediastinum: No evidence of mediastinal lymphadenopathy. The heart and pericardium demonstrate no acute abnormality. There is no acute abnormality of the thoracic aorta. Lungs/pleura: Moderate centrilobular emphysema is seen with scattered interlobular thickening/scarring identified. Hyperlucent medial left lower lung lobe parenchyma is seen suggesting associated bullae formation versus air trapping. Multifocal consolidation is seen within the right lower lung lobe. No evidence of pleural thickening or pleural effusion is identified. Bibasilar subsegmental atelectasis is present. Upper Abdomen: Limited images of the upper abdomen are unremarkable. Soft Tissues/Bones: No acute bone or soft tissue abnormality. 1. No evidence of acute pulmonary embolism. 2. Moderate centrilobular emphysema with scattered interlobular septal thickening/scarring and medial left lower lung lobe hyperlucency suggesting bullae formation versus air trapping. 3. Right lower lung lobe multifocal ill-defined consolidation consistent with pneumonia. PROCEDURES   Unless otherwise noted below, none     Procedures    CRITICAL CARE TIME   CRITICAL CARE NOTE:  There was a high probability of clinically significant life-threatening deterioration of the patient's condition requiring my urgent intervention. Total critical care time was at least 65 minutes. This includes vital sign monitoring, pulse oximetry monitoring, telemetry monitoring, clinical response to the IV medications, reviewing the nursing notes, consultation time, dictation/documentation time, and interpretation of the labwork. This excludes any separately billable procedures performed.       CONSULTS:  IP CONSULT TO HOSPITALIST  IP CONSULT TO PULMONOLOGY  PHARMACY TO DOSE VANCOMYCIN      EMERGENCY DEPARTMENT COURSE and DIFFERENTIAL DIAGNOSIS/MDM:   Vitals:    Vitals:    01/25/21 2009 01/25/21 2015 01/25/21 2030 01/25/21 2031   BP: 135/87    Pulse:   127    Resp: (!) 36 (!) 36 (!) 33    Temp:       TempSrc:       SpO2: 96% 96% 100%    Weight:    176 lb 12.9 oz (80.2 kg)   Height:    5' 6\" (1.676 m)       Patient was given the following medications:  Medications   nicotine (NICODERM CQ) 7 MG/24HR 1 patch (1 patch Transdermal Patch Applied 1/25/21 2146)   levoFLOXacin (LEVAQUIN) 750 MG/150ML infusion 750 mg (has no administration in time range)   acetaminophen (TYLENOL) tablet 650 mg (650 mg Oral Given 1/25/21 1953)   cefepime (MAXIPIME) 2,000 mg in dextrose 5 % 50 mL IVPB (2,000 mg Intravenous New Bag 1/25/21 2044)   ipratropium-albuterol (DUONEB) nebulizer solution 1 ampule (1 ampule Inhalation Given 1/25/21 2009)   dexamethasone (PF) (DECADRON) injection 6 mg (6 mg Intravenous Given 1/25/21 1954)   albuterol (PROVENTIL) nebulizer solution 5 mg (5 mg Nebulization Given 1/25/21 2008)   vancomycin (VANCOCIN) 1250 mg in dextrose 5 % 250 mL IVPB (1,250 mg Intravenous New Bag 1/25/21 2147)   iopamidol (ISOVUE-370) 76 % injection 75 mL (75 mLs Intravenous Given 1/25/21 2131)           Differential Diagnosis: Acute Coronary Syndrome, Congestive Heart Failure, Myocardial Infarction, Pulmonary Embolus, Pneumonia, Pneumothorax, other    Physical exam as above. 73year-old presents in respiratory distress hypoxia febrile and tachycardic. See HPI for full presentation. Physical exam as above. Chronic 73year-old in mild respiratory distress. He is alert and oriented but is a very poor historian and HPI is very limited. Lungs are decreased with adventitious breath sounds. Tachycardia. CTA shows no PE but does show RLL consolidation. Lactic acid 2.5. White count 36 with a left shift of 33 and 8 bands. Diagnostic work-up otherwise unremarkable. He was given breathing treatments and steroids, Tylenol for fever, and treatment for hospital-acquired pneumonia. He will be admitted for further work-up and treatment.   He was given all test results and given an opportunity to ask and have any questions answered. He was agreeable to admission. The hospitalist, [facility], was consulted and agreed to meet patient and write orders. The patient tolerated their visit well. They were seen and evaluated by the attending physician, Latoya Wray MD who agreed with the assessment and plan. The patient and / or the family were informed of the results of any tests, a time was given to answer questions, a plan was proposed and they agreed with plan. FINAL IMPRESSION      1. Septicemia ([address]    2. Pneumonia of right lower lobe due to infectious organism    3. Bandemia    4. Acute respiratory failure with hypoxemia (HCC)    5. Chronic obstructive pulmonary disease, unspecified COPD type ([address]    6. Cigarette smoker          DISPOSITION/PLAN   DISPOSITION Admitted 01/25/2021 11:23:26 PM      PATIENT REFERREDTO:  No follow-up provider specified.     DISCHARGE MEDICATIONS:  New Prescriptions    No medications on file       DISCONTINUED MEDICATIONS:  Discontinued Medications    No medications on file              (Please note that portions of this note were completed with a voice recognition program.  Efforts were made to edit the dictations but occasionally words are mis-transcribed.)    DANDRE Dao CNP (electronically signed)             DANDRE Dao CNP  01/25/21 0138

## 2021-01-26 NOTE — ED NOTES
Mikaela Valadez NP and this RN at bedside at this time. Bay Simms NP went over all repercussions of leaving against medical advice. Patient still has chosen to leave against medical advice at this time.      Dee Choe RN  01/26/21 5800

## 2021-01-26 NOTE — ED PROVIDER NOTES
Patient signed out to me by Lawrance Reason awaiting admission. However patient decided he wants to leave AMA so I went to have a long discussion with him. 51-year-old male diagnosed with severe sepsis and lactic acidosis and multifocal pneumonia. Had a long discussion with patient. Patient adamant he wants to leave    I discussed the importance of complying and adhering to medical advice. Nevertheless, pt continues to refuse the recommendation for admission and adamantly expresses a desire to leave at this time. Pt verbalized a clear understanding that this decision could possibly lead to serious consequences. Pt accepts responsibility for any harm or any deterioration in their condition that could result from leaving against medical advice at this time. As a responsible adult, pt expresses a clear understanding and willingness to accept responsibility for this AMA decision. More importantly, pt understands that while this decision is final in the moment, they may return to the ED at anytime for any reason. The patient as decided to leave against medical advice. The patient is awake and alert, non-intoxicated, non-suicidal, nonpsychotic. There is no medical condition that prevents or inhibits their understanding of the risks/benefits of their decision. The patient understands the risks and benefits of their decision and has been given the opportunity to ask questions about their medical condition. Total Critical Care time was 21 minutes, excluding separately reportable procedures. There was a high probability of clinically significant/life threatening deterioration in the patient's condition which required my urgent intervention.             Iban Chowdary MD  01/26/21 8048

## 2021-01-26 NOTE — ED PROVIDER NOTES
Attending Supervisory Note/Shared Visit   I have personally performed a face to face diagnostic evaluation on this patient. I have reviewed the mid-levels findings and agree. History and Exam by me shows alert white male no acute distress. He presents with shortness of breath and generalized weakness which is gradually worsened since he was admitted and discharged 2 weeks ago. Review of his old records shows he was admitted on 12/30/2020 and discharged on 1/1/2021. He had pneumonia, respiratory failure with hypoxia. General: Alert elderly male who appears mildly dyspneic. Head: Atraumatic and normocephalic. ENT: [place] is clear. Oropharynx moist without erythema. Heart: Tachycardic, regular, rate of 124. No murmurs or gallops. Lungs: Breath sounds decreased in the bases with basilar rales. No wheezes. Abdomen: Soft, nondistended, nontender. Musculoskeletal: No lower extremity edema. No calf tenderness. EKG: Sinus tachycardia, rate of 147, nonspecific ST-T changes. Rhythm strip shows sinus tachycardia with a rate of 147, KS interval 134 ms, QRS 90 ms with no other ectopy as interpreted by me. Compared to EKG dated 12/29/2020, no significant change other than the increase in heart rate. Lab reviewed. Influenza screen is negative. Sodium 138 with potassium 5.3. BUN of 9 with a creatinine 0.8. Glucose 124. Liver enzymes are normal.  Lipase of 12. Troponin less than 0.01. BNP of 42. Procalcitonin 0.19.  H&H of 15.9 and 48.1. White blood cell count 36,600 with 84 neutrophils and 8 bands. D-dimer 536. Lactic acid 2.5 Covid negative. CT chest pulmonary embolism: No evidence of pulmonary embolism. Moderate emphysema. Multifocal ill-defined consolidation consistent with pneumonia in the right lower lobe. Chest x-ray: Mild bibasilar atelectasis or infiltrates. Hand-held nebulizers and IV steroids were administered in the ED. IV antibiotics were ordered.   The hospitalist will be consulted for admission.     Diagnosis: Pneumonia  Sepsis    Disposition: Admit      (Please note that portions of this note were completed with a voice recognition program.  Efforts were made to edit the dictations but occasionally words are mis-transcribed.)    Charles Rodriguez MD  Attending Emergency Physician        Te Honeycutt MD  01/25/21 6653

## 2021-01-26 NOTE — H&P
Hospital Medicine History & Physical      PCP: Monse Mirza MD    Date of Admission: 1/25/2021    Date of Service: Pt seen/examined on 1/26/2021 and Admitted to Inpatient     Chief Complaint: Generalized weakness, shortness of breath, room air pulse oximetry of 81%. History Of Present Illness: The patient is a 73 y.o. male who presents to [facility] with PMHx: PTSD, bipolar, CAP recently admitted, COPD/oxygen dependent, fibromyalgia, hyperlipidemia, seizures, tardive dyskinesia. Patient had a recent hospitalization, in early January for pneumonia and acute respiratory failure with hypoxia, sepsis. He was initially seen in the ED on that visit signed out AMA from the ED within 24 hours to request admission. His brother is an internal medicine physician and have encouraged him to come back. ED work-up reveals severe sepsis: Chest x-ray right lower lobe multifocal pneumonia with a left lower lobe bullae versus air trapping. Leukocytosis with left shift and 8% bands, hyperkalemia: Potassium 5.3, lactic acidosis at 2.5. His influenza was negative. Rapid Covid negative. Covid PCR is pending. His vital signs: Temp T-max 100.5, respiratory rate 30 to 40s, heart rate from 120s to 150s, he was 81% on room air, oxygen therapy at 4 L: Improved pulse oximetry to 96%. On my clinical exam the patient is awake alert and oriented. He is mildly tachypneic, heart rate is 133 sinus tach narrow complex. Blood pressure stable. He can speak in full sentences. He is drinking oral fluids. He is vocalizing that he is not sure he wants to stay in the hospital but he was agreeable and understood that he was septic and had pneumonia. Did not appreciate having to be admitted to a Covid rule out floor given the Covid PCR test is pending.   Patient states I have had 3 Covid tests all of which were negative. I do not believe I have Covid and I really do not want to stay if I have to stay on a Covid floor. I spent 10 to 15 minutes at the bedside explaining the safety parameters that we have in place and that despite being on a Covid floor until his Covid PCR is resulted he is very well protected and at minimal to no risk. Status full  At home with his wife  Current tobacco smoking dependence    Past Medical History:        Diagnosis Date    Anxiety     Bipolar 1 disorder ([facility]     Community acquired bacterial pneumonia 6/12/2015    [facility]     Depression     Fibromyalgia     Headache(784.0)     Hyperlipidemia     Manic depressive disease manic phase ([facility])     PTSD (post-traumatic stress disorder)     Seizures ([facility]     Tardive dyskinesia        Past Surgical History:        Procedure Laterality Date    MOUTH SURGERY  05/2013       Medications Prior to Admission:    Prior to Admission medications    Medication Sig Start Date End Date Taking? Authorizing Provider   levoFLOXacin (LEVAQUIN) 750 MG tablet Take 1 tablet by mouth daily for 7 days 1/26/21 2/2/21 Yes DANDRE Gibson CNP   cefdinir (OMNICEF) 300 MG capsule Take 1 capsule by mouth 2 times daily for 7 days 1/26/21 2/2/21 Yes DANDRE Gibson CNP   cyclobenzaprine (FLEXERIL) 10 MG tablet Take 10 mg by mouth 3 times daily as needed 1/23/19   Historical Provider, MD   triazolam (HALCION) 0.25 MG tablet Take 2 tablets by mouth 2 times daily.  9 pm and 2 am 2/4/20   Historical Provider, MD   albuterol sulfate HFA (PROVENTIL HFA) 108 (90 Base) MCG/ACT inhaler Inhale 2 puffs into the lungs every 6 hours as needed 10/16/18   Historical Provider, MD   doxepin (SINEQUAN) 10 MG capsule Take 10 mg by mouth nightly    Historical Provider, MD   budesonide-formoterol (SYMBICORT) 80-4.5 MCG/ACT AERO Inhale 2 puffs into the lungs 2 times daily 3/16/19   Teto Bautista DANDRE Snow - NP   butalbital-acetaminophen-caffeine (FIORICET, ESGIC) -40 MG per tablet Take 1 tablet by mouth every 4 hours as needed for Headaches    Historical Provider, MD   omeprazole (PRILOSEC) 40 MG delayed release capsule Take 40 mg by mouth 2 times daily     Historical Provider, MD   clonazePAM (KLONOPIN) 0.5 MG tablet Take 0.5 mg by mouth daily as needed for Anxiety. Historical Provider, MD   cloNIDine (CATAPRES) 0.1 MG tablet Take 0.2 mg by mouth every 4 hours as needed for High Blood Pressure (anxiety / tremor)     Historical Provider, MD   ondansetron (ZOFRAN) 4 MG tablet Take 1 tablet by mouth every 8 hours as needed for Nausea or Vomiting. 1/29/15   Bobby Starks MD       Allergies:  Prednisone, Serotonin reuptake inhibitors (ssris), and Tricyclic antidepressants    Social History:  The patient currently lives at home with wife    TOBACCO:   reports that he has been smoking cigarettes. He has been smoking about 0.00 packs per day for the past 42.00 years. He has never used smokeless tobacco.  ETOH:   reports no history of alcohol use. Family History:  Reviewed in detail and negative for DM, Early CAD, Cancer, CVA. Positive as follows:        Problem Relation Age of Onset    Mental Illness Mother     Mental Illness Father        REVIEW OF SYSTEMS:   Positive for neurolysed weakness, shortness of breath and as noted in the HPI. All other systems reviewed and negative. PHYSICAL EXAM:    BP (!) 195/102   Pulse 128   Temp 100.5 °F (38.1 °C) (Rectal)   Resp 22   Ht 5' 6\" (1.676 m)   Wt 176 lb 12.9 oz (80.2 kg)   SpO2 93%   BMI 28.54 kg/m²     General appearance: Appearing chronically ill. No evidence of acute distress. HEENT Normal cephalic, atraumatic without obvious deformity. Pupils equal, round, and reactive to light. Extra ocular muscles intact. Conjunctivae/corneas clear. Neck: Supple, No jugular venous distention/bruits.   Trachea midline without thyromegaly or adenopathy with full range of motion. Lungs: Wheezing throughout, aches in full sentences, O2 4 L nasal cannula in place: Pulse oximetry 95 to 96%  Heart: Regular rate and rhythm. No murmurs rubs or gallops. Sinus tachycardia rate 133   abdomen: Soft, non-tender or non-distended without rigidity or guarding and positive bowel sounds all four quadrants. Extremities: No clubbing, cyanosis, or edema bilaterally. Full range of motion without deformity and normal gait intact. Skin: Skin color, texture, turgor normal.  No rashes or lesions. Neurologic: Alert and oriented X 3, neurovascularly intact with sensory/motor intact upper extremities/lower extremities, bilaterally. Cranial nerves: II-XII intact, grossly non-focal.  Mental status: Alert, oriented, thought content appropriate. Capillary Refill: Acceptable  < 3 seconds  Peripheral Pulses: +3 Easily felt, not easily obliterated with pressure      CXR:  I have reviewed the CXR with the following interpretation: Left lower lobe bullae versus air trapping  Point lower lobe multifocal pneumonia  EKG:  I have reviewed the EKG with the following interpretation: N/A    CBC   Recent Labs     01/25/21  1904   WBC 36.6*   HGB 15.9   HCT 48.1         RENAL  Recent Labs     01/25/21 2028      K 5.3*      CO2 27   BUN 9   CREATININE 0.8     LFT'S  Recent Labs     01/25/21 2028   AST 21   ALT 25   BILITOT <0.2   ALKPHOS 121     COAG  No results for input(s): INR in the last 72 hours.   CARDIAC ENZYMES  Recent Labs     01/25/21 2028   TROPONINI <0.01       U/A:    Lab Results   Component Value Date    COLORU YELLOW 01/25/2021    WBCUA 4 06/05/2016    RBCUA 2 06/05/2016    CLARITYU Clear 01/25/2021    SPECGRAV 1.027 01/25/2021    LEUKOCYTESUR Negative 01/25/2021    BLOODU Negative 01/25/2021    GLUCOSEU Negative 01/25/2021    GLUCOSEU NEGATIVE 04/27/2012       ABG    Lab Results   Component Value Date    EIT7OHW 25.4 10/10/2013    BEART -1.4 10/10/2013 I0UIYTXN 93.4 10/10/2013    PHART 7.301 10/10/2013    THGBART 13.8 07/17/2012    RFX4RHT 53.2 10/10/2013    PO2ART 74.7 10/10/2013    TGN5ZVZ 27.1 10/10/2013           Active Hospital Problems    Diagnosis Date Noted    Sepsis [facility]) [A41.9] 12/29/2020         PHYSICIANS CERTIFICATION:    I certify that Shiraz Contreras is expected to be hospitalized for more than 2 midnights based on the following assessment and plan:    Proper PPE was donned to care for this patient: Scrub cap, N95, face mask, face shield, isolation gown and gloves  ASSESSMENT/PLAN:    Sepsis POA: Febrile: T-max 100.5, tachypnea, tachycardia, hypoxia  Positive lactic acidosis: 2.5, positive leukocytosis: WBC 36.6 w/ left shift & 8% bands  Pro-Vipul 0.19  Chest x-ray confirming right lower lobe multifocal pneumonia  Blood cultures x2 in process  Serial lactic acid  IVF 30 mill per kilo over the next 10 hours per septic protocol  currently no evidence of shock potential    PNA: Recent hospitalization we will treat for HCAP  MRSA nasal  Received meropenem, levofloxacin and vancomycin in ED will continue hospital course  Trend and monitor WBC  Consult pulmonology  Rapid Covid negative  Covid PCR pending  Rapid influenza negative  Legionnaire and strep pneumonia in process  Oxygen therapy: Maintain saturation greater than 90-92%    Acute on chronic respiratory failure with hypoxia: 2/2-> copd and PNA  Plan as noted above  COPD: Oxygen dependent  Continue inhaled steroid  Continue duo nebs every 4 hours while awake  Continue oxygen therapy   as noted above        DVT Prophylaxis: Lovenox  Diet: No diet orders on file  Code Status: Prior  PT/OT Eval Status: Independent    Dispo -admit, inpatient       Melissa Howelljennifer Frye, APRN - CNP    Thank you Adelso Clarke MD for the opportunity to be involved in this patient's care. If you have any questions or concerns please feel free to contact me at [phone].     This dictation was performed with a verbal recognition program (DRAGON) and it was checked for errors. It is possible that there are still dictated errors within this office note. If so, please bring any errors to my attention for an addendum. All efforts were made to ensure that this office note is accurate.

## 2021-01-26 NOTE — ED NOTES
Pt taken to car via wheelchair. Pt able to ambulate without assistance.      Anaya Mckinney RN  01/26/21 [id number]

## 2021-01-26 NOTE — DISCHARGE SUMMARY
Hospitalist Discharge Summary    Patient ID:  Elba Adams  [id number]  41 y.o.  1950    Admit date: 1/25/2021    Discharge date: 1/26/2021    Disposition: home: AMA    Admission Diagnoses:   Patient Active Problem List   Diagnosis    Tardive dyskinesia    Community acquired bacterial pneumonia    Esophageal dysphagia    Hoarseness, chronic    Acute respiratory failure with hypoxia ([address]    Community acquired pneumonia of right lower lobe of lung    Centrilobular emphysema ([address]    Lactic acidosis    Postnasal drip    Tobacco use    [facility]    Pneumonia       Discharge Diagnoses: Active Problems:    Sepsis ([facility] [address]  Resolved Problems:    * No resolved hospital problems. *      Code Status:  Prior    Condition:  Unstable    Discharge Diet: Diet:  No diet orders on file    PCP to do list:  followup    Hospital Course: Patient was seen and evaluated in the emergency department. Diagnosed with severe sepsis, lactic acidosis secondary to multifocal right lower lobe pneumonia. He is in acute respiratory distress with hypoxia as well. His rapid Covid was negative, PCR pending. He is requiring continuous oxygen therapy at 4 L. Patient came into the emergency department for shortness of breath and increased oxygen demand and generally not feeling well with an associated cough. And was admitted to the hospital.  And then requested that he be discharged and that he was not going to agree any longer to being admitted. 20 minutes was spent at bedside discussing the patient's current condition risk factors for decompensation and possibility of death at home with sepsis not being treated appropriately at home. Patient's primary RN Lou Schilling was at bedside during my conversation with the patient.   I explained to the patient he has a very high white blood cell count at 36,000, 8% bands, low-grade temperature with respiratory rate of 30s to 40s and heart rate of 130s and increased oxygen demand with a lactic acid of 2.5. Blood pressure elevated. I explained to him that that puts him currently in severe sepsis and at high risk for death if not closely watched and treated properly. Patient stated that he was being \"worst and scared to being admitted because of political reasons and compensation for Covid admissions. Explained to the patient that this is not politically driven but rather factual and his vital signs, laboratory studies and diagnostic evidence. I define him what the meaning of sepsis indicates and the complications associated with sepsis with one being death. Patient still insisted on being discharged home. States that he would rather take his chances at home than being in a hospital on a Covid floor or near Covid patients. Despite my recommendations patient signed out AMA. Franki Sellers RN also discussed this with his wife and she was happy that he signed out AMA and was not stating according to Franki Sellers. Strongly encouraging him to be compliant and uses oxygen therapy, be compliant and do nebulizer treatments every 4-6 hours, be compliant with the antibiotics, avoid smoking and by all means absolutely return if he feels like his symptoms are getting any worse. discharge Medications:   Current Discharge Medication List      START taking these medications    Details   levoFLOXacin (LEVAQUIN) 750 MG tablet Take 1 tablet by mouth daily for 7 days  Qty: 7 tablet, Refills: 0      cefdinir (OMNICEF) 300 MG capsule Take 1 capsule by mouth 2 times daily for 7 days  Qty: 14 capsule, Refills: 0           Current Discharge Medication List        Current Discharge Medication List      CONTINUE these medications which have NOT CHANGED    Details   cyclobenzaprine (FLEXERIL) 10 MG tablet Take 10 mg by mouth 3 times daily as needed      triazolam (HALCION) 0.25 MG tablet Take 2 tablets by mouth 2 times daily.  9 pm and 2 am      albuterol sulfate HFA (PROVENTIL HFA) 108 (90 Base) MCG/ACT inhaler Inhale 2 puffs into the lungs every 6 hours as needed      doxepin (SINEQUAN) 10 MG capsule Take 10 mg by mouth nightly      budesonide-formoterol (SYMBICORT) 80-4.5 MCG/ACT AERO Inhale 2 puffs into the lungs 2 times daily  Qty: 1 Inhaler, Refills: 3      butalbital-acetaminophen-caffeine (FIORICET, ESGIC) -40 MG per tablet Take 1 tablet by mouth every 4 hours as needed for Headaches      omeprazole (PRILOSEC) 40 MG delayed release capsule Take 40 mg by mouth 2 times daily       clonazePAM (KLONOPIN) 0.5 MG tablet Take 0.5 mg by mouth daily as needed for Anxiety. cloNIDine (CATAPRES) 0.1 MG tablet Take 0.2 mg by mouth every 4 hours as needed for High Blood Pressure (anxiety / tremor)       ondansetron (ZOFRAN) 4 MG tablet Take 1 tablet by mouth every 8 hours as needed for Nausea or Vomiting. Qty: 30 tablet, Refills: 0           Current Discharge Medication List              Procedures: n/a    Assessment on Discharge: Stable, improved     Discharge ROS:  A complete review of systems was asked and negative except for generalized weakness cough and shortness of breath    Discharge Exam:  BP (!) 195/102   Pulse 128   Temp 100.5 °F (38.1 °C) (Rectal)   Resp 22   Ht 5' 6\" (1.676 m)   Wt 176 lb 12.9 oz (80.2 kg)   SpO2 93%   BMI 28.54 kg/m²     Gen: NAD  HEENT: NC/AT, moist mucous membranes, no oropharyngeal erythema or exudate  Neck: supple, trachea midline, no anterior cervical or SC LAD  Heart:  Normal s1/s2, RRR, no murmurs, gallops, or rubs. Negative leg edema  Lungs:  bilaterally, wheeze, retractions. No use of accessory muscles.   Abd: bowel sounds present, soft, nontender, nondistended, no masses  Extrem:  No clubbing, cyanosis, negative edema  Skin: no lesion or masses  Psych:  A & O x3  Neuro: grossly intact, moves all four extremities    Pertinent Studies During Hospital Stay:  Radiology:  Ct Head Wo Contrast    Result Date: 12/29/2020  EXAMINATION: CT OF THE HEAD WITHOUT CONTRAST; CT OF THE CERVICAL SPINE WITHOUT CONTRAST 12/29/2020 4:49 am TECHNIQUE: CT of the head was performed without the administration of intravenous contrast. Dose modulation, iterative reconstruction, and/or weight based adjustment of the mA/kV was utilized to reduce the radiation dose to as low as reasonably achievable.; CT of the cervical spine was performed without the administration of intravenous contrast. Multiplanar reformatted images are provided for review. Dose modulation, iterative reconstruction, and/or weight based adjustment of the mA/kV was utilized to reduce the radiation dose to as low as reasonably achievable. COMPARISON: CT head without contrast June 4, 2016. HISTORY: ORDERING SYSTEM PROVIDED HISTORY: TRAUMA TECHNOLOGIST PROVIDED HISTORY: Has a \"code stroke\" or \"stroke alert\" been called? ->No Reason for exam:->TRAUMA Reason for Exam: fall/trauma Acuity: Acute Type of Exam: Initial; ORDERING SYSTEM PROVIDED HISTORY: Fall TECHNOLOGIST PROVIDED HISTORY: Reason for exam:->Fall Reason for Exam: Fall Acuity: Acute Type of Exam: Initial FINDINGS: CT head: BRAIN/VENTRICLES: There is no acute intracranial hemorrhage, mass effect or midline shift. No abnormal extra-axial fluid collection. The gray-white differentiation is maintained without evidence of an acute infarct. There is no evidence of hydrocephalus. Right-sided small choroid fissure cyst is present. Ill-defined hypoattenuation is noted within cerebral white matter consistent with mild microvascular ischemic change. ORBITS: The visualized portion of the orbits demonstrate no acute abnormality. SINUSES: The visualized paranasal sinuses and mastoid air cells demonstrate no acute abnormality. SOFT TISSUES/SKULL: No acute abnormality of the visualized skull or soft tissues. CT cervical spine: BONES/ALIGNMENT: Generalized reversal of cervical lordosis is noted. C3 on C4 anterolisthesis is noted measuring 4 mm.   C4 on C5 anterolisthesis measuring 4 mm is noted. DEGENERATIVE CHANGES: C5/C6: Severe disc height loss is noted with vacuum disc phenomenon in association with posterior disc osteophyte complex which narrows the midline AP thecal sac diameter to 9 mm consistent with mild central canal stenosis. Disc osteophyte complex encroaches upon and causes mild right neural foraminal narrowing. C6/C7: Severe disc height loss is noted at this level in association with posterior disc osteophyte complex which indents the ventral thecal sac narrowing the midline AP thecal sac diameter to 10 mm consistent with borderline central canal stenosis. Disc osteophyte complex encroaches upon and causes mild bilateral neural foraminal narrowing. Otherwise, multilevel minimal-to-mild spondylosis is noted without further evidence of central canal stenosis/compromise. SOFT TISSUES: There is no prevertebral soft tissue swelling. 1. No evidence of acute intracranial abnormality. 2. No acute abnormality of the cervical spine. 3. Mild cerebral white matter chronic microvascular ischemic disease. 4. Generalized reversal of cervical lordosis, as discussed above. 5. C3 on C4, C4 on C5 anterolisthesis each measuring 4 mm, likely degenerative. 6. C5/C6 mild, C6/C7 borderline central canal stenosis secondary to encroachment by posterior disc osteophyte complex. Ct Chest Wo Contrast    Result Date: 12/29/2020  EXAMINATION: CT OF THE CHEST WITHOUT CONTRAST 12/29/2020 4:49 am TECHNIQUE: CT of the chest was performed without the administration of intravenous contrast. Multiplanar reformatted images are provided for review. Dose modulation, iterative reconstruction, and/or weight based adjustment of the mA/kV was utilized to reduce the radiation dose to as low as reasonably achievable. COMPARISON: CT chest pulmonary embolism with contrast March 15, 2019.  HISTORY: ORDERING SYSTEM PROVIDED HISTORY: SOB TECHNOLOGIST PROVIDED HISTORY: Reason for exam:->SOB Reason for Exam: SOB/Fall Acuity: Acute Type of Exam: Initial FINDINGS: Mediastinum: The heart is normal in size and configuration. No evidence of pericardial effusion is seen. No evidence of mediastinal or hilar lymphadenopathy or mass lesion is identified. Lungs/pleura: Moderate centrilobular emphysema is noted. Right greater than left lower lung lobe, right middle lobe multifocal ill-defined ground-glass consolidation is identified. No evidence of pleural thickening or pleural effusion is identified. Upper Abdomen: Visualized upper abdominal organs on chest CT examination are grossly unremarkable in appearance. Soft Tissues/Bones: The bones, skeletal muscle bundles, fascial planes and subcutaneous soft tissues are unremarkable in appearance. 1. Bilateral lower lung lobe and right middle lobe multifocal ill-defined ground-glass consolidation. Differential diagnostic considerations include viral pneumonia, opportunistic infection, hypersensitivity pneumonitis, drug toxicity. Recommend clinical correlation. 2. Moderate centrilobular emphysema. 3. No evidence of acute chest trauma. Ct Cervical Spine Wo Contrast    Result Date: 12/29/2020  EXAMINATION: CT OF THE HEAD WITHOUT CONTRAST; CT OF THE CERVICAL SPINE WITHOUT CONTRAST 12/29/2020 4:49 am TECHNIQUE: CT of the head was performed without the administration of intravenous contrast. Dose modulation, iterative reconstruction, and/or weight based adjustment of the mA/kV was utilized to reduce the radiation dose to as low as reasonably achievable.; CT of the cervical spine was performed without the administration of intravenous contrast. Multiplanar reformatted images are provided for review. Dose modulation, iterative reconstruction, and/or weight based adjustment of the mA/kV was utilized to reduce the radiation dose to as low as reasonably achievable. COMPARISON: CT head without contrast June 4, 2016.  HISTORY: ORDERING SYSTEM PROVIDED HISTORY: TRAUMA TECHNOLOGIST PROVIDED HISTORY: Has a \"code stroke\" or \"stroke alert\" been called? ->No Reason for exam:->TRAUMA Reason for Exam: fall/trauma Acuity: Acute Type of Exam: Initial; ORDERING SYSTEM PROVIDED HISTORY: Fall TECHNOLOGIST PROVIDED HISTORY: Reason for exam:->Fall Reason for Exam: Fall Acuity: Acute Type of Exam: Initial FINDINGS: CT head: BRAIN/VENTRICLES: There is no acute intracranial hemorrhage, mass effect or midline shift. No abnormal extra-axial fluid collection. The gray-white differentiation is maintained without evidence of an acute infarct. There is no evidence of hydrocephalus. Right-sided small choroid fissure cyst is present. Ill-defined hypoattenuation is noted within cerebral white matter consistent with mild microvascular ischemic change. ORBITS: The visualized portion of the orbits demonstrate no acute abnormality. SINUSES: The visualized paranasal sinuses and mastoid air cells demonstrate no acute abnormality. SOFT TISSUES/SKULL: No acute abnormality of the visualized skull or soft tissues. CT cervical spine: BONES/ALIGNMENT: Generalized reversal of cervical lordosis is noted. C3 on C4 anterolisthesis is noted measuring 4 mm. C4 on C5 anterolisthesis measuring 4 mm is noted. DEGENERATIVE CHANGES: C5/C6: Severe disc height loss is noted with vacuum disc phenomenon in association with posterior disc osteophyte complex which narrows the midline AP thecal sac diameter to 9 mm consistent with mild central canal stenosis. Disc osteophyte complex encroaches upon and causes mild right neural foraminal narrowing. C6/C7: Severe disc height loss is noted at this level in association with posterior disc osteophyte complex which indents the ventral thecal sac narrowing the midline AP thecal sac diameter to 10 mm consistent with borderline central canal stenosis. Disc osteophyte complex encroaches upon and causes mild bilateral neural foraminal narrowing.  Otherwise, multilevel minimal-to-mild spondylosis is noted without further evidence of central canal stenosis/compromise. SOFT TISSUES: There is no prevertebral soft tissue swelling. 1. No evidence of acute intracranial abnormality. 2. No acute abnormality of the cervical spine. 3. Mild cerebral white matter chronic microvascular ischemic disease. 4. Generalized reversal of cervical lordosis, as discussed above. 5. C3 on C4, C4 on C5 anterolisthesis each measuring 4 mm, likely degenerative. 6. C5/C6 mild, C6/C7 borderline central canal stenosis secondary to encroachment by posterior disc osteophyte complex. Xr Chest Portable    Result Date: 1/25/2021  EXAMINATION: ONE XRAY VIEW OF THE CHEST 1/25/2021 7:15 pm COMPARISON: 12/30/2020 HISTORY: ORDERING SYSTEM PROVIDED HISTORY: SOB TECHNOLOGIST PROVIDED HISTORY: Reason for exam:->SOB Reason for Exam: extremity weakness, SOB Acuity: Acute Type of Exam: Initial FINDINGS: The heart is normal.  The pulmonary vessels are normal.  The lungs are hypoinflated with some hazy subsegmental bibasilar opacities which are less prominent. No effusion is seen. Mild bibasilar atelectasis or infiltrates or less likely residual scarring which is less prominent. Suggest short-term follow-up. Xr Chest Portable    Result Date: 12/31/2020  EXAMINATION: ONE XRAY VIEW OF THE CHEST 12/30/2020 11:32 pm COMPARISON: 12/29/2020. Correlation was also made to the chest CT dated 12/29/2020. HISTORY: ORDERING SYSTEM PROVIDED HISTORY: SOB TECHNOLOGIST PROVIDED HISTORY: Reason for exam:->SOB Reason for Exam: sob FINDINGS: The cardiac silhouette appears within normal limits. There is redemonstration of bibasilar opacities, suspicious for underlying pneumonia as seen on the prior study. No large pleural effusion or perceptible pneumothorax is seen. Bibasilar airspace opacity as seen on the prior study, which may reflect underlying pneumonia in the correct clinical setting. Follow-up imaging resolution is recommended.      Xr Chest Portable    Result Date: 12/29/2020  EXAMINATION: ONE XRAY VIEW OF THE CHEST 12/29/2020 7:03 pm COMPARISON: None. HISTORY: ORDERING SYSTEM PROVIDED HISTORY: SOB TECHNOLOGIST PROVIDED HISTORY: Reason for exam:->SOB Reason for Exam: SOB, fatigue Acuity: Acute Type of Exam: Initial FINDINGS: AP portable view of the chest time stamped at 1905 hours demonstrates overlying cardiac monitoring electrodes. Heart size is within upper limits of normal.  Opacity is present at the right lung base suspicious for focal airspace disease. Osseous and mediastinal structures are age-appropriate. No extrapleural air is noted. Opacity at the right lung base suggestive of focal airspace disease/pneumonitis. Ct Chest Pulmonary Embolism W Contrast    Result Date: 1/25/2021  EXAMINATION: CTA OF THE CHEST 1/25/2021 9:12 pm TECHNIQUE: CTA of the chest was performed after the administration of intravenous contrast.  Multiplanar reformatted images are provided for review. MIP images are provided for review. Dose modulation, iterative reconstruction, and/or weight based adjustment of the mA/kV was utilized to reduce the radiation dose to as low as reasonably achievable. COMPARISON: Chest portable January 25, 2021. CT chest without contrast December 29, 2020. HISTORY: ORDERING SYSTEM PROVIDED HISTORY: sob, hypoxia, elevated dimer TECHNOLOGIST PROVIDED HISTORY: Reason for exam:->sob, hypoxia, elevated dimer Decision Support Exception->Emergency Medical Condition (MA) Reason for Exam: sob, hypoxia, elevated dimer FINDINGS: Pulmonary Arteries: Pulmonary arteries are adequately opacified for evaluation. No evidence of intraluminal filling defect to suggest pulmonary embolism. Main pulmonary artery is normal in caliber. Mediastinum: No evidence of mediastinal lymphadenopathy. The heart and pericardium demonstrate no acute abnormality. There is no acute abnormality of the thoracic aorta. Lungs/pleura:  Moderate centrilobular emphysema is seen with scattered interlobular thickening/scarring identified. Hyperlucent medial left lower lung lobe parenchyma is seen suggesting associated bullae formation versus air trapping. Multifocal consolidation is seen within the right lower lung lobe. No evidence of pleural thickening or pleural effusion is identified. Bibasilar subsegmental atelectasis is present. Upper Abdomen: Limited images of the upper abdomen are unremarkable. Soft Tissues/Bones: No acute bone or soft tissue abnormality. 1. No evidence of acute pulmonary embolism. 2. Moderate centrilobular emphysema with scattered interlobular septal thickening/scarring and medial left lower lung lobe hyperlucency suggesting bullae formation versus air trapping. 3. Right lower lung lobe multifocal ill-defined consolidation consistent with pneumonia. Fl Modified Barium Swallow W Video    Result Date: 12/31/2020  EXAMINATION: MODIFIED BARIUM SWALLOW WAS PERFORMED IN CONJUNCTION WITH SPEECH PATHOLOGY SERVICES TECHNIQUE: Fluoroscopic evaluation of the swallowing mechanism was performed with multiple consistency of barium product. FLUOROSCOPY DOSE AND TYPE OR TIME AND EXPOSURES: 3.1 minutes. No exposures COMPARISON: None HISTORY: ORDERING SYSTEM PROVIDED HISTORY: dysphagia TECHNOLOGIST PROVIDED HISTORY: Reason for exam:->dysphagia FINDINGS: The patient swallowed various consistencies mixed with barium and was evaluated alongside the speech pathologist. There was no aspiration or penetration. No evidence of aspiration or penetration. Please see separate speech pathologist's report for full discussion of findings and recommendations.            Last Labs on Discharge:     Recent Results (from the past 24 hour(s))   CBC Auto Differential    Collection Time: 01/25/21  7:04 PM   Result Value Ref Range    WBC 36.6 (H) 4.0 - 11.0 K/uL    RBC 5.27 4.20 - 5.90 M/uL    Hemoglobin 15.9 13.5 - 17.5 g/dL    Hematocrit 48.1 40.5 - 52.5 %    MCV 91.1 80.0 - 100.0 fL    MCH 30.1 26.0 - 34.0 pg    MCHC 33.0 31.0 - 36.0 g/dL    RDW 15.6 (H) 12.4 - 15.4 %    Platelets 918 654 - 628 K/uL    MPV 10.1 5.0 - 10.5 fL    PLATELET SLIDE REVIEW Adequate     SLIDE REVIEW see below     Neutrophils % 84.0 %    Lymphocytes % 3.0 %    Monocytes % 5.0 %    Eosinophils % 0.0 %    Basophils % 0.0 %    Neutrophils Absolute 33.7 (H) 1.7 - 7.7 K/uL    Lymphocytes Absolute 1.1 1.0 - 5.1 K/uL    Monocytes Absolute 1.8 (H) 0.0 - 1.3 K/uL    Eosinophils Absolute 0.0 0.0 - 0.6 K/uL    Basophils Absolute 0.0 0.0 - 0.2 K/uL    Bands Relative 8 (H) 0 - 7 %    Vacuolated Neutrophils Present (A)     Anisocytosis 1+ (A)     Polychromasia Occasional (A)    D-Dimer, Quantitative    Collection Time: 01/25/21  7:04 PM   Result Value Ref Range    D-Dimer, Quant 536 (H) 0 - 229 ng/mL DDU   Lactic Acid, Plasma    Collection Time: 01/25/21  7:04 PM   Result Value Ref Range    Lactic Acid 2.5 (H) 0.4 - 2.0 mmol/L   COVID-19    Collection Time: 01/25/21  7:04 PM   Result Value Ref Range    SARS-CoV-2, NAAT Not Detected Not Detected   SPECIMEN REJECTION    Collection Time: 01/25/21  8:11 PM   Result Value Ref Range    Rejected Test CMPX LIPAS TROP     Reason for Rejection see below    Rapid influenza A/B antigens    Collection Time: 01/25/21  8:28 PM    Specimen: Nares   Result Value Ref Range    Rapid Influenza A Ag Negative Negative    Rapid Influenza B Ag Negative Negative   Comprehensive Metabolic Panel w/ Reflex to MG    Collection Time: 01/25/21  8:28 PM   Result Value Ref Range    Sodium 138 136 - 145 mmol/L    Potassium reflex Magnesium 5.3 (H) 3.5 - 5.1 mmol/L    Chloride 102 99 - 110 mmol/L    CO2 27 21 - 32 mmol/L    Anion Gap 9 3 - 16    Glucose 124 (H) 70 - 99 mg/dL    BUN 9 7 - 20 mg/dL    CREATININE 0.8 0.8 - 1.3 mg/dL    GFR Non-African American >60 >60    GFR African American >60 >60    Calcium 9.0 8.3 - 10.6 mg/dL    Total Protein 7.2 6.4 - 8.2 g/dL    Alb 4.2 3.4 - 5.0 g/dL    Albumin/Globulin Ratio 1.4 1.1 - 2.2    Total Bilirubin <0.2 0.0 - 1.0 mg/dL    Alkaline Phosphatase 121 40 - 129 U/L    ALT 25 10 - 40 U/L    AST 21 15 - 37 U/L    Globulin 3.0 g/dL   Lipase    Collection Time: 01/25/21  8:28 PM   Result Value Ref Range    Lipase 12.0 (L) 13.0 - 60.0 U/L   Troponin    Collection Time: 01/25/21  8:28 PM   Result Value Ref Range    Troponin <0.01 <0.01 ng/mL   Brain Natriuretic Peptide    Collection Time: 01/25/21  8:28 PM   Result Value Ref Range    Pro-BNP 42 0 - 124 pg/mL   Procalcitonin    Collection Time: 01/25/21  8:28 PM   Result Value Ref Range    Procalcitonin 0.19 (H) 0.00 - 0.15 ng/mL   Urinalysis Reflex to Culture    Collection Time: 01/25/21 10:26 PM    Specimen: Urine, clean catch   Result Value Ref Range    Color, UA YELLOW Straw/Yellow    Clarity, UA Clear Clear    Glucose, Ur Negative Negative mg/dL    Bilirubin Urine Negative Negative    Ketones, Urine Negative Negative mg/dL    Specific Gravity, UA 1.027 1.005 - 1.030    Blood, Urine Negative Negative    pH, UA 5.0 5.0 - 8.0    Protein, UA Negative Negative mg/dL    Urobilinogen, Urine 0.2 <2.0 E.U./dL    Nitrite, Urine Negative Negative    Leukocyte Esterase, Urine Negative Negative    Microscopic Examination Not Indicated     Urine Type 4     Urine Reflex to Culture Not Indicated          Follow up: with Margarita Cerda MD    Note that over 30 minutes was spent in preparing discharge papers, discussing discharge with patient, medication review, etc.    Thank you Margarita Cerda MD for the opportunity to be involved in this patient's care. If you have any questions or concerns please feel free to contact me at [phone].     Electronically signed by DANDRE Chou CNP on 1/26/2021 at 12:52 AM

## 2021-01-26 NOTE — ED NOTES
Pt given orange juice to drink. O.K per [facility]. Urine sample sent at this time. [facility]  01/25/21 [id number]

## 2021-01-26 NOTE — ED NOTES
This RN spoke with patient's wife, Megan Craven about patient wanting to leave against medical advice. No further questions and she is coming to pick patient up at 0200 after antibiotics are finished.      Hortensia Villasenor, AKIN  01/26/21 [id number] [facility], RN  01/26/21 [id number]

## 2021-01-29 LAB
BLOOD CULTURE, ROUTINE: NORMAL
CULTURE, BLOOD 2: NORMAL

## 2021-06-13 ENCOUNTER — HOSPITAL ENCOUNTER (EMERGENCY)
Age: 71
Discharge: HOME | End: 2021-06-13
Payer: COMMERCIAL

## 2021-06-13 VITALS
HEART RATE: 53 BPM | TEMPERATURE: 97.8 F | DIASTOLIC BLOOD PRESSURE: 67 MMHG | WEIGHT: 172 LBS | SYSTOLIC BLOOD PRESSURE: 111 MMHG | OXYGEN SATURATION: 100 % | RESPIRATION RATE: 18 BRPM | BODY MASS INDEX: 27.76 KG/M2

## 2021-06-13 DIAGNOSIS — S05.01XA ABRASION OF RIGHT CORNEA, INITIAL ENCOUNTER: Primary | ICD-10-CM

## 2021-06-13 PROCEDURE — 99284 EMERGENCY DEPT VISIT MOD MDM: CPT

## 2021-06-13 RX ORDER — BUTALBITAL, ASPIRIN, AND CAFFEINE 325; 50; 40 MG/1; MG/1; MG/1
CAPSULE ORAL
COMMUNITY
Start: 2021-06-09 | End: 2021-12-09

## 2021-06-13 RX ORDER — BALANCED SALT SOLUTION ENRICHED WITH BICARBONATE, DEXTROSE, AND GLUTATHIONE
KIT INTRAOCULAR
Status: DISCONTINUED
Start: 2021-06-13 | End: 2021-06-13 | Stop reason: HOSPADM

## 2021-06-13 RX ORDER — TOBRAMYCIN 3 MG/ML
1 SOLUTION/ DROPS OPHTHALMIC
Qty: 5 ML | Refills: 0 | Status: SHIPPED | OUTPATIENT
Start: 2021-06-13 | End: 2021-06-23

## 2021-06-13 RX ORDER — SULFACETAMIDE SODIUM 100 MG/ML
SOLUTION/ DROPS OPHTHALMIC
Status: DISCONTINUED
Start: 2021-06-13 | End: 2021-06-13 | Stop reason: HOSPADM

## 2021-06-13 RX ORDER — HYDROCODONE BITARTRATE AND ACETAMINOPHEN 5; 325 MG/1; MG/1
1 TABLET ORAL EVERY 6 HOURS PRN
Qty: 8 TABLET | Refills: 0 | Status: SHIPPED | OUTPATIENT
Start: 2021-06-13 | End: 2021-06-15

## 2021-06-13 RX ORDER — PROPARACAINE HYDROCHLORIDE 5 MG/ML
SOLUTION/ DROPS OPHTHALMIC
Status: DISCONTINUED
Start: 2021-06-13 | End: 2021-06-13 | Stop reason: HOSPADM

## 2021-06-13 RX ORDER — CLONAZEPAM 0.5 MG/1
TABLET ORAL
Status: ON HOLD | COMMUNITY
End: 2022-06-11

## 2021-06-13 ASSESSMENT — TONOMETRY
IOP_AUTOMATED: 1
OD_IOP_MMHG: 18

## 2021-06-13 ASSESSMENT — PAIN SCALES - GENERAL: PAINLEVEL_OUTOF10: 10

## 2021-06-13 ASSESSMENT — ENCOUNTER SYMPTOMS
DIARRHEA: 0
ABDOMINAL PAIN: 0
EYE DISCHARGE: 0
EYE PAIN: 1
EYE ITCHING: 0
NAUSEA: 0
EYE REDNESS: 0
CHEST TIGHTNESS: 0
SHORTNESS OF BREATH: 0
VOMITING: 0

## 2021-06-13 ASSESSMENT — VISUAL ACUITY
OU: 20/20
OD: 20/50
OS: 20/20

## 2021-06-13 NOTE — ED NOTES
Attempted to reach all family members and numbers on file, no answer      Monica Rosas RN  [id number]/[phone]

## 2021-06-13 NOTE — ED PROVIDER NOTES
BUTALBITAL-ACETAMINOPHEN-CAFFEINE (FIORICET, ESGIC) -40 MG PER TABLET    Take 1 tablet by mouth every 4 hours as needed for Headaches    BUTALBITAL-ASPIRIN-CAFFEINE (FIORINAL) -40 MG CAPSULE    Take 1-2 tablets every 6 hours as needed for headaches    CLONAZEPAM (KLONOPIN) 0.5 MG TABLET    Take 0.5 mg by mouth daily as needed for Anxiety. CLONAZEPAM (KLONOPIN) 0.5 MG TABLET    Take by mouth. CLONIDINE (CATAPRES) 0.1 MG TABLET    Take 0.2 mg by mouth every 4 hours as needed for High Blood Pressure (anxiety / tremor)     CYCLOBENZAPRINE (FLEXERIL) 10 MG TABLET    Take 10 mg by mouth 3 times daily as needed    DOXEPIN (SINEQUAN) 10 MG CAPSULE    Take 10 mg by mouth nightly    OMEPRAZOLE (PRILOSEC) 40 MG DELAYED RELEASE CAPSULE    Take 40 mg by mouth 2 times daily     ONDANSETRON (ZOFRAN) 4 MG TABLET    Take 1 tablet by mouth every 8 hours as needed for Nausea or Vomiting. TRIAZOLAM (HALCION) 0.25 MG TABLET    Take 2 tablets by mouth 2 times daily. 9 pm and 2 am         ALLERGIES     Prednisone, Serotonin reuptake inhibitors (ssris), and Tricyclic antidepressants    FAMILYHISTORY       Family History   Problem Relation Age of Onset    Mental Illness Mother     Mental Illness Father           SOCIAL HISTORY       Social History     Tobacco Use    Smoking status: Current Every Day Smoker     Packs/day: 0.00     Years: 42.00     Pack years: 0.00     Types: Cigarettes    Smokeless tobacco: Never Used   Substance Use Topics    Alcohol use: No     Comment: hasn't drunk in 10-12 years    Drug use: No       SCREENINGS             PHYSICAL EXAM    (up to 7 for level 4, 8 or more for level 5)     ED Triage Vitals   BP Temp Temp Source Pulse Resp SpO2 Height Weight   06/13/21 0951 06/13/21 0948 06/13/21 0948 06/13/21 0951 06/13/21 0948 06/13/21 0948 -- 06/13/21 0948   111/67 97.8 °F (36.6 °C) Oral 53 18 100 %  172 lb (78 kg)       Physical Exam  Vitals and nursing note reviewed.    Constitutional: General: He is awake. He is not in acute distress. Appearance: Normal appearance. He is well-developed. He is not diaphoretic. HENT:      Head: Normocephalic and atraumatic. No raccoon eyes, Smith's sign, contusion or laceration. Right Ear: Hearing and external ear normal.      Left Ear: Hearing and external ear normal.      Nose: Nose normal.      Mouth/Throat:      Lips: Pink. Mouth: Mucous membranes are moist.   Eyes:      General: Lids are normal. No scleral icterus. Right eye: No discharge. Left eye: No discharge. Intraocular pressure: Right eye pressure is 18 mmHg. Measurements were taken using an automated tonometer. Extraocular Movements: Extraocular movements intact. Right eye: Normal extraocular motion and no nystagmus. Left eye: Normal extraocular motion (Pupillary) and no nystagmus. Conjunctiva/sclera:      Right eye: Right conjunctiva is injected. No chemosis, exudate or hemorrhage. Left eye: Left conjunctiva is not injected. No chemosis, exudate or hemorrhage. Pupils:      Right eye: Pupil is round and reactive. Corneal abrasion and fluorescein uptake present. Kishan exam negative. Left eye: Pupil is not round. Slit lamp exam:     Right eye: Anterior chamber quiet. Comments: Pupillary abnormality noted unaffected left eye with 6:00 vertical deviation   Neck:      Vascular: No JVD. Cardiovascular:      Rate and Rhythm: Normal rate and regular rhythm. Heart sounds: No murmur heard. No friction rub. No gallop. Pulmonary:      Effort: Pulmonary effort is normal. No accessory muscle usage or respiratory distress. Breath sounds: Normal breath sounds. No wheezing, rhonchi or rales. Musculoskeletal:      Cervical back: Normal range of motion. Skin:     General: Skin is warm and dry. Neurological:      Mental Status: He is alert and oriented to person, place, and time. GCS: GCS eye subscore is 4.  GCS readings are reassuring but are on the upper end of normal..  The does have a small corneal abrasion noted at the 4 o'clock position. I initiated antibiotics here in the emergency department. Pain medicine antibiotics for home and follow-up with [facility] tomorrow. The patient has been made aware of the signs and symptoms which would necessitate an immediate return to the emergency department and verbalizes an understanding of these signs and symptoms. I estimate there is a low risk of herpes keratitis or UV keratitis, corneal ulceration or angle closure glaucoma. There is no evidence of conjunctivitis, scleritis, or episcleritis. No vision threatening condition found such as penetrating globe injury, central retinal artery occlusion, rentinal vein thrombosis or retinal detachment. In light of a clinical presentation which lacks an opthamalogic emergency, the patient be managed on an outpatient basis. The patient/family and I have discussed the diagnosis and risks, and we agree with discharging home with referral to ophthalmology       FINAL IMPRESSION      1. Abrasion of right cornea, initial encounter          DISPOSITION/PLAN   DISPOSITION: Discharged to home      PATIENT REFERRED TO:  Joi Almazan MD  [address] [facility]. [place] [id number]    Schedule an appointment as soon as possible for a visit       [facility] Emergency Department  [address] [place] [address]  [phone]    If symptoms worsen      DISCHARGE MEDICATIONS:  New Prescriptions    HYDROCODONE-ACETAMINOPHEN (NORCO) 5-325 MG PER TABLET    Take 1 tablet by mouth every 6 hours as needed for Pain for up to 2 days.     TOBRAMYCIN (TOBREX) 0.3 % OPHTHALMIC SOLUTION    Place 1 drop into the right eye every 2 hours for 10 days       DISCONTINUED MEDICATIONS:  Discontinued Medications    No medications on file (Please note that portions of this note were completed with a voice recognition program.  Efforts were made to edit the dictations but occasionally words are mis-transcribed.)    Lida Marsh PA-C (electronically signed)           Luisa Bruner PA-C  06/13/21 114

## 2021-08-09 ENCOUNTER — HOSPITAL ENCOUNTER (EMERGENCY)
Age: 71
Discharge: HOME | End: 2021-08-09
Attending: EMERGENCY MEDICINE
Payer: COMMERCIAL

## 2021-08-09 VITALS
OXYGEN SATURATION: 98 % | HEART RATE: 104 BPM | SYSTOLIC BLOOD PRESSURE: 137 MMHG | RESPIRATION RATE: 20 BRPM | HEIGHT: 67 IN | WEIGHT: 137 LBS | TEMPERATURE: 99 F | DIASTOLIC BLOOD PRESSURE: 90 MMHG | BODY MASS INDEX: 21.5 KG/M2

## 2021-08-09 DIAGNOSIS — F51.4 NIGHT TERRORS: Primary | ICD-10-CM

## 2021-08-09 PROCEDURE — 99285 EMERGENCY DEPT VISIT HI MDM: CPT

## 2021-08-09 ASSESSMENT — ENCOUNTER SYMPTOMS
EYE PAIN: 0
WHEEZING: 0
ABDOMINAL PAIN: 0
COUGH: 0
DIARRHEA: 0
SHORTNESS OF BREATH: 0
NAUSEA: 0
VOMITING: 0
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
## 2021-11-11 ENCOUNTER — APPOINTMENT (OUTPATIENT)
Dept: CT IMAGING | Age: 71
End: 2021-11-11
Payer: COMMERCIAL

## 2021-11-11 ENCOUNTER — HOSPITAL ENCOUNTER (EMERGENCY)
Age: 71
Discharge: HOME | End: 2021-11-11
Attending: EMERGENCY MEDICINE
Payer: COMMERCIAL

## 2021-11-11 VITALS
HEART RATE: 98 BPM | SYSTOLIC BLOOD PRESSURE: 154 MMHG | TEMPERATURE: 98.7 F | DIASTOLIC BLOOD PRESSURE: 98 MMHG | OXYGEN SATURATION: 91 % | RESPIRATION RATE: 18 BRPM

## 2021-11-11 DIAGNOSIS — G43.009 MIGRAINE WITHOUT AURA AND WITHOUT STATUS MIGRAINOSUS, NOT INTRACTABLE: Primary | ICD-10-CM

## 2021-11-11 LAB
A/G RATIO: 1.4 (ref 1.1–2.2)
ALBUMIN SERPL-MCNC: 4.1 G/DL (ref 3.4–5)
ALP BLD-CCNC: 112 U/L (ref 40–129)
ALT SERPL-CCNC: 15 U/L (ref 10–40)
ANION GAP SERPL CALCULATED.3IONS-SCNC: 10 MMOL/L (ref 3–16)
AST SERPL-CCNC: 16 U/L (ref 15–37)
BASOPHILS ABSOLUTE: 0 K/UL (ref 0–0.2)
BASOPHILS RELATIVE PERCENT: 0.6 %
BILIRUB SERPL-MCNC: <0.2 MG/DL (ref 0–1)
BUN BLDV-MCNC: 9 MG/DL (ref 7–20)
CALCIUM SERPL-MCNC: 9.2 MG/DL (ref 8.3–10.6)
CHLORIDE BLD-SCNC: 104 MMOL/L (ref 99–110)
CO2: 30 MMOL/L (ref 21–32)
CREAT SERPL-MCNC: 1 MG/DL (ref 0.8–1.3)
EOSINOPHILS ABSOLUTE: 0.2 K/UL (ref 0–0.6)
EOSINOPHILS RELATIVE PERCENT: 2.8 %
GFR AFRICAN AMERICAN: >60
GFR NON-AFRICAN AMERICAN: >60
GLUCOSE BLD-MCNC: 117 MG/DL (ref 70–99)
HCT VFR BLD CALC: 43.9 % (ref 40.5–52.5)
HEMOGLOBIN: 14.2 G/DL (ref 13.5–17.5)
LYMPHOCYTES ABSOLUTE: 1.9 K/UL (ref 1–5.1)
LYMPHOCYTES RELATIVE PERCENT: 23.1 %
MCH RBC QN AUTO: 29.1 PG (ref 26–34)
MCHC RBC AUTO-ENTMCNC: 32.4 G/DL (ref 31–36)
MCV RBC AUTO: 89.8 FL (ref 80–100)
MONOCYTES ABSOLUTE: 0.6 K/UL (ref 0–1.3)
MONOCYTES RELATIVE PERCENT: 6.5 %
NEUTROPHILS ABSOLUTE: 5.7 K/UL (ref 1.7–7.7)
NEUTROPHILS RELATIVE PERCENT: 67 %
PDW BLD-RTO: 16.1 % (ref 12.4–15.4)
PLATELET # BLD: 183 K/UL (ref 135–450)
PMV BLD AUTO: 8.9 FL (ref 5–10.5)
POTASSIUM REFLEX MAGNESIUM: 4.4 MMOL/L (ref 3.5–5.1)
RBC # BLD: 4.89 M/UL (ref 4.2–5.9)
SODIUM BLD-SCNC: 144 MMOL/L (ref 136–145)
TOTAL PROTEIN: 7.1 G/DL (ref 6.4–8.2)
WBC # BLD: 8.4 K/UL (ref 4–11)

## 2021-11-11 PROCEDURE — 6360000002 HC RX W HCPCS: Performed by: EMERGENCY MEDICINE

## 2021-11-11 PROCEDURE — 70450 CT HEAD/BRAIN W/O DYE: CPT

## 2021-11-11 PROCEDURE — 96374 THER/PROPH/DIAG INJ IV PUSH: CPT

## 2021-11-11 PROCEDURE — 2580000003 HC RX 258: Performed by: EMERGENCY MEDICINE

## 2021-11-11 PROCEDURE — 99284 EMERGENCY DEPT VISIT MOD MDM: CPT

## 2021-11-11 PROCEDURE — 80053 COMPREHEN METABOLIC PANEL: CPT

## 2021-11-11 PROCEDURE — 85025 COMPLETE CBC W/AUTO DIFF WBC: CPT

## 2021-11-11 PROCEDURE — 96375 TX/PRO/DX INJ NEW DRUG ADDON: CPT

## 2021-11-11 PROCEDURE — 36415 COLL VENOUS BLD VENIPUNCTURE: CPT

## 2021-11-11 PROCEDURE — 6370000000 HC RX 637 (ALT 250 FOR IP): Performed by: EMERGENCY MEDICINE

## 2021-11-11 RX ORDER — ACETAMINOPHEN 500 MG
1000 TABLET ORAL ONCE
Status: COMPLETED | OUTPATIENT
Start: 2021-11-11 | End: 2021-11-11

## 2021-11-11 RX ORDER — 0.9 % SODIUM CHLORIDE 0.9 %
500 INTRAVENOUS SOLUTION INTRAVENOUS ONCE
Status: COMPLETED | OUTPATIENT
Start: 2021-11-11 | End: 2021-11-11

## 2021-11-11 RX ORDER — METOCLOPRAMIDE HYDROCHLORIDE 5 MG/ML
10 INJECTION INTRAMUSCULAR; INTRAVENOUS ONCE
Status: COMPLETED | OUTPATIENT
Start: 2021-11-11 | End: 2021-11-11

## 2021-11-11 RX ORDER — DIPHENHYDRAMINE HYDROCHLORIDE 50 MG/ML
25 INJECTION INTRAMUSCULAR; INTRAVENOUS ONCE
Status: COMPLETED | OUTPATIENT
Start: 2021-11-11 | End: 2021-11-11

## 2021-11-11 RX ADMIN — METOCLOPRAMIDE HYDROCHLORIDE 10 MG: 5 INJECTION INTRAMUSCULAR; INTRAVENOUS at 18:50

## 2021-11-11 RX ADMIN — DIPHENHYDRAMINE HYDROCHLORIDE 25 MG: 50 INJECTION, SOLUTION INTRAMUSCULAR; INTRAVENOUS at 18:50

## 2021-11-11 RX ADMIN — SODIUM CHLORIDE 500 ML: 9 INJECTION, SOLUTION INTRAVENOUS at 18:48

## 2021-11-11 RX ADMIN — ACETAMINOPHEN 1000 MG: 500 TABLET ORAL at 18:49

## 2021-11-11 ASSESSMENT — ENCOUNTER SYMPTOMS
ABDOMINAL PAIN: 0
EYES NEGATIVE: 1
COLOR CHANGE: 0
RHINORRHEA: 0
VOMITING: 0
SORE THROAT: 0
WHEEZING: 0
SHORTNESS OF BREATH: 0
NAUSEA: 0
DIARRHEA: 0
CHEST TIGHTNESS: 0
COUGH: 0

## 2021-11-11 ASSESSMENT — PAIN DESCRIPTION - FREQUENCY: FREQUENCY: CONTINUOUS

## 2021-11-11 ASSESSMENT — PAIN DESCRIPTION - ORIENTATION: ORIENTATION: LEFT

## 2021-11-11 ASSESSMENT — PAIN SCALES - GENERAL
PAINLEVEL_OUTOF10: 10
PAINLEVEL_OUTOF10: 5
PAINLEVEL_OUTOF10: 0

## 2021-11-11 ASSESSMENT — PAIN DESCRIPTION - LOCATION: LOCATION: HEAD

## 2021-11-11 ASSESSMENT — PAIN DESCRIPTION - PAIN TYPE: TYPE: ACUTE PAIN

## 2021-11-11 ASSESSMENT — PAIN SCALES - WONG BAKER: WONGBAKER_NUMERICALRESPONSE: 10

## 2021-11-11 NOTE — ED PROVIDER NOTES
[address] [facility]  [facility]      Pt Name: Mony Edmond  MRN: [id number]  [place] 1950  Date ofevaluation: 11/11/2021  Provider: Margret Garcia MD    CHIEF COMPLAINT       Chief Complaint   Patient presents with    Headache     since noon, no light sensitivity         HISTORY OF PRESENT ILLNESS   (Location/Symptom, Timing/Onset,Context/Setting, Quality, Duration, Modifying Factors, Severity)  Note limiting factors. Mony Edmond is a 73 y.o. male  who  has a past medical history of Anxiety, Bipolar 1 disorder ([address] Community acquired bacterial pneumonia, COPD (chronic obstructive pulmonary disease) ([address] Depression, Fibromyalgia, Headache(784.0), Hyperlipidemia, Manic depressive disease manic phase ([address] On home O2, PTSD (post-traumatic stress disorder), Seizures ([address] and Tardive dyskinesia. 68-year-old male presents with headache similar to previous headaches that he had before in the past.  Patient has history of anxiety, bipolar, COPD, depression, fibromyalgia, migraines, hyperlipidemia, seizures who presents for acute on chronic headaches. However patient states he has not had a headache like this in approximately 1 year. States it came on gradually and then reached its peak. Was not thunderclap in nature. Occurred between 12 and 1230 today. Nothing seems to make his headache better or worse. Not worse with light. No described aura. Did not take anything for his headache today. Describe that his symptoms have been constant but almost resolved at this point. States he gets \"the headache cocktail and it always gets better\". Patient has no neck pain numbness, weakness, dizziness, vision changes, flashers, floaters. Similar to all previous headaches. Denies any fever, nausea, vomiting, diarrhea, chest pain, shortness of breath, dysuria, hematuria, back pain. Factors previous migraines.             NursingNotes were reviewed. REVIEW OF SYSTEMS    (2-9 systems for level 4, 10 or more for level 5)     Review of Systems   Constitutional: Negative. Negative for fatigue and fever. HENT: Negative for congestion, rhinorrhea and sore throat. Eyes: Negative. Respiratory: Negative for cough, chest tightness, shortness of breath and wheezing. Cardiovascular: Negative for chest pain. Gastrointestinal: Negative for abdominal pain, diarrhea, nausea and vomiting. Endocrine: Negative. Genitourinary: Negative. Musculoskeletal: Negative. Skin: Negative for color change and rash. Allergic/Immunologic: Negative for environmental allergies and immunocompromised state. Neurological: Positive for headaches. Negative for dizziness and light-headedness. Hematological: Negative. All other systems reviewed and are negative. Except as noted above the remainder of the review of systems was reviewed and negative.        PAST MEDICAL HISTORY     Past Medical History:   Diagnosis Date    Anxiety     [facility] 1 disorder ([facility]     Community acquired bacterial pneumonia 6/12/2015    COPD (chronic obstructive pulmonary disease) ([facility]     Depression     Fibromyalgia     Headache(784.0)     Hyperlipidemia     Manic depressive disease manic phase (HCC)     On home O2     PTSD (post-traumatic stress disorder)     Seizures (HCC)     Tardive dyskinesia          SURGICALHISTORY       Past Surgical History:   Procedure Laterality Date    MOUTH SURGERY  05/2013         CURRENT MEDICATIONS       Previous Medications    ALBUTEROL SULFATE HFA (PROVENTIL HFA) 108 (90 BASE) MCG/ACT INHALER    Inhale 2 puffs into the lungs every 6 hours as needed    BUDESONIDE-FORMOTEROL (SYMBICORT) 80-4.5 MCG/ACT AERO    Inhale 2 puffs into the lungs 2 times daily    BUTALBITAL-ACETAMINOPHEN-CAFFEINE (FIORICET, ESGIC) -40 MG PER TABLET    Take 1 tablet by mouth every 4 hours as needed for Headaches    BUTALBITAL-ASPIRIN-CAFFEINE (FIORINAL) -40 MG CAPSULE    Take 1-2 tablets every 6 hours as needed for headaches    CLONAZEPAM (KLONOPIN) 0.5 MG TABLET    Take 0.5 mg by mouth daily as needed for Anxiety. CLONAZEPAM (KLONOPIN) 0.5 MG TABLET    Take by mouth. CLONIDINE (CATAPRES) 0.1 MG TABLET    Take 0.2 mg by mouth every 4 hours as needed for High Blood Pressure (anxiety / tremor)     CYCLOBENZAPRINE (FLEXERIL) 10 MG TABLET    Take 10 mg by mouth 3 times daily as needed    DOXEPIN (SINEQUAN) 10 MG CAPSULE    Take 10 mg by mouth nightly    OMEPRAZOLE (PRILOSEC) 40 MG DELAYED RELEASE CAPSULE    Take 40 mg by mouth 2 times daily     ONDANSETRON (ZOFRAN) 4 MG TABLET    Take 1 tablet by mouth every 8 hours as needed for Nausea or Vomiting. OXYGEN    Inhale 2 L/min into the lungs as needed    TRIAZOLAM (HALCION) 0.25 MG TABLET    Take 2 tablets by mouth 2 times daily.  9 pm and 2 am            Prednisone, Serotonin reuptake inhibitors (ssris), and Tricyclic antidepressants    FAMILY HISTORY       Family History   Problem Relation Age of Onset    Mental Illness Mother     Mental Illness Father           SOCIAL HISTORY       Social History     Socioeconomic History    Marital status:      Spouse name: None    Number of children: None    Years of education: None    Highest education level: None   Occupational History    None   Tobacco Use    Smoking status: Current Every Day Smoker     Packs/day: 0.50     Years: 42.00     Pack years: 21.00     Types: Cigarettes    Smokeless tobacco: Never Used   Substance and Sexual Activity    Alcohol use: No     Comment: hasn't drunk in 10-12 years    Drug use: No    Sexual activity: Not Currently     Partners: Female     Comment:    Other Topics Concern    None   Social History Narrative    None     Social Determinants of Health     Financial Resource Strain:     Difficulty of Paying Living Expenses: Not on file   Food Insecurity:     Worried About Running Out of Food in the Last Year: Not on file    Ran Out of Food in the Last Year: Not on file   Transportation Needs:     Lack of Transportation (Medical): Not on file    Lack of Transportation (Non-Medical): Not on file   Physical Activity:     Days of Exercise per Week: Not on file    Minutes of Exercise per Session: Not on file   Stress:     Feeling of Stress : Not on file   Social Connections:     Frequency of Communication with Friends and Family: Not on file    Frequency of Social Gatherings with Friends and Family: Not on file    Attends Hinduism Services: Not on file    Active Member of [address] [place] [address] or Organizations: Not on file    Attends Club or Organization Meetings: Not on file    Marital Status: Not on file   Intimate Partner Violence:     Fear of Current or Ex-Partner: Not on file    Emotionally Abused: Not on file    Physically Abused: Not on file    Sexually Abused: Not on file   Housing Stability:     Unable to Pay for Housing in the Last Year: Not on file    Number of [place] in the Last Year: Not on file    Unstable Housing in the Last Year: Not on file       SCREENINGS             PHYSICAL EXAM    (up to 7 for level 4, 8 or more for level 5)     ED Triage Vitals [11/11/21 1756]   BP Temp Temp Source Pulse Resp SpO2 Height Weight   (!) 161/96 98.7 °F (37.1 °C) Oral 98 18 91 % -- --       Physical Exam  Vitals and nursing note reviewed. Constitutional:       General: He is not in acute distress. Appearance: He is well-developed and normal weight. He is not ill-appearing, toxic-appearing or diaphoretic. HENT:      Head: Normocephalic and atraumatic. Mouth/Throat:      Mouth: Mucous membranes are moist.      Pharynx: Oropharynx is clear. Eyes:      Extraocular Movements: Extraocular movements intact. Cardiovascular:      Rate and Rhythm: Normal rate and regular rhythm. Pulses: Normal pulses. Heart sounds: Normal heart sounds.    Pulmonary:      Effort: Pulmonary effort is normal.      Breath sounds: Normal breath sounds. No decreased breath sounds, wheezing, rhonchi or rales. Chest:      Chest wall: No tenderness. Abdominal:      General: Bowel sounds are normal.      Palpations: Abdomen is soft. Tenderness: There is no abdominal tenderness. Musculoskeletal:         General: Normal range of motion. Cervical back: Normal range of motion and neck supple. Right lower leg: No edema. Left lower leg: No edema. Skin:     General: Skin is warm and dry. Capillary Refill: Capillary refill takes less than 2 seconds. Findings: No rash. Neurological:      General: No focal deficit present. Mental Status: He is alert and oriented to person, place, and time. GCS: GCS eye subscore is 4. GCS verbal subscore is 5. GCS motor subscore is 6. Cranial Nerves: Cranial nerves are intact. No cranial nerve deficit, dysarthria or facial asymmetry. Sensory: Sensation is intact. No sensory deficit. Motor: Motor function is intact. No weakness, tremor, atrophy, abnormal muscle tone, seizure activity or pronator drift. Coordination: Coordination is intact. Coordination normal. Finger-Nose-Finger Test and Heel to [facility] Test normal.      Gait: Gait normal.   Psychiatric:         Mood and Affect: Mood normal.         Behavior: Behavior normal.         RESULTS       RADIOLOGY:   Non-plain filmimages such as CT, Ultrasound and MRI are read by the radiologist.     Interpretation per the Radiologist below, if available at the time ofthis note:    CT Head WO Contrast   Final Result      Stable study. No evidence for acute intracranial hemorrhage, territorial   infarction or intracranial mass lesion. Mild chronic microangiopathic ischemic disease. Mild generalized volume loss.                ED BEDSIDE ULTRASOUND:   Performed by ED Physician - none    LABS:  Labs Reviewed   CBC WITH AUTO DIFFERENTIAL - Abnormal; Notable for the following components:       Result Value    RDW 16.1 (*)     All other components within normal limits    Narrative:     Performed at:  [facility]  [address] [place] [facility] [address]   Phone [phone]   COMPREHENSIVE METABOLIC PANEL W/ REFLEX TO MG FOR LOW K - Abnormal; Notable for the following components:    Glucose 117 (*)     All other components within normal limits    Narrative:     Performed at:  [facility]  [address] [place] [facility] [address]   Phone [phone]       All other labs were within normal range or not returned as of this dictation. EMERGENCY DEPARTMENT COURSE and DIFFERENTIAL DIAGNOSIS/MDM:   Vitals:    Vitals:    11/11/21 1831 11/11/21 1845 11/11/21 1901 11/11/21 1915   BP: (!) 167/92 (!) 156/90 (!) 158/90 (!) 156/90   Pulse:       Resp:       Temp:       TempSrc:       SpO2: 93% 91% 92% 94%       Patient was given thefollowing medications:  Medications   0.9 % sodium chloride bolus (500 mLs IntraVENous New Bag 11/11/21 1848)   acetaminophen (TYLENOL) tablet 1,000 mg (1,000 mg Oral Given 11/11/21 1849)   metoclopramide (REGLAN) injection 10 mg (10 mg IntraVENous Given 11/11/21 1850)   diphenhydrAMINE (BENADRYL) injection 25 mg (25 mg IntraVENous Given 11/11/21 1850)       ED COURSE & MEDICAL DECISION MAKING    Pertinent Labs & Imaging studies reviewed. (See chart for details)   -  Patient seen and evaluated in the emergency department. -  Triage and nursing notes reviewed and incorporated. -  Old chart records reviewed and incorporated. -  Differential diagnosis includes: Differential Diagnosis: Subarachnoid hemorrhage, Meningitis, Temporal arteritis, Pseudotumor Cerebri, Migraine, tension headache,  Other      62-year-old male presents with acute headache similar to previous migraine headaches in the past.  Patient has no neurologic findings. No signs of meningismus.   Patient is afebrile not tachycardic the reasons which may require a return visit and the importance of follow-up care. The patient is well-appearing, nontoxic, and improved at the time of discharge. Patient agrees to call to arrange follow-up care as directed. Patient understands to return immediately for worsening/change in symptoms. CRITICAL CARE TIME   Total Critical Care time was 15 minutes, excluding separately reportable procedures. There was a high probability of clinically significant/life threatening deterioration in the patient's condition which required my urgent intervention. This includes multiple reevaluations, vital sign monitoring, pulse oximetry monitoring, telemetry monitoring, clinical response to the IV medications, reviewing the nursing notes, consultation time, dictation/documentation time, and interpretation of the labwork. (This time excludes time spent performing procedures). CONSULTS:  None    PROCEDURES:  Unless otherwise noted below, none     Procedures    FINAL IMPRESSION      1. Migraine without aura and without status migrainosus, not intractable          DISPOSITION/PLAN   DISPOSITION        PATIENT REFERREDTO:  Jhon Murry MD  [address] [facility]. [facility] [address]    Schedule an appointment as soon as possible for a visit         DISCHARGEMEDICATIONS:  New Prescriptions    No medications on file          (Please note that portions of this note were completed with a voice recognition program.  Efforts were made to edit the dictations but occasionally words are mis-transcribed.)    Adrianna Rondon MD (electronically signed)  Attending Emergency Physician         Adrianna Rondon MD  11/11/21 8253

## 2021-12-09 ENCOUNTER — HOSPITAL ENCOUNTER (EMERGENCY)
Age: 71
Discharge: HOME | End: 2021-12-09
Payer: COMMERCIAL

## 2021-12-09 ENCOUNTER — APPOINTMENT (OUTPATIENT)
Dept: CT IMAGING | Age: 71
End: 2021-12-09
Payer: COMMERCIAL

## 2021-12-09 VITALS
HEIGHT: 67 IN | DIASTOLIC BLOOD PRESSURE: 90 MMHG | HEART RATE: 99 BPM | OXYGEN SATURATION: 91 % | TEMPERATURE: 98.1 F | WEIGHT: 166.01 LBS | SYSTOLIC BLOOD PRESSURE: 156 MMHG | RESPIRATION RATE: 16 BRPM | BODY MASS INDEX: 26.06 KG/M2

## 2021-12-09 DIAGNOSIS — G43.909 MIGRAINE WITHOUT STATUS MIGRAINOSUS, NOT INTRACTABLE, UNSPECIFIED MIGRAINE TYPE: Primary | ICD-10-CM

## 2021-12-09 PROCEDURE — 99283 EMERGENCY DEPT VISIT LOW MDM: CPT

## 2021-12-09 PROCEDURE — 70450 CT HEAD/BRAIN W/O DYE: CPT

## 2021-12-09 PROCEDURE — 96375 TX/PRO/DX INJ NEW DRUG ADDON: CPT

## 2021-12-09 PROCEDURE — 2580000003 HC RX 258: Performed by: PHYSICIAN ASSISTANT

## 2021-12-09 PROCEDURE — 96374 THER/PROPH/DIAG INJ IV PUSH: CPT

## 2021-12-09 PROCEDURE — 6360000002 HC RX W HCPCS: Performed by: PHYSICIAN ASSISTANT

## 2021-12-09 PROCEDURE — 96361 HYDRATE IV INFUSION ADD-ON: CPT

## 2021-12-09 RX ORDER — KETOROLAC TROMETHAMINE 30 MG/ML
15 INJECTION, SOLUTION INTRAMUSCULAR; INTRAVENOUS ONCE
Status: COMPLETED | OUTPATIENT
Start: 2021-12-09 | End: 2021-12-09

## 2021-12-09 RX ORDER — PROCHLORPERAZINE EDISYLATE 5 MG/ML
10 INJECTION INTRAMUSCULAR; INTRAVENOUS ONCE
Status: DISCONTINUED | OUTPATIENT
Start: 2021-12-09 | End: 2021-12-09

## 2021-12-09 RX ORDER — METOCLOPRAMIDE HYDROCHLORIDE 5 MG/ML
10 INJECTION INTRAMUSCULAR; INTRAVENOUS ONCE
Status: COMPLETED | OUTPATIENT
Start: 2021-12-09 | End: 2021-12-09

## 2021-12-09 RX ORDER — 0.9 % SODIUM CHLORIDE 0.9 %
1000 INTRAVENOUS SOLUTION INTRAVENOUS ONCE
Status: COMPLETED | OUTPATIENT
Start: 2021-12-09 | End: 2021-12-09

## 2021-12-09 RX ORDER — DIPHENHYDRAMINE HYDROCHLORIDE 50 MG/ML
25 INJECTION INTRAMUSCULAR; INTRAVENOUS ONCE
Status: COMPLETED | OUTPATIENT
Start: 2021-12-09 | End: 2021-12-09

## 2021-12-09 RX ADMIN — METOCLOPRAMIDE HYDROCHLORIDE 10 MG: 5 INJECTION INTRAMUSCULAR; INTRAVENOUS at 18:14

## 2021-12-09 RX ADMIN — SODIUM CHLORIDE 1000 ML: 9 INJECTION, SOLUTION INTRAVENOUS at 18:34

## 2021-12-09 RX ADMIN — KETOROLAC TROMETHAMINE 15 MG: 30 INJECTION, SOLUTION INTRAMUSCULAR; INTRAVENOUS at 18:11

## 2021-12-09 RX ADMIN — DIPHENHYDRAMINE HYDROCHLORIDE 25 MG: 50 INJECTION, SOLUTION INTRAMUSCULAR; INTRAVENOUS at 18:13

## 2021-12-09 ASSESSMENT — PAIN DESCRIPTION - PAIN TYPE
TYPE: ACUTE PAIN
TYPE: ACUTE PAIN;CHRONIC PAIN

## 2021-12-09 ASSESSMENT — PAIN DESCRIPTION - ORIENTATION: ORIENTATION: LEFT

## 2021-12-09 ASSESSMENT — PAIN SCALES - GENERAL
PAINLEVEL_OUTOF10: 10
PAINLEVEL_OUTOF10: 3
PAINLEVEL_OUTOF10: 8

## 2021-12-09 ASSESSMENT — PAIN DESCRIPTION - FREQUENCY: FREQUENCY: CONTINUOUS

## 2021-12-09 ASSESSMENT — PAIN DESCRIPTION - LOCATION
LOCATION: HEAD
LOCATION: HEAD

## 2021-12-09 ASSESSMENT — PAIN SCALES - WONG BAKER: WONGBAKER_NUMERICALRESPONSE: 2

## 2021-12-09 NOTE — ED PROVIDER NOTES
[facility] Emergency Department    CHIEF COMPLAINT  Migraine (Started this AM)      SHARED SERVICE VISIT  Evaluated by ISAIAH. My supervising physician was available for consultation. HISTORY OF PRESENT ILLNESS  Madhu Miranda is a 73 y.o. male history of migraines states that he is having his usual migraine. Patient reports that this headache has been ongoing for the past 2 to 3 days. He reports he was seen yesterday at the emergency department and obtained a CT of the head which was normal.  He states that he took his Fioricet at home with minimal relief. Patient denies any head injury or changing of the symptoms since he had the CT yesterday. Patient reports he was seen at that time for anxiety and PTSD however did have a headache during that stay. Denies any thunderclap onset, syncope, head injury or loss of consciousness. Patient states he does have sensitivity to light. No other complaints, modifying factors or associated symptoms. Nursing notes reviewed.    Past Medical History:   Diagnosis Date    Anxiety     [facility]     Community acquired bacterial pneumonia 6/12/2015    COPD (chronic obstructive pulmonary disease) ([facility]     Depression     Fibromyalgia     Headache(784.0)     Hyperlipidemia     Manic depressive disease manic phase ([facility]     On home O2     PTSD (post-traumatic stress disorder)     Seizures (HCC)     Tardive dyskinesia      Past Surgical History:   Procedure Laterality Date    MOUTH SURGERY  05/2013     Family History   Problem Relation Age of Onset    Mental Illness Mother     Mental Illness Father      Social History     Socioeconomic History    Marital status:      Spouse name: Not on file    Number of children: Not on file    Years of education: Not on file    Highest education level: Not on file   Occupational History    Not on file   Tobacco Use    Smoking status: Current Every Day Smoker Packs/day: 0.50     Years: 42.00     Pack years: 21.00     Types: Cigarettes    Smokeless tobacco: Never Used   Substance and Sexual Activity    Alcohol use: No     Comment: hasn't drunk in 10-12 years    Drug use: No    Sexual activity: Not Currently     Partners: Female     Comment:    Other Topics Concern    Not on file   Social History Narrative    Not on file     Social Determinants of Health     Financial Resource Strain:     Difficulty of Paying Living Expenses: Not on file   Food Insecurity:     Worried About Running Out of Food in the Last Year: Not on file    Lindy of Food in the Last Year: Not on file   Transportation Needs:     Lack of Transportation (Medical): Not on file    Lack of Transportation (Non-Medical):  Not on file   Physical Activity:     Days of Exercise per Week: Not on file    Minutes of Exercise per Session: Not on file   Stress:     Feeling of Stress : Not on file   Social Connections:     Frequency of Communication with Friends and Family: Not on file    Frequency of Social Gatherings with Friends and Family: Not on file    Attends Tenriism Services: Not on file    Active Member of [email or web address] or Organizations: Not on file    Attends Club or Organization Meetings: Not on file    Marital Status: Not on file   Intimate Partner Violence:     Fear of Current or Ex-Partner: Not on file    Emotionally Abused: Not on file    Physically Abused: Not on file    Sexually Abused: Not on file   Housing Stability:     Unable to Pay for Housing in the Last Year: Not on file    Number of [place] in the Last Year: Not on file    Unstable Housing in the Last Year: Not on file     Current Facility-Administered Medications   Medication Dose Route Frequency Provider Last Rate Last Admin    0.9 % sodium chloride bolus  1,000 mL IntraVENous Once Radha Jade PA-C 1,000 mL/hr at 12/09/21 1834 1,000 mL at 12/09/21 1834     Current Outpatient Medications   Medication Sig Dispense Refill    OXYGEN Inhale 2 L/min into the lungs as needed      butalbital-aspirin-caffeine (FIORINAL) -40 MG capsule Take 1-2 tablets every 6 hours as needed for headaches      clonazePAM (KLONOPIN) 0.5 MG tablet Take by mouth.  cyclobenzaprine (FLEXERIL) 10 MG tablet Take 10 mg by mouth 3 times daily as needed      triazolam (HALCION) 0.25 MG tablet Take 2 tablets by mouth 2 times daily. 9 pm and 2 am      albuterol sulfate HFA (PROVENTIL HFA) 108 (90 Base) MCG/ACT inhaler Inhale 2 puffs into the lungs every 6 hours as needed      doxepin (SINEQUAN) 10 MG capsule Take 10 mg by mouth nightly      budesonide-formoterol (SYMBICORT) 80-4.5 MCG/ACT AERO Inhale 2 puffs into the lungs 2 times daily 1 Inhaler 3    butalbital-acetaminophen-caffeine (FIORICET, ESGIC) -40 MG per tablet Take 1 tablet by mouth every 4 hours as needed for Headaches      omeprazole (PRILOSEC) 40 MG delayed release capsule Take 40 mg by mouth 2 times daily       clonazePAM (KLONOPIN) 0.5 MG tablet Take 0.5 mg by mouth daily as needed for Anxiety.  cloNIDine (CATAPRES) 0.1 MG tablet Take 0.2 mg by mouth every 4 hours as needed for High Blood Pressure (anxiety / tremor)       ondansetron (ZOFRAN) 4 MG tablet Take 1 tablet by mouth every 8 hours as needed for Nausea or Vomiting. 30 tablet 0     Allergies   Allergen Reactions    Prednisone Anxiety    Serotonin Reuptake Inhibitors (Ssris) Anxiety    Tricyclic Antidepressants Anxiety       REVIEW OF SYSTEMS  10 systems reviewed, pertinent positives per HPI otherwise noted to be negative    PHYSICAL EXAM  BP (!) 172/96   Pulse 99   Temp 98.6 °F (37 °C) (Temporal)   Resp 16   Ht 5' 7\" (1.702 m)   Wt 166 lb 0.1 oz (75.3 kg)   SpO2 90%   BMI 26.00 kg/m²   GENERAL APPEARANCE: Awake and alert. Cooperative. HEAD: Normocephalic. Atraumatic. EYES: EOM's grossly intact. ENT: Mucous membranes are moist. No hemotympanums. NECK: Supple. No rigidity.   HEART: RRR. No murmurs. LUNGS: Respirations unlabored. CTAB. Good air exchange. Speaking comfortably in full sentences. ABDOMEN: Soft. Non-distended. Non-tender. No guarding or rebound. No masses. No organomegaly. EXTREMITIES: No peripheral edema. Moves all extremities equally. All extremities neurovascularly intact. SKIN: Warm and dry. No acute rashes. NEUROLOGICAL: Alert and oriented. CN's 2-12 intact. No gross facial drooping. Strength 5/5, sensation intact and equal.  NIHSS: 0. Coordination is intact. Visual confrontation is intact. PSYCHIATRIC: Normal mood and affect. RADIOLOGY  CT Head WO Contrast   Final Result   No acute intracranial abnormality. LABS  Labs Reviewed - No data to display    PROCEDURES  Unless otherwise noted below, none  Procedures    ED COURSE    ED Course as of 12/09/21 1923   Thu Dec 09, 2021   1919 Evaluation patient had improvement of his symptoms and is requesting be discharged home. [MM]   1919 No intracranial abnormalities. [MM]   1919 CT Head WO Contrast [MM]      ED Course User Index  [MM] Deepti Zambrano PA-C       CRITICAL CARE TIME    MDM  72-year-old male with a history of migraines presents to the emergency department for evaluation of a migraine. Patient was actually seen in the emergency department yesterday at an outside hospital he was evaluated for anxiety and the patient describes PTSD episodes. He obtained a CT of the head at that time which was unremarkable for any acute abnormalities. Patient states that he had this headache prior and during his hospital visit yesterday. Patient has no change in his headache headache is not worsened and there is no known injury. Given that I will hold on repeating CT of the head as there was one performed within the past 24 hours while the patient was having the same complaint. Discussed this with the patient he was agreeable.   We will plan to go ahead and treat patient's headache with a migraine cocktail

## 2021-12-10 NOTE — ED NOTES
Pt. was encouraged to stay hydrated during the day by this writer. Pt.'s states his headache subsided after the administration of NS 1000mL.       Mariel Barrientos RN  12/09/21 1956

## 2021-12-10 NOTE — ED NOTES
Discharge and education instructions reviewed. Patient verbalized understanding, teach-back successful. Patient denied questions at this time. No acute distress noted. Patient instructed to follow-up as noted - return to emergency department if symptoms worsen. Patient verbalized understanding. Discharged per EDMD with discharged instructions.        Rika Baxter RN  12/09/21 1956

## 2022-03-08 ENCOUNTER — APPOINTMENT (OUTPATIENT)
Dept: GENERAL RADIOLOGY | Age: 72
DRG: 871 | End: 2022-03-08
Payer: COMMERCIAL

## 2022-03-08 ENCOUNTER — HOSPITAL ENCOUNTER (INPATIENT)
Age: 72
LOS: 1 days | Discharge: HOME | DRG: 871 | End: 2022-03-09
Attending: EMERGENCY MEDICINE | Admitting: STUDENT IN AN ORGANIZED HEALTH CARE EDUCATION/TRAINING PROGRAM
Payer: COMMERCIAL

## 2022-03-08 DIAGNOSIS — E86.0 DEHYDRATION: Primary | ICD-10-CM

## 2022-03-08 DIAGNOSIS — R53.1 GENERAL WEAKNESS: ICD-10-CM

## 2022-03-08 LAB
BASOPHILS ABSOLUTE: 0.1 K/UL (ref 0–0.2)
BASOPHILS RELATIVE PERCENT: 0.4 %
EOSINOPHILS ABSOLUTE: 0.3 K/UL (ref 0–0.6)
EOSINOPHILS RELATIVE PERCENT: 2.2 %
HCT VFR BLD CALC: 46.5 % (ref 40.5–52.5)
HEMOGLOBIN: 15.3 G/DL (ref 13.5–17.5)
LYMPHOCYTES ABSOLUTE: 2.4 K/UL (ref 1–5.1)
LYMPHOCYTES RELATIVE PERCENT: 19.2 %
MCH RBC QN AUTO: 29.3 PG (ref 26–34)
MCHC RBC AUTO-ENTMCNC: 32.8 G/DL (ref 31–36)
MCV RBC AUTO: 89.4 FL (ref 80–100)
MONOCYTES ABSOLUTE: 0.7 K/UL (ref 0–1.3)
MONOCYTES RELATIVE PERCENT: 5.9 %
NEUTROPHILS ABSOLUTE: 8.9 K/UL (ref 1.7–7.7)
NEUTROPHILS RELATIVE PERCENT: 72.3 %
PDW BLD-RTO: 15.2 % (ref 12.4–15.4)
PLATELET # BLD: 163 K/UL (ref 135–450)
PMV BLD AUTO: 8.8 FL (ref 5–10.5)
RBC # BLD: 5.21 M/UL (ref 4.2–5.9)
WBC # BLD: 12.3 K/UL (ref 4–11)

## 2022-03-08 PROCEDURE — 85025 COMPLETE CBC W/AUTO DIFF WBC: CPT

## 2022-03-08 PROCEDURE — 71045 X-RAY EXAM CHEST 1 VIEW: CPT

## 2022-03-08 PROCEDURE — 80048 BASIC METABOLIC PNL TOTAL CA: CPT

## 2022-03-08 PROCEDURE — 83605 ASSAY OF LACTIC ACID: CPT

## 2022-03-08 PROCEDURE — 87635 SARS-COV-2 COVID-19 AMP PRB: CPT

## 2022-03-08 PROCEDURE — 84484 ASSAY OF TROPONIN QUANT: CPT

## 2022-03-08 PROCEDURE — 36415 COLL VENOUS BLD VENIPUNCTURE: CPT

## 2022-03-08 PROCEDURE — 83880 ASSAY OF NATRIURETIC PEPTIDE: CPT

## 2022-03-08 PROCEDURE — 99284 EMERGENCY DEPT VISIT MOD MDM: CPT

## 2022-03-08 PROCEDURE — 96365 THER/PROPH/DIAG IV INF INIT: CPT

## 2022-03-09 ENCOUNTER — APPOINTMENT (OUTPATIENT)
Dept: CT IMAGING | Age: 72
DRG: 871 | End: 2022-03-09
Payer: COMMERCIAL

## 2022-03-09 VITALS
HEART RATE: 107 BPM | SYSTOLIC BLOOD PRESSURE: 142 MMHG | BODY MASS INDEX: 26.02 KG/M2 | WEIGHT: 165.79 LBS | TEMPERATURE: 99.1 F | DIASTOLIC BLOOD PRESSURE: 82 MMHG | RESPIRATION RATE: 18 BRPM | OXYGEN SATURATION: 94 % | HEIGHT: 67 IN

## 2022-03-09 PROBLEM — R53.1 GENERALIZED WEAKNESS: Status: ACTIVE | Noted: 2022-03-09

## 2022-03-09 LAB
ANION GAP SERPL CALCULATED.3IONS-SCNC: 10 MMOL/L (ref 3–16)
ANION GAP SERPL CALCULATED.3IONS-SCNC: 12 MMOL/L (ref 3–16)
BASOPHILS ABSOLUTE: 0.1 K/UL (ref 0–0.2)
BASOPHILS RELATIVE PERCENT: 0.6 %
BILIRUBIN URINE: NEGATIVE
BLOOD, URINE: NEGATIVE
BUN BLDV-MCNC: 6 MG/DL (ref 7–20)
BUN BLDV-MCNC: 8 MG/DL (ref 7–20)
CALCIUM SERPL-MCNC: 8.2 MG/DL (ref 8.3–10.6)
CALCIUM SERPL-MCNC: 8.7 MG/DL (ref 8.3–10.6)
CHLORIDE BLD-SCNC: 101 MMOL/L (ref 99–110)
CHLORIDE BLD-SCNC: 106 MMOL/L (ref 99–110)
CLARITY: CLEAR
CO2: 25 MMOL/L (ref 21–32)
CO2: 30 MMOL/L (ref 21–32)
COLOR: YELLOW
CREAT SERPL-MCNC: 0.7 MG/DL (ref 0.8–1.3)
CREAT SERPL-MCNC: 0.8 MG/DL (ref 0.8–1.3)
EOSINOPHILS ABSOLUTE: 0.3 K/UL (ref 0–0.6)
EOSINOPHILS RELATIVE PERCENT: 2.4 %
GFR AFRICAN AMERICAN: >60
GFR AFRICAN AMERICAN: >60
GFR NON-AFRICAN AMERICAN: >60
GFR NON-AFRICAN AMERICAN: >60
GLUCOSE BLD-MCNC: 111 MG/DL (ref 70–99)
GLUCOSE BLD-MCNC: 135 MG/DL (ref 70–99)
GLUCOSE URINE: NEGATIVE MG/DL
HCT VFR BLD CALC: 45.1 % (ref 40.5–52.5)
HEMOGLOBIN: 14.8 G/DL (ref 13.5–17.5)
KETONES, URINE: NEGATIVE MG/DL
LACTIC ACID: 1.5 MMOL/L (ref 0.4–2)
LEUKOCYTE ESTERASE, URINE: NEGATIVE
LYMPHOCYTES ABSOLUTE: 1.5 K/UL (ref 1–5.1)
LYMPHOCYTES RELATIVE PERCENT: 12.4 %
MAGNESIUM: 2 MG/DL (ref 1.8–2.4)
MCH RBC QN AUTO: 29.4 PG (ref 26–34)
MCHC RBC AUTO-ENTMCNC: 32.7 G/DL (ref 31–36)
MCV RBC AUTO: 89.9 FL (ref 80–100)
MICROSCOPIC EXAMINATION: NORMAL
MONOCYTES ABSOLUTE: 0.8 K/UL (ref 0–1.3)
MONOCYTES RELATIVE PERCENT: 6.6 %
NEUTROPHILS ABSOLUTE: 9.1 K/UL (ref 1.7–7.7)
NEUTROPHILS RELATIVE PERCENT: 78 %
NITRITE, URINE: NEGATIVE
PDW BLD-RTO: 15.5 % (ref 12.4–15.4)
PH UA: 6.5 (ref 5–8)
PLATELET # BLD: 152 K/UL (ref 135–450)
PMV BLD AUTO: 8.6 FL (ref 5–10.5)
POTASSIUM REFLEX MAGNESIUM: 4.3 MMOL/L (ref 3.5–5.1)
POTASSIUM SERPL-SCNC: 4.2 MMOL/L (ref 3.5–5.1)
PRO-BNP: 70 PG/ML (ref 0–124)
PROTEIN UA: NEGATIVE MG/DL
RBC # BLD: 5.02 M/UL (ref 4.2–5.9)
SARS-COV-2, NAAT: NOT DETECTED
SODIUM BLD-SCNC: 141 MMOL/L (ref 136–145)
SODIUM BLD-SCNC: 143 MMOL/L (ref 136–145)
SPECIFIC GRAVITY UA: 1.02 (ref 1–1.03)
TROPONIN: <0.01 NG/ML
URINE REFLEX TO CULTURE: NORMAL
URINE TYPE: NORMAL
UROBILINOGEN, URINE: 0.2 E.U./DL
WBC # BLD: 11.7 K/UL (ref 4–11)

## 2022-03-09 PROCEDURE — 2580000003 HC RX 258: Performed by: STUDENT IN AN ORGANIZED HEALTH CARE EDUCATION/TRAINING PROGRAM

## 2022-03-09 PROCEDURE — 2700000000 HC OXYGEN THERAPY PER DAY

## 2022-03-09 PROCEDURE — 6360000002 HC RX W HCPCS: Performed by: STUDENT IN AN ORGANIZED HEALTH CARE EDUCATION/TRAINING PROGRAM

## 2022-03-09 PROCEDURE — 6360000002 HC RX W HCPCS: Performed by: EMERGENCY MEDICINE

## 2022-03-09 PROCEDURE — 85025 COMPLETE CBC W/AUTO DIFF WBC: CPT

## 2022-03-09 PROCEDURE — 6370000000 HC RX 637 (ALT 250 FOR IP): Performed by: EMERGENCY MEDICINE

## 2022-03-09 PROCEDURE — 2580000003 HC RX 258: Performed by: EMERGENCY MEDICINE

## 2022-03-09 PROCEDURE — 97166 OT EVAL MOD COMPLEX 45 MIN: CPT

## 2022-03-09 PROCEDURE — 36415 COLL VENOUS BLD VENIPUNCTURE: CPT

## 2022-03-09 PROCEDURE — 94760 N-INVAS EAR/PLS OXIMETRY 1: CPT

## 2022-03-09 PROCEDURE — 6370000000 HC RX 637 (ALT 250 FOR IP): Performed by: STUDENT IN AN ORGANIZED HEALTH CARE EDUCATION/TRAINING PROGRAM

## 2022-03-09 PROCEDURE — 74177 CT ABD & PELVIS W/CONTRAST: CPT

## 2022-03-09 PROCEDURE — 83735 ASSAY OF MAGNESIUM: CPT

## 2022-03-09 PROCEDURE — 6370000000 HC RX 637 (ALT 250 FOR IP): Performed by: HOSPITALIST

## 2022-03-09 PROCEDURE — 1200000000 HC SEMI PRIVATE

## 2022-03-09 PROCEDURE — 94761 N-INVAS EAR/PLS OXIMETRY MLT: CPT

## 2022-03-09 PROCEDURE — 96365 THER/PROPH/DIAG IV INF INIT: CPT

## 2022-03-09 PROCEDURE — 97162 PT EVAL MOD COMPLEX 30 MIN: CPT

## 2022-03-09 PROCEDURE — 6360000004 HC RX CONTRAST MEDICATION: Performed by: EMERGENCY MEDICINE

## 2022-03-09 PROCEDURE — G0378 HOSPITAL OBSERVATION PER HR: HCPCS

## 2022-03-09 PROCEDURE — 71260 CT THORAX DX C+: CPT

## 2022-03-09 PROCEDURE — 80048 BASIC METABOLIC PNL TOTAL CA: CPT

## 2022-03-09 PROCEDURE — 97530 THERAPEUTIC ACTIVITIES: CPT

## 2022-03-09 PROCEDURE — 81003 URINALYSIS AUTO W/O SCOPE: CPT

## 2022-03-09 PROCEDURE — 96361 HYDRATE IV INFUSION ADD-ON: CPT

## 2022-03-09 PROCEDURE — 96367 TX/PROPH/DG ADDL SEQ IV INF: CPT

## 2022-03-09 PROCEDURE — 94640 AIRWAY INHALATION TREATMENT: CPT

## 2022-03-09 RX ORDER — METHYLPHENIDATE HYDROCHLORIDE 20 MG/1
40 TABLET ORAL 3 TIMES DAILY
COMMUNITY

## 2022-03-09 RX ORDER — DEXAMETHASONE 6 MG/1
6 TABLET ORAL DAILY
Qty: 6 TABLET | Refills: 0 | Status: SHIPPED | OUTPATIENT
Start: 2022-03-09 | End: 2022-03-15

## 2022-03-09 RX ORDER — LEVOFLOXACIN 5 MG/ML
750 INJECTION, SOLUTION INTRAVENOUS EVERY 24 HOURS
Status: DISCONTINUED | OUTPATIENT
Start: 2022-03-10 | End: 2022-03-09 | Stop reason: HOSPADM

## 2022-03-09 RX ORDER — CIPROFLOXACIN HYDROCHLORIDE 3.5 MG/ML
1 SOLUTION/ DROPS TOPICAL
Status: DISCONTINUED | OUTPATIENT
Start: 2022-03-09 | End: 2022-03-09 | Stop reason: HOSPADM

## 2022-03-09 RX ORDER — ONDANSETRON 2 MG/ML
4 INJECTION INTRAMUSCULAR; INTRAVENOUS EVERY 6 HOURS PRN
Status: DISCONTINUED | OUTPATIENT
Start: 2022-03-09 | End: 2022-03-09 | Stop reason: HOSPADM

## 2022-03-09 RX ORDER — LEVOFLOXACIN 5 MG/ML
750 INJECTION, SOLUTION INTRAVENOUS ONCE
Status: COMPLETED | OUTPATIENT
Start: 2022-03-09 | End: 2022-03-09

## 2022-03-09 RX ORDER — AMOXICILLIN AND CLAVULANATE POTASSIUM 875; 125 MG/1; MG/1
1 TABLET, FILM COATED ORAL 2 TIMES DAILY
Qty: 14 TABLET | Refills: 0 | Status: SHIPPED | OUTPATIENT
Start: 2022-03-09 | End: 2022-03-16

## 2022-03-09 RX ORDER — CLONAZEPAM 0.5 MG/1
0.5 TABLET ORAL DAILY PRN
Status: DISCONTINUED | OUTPATIENT
Start: 2022-03-09 | End: 2022-03-09 | Stop reason: HOSPADM

## 2022-03-09 RX ORDER — ACETAMINOPHEN 650 MG/1
650 SUPPOSITORY RECTAL EVERY 6 HOURS PRN
Status: DISCONTINUED | OUTPATIENT
Start: 2022-03-09 | End: 2022-03-09 | Stop reason: HOSPADM

## 2022-03-09 RX ORDER — DOXEPIN HYDROCHLORIDE 25 MG/1
25 CAPSULE ORAL NIGHTLY
Status: DISCONTINUED | OUTPATIENT
Start: 2022-03-09 | End: 2022-03-09 | Stop reason: HOSPADM

## 2022-03-09 RX ORDER — 0.9 % SODIUM CHLORIDE 0.9 %
1000 INTRAVENOUS SOLUTION INTRAVENOUS ONCE
Status: COMPLETED | OUTPATIENT
Start: 2022-03-09 | End: 2022-03-09

## 2022-03-09 RX ORDER — IPRATROPIUM BROMIDE AND ALBUTEROL SULFATE 2.5; .5 MG/3ML; MG/3ML
1 SOLUTION RESPIRATORY (INHALATION) EVERY 4 HOURS PRN
Status: DISCONTINUED | OUTPATIENT
Start: 2022-03-09 | End: 2022-03-09 | Stop reason: HOSPADM

## 2022-03-09 RX ORDER — PANTOPRAZOLE SODIUM 40 MG/1
40 TABLET, DELAYED RELEASE ORAL
Status: DISCONTINUED | OUTPATIENT
Start: 2022-03-09 | End: 2022-03-09 | Stop reason: HOSPADM

## 2022-03-09 RX ORDER — SODIUM CHLORIDE 0.9 % (FLUSH) 0.9 %
5-40 SYRINGE (ML) INJECTION PRN
Status: DISCONTINUED | OUTPATIENT
Start: 2022-03-09 | End: 2022-03-09 | Stop reason: HOSPADM

## 2022-03-09 RX ORDER — CLONIDINE HYDROCHLORIDE 0.1 MG/1
0.2 TABLET ORAL EVERY 4 HOURS PRN
Status: DISCONTINUED | OUTPATIENT
Start: 2022-03-09 | End: 2022-03-09 | Stop reason: HOSPADM

## 2022-03-09 RX ORDER — SODIUM CHLORIDE 9 MG/ML
25 INJECTION, SOLUTION INTRAVENOUS PRN
Status: DISCONTINUED | OUTPATIENT
Start: 2022-03-09 | End: 2022-03-09 | Stop reason: HOSPADM

## 2022-03-09 RX ORDER — BUDESONIDE AND FORMOTEROL FUMARATE DIHYDRATE 80; 4.5 UG/1; UG/1
2 AEROSOL RESPIRATORY (INHALATION) 2 TIMES DAILY
Status: DISCONTINUED | OUTPATIENT
Start: 2022-03-09 | End: 2022-03-09 | Stop reason: HOSPADM

## 2022-03-09 RX ORDER — SODIUM CHLORIDE 0.9 % (FLUSH) 0.9 %
5-40 SYRINGE (ML) INJECTION EVERY 12 HOURS SCHEDULED
Status: DISCONTINUED | OUTPATIENT
Start: 2022-03-09 | End: 2022-03-09 | Stop reason: HOSPADM

## 2022-03-09 RX ORDER — ALBUTEROL SULFATE 2.5 MG/3ML
5 SOLUTION RESPIRATORY (INHALATION) ONCE
Status: COMPLETED | OUTPATIENT
Start: 2022-03-09 | End: 2022-03-09

## 2022-03-09 RX ORDER — BUTALBITAL, ACETAMINOPHEN AND CAFFEINE 50; 325; 40 MG/1; MG/1; MG/1
1 TABLET ORAL EVERY 4 HOURS PRN
Status: DISCONTINUED | OUTPATIENT
Start: 2022-03-09 | End: 2022-03-09 | Stop reason: HOSPADM

## 2022-03-09 RX ORDER — ACETAMINOPHEN 325 MG/1
650 TABLET ORAL EVERY 6 HOURS PRN
Status: DISCONTINUED | OUTPATIENT
Start: 2022-03-09 | End: 2022-03-09 | Stop reason: HOSPADM

## 2022-03-09 RX ORDER — NICOTINE 21 MG/24HR
1 PATCH, TRANSDERMAL 24 HOURS TRANSDERMAL ONCE
Status: DISCONTINUED | OUTPATIENT
Start: 2022-03-09 | End: 2022-03-09 | Stop reason: HOSPADM

## 2022-03-09 RX ADMIN — ALBUTEROL SULFATE 5 MG: 2.5 SOLUTION RESPIRATORY (INHALATION) at 00:17

## 2022-03-09 RX ADMIN — BUTALBITAL, ACETAMINOPHEN, AND CAFFEINE 1 TABLET: 50; 325; 40 TABLET ORAL at 04:21

## 2022-03-09 RX ADMIN — LEVOFLOXACIN 750 MG: 5 INJECTION, SOLUTION INTRAVENOUS at 01:58

## 2022-03-09 RX ADMIN — PANTOPRAZOLE SODIUM 40 MG: 40 TABLET, DELAYED RELEASE ORAL at 06:03

## 2022-03-09 RX ADMIN — CLONAZEPAM 0.5 MG: 0.5 TABLET ORAL at 04:30

## 2022-03-09 RX ADMIN — PIPERACILLIN AND TAZOBACTAM 4500 MG: 4; .5 INJECTION, POWDER, LYOPHILIZED, FOR SOLUTION INTRAVENOUS at 04:27

## 2022-03-09 RX ADMIN — IOPAMIDOL 75 ML: 755 INJECTION, SOLUTION INTRAVENOUS at 00:42

## 2022-03-09 RX ADMIN — CIPROFLOXACIN 1 DROP: 3 SOLUTION OPHTHALMIC at 09:31

## 2022-03-09 RX ADMIN — Medication 10 ML: at 07:34

## 2022-03-09 RX ADMIN — SODIUM CHLORIDE 1000 ML: 9 INJECTION, SOLUTION INTRAVENOUS at 01:07

## 2022-03-09 RX ADMIN — CLONIDINE HYDROCHLORIDE 0.2 MG: 0.1 TABLET ORAL at 06:51

## 2022-03-09 ASSESSMENT — ENCOUNTER SYMPTOMS
PHOTOPHOBIA: 0
COUGH: 1
BACK PAIN: 0
SHORTNESS OF BREATH: 1
CHOKING: 0
CONSTIPATION: 0
STRIDOR: 0
APNEA: 0
NAUSEA: 1
EYE PAIN: 0
ABDOMINAL PAIN: 0
VOMITING: 0
EYE REDNESS: 0
RECTAL PAIN: 0
EYE DISCHARGE: 0
DIARRHEA: 0
COLOR CHANGE: 0
WHEEZING: 1
CHEST TIGHTNESS: 0
ABDOMINAL DISTENTION: 0
BLOOD IN STOOL: 0
ANAL BLEEDING: 0
EYE ITCHING: 0

## 2022-03-09 ASSESSMENT — PAIN SCALES - GENERAL: PAINLEVEL_OUTOF10: 5

## 2022-03-09 NOTE — ACP (ADVANCE CARE PLANNING)
Advance Care Planning     Advance Care Planning Activator (Inpatient)  Conversation Note      Date of ACP Conversation: 3/9/2022     Conversation Conducted with: Patient with Decision Making Capacity    ACP Activator: Kimmie Shah RN    Health Care Decision Maker:     Current Designated Health Care Decision Maker:     Primary Decision Maker: Beba Pal - Spouse - [phone]    Today we documented Decision Maker(s) consistent with Legal Next of Kin hierarchy. Care Preferences    Ventilation: \"If you were in your present state of health and suddenly became very ill and were unable to breathe on your own, what would your preference be about the use of a ventilator (breathing machine) if it were available to you? \"      Would the patient desire the use of ventilator (breathing machine)?: yes    \"If your health worsens and it becomes clear that your chance of recovery is unlikely, what would your preference be about the use of a ventilator (breathing machine) if it were available to you? \"     Would the patient desire the use of ventilator (breathing machine)?: Yes      Resuscitation  \"CPR works best to restart the heart when there is a sudden event, like a heart attack, in someone who is otherwise healthy. Unfortunately, CPR does not typically restart the heart for people who have serious health conditions or who are very sick. \"    \"In the event your heart stopped as a result of an underlying serious health condition, would you want attempts to be made to restart your heart (answer \"yes\" for attempt to resuscitate) or would you prefer a natural death (answer \"no\" for do not attempt to resuscitate)? \" yes       [] Yes   [] No   Educated Patient / Roxanne Davila regarding differences between Advance Directives and portable DNR orders.     Length of ACP Conversation in minutes:  3 min    Conversation Outcomes:  [x] ACP discussion completed  [] Existing advance directive reviewed with patient; no changes to patient's previously recorded wishes  [] New Advance Directive completed  [] Portable Do Not Rescitate prepared for Provider review and signature  [] POLST/POST/MOLST/MOST prepared for Provider review and signature      Follow-up plan:    [] Schedule follow-up conversation to continue planning  [] Referred individual to Provider for additional questions/concerns   [] Advised patient/agent/surrogate to review completed ACP document and update if needed with changes in condition, patient preferences or care setting    [x] This note routed to one or more involved healthcare providers        Jefry Romo RN, BSN,   [phone]  Electronically signed by Jefry Romo RN on 3/9/2022 at 10:29 AM

## 2022-03-09 NOTE — H&P
Hospitalist  History and Physical    Patient:  Jasmyne Evans  MRN: [id number]  PCP: Norris Frank MD    CHIEF COMPLAINT:  Fatigue       HISTORY OF PRESENT ILLNESS:   The patient Jasmyne Evans is a 81 y.o.male with medical history 8 significant for bipolar disorder anxiety depression COPD tobacco abuse seizure disorder PTSD and a hyperlipidemia. Patient presented to the emergency room with shortness of breath cough and fatigue. Patient has a history of for recurrent right lower lobe pneumonia which most likely is related to aspiration. Patient reports that he has been advised a PEG tube after an abnormal modified barium swallow test but patient declined it. Patient continues to smoke. Patient has chronic respiratory failure and uses 3 to 4 L nasal cannula oxygen at home   patient is insisting to go home today. Past Medical History:        Diagnosis Date    Anxiety     [facility]     Community acquired bacterial pneumonia 6/12/2015    COPD [facility]chronic obstructive [facility] disease) [facility]     Depression     Fibromyalgia     Headache(784.0)     Hyperlipidemia     Manic depressive disease manic phase ([facility]     On home O2     PTSD (post-traumatic stress disorder)     Seizures ([facility]     Tardive dyskinesia        Past Surgical History:        Procedure Laterality Date    MOUTH SURGERY  05/2013       Medications Prior to Admission:    Prior to Admission medications    Medication Sig Start Date End Date Taking? Authorizing Provider   methylphenidate (RITALIN) 10 MG tablet Take 10 mg by mouth 3 times daily. Yes Historical Provider, MD   OXYGEN Inhale 2 L/min into the lungs as needed   Yes Historical Provider, MD   cyclobenzaprine (FLEXERIL) 10 MG tablet Take 10 mg by mouth 3 times daily as needed 1/23/19  Yes Historical Provider, MD   triazolam (HALCION) 0.25 MG tablet Take 2 tablets by mouth 2 times daily.  9 pm and 2 am 2/4/20  Yes Historical Provider, MD   albuterol sulfate HFA (PROVENTIL HFA) 108 (90 Base) MCG/ACT inhaler Inhale 2 puffs into the lungs every 6 hours as needed 10/16/18  Yes Historical Provider, MD   budesonide-formoterol (SYMBICORT) 80-4.5 MCG/ACT AERO Inhale 2 puffs into the lungs 2 times daily 3/16/19  Yes Sandra Snow, APRN - NP   omeprazole (PRILOSEC) 40 MG delayed release capsule Take 40 mg by mouth 2 times daily    Yes Historical Provider, MD   clonazePAM (KLONOPIN) 0.5 MG tablet Take 0.5 mg by mouth daily as needed for Anxiety. Yes Historical Provider, MD   cloNIDine (CATAPRES) 0.1 MG tablet Take 0.2 mg by mouth every 4 hours as needed for High Blood Pressure (anxiety / tremor)    Yes Historical Provider, MD   ondansetron (ZOFRAN) 4 MG tablet Take 1 tablet by mouth every 8 hours as needed for Nausea or Vomiting. 1/29/15  Yes Forest Reyes MD   clonazePAM (KLONOPIN) 0.5 MG tablet Take by mouth. Historical Provider, MD   doxepin (SINEQUAN) 25 MG capsule Take 25 mg by mouth nightly     Historical Provider, MD   butalbital-acetaminophen-caffeine (FIORICET, ESGIC) -40 MG per tablet Take 1 tablet by mouth every 4 hours as needed for Headaches    Historical Provider, MD       Allergies:  Prednisone, Serotonin reuptake inhibitors (ssris), and Tricyclic antidepressants      Social History:   TOBACCO:   reports that he has been smoking cigarettes. He has a 21.00 pack-year smoking history. He has never used smokeless tobacco.  ETOH:   reports no history of alcohol use. Family History:       Problem Relation Age of Onset    Mental Illness Mother     Mental Illness Father            REVIEW OF SYSTEMS:     patients reported symptoms are in BOLD all other symptoms are negative.     CONSTITUTIONAL:      fatigue, fever, chills or night sweats, recent weight gain, recent wt loss, insomnia,  General weakness, poor appetite, muscle aches and pains    HEAD: headache, dizziness    EYES:      blurriness,  double vision, dryness,  discharge, irritation,diplopia    EARS:      hearing loss, vertigo, ear discharge,  Earache. Ringing in the ears. NOSE:      Rhinorrhea, sneezing, epistaxis. Discharge, sinusitis,     MOUTH/THROAT:         sore throat, mouth ulcers, Hoarseness    RESPIRATORY:        Shortness of breath, wheezing,  cough, sputum, hemoptysis, obstructive sleep apnea,    CARDIOVASCULAR :      chest pain, palpitations, dyspnea on exercise, Lower extrimity edema (swelling),     GASTROINTESTINAL:       Dysphagia, Poor appetite,  Nausea, Vomiting, diarrhea, heartburn, abdominal pain. Blood in the stools, hematemesis. Pain with swallowing, constipation    GENITOURINARY:       Urinary frequency, hesitancy,  urgency, Dysuria, hematuria,  Urinary Incontinence. Urinary Retention. GYNECOLOGICAL: vaginal bleeding , vaginal discharge, menopause    MUSCULOSKELETAL:       joint swelling or stiffness, joint pain, muscle pain, balance problems, low back pain. NEUROLOGICAL:      Gait problems. Tremor. Dizziness. Pain and paresthesias, weakness in extremities. Seizures, memory loss    PSYCHLOGICAL:        Anxiety, depression    SKIN :      Rashes ulcers, skin color changes, easy bruisability, lymphadenopathy      Physical Exam:      Vitals: BP (!) 142/82   Pulse 107   Temp 99.1 °F (37.3 °C) (Oral)   Resp 18   Ht 5' 7\" (1.702 m)   Wt 165 lb 12.6 oz (75.2 kg)   SpO2 94%   BMI 25.97 kg/m²     Gen:          Alert and oriented x3  Eyes: PERRL. No sclera icterus. No conjunctival injection. ENT: No discharge. Pharynx clear. External appearance of ears and nose normal.  Neck: Trachea midline. No obvious mass. Resp: No accessory muscle use. No crackles. No wheezes. No rhonchi. CV: Regular rate. Regular rhythm. No murmur or rub. No edema. GI: Non-tender. Non-distended. No hernia. Skin: Warm, dry, normal texture and turgor. Lymph: No cervical LAD. No supraclavicular LAD. M/S: / Ext. No cyanosis. No clubbing. No joint deformity. Neuro:  Moves all four extremities. CN 2-12 tested, no deficits noted. Peripheral pulses and capillary refill is intact. CBC:   Recent Labs     03/08/22 2337 03/09/22  0706   WBC 12.3* 11.7*   HGB 15.3 14.8    152     BMP:    Recent Labs     03/08/22 2337 03/09/22  0706    143   K 4.3 4.2    106   CO2 30 25   BUN 8 6*   CREATININE 0.8 0.7*   GLUCOSE 111* 135*     Hepatic: No results for input(s): AST, ALT, ALB, BILITOT, ALKPHOS in the last 72 hours. Troponin:   Recent Labs     03/08/22 2337   TROPONINI <0.01     BNP: No results for input(s): BNP in the last 72 hours. INR: No results for input(s): INR in the last 72 hours. No results found for: LABA1C        No results for input(s): CKTOTAL in the last 72 hours. -----------------------------------------------------------------  CT CHEST PULMONARY EMBOLISM W CONTRAST  No evidence of pulmonary embolism    CT ABDOMEN PELVIS W IV CONTRAST  Small ill-defined ground-glass densities in the right lower lobe, due to   aspiration or pneumonia.       Large amount of retained stool throughout the colon and rectum with no   evidence of bowel obstruction.       Distal colonic diverticulosis.  No acute diverticulitis. Assessment / Plan     Aspiration pneumonia  Patient received Zosyn and levofloxacin  Patient is ready to leave [address]  Will discharge patient on Augmentin  Advised to follow-up with the primary care physician      COPD exacerbation  Likely due to aspiration pneumonia  Continue on bronchodilators  Discharge patient on steroids 5-day course    Patient will continue his home medication as usual    DVT and GI prophylaxis      Full Code      Elena Luke MD M.D    This note was transcribed using [id number]. Please disregard any translational errors.

## 2022-03-09 NOTE — ED PROVIDER NOTES
[facility]      Pt Name: Leonor Luis  MRN: [id number]  [place] 1950  Date of evaluation: 3/8/2022  Provider: Kwasi Hart MD    CHIEF COMPLAINT       Chief Complaint   Patient presents with    Fatigue       HISTORY OF PRESENT ILLNESS    Leonor Luis is a 81 y.o. male who presents to the emergency department with shortness of breath, fatigue, cough. Patient states that he feels extremely weak and has no energy. States he cannot walk secondary to his generalized weakness. History of recurrent pneumonias. States it feels similar. Positive for exertional shortness of breath. No chest pain. Productive cough. Symptoms started spontaneously last few days. And worsening. Having many times in the past.  No other associated symptoms. Wears 3 to 4 L baseline nasal cannula. Nursing Notes were reviewed. Including nursing noted for FM, Surgical History, Past Medical History, Social History, vitals, and allergies; agree with all. REVIEW OF SYSTEMS       Review of Systems   Constitutional: Positive for activity change, appetite change, chills and fatigue. Negative for diaphoresis, fever and unexpected weight change. HENT: Negative for congestion, dental problem, drooling, ear discharge and ear pain. Eyes: Negative for photophobia, pain, discharge, redness, itching and visual disturbance. Respiratory: Positive for cough, shortness of breath and wheezing. Negative for apnea, choking, chest tightness and stridor. Cardiovascular: Negative for chest pain, palpitations and leg swelling. Gastrointestinal: Positive for nausea. Negative for abdominal distention, abdominal pain, anal bleeding, blood in stool, diarrhea and rectal pain. Endocrine: Negative for cold intolerance and heat intolerance. Genitourinary: Negative for decreased urine volume and urgency.    Musculoskeletal: Negative for arthralgias and back pain.   Skin: Negative for color change and pallor. Neurological: Positive for weakness. Negative for tremors, seizures, facial asymmetry and numbness. Hematological: Negative for adenopathy. Does not bruise/bleed easily. Psychiatric/Behavioral: Negative for agitation, behavioral problems, confusion and decreased concentration. Except as noted above the remainder of the review of systems was reviewed and negative. PAST MEDICAL HISTORY     Past Medical History:   Diagnosis Date    Anxiety     B[facility] ([facility]     Community acquired bacterial pneumonia 6/12/2015    COPD [facility]chronic obstructive pulmonary disease) [facility]     Depression     Fibromyalgia     Headache(784.0)     Hyperlipidemia     Manic depressive disease manic phase (HCC)     On home O2     PTSD (post-traumatic stress disorder)     Seizures ([facility])     Tardive dyskinesia        SURGICAL HISTORY       Past Surgical History:   Procedure Laterality Date    MOUTH SURGERY  05/2013       CURRENT MEDICATIONS       Previous Medications    ALBUTEROL SULFATE HFA (PROVENTIL HFA) 108 (90 BASE) MCG/ACT INHALER    Inhale 2 puffs into the lungs every 6 hours as needed    BUDESONIDE-FORMOTEROL (SYMBICORT) 80-4.5 MCG/ACT AERO    Inhale 2 puffs into the lungs 2 times daily    BUTALBITAL-ACETAMINOPHEN-CAFFEINE (FIORICET, ESGIC) -40 MG PER TABLET    Take 1 tablet by mouth every 4 hours as needed for Headaches    CLONAZEPAM (KLONOPIN) 0.5 MG TABLET    Take 0.5 mg by mouth daily as needed for Anxiety. CLONAZEPAM (KLONOPIN) 0.5 MG TABLET    Take by mouth.     CLONIDINE (CATAPRES) 0.1 MG TABLET    Take 0.2 mg by mouth every 4 hours as needed for High Blood Pressure (anxiety / tremor)     CYCLOBENZAPRINE (FLEXERIL) 10 MG TABLET    Take 10 mg by mouth 3 times daily as needed    DOXEPIN (SINEQUAN) 10 MG CAPSULE    Take 10 mg by mouth nightly    OMEPRAZOLE (PRILOSEC) 40 MG DELAYED RELEASE CAPSULE    Take 40 mg by mouth 2 times daily ONDANSETRON (ZOFRAN) 4 MG TABLET    Take 1 tablet by mouth every 8 hours as needed for Nausea or Vomiting. OXYGEN    Inhale 2 L/min into the lungs as needed    TRIAZOLAM (HALCION) 0.25 MG TABLET    Take 2 tablets by mouth 2 times daily. 9 pm and 2 am       ALLERGIES     Prednisone, Serotonin reuptake inhibitors (ssris), and Tricyclic antidepressants    FAMILY HISTORY        Family History   Problem Relation Age of Onset    Mental Illness Mother     Mental Illness Father        SOCIAL HISTORY       Social History     Socioeconomic History    Marital status:      Spouse name: Not on file    Number of children: Not on file    Years of education: Not on file    Highest education level: Not on file   Occupational History    Not on file   Tobacco Use    Smoking status: Current Every Day Smoker     Packs/day: 0.50     Years: 42.00     Pack years: 21.00     Types: Cigarettes    Smokeless tobacco: Never Used   Substance and Sexual Activity    Alcohol use: No     Comment: hasn't drunk in 10-12 years    Drug use: No    Sexual activity: Not Currently     Partners: Female     Comment:    Other Topics Concern    Not on file   Social History Narrative    Not on file     Social Determinants of Health     Financial Resource Strain:     Difficulty of Paying Living Expenses: Not on file   Food Insecurity:     Worried About Running Out of Food in the Last Year: Not on file    Lindy of Food in the Last Year: Not on file   Transportation Needs:     Lack of Transportation (Medical): Not on file    Lack of Transportation (Non-Medical):  Not on file   Physical Activity:     Days of Exercise per Week: Not on file    Minutes of Exercise per Session: Not on file   Stress:     Feeling of Stress : Not on file   Social Connections:     Frequency of Communication with Friends and Family: Not on file    Frequency of Social Gatherings with Friends and Family: Not on file    Attends Protestant Services: Not on file   [place] or Organizations: Not on file    Attends Club or Organization Meetings: Not on file    Marital Status: Not on file   Intimate Partner Violence:     Fear of Current or Ex-Partner: Not on file    Emotionally Abused: Not on file    Physically Abused: Not on file    Sexually Abused: Not on file   Housing Stability:     Unable to Pay for Housing in the Last Year: Not on file    Number of [place] in the Last Year: Not on file    Unstable Housing in the Last Year: Not on file       PHYSICAL EXAM       ED Triage Vitals [03/08/22 2316]   BP Temp Temp src Pulse Resp SpO2 Height Weight   (!) 161/113 98.3 °F (36.8 °C) -- 104 27 (!) 88 % 5' 7\" (1.702 m) 168 lb 3.2 oz (76.3 kg)       Physical Exam  Vitals and nursing note reviewed. Constitutional:       General: He is not in acute distress. Appearance: He is well-developed. He is ill-appearing. He is not diaphoretic. HENT:      Head: Normocephalic and atraumatic. Eyes:      General:         Right eye: No discharge. Left eye: No discharge. Pupils: Pupils are equal, round, and reactive to light. Neck:      Thyroid: No thyromegaly. Trachea: No tracheal deviation. Cardiovascular:      Rate and Rhythm: Normal rate and regular rhythm. Heart sounds: No murmur heard. Pulmonary:      Breath sounds: Rhonchi and rales present. Chest:      Chest wall: No tenderness. Abdominal:      General: There is no distension. Palpations: Abdomen is soft. There is no mass. Tenderness: There is no abdominal tenderness. There is no guarding or rebound. Musculoskeletal:         General: No tenderness or deformity. Cervical back: Normal range of motion. Skin:     General: Skin is warm. Coloration: Skin is not pale. Findings: No erythema or rash. Neurological:      Mental Status: He is alert. He is disoriented. Motor: No abnormal muscle tone.          DIAGNOSTIC RESULTS RADIOLOGY:   Non-plain film images such as CT, Ultrasoundand MRI are read by the radiologist. Plain radiographic images are visualized and preliminarily interpreted by the emergency physician with the below findings:    CT shows improving pneumonia    ED BEDSIDE ULTRASOUND:   Performed by ED Physician - none    LABS:  Labs Reviewed   CBC WITH AUTO DIFFERENTIAL - Abnormal; Notable for the following components:       Result Value    WBC 12.3 (*)     Neutrophils Absolute 8.9 (*)     All other components within normal limits   BASIC METABOLIC PANEL W/ REFLEX TO MG FOR LOW K - Abnormal; Notable for the following components:    Glucose 111 (*)     All other components within normal limits   COVID-19, RAPID   LACTIC ACID   URINALYSIS WITH REFLEX TO CULTURE   TROPONIN   BRAIN NATRIURETIC PEPTIDE       All other labs were withinnormal range or not returned as of this dictation. EMERGENCY DEPARTMENT COURSE and DIFFERENTIAL DIAGNOSIS/MDM:     PMH, Surgical Hx, FH, Social Hx reviewed by myself (ETOH usage, Tobacco usage, Drug usage reviewed by myself, no pertinent Hx)- No Pertinent Hx     Old records were reviewed by me     59-year-old with weakness and dehydration. Fluids given. Patient has some confusion possible pneumonia. Antibiotics initiated. Admission for inability perform ADLs. Will need PT, OT, rehab. CRITICAL CARE TIME   Total Critical Caretime was 21 minutes, excluding separately reportable procedures. There was a high probability of clinically significant/life threatening deterioration in the patient's condition which required my urgent intervention. PROCEDURES:  Unlessotherwise noted below, none    FINAL IMPRESSION      1. Dehydration    2.  General weakness          DISPOSITION/PLAN   DISPOSITION Admitted 03/09/2022 02:26:10 AM    (Please note that portions ofthis note were completed with a voice recognition program.  Efforts were made to edit the dictations but occasionally words are mis-transcribed.)    Grant Hagan MD(electronically signed)  Attending Emergency Physician           Grant Hagan MD  03/09/22 2571

## 2022-03-09 NOTE — PROGRESS NOTES
Patient is now ready for discharge, IV removed without complications. Dishcarge information discussed with the patient and his wife (with permission). All questions answered, denies any needs for discharge. Will d/c.      Electronically signed by Aman Blakely RN on 3/9/2022 at 2:07 PM

## 2022-03-09 NOTE — CARE COORDINATION
CASE MANAGEMENT DISCHARGE SUMMARY:    DISCHARGE DATE: 03/09/22    DISCHARGED TO HOME     TRANSPORTATION: wife    Liang Haile to talk to pt re: therapy recs, pt not in room & verified with nurse that he had his dc paperwork so has gone home.               Electronically signed by Saran Smith RN on 3/9/2022 at 1:43 PM

## 2022-03-09 NOTE — PROGRESS NOTES
Physical Therapy    Facility/Department: [address] MED SURG  Initial Assessment    NAME: Abida Woods  : 1950  MRN: [id number]    Date of Service: 3/9/2022    Discharge Recommendations:  Continue to assess pending progress,S Level 1,Home with assist PRN,2-3 sessions per week   PT Equipment Recommendations  Equipment Needed: No   Abida Woods scored a 20/24 on the AM-PAC short mobility form. Current research shows that an AM-PAC score of 18 or greater is typically associated with a discharge to the patient's home setting. Based on the patient's AM-PAC score and their current functional mobility deficits, it is recommended that the patient have 2-3 sessions per week of Physical Therapy at d/c to increase the patient's independence. At this time, this patient demonstrates the endurance and safety to discharge home with home services and a follow up treatment frequency of 2-3x/wk. Please see assessment section for further patient specific details. HOME HEALTH CARE: LEVEL 1 STANDARD     -Initial home health evaluation to occur within 24-48 hours, in patient home    -Home health agency to establish plan of care for patient over 60 day period    -Medication Reconciliation    -PCP Visit scheduled within seven days of discharge    -PT/OT to evaluate with goal of regaining prior level of functioning    -OT to evaluate if patient has [address] needs for personal care       If patient discharges prior to next session this note will serve as a discharge summary. Please see below for the latest assessment towards goals. Assessment   Body structures, Functions, Activity limitations: Decreased functional mobility ; Decreased endurance;Decreased balance  Assessment: Patient is a 81 y.o. male with a PMH of Anxiety, Bipolar disorder, pneumonia, COPD, Depression, Fibromyalgia, Hyperlipidemia, Manic depressive disease manic phase, PTSD, Seizures.  Pt presented to [facility] ED with complaints of shortness of breath, fatigue, cough and generalized weakness. Pt wears 3 to 4 L baseline nasal cannula. Pt lives in a two story home with his wife, with 2 KIMBERLEY. Pt repots he can stay on the first level and completes all ADLs and ambulation ind without any AD's. Today, 3/9, pt presenting slightly below his baseline function. Pt required SBA for bed mobility, SBA-CGA for transfers and SBA-CGA for short distance amb without an AD. Pt is slightly agitated throughout the session and required 2 L O2 due to SpO2 dropping to 88% on RA. Pt unclear on how often he w ears his O2 at home. At this time, anticipate pt will be safe to return home with PRN assist and home PT. Will continue to assess. Treatment Diagnosis: Generalized weakness  Prognosis: Good  Decision Making: Medium Complexity  History: See below  Exam: See below  Clinical Presentation: Evolving  PT Education: Goals;Orientation; Functional Mobility Training;PT Role;Transfer Training;Plan of Care;General Safety;Precautions  Barriers to Learning: Cognition/agitation  REQUIRES PT FOLLOW UP: Yes  Activity Tolerance  Activity Tolerance: Patient Tolerated treatment well       Patient Diagnosis(es): The primary encounter diagnosis was Dehydration. A diagnosis of General weakness was also pertinent to this visit. has a past medical history of Anxiety, Bipolar 1 disorder ([address] Community acquired bacterial pneumonia, COPD (chronic obstructive pulmonary disease) ([address] Depression, Fibromyalgia, Headache(784.0), Hyperlipidemia, Manic depressive disease manic phase ([address] On home O2, PTSD (post-traumatic stress disorder), Seizures ([address] and Tardive dyskinesia. has a past surgical history that includes Mouth surgery (05/2013).     Restrictions  Restrictions/Precautions  Restrictions/Precautions: Fall Risk  Position Activity Restriction  Other position/activity restrictions: 2 L O2 via NC     Vision/Hearing  Vision: Impaired  Vision Exceptions: Wears glasses for reading  Hearing: Within functional limits       Subjective  General  Chart Reviewed: Yes  Patient assessed for rehabilitation services?: Yes  Additional Pertinent Hx: Per H&P Ernestine France Eri Garcia is a 81 y.o. male who presents to the emergency department with shortness of breath, fatigue, cough. Patient states that he feels extremely weak and has no energy. States he cannot walk secondary to his generalized weakness. History of recurrent pneumonias. States it feels similar. Positive for exertional shortness of breath. No chest pain. Productive cough. Symptoms started spontaneously last few days. And worsening. Having many times in the past.  No other associated symptoms. Wears 3 to 4 L baseline nasal cannula. \"  Response To Previous Treatment: Not applicable  Referring Practitioner: Vladimir Shannon DO  Referral Date : 03/08/22  Diagnosis: Generalized weakness, nausea and vomiting, SOB  Follows Commands: Within Functional Limits  General Comment  Comments: Pt on RA upon arrival with SpO2 92%. Pt stating he wears O2 only at night. Following amb, SpO2 down to 88% 2L O2 reapplied and O2 returned >90%,  Subjective  Subjective: Pt sitting in bed upon arrival. Pt mildly agitated due to pain in his eye. Pt denies any pain elsewhere.  Agreeable to therapy evaluation  Pain Screening  Patient Currently in Pain: Yes          Orientation  Orientation  Orientation Level: Oriented to time;Oriented to place;Oriented to person;Oriented to situation     Social/Functional History  Social/Functional History  Lives With: Spouse  Type of Home: House  Home Layout: Two level,Able to Live on Main level with bedroom/bathroom  Home Access: Stairs to enter with rails  Entrance Stairs - Number of Steps: 2  Entrance Stairs - Rails: Right  Bathroom Shower/Tub: Walk-in shower,Shower chair with back  Bathroom Toilet: Standard (tub and vanity on ea side)  Bathroom Equipment: Shower chair,Grab bars in shower  Bathroom Accessibility: Not accessible  ADL Assistance: Independent  Homemaking Assistance: Independent (Meal prep, shopping, yard work, driving ind)  Ambulation Assistance: Independent  Transfer Assistance: Independent  Active : No (\"anyone that's willing\" drives him)  Occupation: Retired  Additional Comments: Denies any falls other than sliding off of the couch yesterday; Sleeps on couch    Objective  AROM RLE (degrees)  RLE AROM: WFL  AROM LLE (degrees)  LLE AROM : WFL  Strength RLE  Strength RLE: WFL  Strength LLE  Strength LLE: WFL  Tone RLE  RLE Tone: Normotonic  Tone LLE  LLE Tone: Normotonic  Motor Control  Gross Motor?: WFL  Sensation  Overall Sensation Status: WFL (Denies any N/T)  Bed mobility  Supine to Sit: Stand by assistance (elevated HOB)  Sit to Supine: Unable to assess (Sitting in recliner following session)  Transfers  Sit to Stand: Contact guard assistance;Stand by assistance (from EOB, commode and recliner)  Stand to sit: Contact guard assistance;Stand by assistance  Ambulation  Ambulation?: Yes  Ambulation 1  Surface: level tile  Device: No Device  Assistance: Contact guard assistance;Stand by assistance  Quality of Gait: fast clif, mildly unsteady,  Distance: 25', 30' x2  Comments: first amb on RA with SpO2 dropping to 88%. second amb with 2 L O2 and SpO2 >90%     Balance  Posture: Fair  Sitting - Static: Good  Sitting - Dynamic: Fair;+ (lateral LOB while putting socks on in seated. CGA)  Standing - Static: Good;- (SBA-CGA)  Standing - Dynamic: Fair;+ (SBA-CGA)        Plan   Plan  Times per week: 3-5x  Current Treatment Recommendations: Strengthening,Endurance Training,Patient/Caregiver Education & Training,Balance Training,Functional Mobility Training  Safety Devices  Type of devices:  All fall risk precautions in place,Gait belt,Patient at risk for falls,Call light within reach,Chair alarm in place,Left in chair,Nurse notified    AM-PAC Score  AM-PAC Inpatient Mobility Raw Score : 20 (03/09/22 1010)  AM-PAC Inpatient T-Scale Score : 47.67 (03/09/22 1010)  Mobility Inpatient CMS 0-100% Score: 35.83 (03/09/22 1010)  Mobility Inpatient CMS G-Code Modifier : CJ (03/09/22 1010)          Goals  Short term goals  Time Frame for Short term goals: While in acute care  Short term goal 1: bed mobility Kassandra  Short term goal 2: Transfers Kassandra  Short term goal 3: amb 150' Kassandra  Long term goals  Time Frame for Long term goals : STG=LTG  Patient Goals   Patient goals :  To go home       Therapy Time   Individual Concurrent Group Co-treatment   Time In 0930         Time Out 1010         Minutes 40         Timed Code Treatment Minutes: 40 Minutes     Electronically signed by Julietta Dakins, PT [id number] on 3/9/2022 at 10:11 AM
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
## 2022-03-09 NOTE — PROGRESS NOTES
Speech Language Pathology    Evaluation order received. Patient's wife met outside room. Wife reports prior dysphagia work-ups and politely declines evaluation at this time. ST to discontinue attempts per family request. Please re-order if swallowing evaluation is deemed to remain indicated and patient/family are agreeable. Thank you. Eva Barr, [address] [id number]  Speech-Language Pathologist  Portable phone: [phone]

## 2022-03-10 NOTE — DISCHARGE SUMMARY
Hospital Medicine Discharge Summary      Patient ID: Prince Hamm [id number]     Patient's PCP: Norris Frank MD    Admit Date: 3/8/2022     Discharge Date: 3/9/2022      Admitting Physician: Divya Meng DO    Discharge Physician: Bridgette Saldaña MD     Discharge Diagnoses: Active Hospital Problems    Diagnosis Date Noted    Generalized weakness [R53.1] 03/09/2022    Centrilobular emphysema ([address] [J43.2]     Community acquired bacterial pneumonia [J15.9] 06/12/2015         The patient was seen and examined on the day of discharge and this discharge summary is in conjunction with any daily progress note from day of discharge. HOSPITAL COURSE    Patient demographics:  The patient  Prince Hamm is a 81 y.o. male     Significant past medical history:   Patient Active Problem List   Diagnosis    Tardive dyskinesia    Community acquired bacterial pneumonia    Esophageal dysphagia    Hoarseness, chronic    Acute respiratory failure with hypoxia ([address]    Community acquired pneumonia of right lower lobe of lung    Centrilobular emphysema (HCC)    Lactic acidosis    Postnasal drip    Tobacco use    Sepsis ([address]    Pneumonia    Generalized weakness         Presenting symptoms:  Fatigue     Diagnostic workup:      CONSULTS DURING ADMISSION :   IP CONSULT TO SOCIAL WORK      Patient was diagnosed with:  Aspiration pneumonia  COPD exacerbation    Treatment while inpatient:  Patient presented to the emergency room with shortness of breath cough and fatigue. Patient was diagnosed with aspiration pneumonia leading to COPD exacerbation. Patient was started on antibiotics and bronchodilators. Patient was clinically improving and he insisted for discharge. Patient will be discharged on oral antibiotics and bronchodilators. Patient was also given a course of steroids. Discharge Condition:  stable     Discharged to:  Home     Activity:   as tolerated:     Follow Up:  Follow-up with PCP in 1-2 weeks                  Labs: For convenience and continuity at follow-up the following most recent labs are provided:      CBC:   Lab Results   Component Value Date    WBC 11.7 03/09/2022    HGB 14.8 03/09/2022    HCT 45.1 03/09/2022     03/09/2022       RENAL:   Lab Results   Component Value Date     03/09/2022    K 4.2 03/09/2022    K 4.3 03/08/2022     03/09/2022    CO2 25 03/09/2022    BUN 6 03/09/2022    CREATININE 0.7 03/09/2022           Discharge Medications:      Medication List      START taking these medications    amoxicillin-clavulanate 875-125 MG per tablet  Commonly known as: Augmentin  Take 1 tablet by mouth 2 times daily for 7 days     dexamethasone 6 MG tablet  Commonly known as: DECADRON  Take 1 tablet by mouth daily for 6 days        CONTINUE taking these medications    budesonide-formoterol 80-4.5 MCG/ACT Aero  Commonly known as: Symbicort  Inhale 2 puffs into the lungs 2 times daily     butalbital-acetaminophen-caffeine -40 MG per tablet  Commonly known as: FIORICET, ESGIC     * KlonoPIN 0.5 MG tablet  Generic drug: clonazePAM     * clonazePAM 0.5 MG tablet  Commonly known as: KLONOPIN     cloNIDine 0.1 MG tablet  Commonly known as: CATAPRES     cyclobenzaprine 10 MG tablet  Commonly known as: FLEXERIL     doxepin 25 MG capsule  Commonly known as: SINEQUAN     methylphenidate 10 MG tablet  Commonly known as: RITALIN     omeprazole 40 MG delayed release capsule  Commonly known as: PRILOSEC     ondansetron 4 MG tablet  Commonly known as: Zofran  Take 1 tablet by mouth every 8 hours as needed for Nausea or Vomiting. OXYGEN     Proventil  (90 Base) MCG/ACT inhaler  Generic drug: albuterol sulfate HFA     triazolam 0.25 MG tablet  Commonly known as: HALCION         * This list has 2 medication(s) that are the same as other medications prescribed for you.  Read the directions carefully, and ask your doctor or other care provider to review them with you. Where to Get Your Medications      These medications were sent to [facility] [address] [place] [address]), [address]    Phone: [phone]   · amoxicillin-clavulanate 875-125 MG per tablet  · dexamethasone 6 MG tablet            Signed:  Elier Johnson MD   3/10/2022      Thank you Teresa Osman MD for the opportunity to be involved in this patient's care. If you have any questions or concerns please feel free to contact me at [phone]. This note was transcribed using [id number] [address]. Please disregard any translational errors.

## 2022-03-30 NOTE — PROGRESS NOTES
Physician Progress Note      PATIENTGejermaine Nichole  CSN #:                  [id number]  :                       1950  ADMIT DATE:       3/8/2022 11:08 PM  [address] [place] DATE:        3/9/2022 2:37 PM  RESPONDING  PROVIDER #:        Leydi Ash MD          QUERY TEXT:    Pt admitted with aspiration pneumonia. Pt noted to have leukocytosis,   tachycardia and tachypnea. If possible, please document in the progress notes   and discharge summary if you are evaluating and /or treating any of the   following: The medical record reflects the following:  Risk Factors: Current admission for aspiration pneumonia with a history of   recurrent pneumonia. Clinical Indicators: P 110, RR 34. ..wbc 12.3, neut 8.9,. ..CT -Small   ill-defined ground-glass densities in the right lower lobe, due to aspiration   or pneumonia. .. Mica Guillen CT Chest 3/9-Improved appearance of the right lower lobe   pneumonia. Treatment: IVF, Levaquin IV    Thank You,  Shantell Jones RN BSN CDS CRCR  [email or web address]  Options provided:  -- Sepsis, present on admission  -- Aspiration pneumonia without Sepsis  -- Other - I will add my own diagnosis  -- Disagree - Not applicable / Not valid  -- Disagree - Clinically unable to determine / Unknown  -- Refer to Clinical Documentation Reviewer    PROVIDER RESPONSE TEXT:    This patient has sepsis which was present on admission.     Query created by: Fox Nogueira on 3/30/2022 2:01 PM      Electronically signed by:  Leydi Ash MD 3/30/2022 2:03 PM

## 2022-04-06 ENCOUNTER — HOSPITAL ENCOUNTER (EMERGENCY)
Age: 72
Discharge: HOME | End: 2022-04-06
Attending: EMERGENCY MEDICINE
Payer: COMMERCIAL

## 2022-04-06 VITALS
WEIGHT: 164.24 LBS | TEMPERATURE: 99.1 F | DIASTOLIC BLOOD PRESSURE: 76 MMHG | HEART RATE: 101 BPM | RESPIRATION RATE: 26 BRPM | BODY MASS INDEX: 25.78 KG/M2 | HEIGHT: 67 IN | OXYGEN SATURATION: 98 % | SYSTOLIC BLOOD PRESSURE: 143 MMHG

## 2022-04-06 DIAGNOSIS — R51.9 ACUTE NONINTRACTABLE HEADACHE, UNSPECIFIED HEADACHE TYPE: Primary | ICD-10-CM

## 2022-04-06 PROCEDURE — 96361 HYDRATE IV INFUSION ADD-ON: CPT

## 2022-04-06 PROCEDURE — 2580000003 HC RX 258: Performed by: EMERGENCY MEDICINE

## 2022-04-06 PROCEDURE — 6370000000 HC RX 637 (ALT 250 FOR IP): Performed by: EMERGENCY MEDICINE

## 2022-04-06 PROCEDURE — 6360000002 HC RX W HCPCS: Performed by: EMERGENCY MEDICINE

## 2022-04-06 PROCEDURE — 96365 THER/PROPH/DIAG IV INF INIT: CPT

## 2022-04-06 PROCEDURE — 96375 TX/PRO/DX INJ NEW DRUG ADDON: CPT

## 2022-04-06 PROCEDURE — 99283 EMERGENCY DEPT VISIT LOW MDM: CPT

## 2022-04-06 RX ORDER — KETOROLAC TROMETHAMINE 30 MG/ML
15 INJECTION, SOLUTION INTRAMUSCULAR; INTRAVENOUS ONCE
Status: COMPLETED | OUTPATIENT
Start: 2022-04-06 | End: 2022-04-06

## 2022-04-06 RX ORDER — MAGNESIUM SULFATE 1 G/100ML
1000 INJECTION INTRAVENOUS ONCE
Status: COMPLETED | OUTPATIENT
Start: 2022-04-06 | End: 2022-04-06

## 2022-04-06 RX ORDER — CLONAZEPAM 0.5 MG/1
1 TABLET ORAL ONCE
Status: COMPLETED | OUTPATIENT
Start: 2022-04-06 | End: 2022-04-06

## 2022-04-06 RX ORDER — 0.9 % SODIUM CHLORIDE 0.9 %
1000 INTRAVENOUS SOLUTION INTRAVENOUS ONCE
Status: COMPLETED | OUTPATIENT
Start: 2022-04-06 | End: 2022-04-06

## 2022-04-06 RX ORDER — PROCHLORPERAZINE EDISYLATE 5 MG/ML
10 INJECTION INTRAMUSCULAR; INTRAVENOUS ONCE
Status: COMPLETED | OUTPATIENT
Start: 2022-04-06 | End: 2022-04-06

## 2022-04-06 RX ADMIN — CLONAZEPAM 1 MG: 0.5 TABLET ORAL at 19:32

## 2022-04-06 RX ADMIN — PROCHLORPERAZINE EDISYLATE 10 MG: 5 INJECTION INTRAMUSCULAR; INTRAVENOUS at 18:35

## 2022-04-06 RX ADMIN — KETOROLAC TROMETHAMINE 15 MG: 30 INJECTION, SOLUTION INTRAMUSCULAR at 18:36

## 2022-04-06 RX ADMIN — SODIUM CHLORIDE 1000 ML: 9 INJECTION, SOLUTION INTRAVENOUS at 18:35

## 2022-04-06 RX ADMIN — MAGNESIUM SULFATE HEPTAHYDRATE 1000 MG: 1 INJECTION, SOLUTION INTRAVENOUS at 19:25

## 2022-04-06 ASSESSMENT — PAIN DESCRIPTION - DESCRIPTORS: DESCRIPTORS: PRESSURE

## 2022-04-06 ASSESSMENT — PAIN - FUNCTIONAL ASSESSMENT: PAIN_FUNCTIONAL_ASSESSMENT: 0-10

## 2022-04-06 ASSESSMENT — PAIN SCALES - GENERAL: PAINLEVEL_OUTOF10: 8

## 2022-04-06 ASSESSMENT — PAIN DESCRIPTION - LOCATION: LOCATION: HEAD

## 2022-04-06 ASSESSMENT — PAIN DESCRIPTION - ONSET: ONSET: GRADUAL

## 2022-04-06 ASSESSMENT — PAIN DESCRIPTION - PAIN TYPE: TYPE: ACUTE PAIN

## 2022-04-06 ASSESSMENT — PAIN DESCRIPTION - FREQUENCY: FREQUENCY: CONTINUOUS

## 2022-04-06 NOTE — ED PROVIDER NOTES
[address]      Pt Name: Edwin Crowder  MRN: [id number]  [place] 1950  Date of evaluation: 4/6/2022  Provider: Sera Colin MD    CHIEF COMPLAINT     Every once in a while I get these nuclear headaches. I have migraines more frequently but these headaches are worse. They happen about once every 6 months. I come in and they give me the migraine cocktail and I feel better. HISTORY OF PRESENT ILLNESS  (Location/Symptom, Timing/Onset,Context/Setting, Quality, Duration, Modifying Factors, Severity). Note limiting factors. Chief Complaint   Patient presents with    Headache     pt states headache for 2 days, states he comes here all the time for the same and they give him a cocktail and discharge him. Edwin Crowder is a 81 y.o. male who presents to the emergency department secondary to concern for headache. Has been going on since yesterday. Denies any auras with this headache but states he sometimes does get auras. Denies lights making it worse but states he can \"hear a pin drop in [place]\". He denies any nausea or vomiting. States he has been eating and drinking well. No known trauma or falls. He states his prior doctor put him on Fioricet as a preventative medication; however, he does not take it on a regular basis anymore. Past medical history noted below, significant for anxiety, bipolar 1, COPD, depression, fibromyalgia, headaches, hyperlipidemia, seizures, tardive dyskinesia, PTSD. Currently smokes. Aside from what is stated above denies any other symptoms or modifying factors. Nursing Notes reviewed. REVIEW OF SYSTEMS  (2-9 systems for level 4, 10 or more for level 5)   Review of Systems  Pertinent positive and negative findings as documented in the HPI; otherwise all other systems were reviewed and were negative.    PAST MEDICAL HISTORY     Past Medical History:   Diagnosis Date    Anxiety     [facility] disorder [facility]     Community acquired bacterial pneumonia 6/12/2015    COPD (chronic obstructive pulmonary disease) (HCC)     Depression     Fibromyalgia     Headache(784.0)     Hyperlipidemia     Manic depressive disease manic phase [facility])     On home O2     PTSD (post-traumatic stress disorder)     Seizures ([facility])     Tardive dyskinesia        SURGICALHISTORY       Past Surgical History:   Procedure Laterality Date    MOUTH SURGERY  05/2013     CURRENT MEDICATIONS       Previous Medications    ALBUTEROL SULFATE HFA (PROVENTIL HFA) 108 (90 BASE) MCG/ACT INHALER    Inhale 2 puffs into the lungs every 6 hours as needed    BUDESONIDE-FORMOTEROL (SYMBICORT) 80-4.5 MCG/ACT AERO    Inhale 2 puffs into the lungs 2 times daily    BUTALBITAL-ACETAMINOPHEN-CAFFEINE (FIORICET, ESGIC) -40 MG PER TABLET    Take 1 tablet by mouth every 4 hours as needed for Headaches    CLONAZEPAM (KLONOPIN) 0.5 MG TABLET    Take 0.5 mg by mouth daily as needed for Anxiety. CLONAZEPAM (KLONOPIN) 0.5 MG TABLET    Take by mouth. CLONIDINE (CATAPRES) 0.1 MG TABLET    Take 0.2 mg by mouth every 4 hours as needed for High Blood Pressure (anxiety / tremor)     CYCLOBENZAPRINE (FLEXERIL) 10 MG TABLET    Take 10 mg by mouth 3 times daily as needed    DOXEPIN (SINEQUAN) 25 MG CAPSULE    Take 25 mg by mouth nightly     METHYLPHENIDATE (RITALIN) 10 MG TABLET    Take 10 mg by mouth 3 times daily. OMEPRAZOLE (PRILOSEC) 40 MG DELAYED RELEASE CAPSULE    Take 40 mg by mouth 2 times daily     ONDANSETRON (ZOFRAN) 4 MG TABLET    Take 1 tablet by mouth every 8 hours as needed for Nausea or Vomiting. OXYGEN    Inhale 2 L/min into the lungs as needed    TRIAZOLAM (HALCION) 0.25 MG TABLET    Take 2 tablets by mouth 2 times daily.  9 pm and 2 am      ALLERGIES     Prednisone, Serotonin reuptake inhibitors (ssris), and Tricyclic antidepressants  FAMILY HISTORY       Family History   Problem Relation Age of Onset    Mental Illness Mother     Mental Illness Father      SOCIAL HISTORY       Social History     Socioeconomic History    Marital status:      Spouse name: None    Number of children: None    Years of education: None    Highest education level: None   Occupational History    None   Tobacco Use    Smoking status: Current Every Day Smoker     Packs/day: 0.50     Years: 42.00     Pack years: 21.00     Types: Cigarettes    Smokeless tobacco: Never Used   Substance and Sexual Activity    Alcohol use: No     Comment: hasn't drunk in 10-12 years    Drug use: No    Sexual activity: Not Currently     Partners: Female     Comment:    Other Topics Concern    None   Social History Narrative    None     Social Determinants of Health     Financial Resource Strain:     Difficulty of Paying Living Expenses: Not on file   Food Insecurity:     Worried About Running Out of Food in the Last Year: Not on file    Lindy of Food in the Last Year: Not on file   Transportation Needs:     Lack of Transportation (Medical): Not on file    Lack of Transportation (Non-Medical):  Not on file   Physical Activity:     Days of Exercise per Week: Not on file    Minutes of Exercise per Session: Not on file   Stress:     Feeling of Stress : Not on file   Social Connections:     Frequency of Communication with Friends and Family: Not on file    Frequency of Social Gatherings with Friends and Family: Not on file    Attends Mormonism Services: Not on file    Active Member of Clubs or Organizations: Not on file    Attends Club or Organization Meetings: Not on file    Marital Status: Not on file   Intimate Partner Violence:     Fear of Current or Ex-Partner: Not on file    Emotionally Abused: Not on file    Physically Abused: Not on file    Sexually Abused: Not on file   Housing Stability:     Unable to Pay for Housing in the Last Year: Not on file    Number of [place] in the Last Year: Not on file    clonazePAM (KLONOPIN) tablet 1 mg     CONSULTS:  None    INITIAL VITALS: BP: (!) 140/85, Temp: 97.3 °F (36.3 °C), Pulse: 99, Resp: 18, SpO2: 90 %   RECENT VITALS:  BP: (!) 147/94,Temp: 99.1 °F (37.3 °C), Pulse: 102, Resp: 30, SpO2: 97 %     Sanju Newsome is a 81 y.o. male who presents to the emergency department secondary to concern for symptoms as noted in HPI. On arrival he is awake, alert, oriented. Initial vitals notable for elevated blood pressure otherwise hemodynamically stable. At the time of my assessment his heart rate has increased to the 120s, he has missed medications for his anxiety and he tells me his anxiety is panchito high right now so he was ordered a dose of his home Klonopin. His wife does corroborate he has really bad anxiety and PTSD and hates coming to the hospital. His neuro exam is nonfocal.  He denies any trauma or falls. He is not on anticoagulation. A peripheral IV was placed and he was ordered a migraine cocktail given his headache is similar to prior headaches and he has had CT scans for similar symptoms in the past which have been unremarkable. His wife did want me to listen to his lungs (reports a history of pneumonias in the past) however he adamantly refused to let me listen to his lungs and also did not want a chest x-ray. He does deny any cough or fevers. I did let him know he can come back for further evaluation or follow-up with his primary care. On reassessment it is noted he appears more lethargic. The wife states this is how he gets whenever he gets medicated. She states this is not unusual for him. His tachycardia has improved to 110s at that time. On reassessment he is more alert, he continues to report his headache has completely resolved. Heart rate improved now low 100s. Discussed with him again following up with primary care, neurology. Discussed return precautions.   He and his wife expressed understanding of all instructions, were in agreement with plan, and he was discharged home in stable condition. FINAL IMPRESSION      1. Acute nonintractable headache, unspecified headache type        DISPOSITION/PLAN   DISPOSITION        PATIENT REFERRED TO:  Shane Rodriguez MD  [address] [facility].   [phone]    Call   For follow up appointment to talk about your headaches/migraines      DISCHARGE MEDICATIONS:  New Prescriptions    No medications on file            (Please note that portions of this note were completed with a voice recognition program. Efforts were made to edit the dictations but occasionally words are mis-transcribed.)    Soni Monroe MD (electronically signed)  Attending Emergency Physician        Soni Monroe MD  04/06/22 [id number]

## 2022-05-07 ENCOUNTER — HOSPITAL ENCOUNTER (EMERGENCY)
Age: 72
Discharge: HOME | End: 2022-05-07
Payer: COMMERCIAL

## 2022-05-07 ENCOUNTER — APPOINTMENT (OUTPATIENT)
Dept: CT IMAGING | Age: 72
End: 2022-05-07
Payer: COMMERCIAL

## 2022-05-07 VITALS
OXYGEN SATURATION: 97 % | HEART RATE: 97 BPM | WEIGHT: 162.48 LBS | SYSTOLIC BLOOD PRESSURE: 127 MMHG | TEMPERATURE: 99 F | BODY MASS INDEX: 24.62 KG/M2 | HEIGHT: 68 IN | RESPIRATION RATE: 16 BRPM | DIASTOLIC BLOOD PRESSURE: 86 MMHG

## 2022-05-07 DIAGNOSIS — R51.9 ACUTE NONINTRACTABLE HEADACHE, UNSPECIFIED HEADACHE TYPE: Primary | ICD-10-CM

## 2022-05-07 LAB
A/G RATIO: 1.4 (ref 1.1–2.2)
ALBUMIN SERPL-MCNC: 4.2 G/DL (ref 3.4–5)
ALP BLD-CCNC: 89 U/L (ref 40–129)
ALT SERPL-CCNC: 14 U/L (ref 10–40)
ANION GAP SERPL CALCULATED.3IONS-SCNC: 10 MMOL/L (ref 3–16)
AST SERPL-CCNC: 27 U/L (ref 15–37)
BASOPHILS ABSOLUTE: 0.1 K/UL (ref 0–0.2)
BASOPHILS RELATIVE PERCENT: 0.6 %
BILIRUB SERPL-MCNC: <0.2 MG/DL (ref 0–1)
BUN BLDV-MCNC: 8 MG/DL (ref 7–20)
CALCIUM SERPL-MCNC: 9.2 MG/DL (ref 8.3–10.6)
CHLORIDE BLD-SCNC: 98 MMOL/L (ref 99–110)
CO2: 30 MMOL/L (ref 21–32)
CREAT SERPL-MCNC: 0.7 MG/DL (ref 0.8–1.3)
EOSINOPHILS ABSOLUTE: 0.3 K/UL (ref 0–0.6)
EOSINOPHILS RELATIVE PERCENT: 2.7 %
GFR AFRICAN AMERICAN: >60
GFR NON-AFRICAN AMERICAN: >60
GLUCOSE BLD-MCNC: 119 MG/DL (ref 70–99)
HCT VFR BLD CALC: 43.5 % (ref 40.5–52.5)
HEMOGLOBIN: 14.2 G/DL (ref 13.5–17.5)
LYMPHOCYTES ABSOLUTE: 1.6 K/UL (ref 1–5.1)
LYMPHOCYTES RELATIVE PERCENT: 15.7 %
MCH RBC QN AUTO: 29.7 PG (ref 26–34)
MCHC RBC AUTO-ENTMCNC: 32.8 G/DL (ref 31–36)
MCV RBC AUTO: 90.7 FL (ref 80–100)
MONOCYTES ABSOLUTE: 0.7 K/UL (ref 0–1.3)
MONOCYTES RELATIVE PERCENT: 6.7 %
NEUTROPHILS ABSOLUTE: 7.7 K/UL (ref 1.7–7.7)
NEUTROPHILS RELATIVE PERCENT: 74.3 %
PDW BLD-RTO: 16.4 % (ref 12.4–15.4)
PLATELET # BLD: 158 K/UL (ref 135–450)
PMV BLD AUTO: 8.4 FL (ref 5–10.5)
POTASSIUM REFLEX MAGNESIUM: 4.9 MMOL/L (ref 3.5–5.1)
RBC # BLD: 4.79 M/UL (ref 4.2–5.9)
SODIUM BLD-SCNC: 138 MMOL/L (ref 136–145)
TOTAL PROTEIN: 7.2 G/DL (ref 6.4–8.2)
WBC # BLD: 10.3 K/UL (ref 4–11)

## 2022-05-07 PROCEDURE — 6360000002 HC RX W HCPCS: Performed by: NURSE PRACTITIONER

## 2022-05-07 PROCEDURE — 99284 EMERGENCY DEPT VISIT MOD MDM: CPT

## 2022-05-07 PROCEDURE — 36415 COLL VENOUS BLD VENIPUNCTURE: CPT

## 2022-05-07 PROCEDURE — 85025 COMPLETE CBC W/AUTO DIFF WBC: CPT

## 2022-05-07 PROCEDURE — 70450 CT HEAD/BRAIN W/O DYE: CPT

## 2022-05-07 PROCEDURE — 80053 COMPREHEN METABOLIC PANEL: CPT

## 2022-05-07 PROCEDURE — 96374 THER/PROPH/DIAG INJ IV PUSH: CPT

## 2022-05-07 PROCEDURE — 2580000003 HC RX 258: Performed by: NURSE PRACTITIONER

## 2022-05-07 RX ORDER — 0.9 % SODIUM CHLORIDE 0.9 %
1000 INTRAVENOUS SOLUTION INTRAVENOUS ONCE
Status: COMPLETED | OUTPATIENT
Start: 2022-05-07 | End: 2022-05-07

## 2022-05-07 RX ORDER — KETOROLAC TROMETHAMINE 30 MG/ML
15 INJECTION, SOLUTION INTRAMUSCULAR; INTRAVENOUS ONCE
Status: COMPLETED | OUTPATIENT
Start: 2022-05-07 | End: 2022-05-07

## 2022-05-07 RX ADMIN — KETOROLAC TROMETHAMINE 15 MG: 30 INJECTION, SOLUTION INTRAMUSCULAR at 11:35

## 2022-05-07 RX ADMIN — SODIUM CHLORIDE 1000 ML: 9 INJECTION, SOLUTION INTRAVENOUS at 11:35

## 2022-05-07 ASSESSMENT — PAIN DESCRIPTION - ORIENTATION: ORIENTATION: POSTERIOR

## 2022-05-07 ASSESSMENT — PAIN DESCRIPTION - PAIN TYPE: TYPE: ACUTE PAIN

## 2022-05-07 ASSESSMENT — PAIN SCALES - GENERAL
PAINLEVEL_OUTOF10: 8
PAINLEVEL_OUTOF10: 7

## 2022-05-07 ASSESSMENT — PAIN DESCRIPTION - DESCRIPTORS: DESCRIPTORS: ACHING

## 2022-05-07 ASSESSMENT — PAIN DESCRIPTION - FREQUENCY: FREQUENCY: CONTINUOUS

## 2022-05-07 ASSESSMENT — PAIN - FUNCTIONAL ASSESSMENT: PAIN_FUNCTIONAL_ASSESSMENT: 0-10

## 2022-05-07 ASSESSMENT — PAIN DESCRIPTION - LOCATION: LOCATION: HEAD

## 2022-05-07 NOTE — ED PROVIDER NOTES
[address] [facility]        Pt Name: Trena Sanders  MRN: [id number]  [place] 1950  Date of evaluation: 5/7/2022  Provider: DANDRE Whyte - CNP  PCP: Jolanta Templeton MD  Note Started: 11:55 AM EDT       ISAIAH. I have evaluated this patient. My supervising physician was available for consultation. CHIEF COMPLAINT       Chief Complaint   Patient presents with    Headache     history of migraines, foricet/tramadol not working. HA for 3 days posterior radiating to front. denies n/v and vision changes        HISTORY OF PRESENT ILLNESS   (Location, Timing/Onset, Context/Setting, Quality, Duration, Modifying Factors, Severity, Associated Signs and Symptoms)  Note limiting factors. Chief Complaint: Headache    Trena Sanders is a 81 y.o. male who presents with complaints of occipital burning headache that radiates into his head for the past 3 days. He has had constant headaches for the past 2 months. He said usually when he takes his home medication to include Fioricet and Klonopin it relieves his symptoms. He last took his medication yesterday and did not have relief of symptoms and therefore presented to the ED today. Brittney Bob this feels like a normal headache, however usually the medication relieves the symptoms, as it did not yesterday. Denies any recent trauma, accidents, head injuries, or falls. Denies any associated visual disturbance, dysphagia, ataxia, numbness, tingling, weakness, neck pain, back pain, cough, wheezing, rashes, confusion, neck stiffness, fevers, sore throat, sneezing, rhinorrhea, sinus congestion, tinnitus, loss of bowel or bladder function, or syncope. Smokes half pack per day, former alcohol abuse, denies street drugs or IVDA. He has received his COVID-19 vaccine and flu vaccine. Patient does have frequent ED visits to outlying facilities for his migraine headaches.   He denies any recent evaluation with neurology. Nursing Notes were all reviewed and agreed with or any disagreements were addressed in the HPI. REVIEW OF SYSTEMS    (2-9 systems for level 4, 10 or more for level 5)     Review of Systems    Positives and Pertinent negatives as per HPI. Except as noted above in the ROS, all other systems were reviewed and negative. PAST MEDICAL HISTORY     Past Medical History:   Diagnosis Date    Anxiety     [facility]     Community acquired bacterial pneumonia 6/12/2015    COPD (chronic obstructive pulmonary disease) [facility]     Depression     Fibromyalgia     Headache(784.0)     Hyperlipidemia     Manic depressive disease manic phase ([facility]     On home O2     PTSD (post-traumatic stress disorder)     Seizures ([facility]     Tardive dyskinesia          SURGICAL HISTORY     Past Surgical History:   Procedure Laterality Date    MOUTH SURGERY  05/2013         CURRENTMEDICATIONS       Discharge Medication List as of 5/7/2022 12:31 PM      CONTINUE these medications which have NOT CHANGED    Details   methylphenidate (RITALIN) 10 MG tablet Take 10 mg by mouth 3 times daily. Historical Med      OXYGEN Inhale 2 L/min into the lungs as neededHistorical Med      !! clonazePAM (KLONOPIN) 0.5 MG tablet Take by mouth. Historical Med      cyclobenzaprine (FLEXERIL) 10 MG tablet Take 10 mg by mouth 3 times daily as neededHistorical Med      triazolam (HALCION) 0.25 MG tablet Take 2 tablets by mouth 2 times daily.  9 pm and 2 amHistorical Med      albuterol sulfate HFA (PROVENTIL HFA) 108 (90 Base) MCG/ACT inhaler Inhale 2 puffs into the lungs every 6 hours as neededHistorical Med      doxepin (SINEQUAN) 25 MG capsule Take 25 mg by mouth nightly Historical Med      budesonide-formoterol (SYMBICORT) 80-4.5 MCG/ACT AERO Inhale 2 puffs into the lungs 2 times daily, Disp-1 Inhaler, R-3Normal      butalbital-acetaminophen-caffeine (FIORICET, ESGIC) -40 MG per tablet Take 1 tablet by mouth every 4 hours as needed for HeadachesHistorical Med      omeprazole (PRILOSEC) 40 MG delayed release capsule Take 40 mg by mouth 2 times daily Historical Med      !! clonazePAM (KLONOPIN) 0.5 MG tablet Take 0.5 mg by mouth daily as needed for Anxiety. Historical Med      cloNIDine (CATAPRES) 0.1 MG tablet Take 0.2 mg by mouth every 4 hours as needed for High Blood Pressure (anxiety / tremor) Historical Med      ondansetron (ZOFRAN) 4 MG tablet Take 1 tablet by mouth every 8 hours as needed for Nausea or Vomiting., Disp-30 tablet, R-0       !! - Potential duplicate medications found. Please discuss with provider. ALLERGIES     Prednisone, Serotonin reuptake inhibitors (ssris), and Tricyclic antidepressants    FAMILYHISTORY       Family History   Problem Relation Age of Onset    Mental Illness Mother     Mental Illness Father           SOCIAL HISTORY       Social History     Tobacco Use    Smoking status: Current Every Day Smoker     Packs/day: 0.50     Years: 42.00     Pack years: 21.00     Types: Cigarettes    Smokeless tobacco: Never Used   Substance Use Topics    Alcohol use: No     Comment: hasn't drunk in 10-12 years    Drug use: No       SCREENINGS   NIH Stroke Scale  Interval: Baseline  Level of Consciousness (1a): Alert  LOC Questions (1b): Answers both correctly  LOC Commands (1c): Performs both tasks correctly  Best Gaze (2): Normal  Visual (3): No visual loss  Facial Palsy (4): Normal symmetrical movement  Motor Arm, Left (5a): No drift  Motor Arm, Right (5b): No drift  Motor Leg, Left (6a): No drift  Motor Leg, Right (6b):  No drift  Limb Ataxia (7): Absent  Sensory (8): Normal  Best Language (9): No aphasia  Dysarthria (10): Normal  Extinction and Inattention (11): No abnormality  Total: 0Glasgow Coma Scale  Eye Opening: Spontaneous  Best Verbal Response: Oriented  Best Motor Response: Obeys commands  Umbarger Coma Scale Score: 15        PHYSICAL EXAM    (up to 7 for level 4, 8 or more for level 5)     ED Triage Vitals [05/07/22 1023]   BP Temp Temp Source Pulse Resp SpO2 Height Weight   136/84 99 °F (37.2 °C) Oral 99 20 97 % 5' 8\" (1.727 m) 162 lb 7.7 oz (73.7 kg)       Physical Exam  Vitals and nursing note reviewed. Constitutional:       General: He is awake. Appearance: Normal appearance. He is well-developed and normal weight. HENT:      Head: Normocephalic and atraumatic. Right Ear: Hearing and external ear normal.      Left Ear: Hearing and external ear normal.      Nose: Nose normal. No mucosal edema, congestion or rhinorrhea. Mouth/Throat:      Mouth: Mucous membranes are dry. Eyes:      General:         Right eye: No discharge. Left eye: No discharge. Cardiovascular:      Rate and Rhythm: Normal rate and regular rhythm. Pulses:           Radial pulses are 2+ on the right side and 2+ on the left side. Heart sounds: Normal heart sounds. Pulmonary:      Effort: Pulmonary effort is normal. No respiratory distress. Breath sounds: Normal breath sounds. Abdominal:      General: Bowel sounds are normal.      Palpations: Abdomen is soft. Tenderness: There is no abdominal tenderness. Musculoskeletal:         General: Normal range of motion. Cervical back: Full passive range of motion without pain and normal range of motion. No spinous process tenderness or muscular tenderness. Right lower leg: No edema. Left lower leg: No edema. Skin:     General: Skin is warm and dry. Coloration: Skin is not pale. Neurological:      General: No focal deficit present. Mental Status: He is alert and oriented to person, place, and time. Cranial Nerves: Cranial nerves are intact. Sensory: Sensation is intact. Motor: Motor function is intact. Coordination: Coordination is intact. Gait: Gait is intact. Psychiatric:         Behavior: Behavior normal. Behavior is cooperative.          DIAGNOSTIC RESULTS LABS:    Labs Reviewed   CBC WITH AUTO DIFFERENTIAL - Abnormal; Notable for the following components:       Result Value    RDW 16.4 (*)     All other components within normal limits   COMPREHENSIVE METABOLIC PANEL W/ REFLEX TO MG FOR LOW K - Abnormal; Notable for the following components:    Chloride 98 (*)     Glucose 119 (*)     CREATININE 0.7 (*)     All other components within normal limits       When ordered only abnormal lab results are displayed. All other labs were within normal range or not returned as of this dictation. EKG: When ordered, EKG's are interpreted by the Emergency Department Physician in the absence of a cardiologist.  Please see their note for interpretation of EKG. RADIOLOGY:   Non-plain film images such as CT, Ultrasound and MRI are read by the radiologist. Plain radiographic images are visualized and preliminarily interpreted by the ED Provider with the below findings:        Interpretation per the Radiologist below, if available at the time of this note:    CT Head WO Contrast   Final Result   No acute intracranial abnormality. CT Head WO Contrast    Result Date: 5/7/2022  EXAMINATION: CT OF THE HEAD WITHOUT CONTRAST  5/7/2022 10:43 am TECHNIQUE: CT of the head was performed without the administration of intravenous contrast. Dose modulation, iterative reconstruction, and/or weight based adjustment of the mA/kV was utilized to reduce the radiation dose to as low as reasonably achievable. COMPARISON: 12/09/2021 HISTORY: ORDERING SYSTEM PROVIDED HISTORY: posterior h/a, intermittent x 2 months, worse x 3 days TECHNOLOGIST PROVIDED HISTORY: Reason for exam:->posterior h/a, intermittent x 2 months, worse x 3 days Has a \"code stroke\" or \"stroke alert\" been called? ->No Decision Support Exception - unselect if not a suspected or confirmed emergency medical condition->Emergency Medical Condition (MA) Reason for Exam: posterior h/a, intermittent x 2 months, worse x 3 days FINDINGS: BRAIN/VENTRICLES: There is no acute intracranial hemorrhage, mass effect or midline shift. No abnormal extra-axial fluid collection. The gray-white differentiation is maintained without evidence of an acute infarct. There is no evidence of hydrocephalus. Mild generalized volume loss. There is a mild degree of scattered low-attenuation in the periventricular and subcortical white matter, stable, consistent with small vessel disease. ORBITS: The visualized portion of the orbits demonstrate no acute abnormality. SINUSES: The visualized paranasal sinuses and mastoid air cells demonstrate no acute abnormality. SOFT TISSUES/SKULL:  No acute abnormality of the visualized skull or soft tissues. No acute intracranial abnormality. PROCEDURES   Unless otherwise noted below, none     Procedures    CRITICAL CARE TIME       CONSULTS:  None      EMERGENCY DEPARTMENT COURSE and DIFFERENTIAL DIAGNOSIS/MDM:   Vitals:    Vitals:    05/07/22 1023 05/07/22 1233   BP: 136/84 127/86   Pulse: 99 97   Resp: 20 16   Temp: 99 °F (37.2 °C)    TempSrc: Oral    SpO2: 97%    Weight: 162 lb 7.7 oz (73.7 kg)    Height: 5' 8\" (1.727 m)        Patient was given the following medications:  Medications   0.9 % sodium chloride bolus (0 mLs IntraVENous Stopped 5/7/22 1223)   ketorolac (TORADOL) injection 15 mg (15 mg IntraVENous Given 5/7/22 1135)           Care of this patient took place during the COVID-19 pandemic emergency. ED COURSE & MEDICAL DECISION MAKING    - The patient presented to the ER with complaints of  headache. Vital signs were reviewed. Exam well-developed, well-nourished male who appears uncomfortable. Peripheral IV placed. Labs, Imaging ordered. - Pertinent Labs & Imaging studies reviewed. (See chart for details)   -  Patient seen and evaluated in the emergency department. -  Triage and nursing notes reviewed and incorporated. -  Old chart records reviewed and incorporated. - ISAIAH.  I have evaluated this patient. My supervising physician was available for consultation.  -  Differential diagnosis includes: CVA, TIA, ICH, SAH, aneurysm, dissection, meningitis, glioblastoma, meningioma, metabolic encephalopathy, VTE, SDH, dehydration, sepsis, COVID-19  -  Work-up included:  See above  -  ED treatment included:   Normal saline, Toradol  -  Results discussed with patient. Bonny Urbano is a 81-year-old male with complaints of headache for the past 2 months, worse the past 3 days. It is occipital and radiates up into his head with an anterior burning sensation. He denies any associated symptoms. On exam his NIH is 0. He denies any scalpel tenderness as he said is more of a burning sensation inside. No gait ataxia noted. Lungs clear to auscultate bilateral.  Cardiac exam with regular rate and rhythm. Abdomen soft nontender. Neurovascular status intact. No facial symmetry noted. No neck rigidity. Lab work and imaging is obtained. CBC with RDW 16.4. CMP with chloride 98, glucose 119. CT head without contrast showed no acute and cranial abnormality. Vital signs stable. Patient has received 1 L normal saline and Toradol and said he feels much better and his headache is now completely gone. He is eating peanut butter and cheese crackers without difficulty. Marry Lund he is ready to be discharged home. Instructed to go home and rest in a quiet and dark environment. He thinks that is the problem is that he is unable to get a good rest.  Female visitor x1 at bedside. He was given strict return discharge instructions. Given referral to neurology. Shared decision making is complete and patient is stable for discharge at this time. FINAL IMPRESSION      1. Acute nonintractable headache, unspecified headache type          DISPOSITION/PLAN   DISPOSITION        PATIENT REFERRED TO:  Shani Paul MD  [address] [facility].   [address]    Call in 2 days  As needed, If symptoms worsen    [facility]  [address] [address]  [phone]  Go to   As needed    Leela Lovea, [address] [place], [place] [id number] [phone]    Call   As needed      DISCHARGE MEDICATIONS:  Discharge Medication List as of 5/7/2022 12:31 PM          DISCONTINUED MEDICATIONS:  Discharge Medication List as of 5/7/2022 12:31 PM                 (Please note that portions of this note were completed with a voice recognition program.  Efforts were made to edit the dictations but occasionally words are mis-transcribed.)    DANDRE Moreira CNP (electronically signed)            DANDRE Moreira CNP  05/07/22 9096

## 2022-05-07 NOTE — ED NOTES
D/C: Order noted for d/c. Pt confirmed d/c paperwork have correct name. Discharge and education instructions reviewed with patient. Teach-back successful. Pt verbalized understanding and signed d/c papers. Pt denied questions at this time. No acute distress noted. Patient instructed to follow-up as noted - return to emergency department if symptoms worsen. Patient verbalized understanding. Discharged per EDMD with discharge instructions. Pt discharged to private vehicle. Patient stable upon departure.       Fransisca Chanel RN  05/07/22 5072

## 2022-06-11 ENCOUNTER — APPOINTMENT (OUTPATIENT)
Dept: GENERAL RADIOLOGY | Age: 72
DRG: 871 | End: 2022-06-11
Payer: COMMERCIAL

## 2022-06-11 ENCOUNTER — HOSPITAL ENCOUNTER (INPATIENT)
Age: 72
LOS: 1 days | Discharge: HOME | DRG: 871 | End: 2022-06-12
Attending: EMERGENCY MEDICINE | Admitting: FAMILY MEDICINE
Payer: COMMERCIAL

## 2022-06-11 DIAGNOSIS — A41.9 SEPSIS, DUE TO UNSPECIFIED ORGANISM, UNSPECIFIED WHETHER ACUTE ORGAN DYSFUNCTION PRESENT (HCC): ICD-10-CM

## 2022-06-11 DIAGNOSIS — J18.9 PNEUMONIA OF RIGHT LUNG DUE TO INFECTIOUS ORGANISM, UNSPECIFIED PART OF LUNG: Primary | ICD-10-CM

## 2022-06-11 LAB
A/G RATIO: 1.6 (ref 1.1–2.2)
ALBUMIN SERPL-MCNC: 3.8 G/DL (ref 3.4–5)
ALP BLD-CCNC: 87 U/L (ref 40–129)
ALT SERPL-CCNC: 10 U/L (ref 10–40)
ANION GAP SERPL CALCULATED.3IONS-SCNC: 15 MMOL/L (ref 3–16)
AST SERPL-CCNC: 14 U/L (ref 15–37)
BASOPHILS ABSOLUTE: 0.1 K/UL (ref 0–0.2)
BASOPHILS RELATIVE PERCENT: 0.6 %
BILIRUB SERPL-MCNC: <0.2 MG/DL (ref 0–1)
BUN BLDV-MCNC: 11 MG/DL (ref 7–20)
CALCIUM SERPL-MCNC: 8.7 MG/DL (ref 8.3–10.6)
CHLORIDE BLD-SCNC: 97 MMOL/L (ref 99–110)
CO2: 20 MMOL/L (ref 21–32)
CREAT SERPL-MCNC: 0.8 MG/DL (ref 0.8–1.3)
EKG ATRIAL RATE: 79 BPM
EKG DIAGNOSIS: NORMAL
EKG P AXIS: 79 DEGREES
EKG P-R INTERVAL: 132 MS
EKG Q-T INTERVAL: 384 MS
EKG QRS DURATION: 102 MS
EKG QTC CALCULATION (BAZETT): 440 MS
EKG R AXIS: 86 DEGREES
EKG T AXIS: 78 DEGREES
EKG VENTRICULAR RATE: 79 BPM
EOSINOPHILS ABSOLUTE: 0 K/UL (ref 0–0.6)
EOSINOPHILS RELATIVE PERCENT: 0.1 %
GFR AFRICAN AMERICAN: >60
GFR NON-AFRICAN AMERICAN: >60
GLUCOSE BLD-MCNC: 101 MG/DL (ref 70–99)
HCT VFR BLD CALC: 41.7 % (ref 40.5–52.5)
HEMOGLOBIN: 14 G/DL (ref 13.5–17.5)
LACTIC ACID, SEPSIS: 1.4 MMOL/L (ref 0.4–1.9)
LACTIC ACID, SEPSIS: 1.8 MMOL/L (ref 0.4–1.9)
LACTIC ACID, SEPSIS: 2.1 MMOL/L (ref 0.4–1.9)
LACTIC ACID, SEPSIS: 2.3 MMOL/L (ref 0.4–1.9)
LYMPHOCYTES ABSOLUTE: 0.8 K/UL (ref 1–5.1)
LYMPHOCYTES RELATIVE PERCENT: 3.5 %
MCH RBC QN AUTO: 30.7 PG (ref 26–34)
MCHC RBC AUTO-ENTMCNC: 33.6 G/DL (ref 31–36)
MCV RBC AUTO: 91.4 FL (ref 80–100)
MONOCYTES ABSOLUTE: 1 K/UL (ref 0–1.3)
MONOCYTES RELATIVE PERCENT: 4.2 %
NEUTROPHILS ABSOLUTE: 20.8 K/UL (ref 1.7–7.7)
NEUTROPHILS RELATIVE PERCENT: 91.6 %
PDW BLD-RTO: 15.5 % (ref 12.4–15.4)
PLATELET # BLD: 176 K/UL (ref 135–450)
PMV BLD AUTO: 8.4 FL (ref 5–10.5)
POTASSIUM REFLEX MAGNESIUM: 4.5 MMOL/L (ref 3.5–5.1)
PRO-BNP: 56 PG/ML (ref 0–124)
PROCALCITONIN: 0.49 NG/ML (ref 0–0.15)
RAPID INFLUENZA  B AGN: NEGATIVE
RAPID INFLUENZA A AGN: NEGATIVE
RBC # BLD: 4.57 M/UL (ref 4.2–5.9)
SARS-COV-2, NAAT: NOT DETECTED
SODIUM BLD-SCNC: 132 MMOL/L (ref 136–145)
TOTAL PROTEIN: 6.2 G/DL (ref 6.4–8.2)
TROPONIN: <0.01 NG/ML
WBC # BLD: 22.7 K/UL (ref 4–11)

## 2022-06-11 PROCEDURE — 94640 AIRWAY INHALATION TREATMENT: CPT

## 2022-06-11 PROCEDURE — 1200000000 HC SEMI PRIVATE

## 2022-06-11 PROCEDURE — 85025 COMPLETE CBC W/AUTO DIFF WBC: CPT

## 2022-06-11 PROCEDURE — 6370000000 HC RX 637 (ALT 250 FOR IP): Performed by: FAMILY MEDICINE

## 2022-06-11 PROCEDURE — 84484 ASSAY OF TROPONIN QUANT: CPT

## 2022-06-11 PROCEDURE — 93005 ELECTROCARDIOGRAM TRACING: CPT | Performed by: EMERGENCY MEDICINE

## 2022-06-11 PROCEDURE — 2580000003 HC RX 258: Performed by: FAMILY MEDICINE

## 2022-06-11 PROCEDURE — 87635 SARS-COV-2 COVID-19 AMP PRB: CPT

## 2022-06-11 PROCEDURE — 96365 THER/PROPH/DIAG IV INF INIT: CPT

## 2022-06-11 PROCEDURE — 80053 COMPREHEN METABOLIC PANEL: CPT

## 2022-06-11 PROCEDURE — 83880 ASSAY OF NATRIURETIC PEPTIDE: CPT

## 2022-06-11 PROCEDURE — 6360000002 HC RX W HCPCS: Performed by: FAMILY MEDICINE

## 2022-06-11 PROCEDURE — 36415 COLL VENOUS BLD VENIPUNCTURE: CPT

## 2022-06-11 PROCEDURE — 96367 TX/PROPH/DG ADDL SEQ IV INF: CPT

## 2022-06-11 PROCEDURE — 93010 ELECTROCARDIOGRAM REPORT: CPT | Performed by: INTERNAL MEDICINE

## 2022-06-11 PROCEDURE — 99285 EMERGENCY DEPT VISIT HI MDM: CPT

## 2022-06-11 PROCEDURE — 2700000000 HC OXYGEN THERAPY PER DAY

## 2022-06-11 PROCEDURE — 84145 PROCALCITONIN (PCT): CPT

## 2022-06-11 PROCEDURE — 71045 X-RAY EXAM CHEST 1 VIEW: CPT

## 2022-06-11 PROCEDURE — 6360000002 HC RX W HCPCS: Performed by: EMERGENCY MEDICINE

## 2022-06-11 PROCEDURE — 87804 INFLUENZA ASSAY W/OPTIC: CPT

## 2022-06-11 PROCEDURE — 87040 BLOOD CULTURE FOR BACTERIA: CPT

## 2022-06-11 PROCEDURE — 94760 N-INVAS EAR/PLS OXIMETRY 1: CPT

## 2022-06-11 PROCEDURE — 2580000003 HC RX 258: Performed by: EMERGENCY MEDICINE

## 2022-06-11 PROCEDURE — 83605 ASSAY OF LACTIC ACID: CPT

## 2022-06-11 RX ORDER — CLONAZEPAM 1 MG/1
1 TABLET ORAL 3 TIMES DAILY
COMMUNITY

## 2022-06-11 RX ORDER — METHYLPHENIDATE HYDROCHLORIDE 5 MG/1
10 TABLET ORAL 3 TIMES DAILY
Status: DISCONTINUED | OUTPATIENT
Start: 2022-06-11 | End: 2022-06-11

## 2022-06-11 RX ORDER — 0.9 % SODIUM CHLORIDE 0.9 %
500 INTRAVENOUS SOLUTION INTRAVENOUS ONCE
Status: COMPLETED | OUTPATIENT
Start: 2022-06-11 | End: 2022-06-11

## 2022-06-11 RX ORDER — ACETAMINOPHEN 650 MG/1
650 SUPPOSITORY RECTAL EVERY 6 HOURS PRN
Status: DISCONTINUED | OUTPATIENT
Start: 2022-06-11 | End: 2022-06-12 | Stop reason: HOSPADM

## 2022-06-11 RX ORDER — SODIUM CHLORIDE 9 MG/ML
INJECTION, SOLUTION INTRAVENOUS PRN
Status: DISCONTINUED | OUTPATIENT
Start: 2022-06-11 | End: 2022-06-12 | Stop reason: HOSPADM

## 2022-06-11 RX ORDER — LORAZEPAM 1 MG/1
1 TABLET ORAL NIGHTLY
COMMUNITY

## 2022-06-11 RX ORDER — ACETAMINOPHEN 325 MG/1
650 TABLET ORAL EVERY 6 HOURS PRN
Status: DISCONTINUED | OUTPATIENT
Start: 2022-06-11 | End: 2022-06-12 | Stop reason: HOSPADM

## 2022-06-11 RX ORDER — SODIUM CHLORIDE 0.9 % (FLUSH) 0.9 %
5-40 SYRINGE (ML) INJECTION PRN
Status: DISCONTINUED | OUTPATIENT
Start: 2022-06-11 | End: 2022-06-11

## 2022-06-11 RX ORDER — SODIUM CHLORIDE 0.9 % (FLUSH) 0.9 %
5-40 SYRINGE (ML) INJECTION EVERY 12 HOURS SCHEDULED
Status: DISCONTINUED | OUTPATIENT
Start: 2022-06-11 | End: 2022-06-12 | Stop reason: HOSPADM

## 2022-06-11 RX ORDER — DOXEPIN HYDROCHLORIDE 25 MG/1
25 CAPSULE ORAL NIGHTLY
Status: DISCONTINUED | OUTPATIENT
Start: 2022-06-11 | End: 2022-06-11

## 2022-06-11 RX ORDER — CLONIDINE HYDROCHLORIDE 0.1 MG/1
0.2 TABLET ORAL EVERY 4 HOURS PRN
Status: DISCONTINUED | OUTPATIENT
Start: 2022-06-11 | End: 2022-06-11

## 2022-06-11 RX ORDER — CLONIDINE HYDROCHLORIDE 0.1 MG/1
0.1 TABLET ORAL 2 TIMES DAILY
Status: DISCONTINUED | OUTPATIENT
Start: 2022-06-11 | End: 2022-06-12 | Stop reason: HOSPADM

## 2022-06-11 RX ORDER — SODIUM CHLORIDE 0.9 % (FLUSH) 0.9 %
5-40 SYRINGE (ML) INJECTION PRN
Status: DISCONTINUED | OUTPATIENT
Start: 2022-06-11 | End: 2022-06-12 | Stop reason: HOSPADM

## 2022-06-11 RX ORDER — CLONAZEPAM 1 MG/1
1 TABLET ORAL 3 TIMES DAILY PRN
Status: DISCONTINUED | OUTPATIENT
Start: 2022-06-11 | End: 2022-06-12 | Stop reason: HOSPADM

## 2022-06-11 RX ORDER — ENOXAPARIN SODIUM 100 MG/ML
40 INJECTION SUBCUTANEOUS DAILY
Status: DISCONTINUED | OUTPATIENT
Start: 2022-06-11 | End: 2022-06-12 | Stop reason: HOSPADM

## 2022-06-11 RX ORDER — METHYLPHENIDATE HYDROCHLORIDE 5 MG/1
20 TABLET ORAL 3 TIMES DAILY
Status: DISCONTINUED | OUTPATIENT
Start: 2022-06-11 | End: 2022-06-12 | Stop reason: HOSPADM

## 2022-06-11 RX ORDER — SODIUM CHLORIDE 0.9 % (FLUSH) 0.9 %
5-40 SYRINGE (ML) INJECTION EVERY 12 HOURS SCHEDULED
Status: DISCONTINUED | OUTPATIENT
Start: 2022-06-11 | End: 2022-06-11

## 2022-06-11 RX ORDER — SODIUM CHLORIDE, SODIUM LACTATE, POTASSIUM CHLORIDE, AND CALCIUM CHLORIDE .6; .31; .03; .02 G/100ML; G/100ML; G/100ML; G/100ML
30 INJECTION, SOLUTION INTRAVENOUS ONCE
Status: COMPLETED | OUTPATIENT
Start: 2022-06-11 | End: 2022-06-11

## 2022-06-11 RX ORDER — ACYCLOVIR 800 MG/1
800 TABLET ORAL 2 TIMES DAILY
Status: DISCONTINUED | OUTPATIENT
Start: 2022-06-11 | End: 2022-06-12 | Stop reason: HOSPADM

## 2022-06-11 RX ORDER — ALBUTEROL SULFATE 90 UG/1
2 AEROSOL, METERED RESPIRATORY (INHALATION) EVERY 6 HOURS PRN
Status: DISCONTINUED | OUTPATIENT
Start: 2022-06-11 | End: 2022-06-12 | Stop reason: HOSPADM

## 2022-06-11 RX ORDER — SODIUM CHLORIDE 9 MG/ML
INJECTION, SOLUTION INTRAVENOUS PRN
Status: DISCONTINUED | OUTPATIENT
Start: 2022-06-11 | End: 2022-06-11

## 2022-06-11 RX ORDER — ACYCLOVIR 800 MG/1
800 TABLET ORAL 2 TIMES DAILY
COMMUNITY

## 2022-06-11 RX ORDER — CLONAZEPAM 0.5 MG/1
0.5 TABLET ORAL NIGHTLY PRN
Status: DISCONTINUED | OUTPATIENT
Start: 2022-06-11 | End: 2022-06-12 | Stop reason: HOSPADM

## 2022-06-11 RX ADMIN — METHYLPHENIDATE HYDROCHLORIDE 20 MG: 5 TABLET ORAL at 21:37

## 2022-06-11 RX ADMIN — SODIUM CHLORIDE, POTASSIUM CHLORIDE, SODIUM LACTATE AND CALCIUM CHLORIDE 2190 ML: 600; 310; 30; 20 INJECTION, SOLUTION INTRAVENOUS at 05:31

## 2022-06-11 RX ADMIN — VANCOMYCIN HYDROCHLORIDE 1250 MG: 10 INJECTION, POWDER, LYOPHILIZED, FOR SOLUTION INTRAVENOUS at 08:04

## 2022-06-11 RX ADMIN — CEFTRIAXONE 1000 MG: 1 INJECTION, POWDER, FOR SOLUTION INTRAMUSCULAR; INTRAVENOUS at 14:43

## 2022-06-11 RX ADMIN — PIPERACILLIN AND TAZOBACTAM 4500 MG: 4; .5 INJECTION, POWDER, LYOPHILIZED, FOR SOLUTION INTRAVENOUS at 06:26

## 2022-06-11 RX ADMIN — PIPERACILLIN AND TAZOBACTAM 3375 MG: 3; .375 INJECTION, POWDER, LYOPHILIZED, FOR SOLUTION INTRAVENOUS at 21:42

## 2022-06-11 RX ADMIN — METHYLPHENIDATE HYDROCHLORIDE 10 MG: 5 TABLET ORAL at 17:31

## 2022-06-11 RX ADMIN — ACETAMINOPHEN 650 MG: 325 TABLET ORAL at 17:58

## 2022-06-11 RX ADMIN — Medication 10 ML: at 21:01

## 2022-06-11 RX ADMIN — MOMETASONE FUROATE AND FORMOTEROL FUMARATE DIHYDRATE 2 PUFF: 200; 5 AEROSOL RESPIRATORY (INHALATION) at 12:23

## 2022-06-11 RX ADMIN — AZITHROMYCIN MONOHYDRATE 500 MG: 500 INJECTION, POWDER, LYOPHILIZED, FOR SOLUTION INTRAVENOUS at 17:07

## 2022-06-11 RX ADMIN — SODIUM CHLORIDE 500 ML: 9 INJECTION, SOLUTION INTRAVENOUS at 15:44

## 2022-06-11 RX ADMIN — MOMETASONE FUROATE AND FORMOTEROL FUMARATE DIHYDRATE 2 PUFF: 200; 5 AEROSOL RESPIRATORY (INHALATION) at 19:47

## 2022-06-11 RX ADMIN — ACYCLOVIR 800 MG: 800 TABLET ORAL at 21:01

## 2022-06-11 ASSESSMENT — PAIN DESCRIPTION - DESCRIPTORS: DESCRIPTORS: ACHING

## 2022-06-11 ASSESSMENT — PAIN - FUNCTIONAL ASSESSMENT
PAIN_FUNCTIONAL_ASSESSMENT: 0-10
PAIN_FUNCTIONAL_ASSESSMENT: NONE - DENIES PAIN

## 2022-06-11 ASSESSMENT — PAIN DESCRIPTION - LOCATION: LOCATION: SHOULDER

## 2022-06-11 ASSESSMENT — PAIN DESCRIPTION - ORIENTATION: ORIENTATION: RIGHT

## 2022-06-11 ASSESSMENT — PAIN SCALES - GENERAL
PAINLEVEL_OUTOF10: 2
PAINLEVEL_OUTOF10: 0

## 2022-06-11 NOTE — PROGRESS NOTES
D: pt received 10 mg of Ritalin and mentioned that his home dose is 20 mg TID A: this RN sent secure message to Dr. Marvin Bradford, asking if we can change dose to home dose R: waiting on response from physician

## 2022-06-11 NOTE — H&P
Hospital Medicine History & Physical      PCP: Nelda Rand MD    Date of Admission: 6/11/2022    Date of Service: Pt seen/examined, with 1st encounter, on 6/11/2022 and Admitted to Inpatient     Chief Complaint:  weakness      History Of Present Illness: The patient is a 81 y.o. male who presents to [facility] with acute onset of severe weakness, per his wife he wasn't able to get up off of the couch. She described his weakness as diffuse and not unilateral. She states he wasn't confused, and the Pt does remember this episode. He denies increased sob or pain. He thinks it was due to Ca depletion causing weakness, which he says he has had before. When I saw him later he stated he felt better. ED workup  Vitals - temp 100.3, 92% on room air, bp soft in the 80s/50s  Pertinent labs: Na 132, lactic acid 2.3, wbc 22.7, rapid flu and covid negative  Imaging - cxr with opacities rt base      Past Medical History:        Diagnosis Date    Anxiety     Bipolar 1 disorder ([address]     Community acquired bacterial pneumonia 6/12/2015    COPD (chronic obstructive pulmonary disease) ([address]     Depression     Fibromyalgia     Headache(784.0)     Hyperlipidemia     Manic depressive disease manic phase ([address]     On home O2     PTSD (post-traumatic stress disorder)     Seizures ([address]     Tardive dyskinesia        Past Surgical History:        Procedure Laterality Date    MOUTH SURGERY  05/2013       Medications Prior to Admission:    Prior to Admission medications    Medication Sig Start Date End Date Taking? Authorizing Provider   methylphenidate (RITALIN) 10 MG tablet Take 10 mg by mouth 3 times daily. Historical Provider, MD   OXYGEN Inhale 2 L/min into the lungs as needed    Historical Provider, MD   clonazePAM (KLONOPIN) 0.5 MG tablet Take by mouth. Historical Provider, MD   cyclobenzaprine (FLEXERIL) 10 MG tablet Take 10 mg by mouth 3 times daily as needed 1/23/19   Historical Provider, MD   triazolam (HALCION) 0.25 MG tablet Take 2 tablets by mouth 2 times daily. 9 pm and 2 am 2/4/20   Historical Provider, MD   albuterol sulfate HFA (PROVENTIL HFA) 108 (90 Base) MCG/ACT inhaler Inhale 2 puffs into the lungs every 6 hours as needed 10/16/18   Historical Provider, MD   doxepin (SINEQUAN) 25 MG capsule Take 25 mg by mouth nightly     Historical Provider, MD   budesonide-formoterol (SYMBICORT) 80-4.5 MCG/ACT AERO Inhale 2 puffs into the lungs 2 times daily 3/16/19   DANDRE Amaya - NP   butalbital-acetaminophen-caffeine (FIORICET, ESGIC) -40 MG per tablet Take 1 tablet by mouth every 4 hours as needed for Headaches    Historical Provider, MD   omeprazole (PRILOSEC) 40 MG delayed release capsule Take 40 mg by mouth 2 times daily     Historical Provider, MD   clonazePAM (KLONOPIN) 0.5 MG tablet Take 0.5 mg by mouth daily as needed for Anxiety. Historical Provider, MD   cloNIDine (CATAPRES) 0.1 MG tablet Take 0.2 mg by mouth every 4 hours as needed for High Blood Pressure (anxiety / tremor)     Historical Provider, MD   ondansetron (ZOFRAN) 4 MG tablet Take 1 tablet by mouth every 8 hours as needed for Nausea or Vomiting. 1/29/15   Klever Lopes MD       Allergies:  Prednisone, Serotonin reuptake inhibitors (ssris), and Tricyclic antidepressants    Social History:      TOBACCO:   reports that he has been smoking cigarettes. He has a 21.00 pack-year smoking history. He has never used smokeless tobacco.  ETOH:   reports no history of alcohol use. Family History:          Problem Relation Age of Onset    Mental Illness Mother     Mental Illness Father        REVIEW OF SYSTEMS:   Positive for weakness and as noted in the HPI. All other systems reviewed and negative.     PHYSICAL EXAM:    BP (!) 93/59   Pulse 73   Temp 99.3 °F (37.4 °C)   Resp 29   Wt 161 lb (73 kg)   SpO2 95%   BMI 24.48 kg/m²     General appearance: No apparent distress, cooperative. HEENT Normal cephalic, atraumatic without obvious deformity. PERRL. EOM. Conjunctivae/corneas clear. Neck: Supple, No jugular venous distention/bruits. Trachea midline   Lungs: Clear to auscultation, bilaterally without Rales/Wheezes/Rhonchi without accessory muscle use. Heart: Regular rate and rhythm with Normal S1/S2 without murmurs, rubs or gallops  Abdomen: Soft, non-tender or non-distended without rigidity or guarding and positive bowel sounds all four quadrants. Extremities: No clubbing, cyanosis, or edema bilaterally. Skin: Skin color, texture, turgor normal.  No rashes or lesions. Neurologic: Alert and oriented X 3, neurovascularly intact with sensory/motor intact upper extremities/lower extremities, bilaterally. Grossly non-focal.  Mental status: Alert, oriented, thought content appropriate. Peripheral Pulses: +3 Easily felt, not easily obliterated with pressure  Cap refill  +2 sec    CBC   Recent Labs     06/11/22  0521   WBC 22.7*   HGB 14.0   HCT 41.7         RENAL  Recent Labs     06/11/22  0521   *   K 4.5   CL 97*   CO2 20*   BUN 11   CREATININE 0.8     LFT'S  Recent Labs     06/11/22 0521   AST 14*   ALT 10   BILITOT <0.2   ALKPHOS 87     COAG  No results for input(s): INR in the last 72 hours.   CARDIAC ENZYMES  Recent Labs     06/11/22 0521   TROPONINI <0.01       U/A:    Lab Results   Component Value Date    COLORU YELLOW 03/09/2022    WBCUA 4 06/05/2016    RBCUA 2 06/05/2016    CLARITYU Clear 03/09/2022    SPECGRAV 1.025 03/09/2022    LEUKOCYTESUR Negative 03/09/2022    BLOODU Negative 03/09/2022    GLUCOSEU Negative 03/09/2022    GLUCOSEU NEGATIVE 04/27/2012       ABG    Lab Results   Component Value Date    WIX4ICC 25.4 10/10/2013    BEART -1.4 10/10/2013    Z0FLUMKF 93.4 10/10/2013    PHART 7.301 10/10/2013    THGBART 13.8 07/17/2012    DVV0DWT 53.2

## 2022-06-11 NOTE — PROGRESS NOTES
D: pt's BP is still running lower, 91/59, no iv fluids being administered at this time.  A: this RN sent secure message to Dr. Rossi Kumari R: will continue to monitor pt

## 2022-06-11 NOTE — ED PROVIDER NOTES
Emergency Physician Note        Note Open Time: 8:11 AM EDT    Chief Complaint  Fatigue       History of Present Illness  Bell Alvarez is a 81 y.o. male who presents to the ED for fatigue. Patient reports some muscle weakness and fatigue over the evening and into the night. No reported fevers or chest pain. Patient does have baseline oxygen requirement related to COPD. Patient denies any known exposure to COVID. 10 systems reviewed, pertinent positives per HPI otherwise noted to be negative    I have reviewed the following from the nursing documentation:      Prior to Admission medications    Medication Sig Start Date End Date Taking? Authorizing Provider   methylphenidate (RITALIN) 10 MG tablet Take 10 mg by mouth 3 times daily. Historical Provider, MD   OXYGEN Inhale 2 L/min into the lungs as needed    Historical Provider, MD   clonazePAM (KLONOPIN) 0.5 MG tablet Take by mouth. Historical Provider, MD   cyclobenzaprine (FLEXERIL) 10 MG tablet Take 10 mg by mouth 3 times daily as needed 1/23/19   Historical Provider, MD   triazolam (HALCION) 0.25 MG tablet Take 2 tablets by mouth 2 times daily. 9 pm and 2 am 2/4/20   Historical Provider, MD   albuterol sulfate HFA (PROVENTIL HFA) 108 (90 Base) MCG/ACT inhaler Inhale 2 puffs into the lungs every 6 hours as needed 10/16/18   Historical Provider, MD   doxepin (SINEQUAN) 25 MG capsule Take 25 mg by mouth nightly     Historical Provider, MD   budesonide-formoterol (SYMBICORT) 80-4.5 MCG/ACT AERO Inhale 2 puffs into the lungs 2 times daily 3/16/19   DANDRE Amanda - NP   butalbital-acetaminophen-caffeine (FIORICET, ESGIC) -40 MG per tablet Take 1 tablet by mouth every 4 hours as needed for Headaches    Historical Provider, MD   omeprazole (PRILOSEC) 40 MG delayed release capsule Take 40 mg by mouth 2 times daily     Historical Provider, MD   clonazePAM (KLONOPIN) 0.5 MG tablet Take 0.5 mg by mouth daily as needed for Anxiety. Historical Provider, MD   cloNIDine (CATAPRES) 0.1 MG tablet Take 0.2 mg by mouth every 4 hours as needed for High Blood Pressure (anxiety / tremor)     Historical Provider, MD   ondansetron (ZOFRAN) 4 MG tablet Take 1 tablet by mouth every 8 hours as needed for Nausea or Vomiting. 1/29/15   Musa Higginbotham MD       Allergies as of 06/11/2022 - Fully Reviewed 06/11/2022   Allergen Reaction Noted    Prednisone Anxiety 03/15/2019    Serotonin reuptake inhibitors (ssris) Anxiety 99/29/8045    Tricyclic antidepressants Anxiety 03/02/2014       Past Medical History:   Diagnosis Date    Anxiety     Bipolar 1 disorder ([facility]     Community acquired bacterial pneumonia 6/12/2015    COPD (chronic obstructive pulmonary disease) ([facility]     Depression     Fibromyalgia     Headache(784.0)     Hyperlipidemia     Manic depressive disease manic phase ([facility]     On home O2     PTSD (post-traumatic stress disorder)     Seizures ([facility]     Tardive dyskinesia         Surgical History:   Past Surgical History:   Procedure Laterality Date    MOUTH SURGERY  05/2013        Family History:    Family History   Problem Relation Age of Onset    Mental Illness Mother     Mental Illness Father        Social History     Socioeconomic History    Marital status:      Spouse name: Not on file    Number of children: Not on file    Years of education: Not on file    Highest education level: Not on file   Occupational History    Not on file   Tobacco Use    Smoking status: Current Every Day Smoker     Packs/day: 0.50     Years: 42.00     Pack years: 21.00     Types: Cigarettes    Smokeless tobacco: Never Used   Substance and Sexual Activity    Alcohol use: No     Comment: hasn't drunk in 10-12 years    Drug use: No    Sexual activity: Not Currently     Partners: Female     Comment:    Other Topics Concern    Not on file   Social History Narrative    Not on file     Social Determinants of Health     Financial Resource Strain:     Difficulty of Paying Living Expenses: Not on file   Food Insecurity:     Worried About Running Out of Food in the Last Year: Not on file    Lindy of Food in the Last Year: Not on file   Transportation Needs:     Lack of Transportation (Medical): Not on file    Lack of Transportation (Non-Medical): Not on file   Physical Activity:     Days of Exercise per Week: Not on file    Minutes of Exercise per Session: Not on file   Stress:     Feeling of Stress : Not on file   Social Connections:     Frequency of Communication with Friends and Family: Not on file    Frequency of Social Gatherings with Friends and Family: Not on file    Attends Rastafarian Services: Not on file    Active Member of [address] [place] [address] or Organizations: Not on file    Attends Club or Organization Meetings: Not on file    Marital Status: Not on file   Intimate Partner Violence:     Fear of Current or Ex-Partner: Not on file    Emotionally Abused: Not on file    Physically Abused: Not on file    Sexually Abused: Not on file   Housing Stability:     Unable to Pay for Housing in the Last Year: Not on file    Number of [place] in the Last Year: Not on file    Unstable Housing in the Last Year: Not on file       Nursing notes reviewed. ED Triage Vitals [06/11/22 0451]   Enc Vitals Group      BP 85/60      Heart Rate 93      Resp 14      Temp 100.3 °F (37.9 °C)      Temp Source Oral      SpO2 92 %      Weight 161 lb (73 kg)      Height       Head Circumference       Peak Flow       Pain Score       Pain Loc       Pain Edu? Excl. in [address]? GENERAL:  Awake, alert. Well developed, well nourished with no apparent distress. HENT:  Normocephalic, Atraumatic, moist mucous membranes. EYES:  Pupils equal round and reactive to light, Conjunctiva normal, extraocular movements normal.  NECK:  No meningeal signs, Supple. CHEST:  Regular rate and rhythm, chest wall non-tender.    LUNGS:  Clear to auscultation bilaterally. ABDOMEN:  Soft, non-tender, no rebound, rigidity or guarding, non-distended, normal bowel sounds. No costovertebral angle tenderness to palpation. BACK:  No tenderness. EXTREMITIES:  Normal range of motion, no edema, no bony tenderness, no deformity, distal pulses present. SKIN: Warm, dry and intact. NEUROLOGIC: Normal mental status. Moving all extremities to command. LABS and DIAGNOSTIC RESULTS  EKG  The Ekg interpreted by me shows  normal sinus rhythm with a rate of 79  Axis is   Normal  QTc is  normal  Intervals and Durations are unremarkable. ST Segments: no acute change  Delta waves, Brugada Syndrome, and Short CO are not present. No significant change from prior EKG dated 25 jan 2021    RADIOLOGY  X-RAYS:  I have reviewed radiologic plain film image(s). ALL OTHER NON-PLAIN FILM IMAGES SUCH AS CT, ULTRASOUND AND MRI HAVE BEEN READ BY THE RADIOLOGIST. XR CHEST PORTABLE   Final Result   Hazy opacity in the right mid chest with right basilar opacities more than   left. Could represent developing pneumonia or aspiration pneumonitis in the   right lower lobe.               LABS  Labs Reviewed   CBC WITH AUTO DIFFERENTIAL - Abnormal; Notable for the following components:       Result Value    WBC 22.7 (*)     RDW 15.5 (*)     Neutrophils Absolute 20.8 (*)     Lymphocytes Absolute 0.8 (*)     All other components within normal limits   COMPREHENSIVE METABOLIC PANEL W/ REFLEX TO MG FOR LOW K - Abnormal; Notable for the following components:    Sodium 132 (*)     Chloride 97 (*)     CO2 20 (*)     Glucose 101 (*)     Total Protein 6.2 (*)     AST 14 (*)     All other components within normal limits   LACTATE, SEPSIS - Abnormal; Notable for the following components:    Lactic Acid, Sepsis 2.3 (*)     All other components within normal limits   COVID-19, RAPID   RAPID INFLUENZA A/B ANTIGENS   CULTURE, BLOOD 1   CULTURE, BLOOD 2   TROPONIN   BRAIN NATRIURETIC PEPTIDE   LACTATE, SEPSIS MEDICAL DECISION MAKING        Patient's COVID test is negative and the x-ray and laboratory work are most consistent with a right-sided pneumonia. The total Critical Care time is 45 minutes which excludes separately billable procedures. The critical care was concerning IV fluid bolus as well as supplemental oxygen for treatment of sepsis and pneumonia with hypoxia. This time is exclusive of any time documented by any other providers. I spoke with the hospitalist. We thoroughly discussed the history, physical exam, laboratory and imaging studies, as well as, emergency department course. Based upon that discussion, we've decided to admit Martha Martinez for further observation and evaluation of Aki Pal's dyspnea. As I have deemed necessary from their history, physical, and studies, I have considered and evaluated Martha Martinez for the following diagnoses:  ACUTE CORONARY SYNDROME, CHRONIC OBSTRUCTIVE PULMONARY DISEASE, CONGESTIVE HEART FAILURE, PERICARDIAL TAMPONADE, PNEUMONIA, PNEUMOTHORAX, PULMONARY EMBOLISM, SEPSIS, and THORACIC DISSECTION. FINAL IMPRESSION  1. Pneumonia of right lung due to infectious organism, unspecified part of lung    2. Sepsis, due to unspecified organism, unspecified whether acute organ dysfunction present ([facility] [address]        Vitals:  Blood pressure (!) 93/59, pulse 73, temperature 99.3 °F (37.4 °C), resp. rate 29, weight 161 lb (73 kg), SpO2 95 %. Disposition  Pt is in stable condition upon Admit to telemetry. This chart was generated using the [address] [place] [address] dictation system. I created this record but it may contain dictation errors.           Anika Mackey MD  06/11/22 6130

## 2022-06-11 NOTE — ED NOTES
Pt was notified of admission. Pt is upset regarding admission, he was explained his diagnosis for admission. He is now agitated regarding being admitted.      Ashok Calderón RN  06/11/22 1027       Shavon Gu RN  06/11/22 5293

## 2022-06-11 NOTE — PROGRESS NOTES
Pt arrived to unit, able to ambulate safely to bathroom. Vitals taken. Pt oriented to room and call light.

## 2022-06-11 NOTE — ED NOTES

## 2022-06-11 NOTE — PROGRESS NOTES
D: pt's hand is burning/painful at iv site.  Azithromycin slowed down and iv fluids y-d into site in case the pain was from antibiotic, pt didn't tolerate well A: this RN removed iv R: will attempt to regain iv access

## 2022-06-11 NOTE — ED TRIAGE NOTES
Pt arrived to ED via EMS for a complaint of weakness. Pt states that sometimes he experiences partial paralysis of his limbs and was experiencing weakness in his legs for \"hours\" tonight. Per EMS, pt was able to walk to stretcher from his home couch. Pt states that it usually resolves within a few hours, but tonight it wasn't resolving. Pt states that the weakness started yesterday afternoon. Pt denies pain or any other complaints. Pt states that he thinks his \"psychiatric problems may contribute\". VS noted and stable. Patient A&Ox4. Respirations easy and unlabored. Skin warm and dry and appropriate for ethnicity. No acute distress noted at this time.  Patient placed on continuous telemetry and pulse ox upon arrival to room. '

## 2022-06-12 VITALS
HEART RATE: 78 BPM | SYSTOLIC BLOOD PRESSURE: 128 MMHG | WEIGHT: 157.85 LBS | OXYGEN SATURATION: 93 % | RESPIRATION RATE: 18 BRPM | TEMPERATURE: 97.9 F | BODY MASS INDEX: 23.92 KG/M2 | DIASTOLIC BLOOD PRESSURE: 69 MMHG | HEIGHT: 68 IN

## 2022-06-12 LAB
ANION GAP SERPL CALCULATED.3IONS-SCNC: 11 MMOL/L (ref 3–16)
BASOPHILS ABSOLUTE: 0.1 K/UL (ref 0–0.2)
BASOPHILS RELATIVE PERCENT: 0.7 %
BUN BLDV-MCNC: 9 MG/DL (ref 7–20)
CALCIUM SERPL-MCNC: 8.6 MG/DL (ref 8.3–10.6)
CHLORIDE BLD-SCNC: 107 MMOL/L (ref 99–110)
CO2: 25 MMOL/L (ref 21–32)
CREAT SERPL-MCNC: 0.7 MG/DL (ref 0.8–1.3)
EOSINOPHILS ABSOLUTE: 0.4 K/UL (ref 0–0.6)
EOSINOPHILS RELATIVE PERCENT: 4.1 %
GFR AFRICAN AMERICAN: >60
GFR NON-AFRICAN AMERICAN: >60
GLUCOSE BLD-MCNC: 127 MG/DL (ref 70–99)
HCT VFR BLD CALC: 36.7 % (ref 40.5–52.5)
HEMOGLOBIN: 12.8 G/DL (ref 13.5–17.5)
LYMPHOCYTES ABSOLUTE: 2.1 K/UL (ref 1–5.1)
LYMPHOCYTES RELATIVE PERCENT: 21.1 %
MCH RBC QN AUTO: 31.6 PG (ref 26–34)
MCHC RBC AUTO-ENTMCNC: 34.8 G/DL (ref 31–36)
MCV RBC AUTO: 90.6 FL (ref 80–100)
MONOCYTES ABSOLUTE: 0.5 K/UL (ref 0–1.3)
MONOCYTES RELATIVE PERCENT: 5.3 %
NEUTROPHILS ABSOLUTE: 6.7 K/UL (ref 1.7–7.7)
NEUTROPHILS RELATIVE PERCENT: 68.8 %
PDW BLD-RTO: 15.4 % (ref 12.4–15.4)
PLATELET # BLD: 148 K/UL (ref 135–450)
PMV BLD AUTO: 8.9 FL (ref 5–10.5)
POTASSIUM REFLEX MAGNESIUM: 3.8 MMOL/L (ref 3.5–5.1)
PROCALCITONIN: 0.34 NG/ML (ref 0–0.15)
RBC # BLD: 4.05 M/UL (ref 4.2–5.9)
SODIUM BLD-SCNC: 143 MMOL/L (ref 136–145)
WBC # BLD: 9.8 K/UL (ref 4–11)

## 2022-06-12 PROCEDURE — 2580000003 HC RX 258: Performed by: FAMILY MEDICINE

## 2022-06-12 PROCEDURE — 80048 BASIC METABOLIC PNL TOTAL CA: CPT

## 2022-06-12 PROCEDURE — 6370000000 HC RX 637 (ALT 250 FOR IP): Performed by: FAMILY MEDICINE

## 2022-06-12 PROCEDURE — 6360000002 HC RX W HCPCS: Performed by: FAMILY MEDICINE

## 2022-06-12 PROCEDURE — 85025 COMPLETE CBC W/AUTO DIFF WBC: CPT

## 2022-06-12 PROCEDURE — 94760 N-INVAS EAR/PLS OXIMETRY 1: CPT

## 2022-06-12 PROCEDURE — 94640 AIRWAY INHALATION TREATMENT: CPT

## 2022-06-12 PROCEDURE — 6370000000 HC RX 637 (ALT 250 FOR IP): Performed by: NURSE PRACTITIONER

## 2022-06-12 PROCEDURE — 84145 PROCALCITONIN (PCT): CPT

## 2022-06-12 PROCEDURE — 36415 COLL VENOUS BLD VENIPUNCTURE: CPT

## 2022-06-12 PROCEDURE — 2700000000 HC OXYGEN THERAPY PER DAY

## 2022-06-12 RX ORDER — CLONIDINE HYDROCHLORIDE 0.1 MG/1
0.1 TABLET ORAL 2 TIMES DAILY
Qty: 60 TABLET | Refills: 3 | Status: SHIPPED | OUTPATIENT
Start: 2022-06-12

## 2022-06-12 RX ORDER — AMOXICILLIN AND CLAVULANATE POTASSIUM 875; 125 MG/1; MG/1
1 TABLET, FILM COATED ORAL 2 TIMES DAILY
Qty: 8 TABLET | Refills: 0 | Status: SHIPPED | OUTPATIENT
Start: 2022-06-12 | End: 2022-06-16

## 2022-06-12 RX ORDER — DIPHENHYDRAMINE HCL 25 MG
25 TABLET ORAL EVERY 6 HOURS PRN
Status: DISCONTINUED | OUTPATIENT
Start: 2022-06-12 | End: 2022-06-12 | Stop reason: HOSPADM

## 2022-06-12 RX ORDER — SACCHAROMYCES BOULARDII 250 MG
250 CAPSULE ORAL 2 TIMES DAILY
Qty: 20 CAPSULE | Refills: 0 | Status: SHIPPED | OUTPATIENT
Start: 2022-06-12 | End: 2022-06-22

## 2022-06-12 RX ORDER — DIPHENHYDRAMINE HCL 25 MG
25 TABLET ORAL ONCE
Status: COMPLETED | OUTPATIENT
Start: 2022-06-12 | End: 2022-06-12

## 2022-06-12 RX ADMIN — DIPHENHYDRAMINE HCL 25 MG: 25 TABLET ORAL at 00:56

## 2022-06-12 RX ADMIN — DIPHENHYDRAMINE HCL 25 MG: 25 TABLET ORAL at 10:06

## 2022-06-12 RX ADMIN — ACETAMINOPHEN 650 MG: 325 TABLET ORAL at 04:20

## 2022-06-12 RX ADMIN — MOMETASONE FUROATE AND FORMOTEROL FUMARATE DIHYDRATE 2 PUFF: 200; 5 AEROSOL RESPIRATORY (INHALATION) at 08:32

## 2022-06-12 RX ADMIN — METHYLPHENIDATE HYDROCHLORIDE 20 MG: 5 TABLET ORAL at 09:07

## 2022-06-12 RX ADMIN — PIPERACILLIN AND TAZOBACTAM 3375 MG: 3; .375 INJECTION, POWDER, LYOPHILIZED, FOR SOLUTION INTRAVENOUS at 13:38

## 2022-06-12 RX ADMIN — ACETAMINOPHEN 650 MG: 325 TABLET ORAL at 10:06

## 2022-06-12 RX ADMIN — METHYLPHENIDATE HYDROCHLORIDE 20 MG: 5 TABLET ORAL at 13:38

## 2022-06-12 RX ADMIN — PIPERACILLIN AND TAZOBACTAM 3375 MG: 3; .375 INJECTION, POWDER, LYOPHILIZED, FOR SOLUTION INTRAVENOUS at 05:54

## 2022-06-12 RX ADMIN — ACYCLOVIR 800 MG: 800 TABLET ORAL at 09:07

## 2022-06-12 RX ADMIN — CLONAZEPAM 1 MG: 1 TABLET ORAL at 12:04

## 2022-06-12 ASSESSMENT — PAIN SCALES - GENERAL
PAINLEVEL_OUTOF10: 4
PAINLEVEL_OUTOF10: 3
PAINLEVEL_OUTOF10: 8

## 2022-06-12 ASSESSMENT — PAIN DESCRIPTION - DESCRIPTORS: DESCRIPTORS: STABBING;SHARP

## 2022-06-12 ASSESSMENT — PAIN - FUNCTIONAL ASSESSMENT: PAIN_FUNCTIONAL_ASSESSMENT: ACTIVITIES ARE NOT PREVENTED

## 2022-06-12 ASSESSMENT — PAIN DESCRIPTION - LOCATION: LOCATION: NECK

## 2022-06-12 NOTE — PLAN OF CARE
Problem: Discharge Planning  Goal: Discharge to home or other facility with appropriate resources  6/11/2022 2344 by Debbi Reyes RN  Outcome: Progressing     Problem: Pain  Goal: Verbalizes/displays adequate comfort level or baseline comfort level  6/11/2022 2344 by Debbi Reyes RN  Outcome: Progressing

## 2022-06-12 NOTE — PROGRESS NOTES
Reviewed discharge and follow up information with patient and wife, answered all questions. Patient ambulatory, without s/s of distress at discharge. [facility]

## 2022-06-12 NOTE — PLAN OF CARE
Problem: Discharge Planning  Goal: Discharge to home or other facility with appropriate resources  6/12/2022 1222 by Sarita Sosa RN  Outcome: Completed  6/12/2022 1222 by Sarita Sosa RN  Outcome: Progressing  6/11/2022 2344 by Katie Thompson RN  Outcome: Progressing     Problem: Pain  Goal: Verbalizes/displays adequate comfort level or baseline comfort level  6/12/2022 1222 by Sarita Sosa RN  Outcome: Completed  6/12/2022 1222 by Sarita Sosa RN  Outcome: Progressing  6/11/2022 2344 by Katie Thompson RN  Outcome: Progressing     Problem: ABCDS Injury Assessment  Goal: Absence of physical injury  6/12/2022 1222 by Sarita Sosa RN  Outcome: Completed  6/12/2022 1222 by Sarita Sosa RN  Outcome: Progressing

## 2022-06-12 NOTE — CARE COORDINATION
Discharge order noted. Chart reviewed. Patient IPTA, lives in home with wife. Plan is to return home. No PT/OT ordered. No additional discharge needs identified at this time.     Electronically signed by Jena Kunz RN MSN on 6/12/2022 at 1:02 PM

## 2022-06-12 NOTE — PROGRESS NOTES
Patient alert and oriented x4, VSS, sitting up in bed eating breakfast. Patient c/o sneezing, runny nose and chronic neck pain, denies n/v, diarrhea, SOB. Patient is UAL and tolerating well, denies needs at this time. Bed in lowest and locked position with non-slip socks on, call light in reach. Will continue to monitor pt needs.

## 2022-06-12 NOTE — DISCHARGE SUMMARY
Hospital Medicine Discharge Summary    Patient: Ahsan Tristan     Gender: male  : 1950   Age: 81 y.o. MRN: [id number]    Code Status: Full Code     Primary Care Provider: Gianluca Fernandes MD    Admit Date: 2022   Discharge Date:   2022    Admitting Physician: Milagro Hendricks DO  Discharge Physician: Milagro Hendricks DO     Discharge Diagnoses: Active Hospital Problems    Diagnosis Date Noted    PNA (pneumonia) [J18.9] 2022     Priority: Medium       Hospital Course:   a 81 y.o. male who presented to [facility] with acute onset of severe weakness, per his wife he wasn't able to get up off of the couch. She described his weakness as diffuse and not unilateral. She states he wasn't confused, and the Pt does remember this episode. He denies increased sob or pain. He thinks it was due to Ca depletion causing weakness, which he says he has had before. When I saw him later he stated he felt better.     ED workup  Vitals - temp 100.3, 92% on room air, bp soft in the 80s/50s  Pertinent labs: Na 132, lactic acid 2.3, wbc 22.7, rapid flu and covid negative  Imaging - cxr with opacities rt base  Work up completed, and improved with treatment as below. was discharged today in stable condition. Sepsis, POA -resolved  - with criteria: leukocytosis, lactic acidosis, fevers; source is PNA  - blood cultures negative     CAP  - empiric zosyn to cover for aspiration, discharged with augmentin      Chronic conditions - continue home meds unless otherwise stated  ptsd  John  Insomnia   - on multiple sedating and psychotropic meds    Disposition:  Home    Exam:     /69   Pulse 78   Temp 97.9 °F (36.6 °C) (Oral)   Resp 18   Ht 5' 7.5\" (1.715 m)   Wt 157 lb 13.6 oz (71.6 kg)   SpO2 93%   BMI 24.36 kg/m²     General appearance:  No apparent distress, appears stated age and cooperative. HEENT:  Normal cephalic, atraumatic without obvious deformity.  Pupils equal, round, and reactive to light. Extra ocular muscles intact. Conjunctivae/corneas clear. Neck: Supple, with full range of motion. No jugular venous distention. Trachea midline. Respiratory:  Normal respiratory effort. Clear to auscultation, bilaterally without Rales/Wheezes/Rhonchi. Cardiovascular:  Regular rate and rhythm with normal S1/S2 without murmurs, rubs or gallops. Abdomen: Soft, non-tender, non-distended with normal bowel sounds. Musculoskeletal:  No clubbing, cyanosis or edema bilaterally. Full range of motion without deformity. Skin: Skin color, texture, turgor normal.  No rashes or lesions. Neurologic:  Neurovascularly intact without any focal sensory/motor deficits. Cranial nerves: II-XII intact, grossly non-focal.  Psychiatric:  Alert and oriented, thought content appropriate, normal insight    Consults:     None    Labs: For convenience and continuity at follow-up the following most recent labs are provided:    Lab Results   Component Value Date    WBC 9.8 06/12/2022    HGB 12.8 06/12/2022    HCT 36.7 06/12/2022    MCV 90.6 06/12/2022     06/12/2022     06/12/2022    K 3.8 06/12/2022     06/12/2022    CO2 25 06/12/2022    BUN 9 06/12/2022    CREATININE 0.7 06/12/2022    CALCIUM 8.6 06/12/2022    PHOS 1.9 01/01/2021    ALKPHOS 87 06/11/2022    ALT 10 06/11/2022    AST 14 06/11/2022    BILITOT <0.2 06/11/2022    LABALBU 3.8 06/11/2022     Lab Results   Component Value Date    INR 0.89 04/19/2015    INR 0.87 10/10/2013    INR 0.85 06/27/2013       Radiology:  XR CHEST PORTABLE   Final Result   Hazy opacity in the right mid chest with right basilar opacities more than   left. Could represent developing pneumonia or aspiration pneumonitis in the   right lower lobe.              Discharge Medications:   Current Discharge Medication List      START taking these medications    Details   amoxicillin-clavulanate (AUGMENTIN) 875-125 MG per tablet Take 1 tablet by mouth 2 times daily for 4 days  Qty: 8 tablet, Refills: 0      saccharomyces boulardii (FLORASTOR) 250 MG capsule Take 1 capsule by mouth 2 times daily for 10 days  Qty: 20 capsule, Refills: 0           Current Discharge Medication List      CONTINUE these medications which have CHANGED    Details   cloNIDine (CATAPRES) 0.1 MG tablet Take 1 tablet by mouth 2 times daily  Qty: 60 tablet, Refills: 3           Current Discharge Medication List      CONTINUE these medications which have NOT CHANGED    Details   acyclovir (ZOVIRAX) 800 MG tablet Take 800 mg by mouth 2 times daily      clonazePAM (KLONOPIN) 1 MG tablet Take 1 mg by mouth in the morning, at noon, and at bedtime. AM-Afternoon- PM      LORazepam (ATIVAN) 1 MG tablet Take 1 mg by mouth at bedtime. methylphenidate (RITALIN) 20 MG tablet Take 40 mg by mouth 3 times daily. OXYGEN Inhale 2 L/min into the lungs as needed      albuterol sulfate HFA (PROVENTIL HFA) 108 (90 Base) MCG/ACT inhaler Inhale 2 puffs into the lungs every 6 hours as needed      doxepin (SINEQUAN) 25 MG capsule Take 25 mg by mouth nightly       budesonide-formoterol (SYMBICORT) 80-4.5 MCG/ACT AERO Inhale 2 puffs into the lungs 2 times daily  Qty: 1 Inhaler, Refills: 3      butalbital-acetaminophen-caffeine (FIORICET, ESGIC) -40 MG per tablet Take 1 tablet by mouth every 4 hours as needed for Headaches      omeprazole (PRILOSEC) 40 MG delayed release capsule Take 40 mg by mouth 2 times daily       ondansetron (ZOFRAN) 4 MG tablet Take 1 tablet by mouth every 8 hours as needed for Nausea or Vomiting. Qty: 30 tablet, Refills: 0           Current Discharge Medication List      STOP taking these medications       cyclobenzaprine (FLEXERIL) 10 MG tablet Comments:   Reason for Stopping:         triazolam (HALCION) 0.25 MG tablet Comments:   Reason for Stopping:                Follow-up appointments:  one week    Provider Follow-up:    pcp    Condition at Discharge:  Stable    The patient was seen and

## 2022-06-12 NOTE — DISCHARGE INSTR - DIET
Good nutrition is important when healing from an illness, injury, or surgery. Follow any nutrition recommendations given to you during your hospital stay. If you were given an oral nutrition supplement while in the hospital, continue to take this supplement at home. You can take it with meals, in-between meals, and/or before bedtime. These supplements can be purchased at most local grocery stores, pharmacies, and chain [employer]-stores. If you have any questions about your diet or nutrition, call the hospital and ask for the dietitian.     Regular Diet as tolerated

## 2022-06-15 LAB
BLOOD CULTURE, ROUTINE: NORMAL
CULTURE, BLOOD 2: NORMAL

## 2022-09-30 NOTE — PROGRESS NOTES
Occupational Therapy   Occupational Therapy Initial Assessment  Date: 3/9/2022   Patient Name: Laurie Ocasio  MRN: [id number]     : 1950    Date of Service: 3/9/2022    Discharge Recommendations:  Home with assist PRN  OT Equipment Recommendations  Equipment Needed: Dana Ocasio scored a 22/24 on the AM-PAC ADL Inpatient form. At this time, no further OT is recommended upon discharge due to anticipated return to baseline. Recommend patient returns to prior setting with prior services. Assessment   Performance deficits / Impairments: Decreased functional mobility ; Decreased ADL status; Decreased safe awareness;Decreased endurance;Decreased strength;Decreased high-level IADLs  Assessment: Pt is a 82 yo M admtted with SOB, fatigue, and cough. PMHx: anxiety, BPD, pneumonia, COPD, manic depression, PTSD, seizures. PTA, pt lives at home w/ his wife where he was independent in self-care, fxl mobility, and homemaking. He is just slightly below baseline at this time, but able to complete fxl transfers and mobility w/ SBA/CGA without device, LB dressing w/ setup. Anticipate overall SBA for full ADLs. Will continue to see on acute in order to address the above deficits and maximize pt's fxl performance. Anticipate safe return home w/ assist PRN, no further OT needs at d/c. Treatment Diagnosis: Decreased: ADLs, fxl transfers/mobility  Prognosis: Good  Decision Making: Medium Complexity  OT Education: OT Role;Transfer Training;Plan of Care;Energy Conservation  REQUIRES OT FOLLOW UP: Yes  Activity Tolerance  Activity Tolerance: Patient Tolerated treatment well  Activity Tolerance: Pt was initially on RA w/ 2L O2 NC beside him in the bed. O2 sats were 92% at rest but desatted to 88% following ambulation, so replaced 2L O2 via NC and recovered to low 90s. Pt reports he wears O2 as needed at home  Safety Devices  Safety Devices in place: Yes  Type of devices: Call light within reach; Chair alarm in Device Mode: Continuous  Pulse Oximeter Device Location: Finger  O2 Device: Nasal cannula  O2 Flow Rate (L/min): 2 L/min  Social/Functional History  Social/Functional History  Lives With: Spouse  Type of Home: House  Home Layout: Two level,Able to Live on Main level with bedroom/bathroom  Home Access: Stairs to enter with rails  Entrance Stairs - Number of Steps: 2  Entrance Stairs - Rails: Right  Bathroom Shower/Tub: Walk-in shower,Shower chair with back  Bathroom Toilet: Standard (tub and vanity on ea side)  Bathroom Equipment: Shower chair,Grab bars in shower  Bathroom Accessibility: Not accessible  ADL Assistance: Independent  Homemaking Assistance: Independent (Meal prep, shopping, yard work, driving ind)  Ambulation Assistance: Independent  Transfer Assistance: Independent  Active : No (\"anyone that's willing\" drives him)  Occupation: Retired  Additional Comments: Denies any falls other than sliding off of the couch yesterday; Sleeps on couch       Objective   Vision: Impaired  Vision Exceptions: Wears glasses for reading  Hearing: Within functional limits    Orientation  Overall Orientation Status: Within Functional Limits     Balance  Sitting Balance: Independent  Standing Balance: Stand by assistance  Functional Mobility  Functional - Mobility Device: No device  Activity: To/from bathroom  Assist Level: Contact guard assistance  Functional Mobility Comments: Pt completed fxl mobility from bed > doorway > recliner; then from recliner <> BR w/ SBA/CGA without a device.  Slightly impulsive but no LOB noted  Toilet Transfers  Toilet - Technique: Ambulating  Equipment Used: Grab bars  Toilet Transfer: Contact guard assistance  Toilet Transfers Comments: slightly impulsive  ADL  LE Dressing: Setup (Pt changed his socks sitting EOB, somewhat effortful)  Additional Comments: Anticipate pt would be independent w/ feeding and grooming, SBA for bathing/dressing/toileting based on ROM, strength, endurance this session  Tone RUE  RUE Tone: Normotonic  Tone LUE  LUE Tone: Normotonic  Coordination  Movements Are Fluid And Coordinated: Yes     Bed mobility  Supine to Sit: Stand by assistance (elevated HOB)  Sit to Supine: Unable to assess (Sitting in recliner following session)  Transfers  Sit to stand: Stand by assistance  Stand to sit: Stand by assistance     Cognition  Overall Cognitive Status: [facility]  Cognition Comment: Pt slightly impulsive, easily irritable, but overall functional        Sensation  Overall Sensation Status: WFL (Denies any N/T)        LUE AROM (degrees)  LUE AROM : WFL  Left Hand AROM (degrees)  Left Hand AROM: WFL  RUE AROM (degrees)  RUE AROM : WFL  Right Hand AROM (degrees)  Right Hand AROM: WFL  LUE Strength  Gross LUE Strength: WFL  RUE Strength  Gross RUE Strength: WFL           Plan   Plan  Times per week: 3-5  Times per day: Daily  Current Treatment Recommendations: Endurance Training,ROM,Strengthening,Balance Training,Functional Mobility Training,Safety Education & Training,Self-Care / ADL      AM-PAC Score  AM-[facility] Inpatient Daily Activity Raw Score: 22 (03/09/22 1009)  AM-PAC Inpatient ADL T-Scale Score : 47.1 (03/09/22 1009)  ADL Inpatient CMS 0-100% Score: 25.8 (03/09/22 1009)  ADL Inpatient CMS G-Code Modifier : CJ (03/09/22 1009)    Goals  Short term goals  Time Frame for Short term goals: Prior to d/c  Short term goal 1: Pt will complete fxl transfers mod I  Short term goal 2: Pt will toilet mod I  Short term goal 3: Pt will groom sinkside mod I  Short term goal 4: Pt will bathe w/ supervision  Short term goal 5: Pt will complete LB dressing w/ supervision  Long term goals  Time Frame for Long term goals : LTG=STG  Patient Goals   Patient goals : to go home       Therapy Time   Individual Concurrent Group Co-treatment   Time In 0930         Time Out 1009         Minutes 39         Timed Code Treatment Minutes: 24 Minutes (15 min eval)       Shelbie Mantilla OTR/L [id number] normal

## 2022-11-23 ENCOUNTER — APPOINTMENT (OUTPATIENT)
Dept: CT IMAGING | Age: 72
DRG: 871 | End: 2022-11-23
Payer: COMMERCIAL

## 2022-11-23 ENCOUNTER — HOSPITAL ENCOUNTER (INPATIENT)
Age: 72
LOS: 4 days | Discharge: HOME HEALTH | DRG: 871 | End: 2022-11-27
Attending: EMERGENCY MEDICINE | Admitting: INTERNAL MEDICINE
Payer: COMMERCIAL

## 2022-11-23 DIAGNOSIS — K57.20 DIVERTICULITIS OF LARGE INTESTINE WITH PERFORATION WITHOUT BLEEDING: Primary | ICD-10-CM

## 2022-11-23 DIAGNOSIS — R65.20 SEVERE SEPSIS (HCC): ICD-10-CM

## 2022-11-23 DIAGNOSIS — A41.9 SEVERE SEPSIS (HCC): ICD-10-CM

## 2022-11-23 LAB
A/G RATIO: 1.3 (ref 1.1–2.2)
ALBUMIN SERPL-MCNC: 4 G/DL (ref 3.4–5)
ALP BLD-CCNC: 118 U/L (ref 40–129)
ALT SERPL-CCNC: 42 U/L (ref 10–40)
ANION GAP SERPL CALCULATED.3IONS-SCNC: 8 MMOL/L (ref 3–16)
AST SERPL-CCNC: 29 U/L (ref 15–37)
BASOPHILS ABSOLUTE: 0.1 K/UL (ref 0–0.2)
BASOPHILS RELATIVE PERCENT: 0.4 %
BILIRUB SERPL-MCNC: 0.3 MG/DL (ref 0–1)
BILIRUBIN URINE: NEGATIVE
BLOOD, URINE: NEGATIVE
BUN BLDV-MCNC: 14 MG/DL (ref 7–20)
CALCIUM SERPL-MCNC: 9.1 MG/DL (ref 8.3–10.6)
CHLORIDE BLD-SCNC: 101 MMOL/L (ref 99–110)
CLARITY: CLEAR
CO2: 30 MMOL/L (ref 21–32)
COLOR: YELLOW
CREAT SERPL-MCNC: 0.7 MG/DL (ref 0.8–1.3)
EOSINOPHILS ABSOLUTE: 0.1 K/UL (ref 0–0.6)
EOSINOPHILS RELATIVE PERCENT: 0.4 %
GFR SERPL CREATININE-BSD FRML MDRD: >60 ML/MIN/{1.73_M2}
GLUCOSE BLD-MCNC: 111 MG/DL (ref 70–99)
GLUCOSE URINE: NEGATIVE MG/DL
HCT VFR BLD CALC: 46.2 % (ref 40.5–52.5)
HEMOGLOBIN: 15.3 G/DL (ref 13.5–17.5)
KETONES, URINE: NEGATIVE MG/DL
LACTIC ACID: 2 MMOL/L (ref 0.4–2)
LACTIC ACID: 2.5 MMOL/L (ref 0.4–2)
LEUKOCYTE ESTERASE, URINE: NEGATIVE
LIPASE: 16 U/L (ref 13–60)
LYMPHOCYTES ABSOLUTE: 1.3 K/UL (ref 1–5.1)
LYMPHOCYTES RELATIVE PERCENT: 7.6 %
MCH RBC QN AUTO: 30.7 PG (ref 26–34)
MCHC RBC AUTO-ENTMCNC: 33.1 G/DL (ref 31–36)
MCV RBC AUTO: 92.9 FL (ref 80–100)
MICROSCOPIC EXAMINATION: NORMAL
MONOCYTES ABSOLUTE: 0.9 K/UL (ref 0–1.3)
MONOCYTES RELATIVE PERCENT: 4.9 %
NEUTROPHILS ABSOLUTE: 15.2 K/UL (ref 1.7–7.7)
NEUTROPHILS RELATIVE PERCENT: 86.7 %
NITRITE, URINE: NEGATIVE
PDW BLD-RTO: 14.7 % (ref 12.4–15.4)
PH UA: 6 (ref 5–8)
PLATELET # BLD: 199 K/UL (ref 135–450)
PMV BLD AUTO: 9.2 FL (ref 5–10.5)
POTASSIUM REFLEX MAGNESIUM: 4.8 MMOL/L (ref 3.5–5.1)
PROTEIN UA: NEGATIVE MG/DL
RBC # BLD: 4.97 M/UL (ref 4.2–5.9)
SODIUM BLD-SCNC: 139 MMOL/L (ref 136–145)
SPECIFIC GRAVITY UA: 1.01 (ref 1–1.03)
TOTAL PROTEIN: 7 G/DL (ref 6.4–8.2)
URINE REFLEX TO CULTURE: NORMAL
URINE TYPE: NORMAL
UROBILINOGEN, URINE: 0.2 E.U./DL
WBC # BLD: 17.5 K/UL (ref 4–11)

## 2022-11-23 PROCEDURE — 96375 TX/PRO/DX INJ NEW DRUG ADDON: CPT

## 2022-11-23 PROCEDURE — 80053 COMPREHEN METABOLIC PANEL: CPT

## 2022-11-23 PROCEDURE — 2580000003 HC RX 258: Performed by: INTERNAL MEDICINE

## 2022-11-23 PROCEDURE — 83690 ASSAY OF LIPASE: CPT

## 2022-11-23 PROCEDURE — 83605 ASSAY OF LACTIC ACID: CPT

## 2022-11-23 PROCEDURE — 6360000002 HC RX W HCPCS: Performed by: EMERGENCY MEDICINE

## 2022-11-23 PROCEDURE — 2060000000 HC ICU INTERMEDIATE R&B

## 2022-11-23 PROCEDURE — 87040 BLOOD CULTURE FOR BACTERIA: CPT

## 2022-11-23 PROCEDURE — 6360000002 HC RX W HCPCS: Performed by: INTERNAL MEDICINE

## 2022-11-23 PROCEDURE — 96365 THER/PROPH/DIAG IV INF INIT: CPT

## 2022-11-23 PROCEDURE — 99285 EMERGENCY DEPT VISIT HI MDM: CPT

## 2022-11-23 PROCEDURE — 96366 THER/PROPH/DIAG IV INF ADDON: CPT

## 2022-11-23 PROCEDURE — 81003 URINALYSIS AUTO W/O SCOPE: CPT

## 2022-11-23 PROCEDURE — 85025 COMPLETE CBC W/AUTO DIFF WBC: CPT

## 2022-11-23 PROCEDURE — 2580000003 HC RX 258: Performed by: EMERGENCY MEDICINE

## 2022-11-23 PROCEDURE — 6360000004 HC RX CONTRAST MEDICATION

## 2022-11-23 PROCEDURE — 36415 COLL VENOUS BLD VENIPUNCTURE: CPT

## 2022-11-23 PROCEDURE — 74177 CT ABD & PELVIS W/CONTRAST: CPT

## 2022-11-23 RX ORDER — ONDANSETRON 2 MG/ML
4 INJECTION INTRAMUSCULAR; INTRAVENOUS ONCE
Status: COMPLETED | OUTPATIENT
Start: 2022-11-23 | End: 2022-11-23

## 2022-11-23 RX ORDER — 0.9 % SODIUM CHLORIDE 0.9 %
1000 INTRAVENOUS SOLUTION INTRAVENOUS ONCE
Status: COMPLETED | OUTPATIENT
Start: 2022-11-23 | End: 2022-11-23

## 2022-11-23 RX ORDER — MORPHINE SULFATE 2 MG/ML
2 INJECTION, SOLUTION INTRAMUSCULAR; INTRAVENOUS
Status: DISCONTINUED | OUTPATIENT
Start: 2022-11-23 | End: 2022-11-27 | Stop reason: HOSPADM

## 2022-11-23 RX ORDER — SODIUM CHLORIDE 0.9 % (FLUSH) 0.9 %
10 SYRINGE (ML) INJECTION PRN
Status: DISCONTINUED | OUTPATIENT
Start: 2022-11-23 | End: 2022-11-27 | Stop reason: HOSPADM

## 2022-11-23 RX ORDER — MORPHINE SULFATE 4 MG/ML
4 INJECTION, SOLUTION INTRAMUSCULAR; INTRAVENOUS ONCE
Status: COMPLETED | OUTPATIENT
Start: 2022-11-23 | End: 2022-11-23

## 2022-11-23 RX ORDER — QUETIAPINE FUMARATE 100 MG/1
100-200 TABLET, FILM COATED ORAL NIGHTLY
COMMUNITY
Start: 2022-10-31 | End: 2022-11-23

## 2022-11-23 RX ORDER — LORAZEPAM 2 MG/ML
1 INJECTION INTRAMUSCULAR EVERY 6 HOURS PRN
Status: DISCONTINUED | OUTPATIENT
Start: 2022-11-23 | End: 2022-11-27 | Stop reason: HOSPADM

## 2022-11-23 RX ORDER — ACETAMINOPHEN 650 MG/1
650 SUPPOSITORY RECTAL EVERY 6 HOURS PRN
Status: DISCONTINUED | OUTPATIENT
Start: 2022-11-23 | End: 2022-11-27 | Stop reason: HOSPADM

## 2022-11-23 RX ORDER — POTASSIUM CHLORIDE 7.45 MG/ML
10 INJECTION INTRAVENOUS PRN
Status: DISCONTINUED | OUTPATIENT
Start: 2022-11-23 | End: 2022-11-23 | Stop reason: SDUPTHER

## 2022-11-23 RX ORDER — ONDANSETRON 2 MG/ML
4 INJECTION INTRAMUSCULAR; INTRAVENOUS EVERY 6 HOURS PRN
Status: DISCONTINUED | OUTPATIENT
Start: 2022-11-23 | End: 2022-11-27 | Stop reason: HOSPADM

## 2022-11-23 RX ORDER — 0.9 % SODIUM CHLORIDE 0.9 %
1130 INTRAVENOUS SOLUTION INTRAVENOUS ONCE
Status: COMPLETED | OUTPATIENT
Start: 2022-11-23 | End: 2022-11-23

## 2022-11-23 RX ORDER — PROMETHAZINE HYDROCHLORIDE 25 MG/1
12.5 TABLET ORAL EVERY 6 HOURS PRN
Status: DISCONTINUED | OUTPATIENT
Start: 2022-11-23 | End: 2022-11-27 | Stop reason: HOSPADM

## 2022-11-23 RX ORDER — CYCLOBENZAPRINE HCL 10 MG
10 TABLET ORAL 3 TIMES DAILY PRN
COMMUNITY
Start: 2022-10-07

## 2022-11-23 RX ORDER — MAGNESIUM SULFATE IN WATER 40 MG/ML
2000 INJECTION, SOLUTION INTRAVENOUS PRN
Status: DISCONTINUED | OUTPATIENT
Start: 2022-11-23 | End: 2022-11-27 | Stop reason: HOSPADM

## 2022-11-23 RX ORDER — SODIUM CHLORIDE 9 MG/ML
INJECTION, SOLUTION INTRAVENOUS PRN
Status: DISCONTINUED | OUTPATIENT
Start: 2022-11-23 | End: 2022-11-27 | Stop reason: HOSPADM

## 2022-11-23 RX ORDER — DOXEPIN HYDROCHLORIDE 25 MG/1
25 CAPSULE ORAL NIGHTLY
COMMUNITY

## 2022-11-23 RX ORDER — ONDANSETRON HYDROCHLORIDE 8 MG/1
8 TABLET, FILM COATED ORAL EVERY 8 HOURS PRN
COMMUNITY
Start: 2022-11-16

## 2022-11-23 RX ORDER — CETIRIZINE HYDROCHLORIDE 10 MG/1
10 TABLET ORAL DAILY PRN
COMMUNITY
Start: 2022-10-27

## 2022-11-23 RX ORDER — BUTALBITAL, ACETAMINOPHEN, CAFFEINE AND CODEINE PHOSPHATE 50; 325; 40; 30 MG/1; MG/1; MG/1; MG/1
1 CAPSULE ORAL 2 TIMES DAILY
COMMUNITY
Start: 2022-07-13 | End: 2023-01-09

## 2022-11-23 RX ORDER — POTASSIUM CHLORIDE 20 MEQ/1
40 TABLET, EXTENDED RELEASE ORAL PRN
Status: DISCONTINUED | OUTPATIENT
Start: 2022-11-23 | End: 2022-11-27 | Stop reason: HOSPADM

## 2022-11-23 RX ORDER — SODIUM CHLORIDE 0.9 % (FLUSH) 0.9 %
10 SYRINGE (ML) INJECTION EVERY 12 HOURS SCHEDULED
Status: DISCONTINUED | OUTPATIENT
Start: 2022-11-23 | End: 2022-11-27 | Stop reason: HOSPADM

## 2022-11-23 RX ORDER — ACETAMINOPHEN 325 MG/1
650 TABLET ORAL EVERY 6 HOURS PRN
Status: DISCONTINUED | OUTPATIENT
Start: 2022-11-23 | End: 2022-11-27 | Stop reason: HOSPADM

## 2022-11-23 RX ORDER — POTASSIUM CHLORIDE 7.45 MG/ML
10 INJECTION INTRAVENOUS PRN
Status: DISCONTINUED | OUTPATIENT
Start: 2022-11-23 | End: 2022-11-27 | Stop reason: HOSPADM

## 2022-11-23 RX ORDER — ENOXAPARIN SODIUM 100 MG/ML
40 INJECTION SUBCUTANEOUS DAILY
Status: DISCONTINUED | OUTPATIENT
Start: 2022-11-24 | End: 2022-11-27 | Stop reason: HOSPADM

## 2022-11-23 RX ORDER — SODIUM CHLORIDE 9 MG/ML
INJECTION, SOLUTION INTRAVENOUS CONTINUOUS
Status: DISCONTINUED | OUTPATIENT
Start: 2022-11-23 | End: 2022-11-27 | Stop reason: HOSPADM

## 2022-11-23 RX ORDER — ALBUTEROL SULFATE 90 UG/1
2 AEROSOL, METERED RESPIRATORY (INHALATION) EVERY 6 HOURS PRN
Status: DISCONTINUED | OUTPATIENT
Start: 2022-11-23 | End: 2022-11-27 | Stop reason: HOSPADM

## 2022-11-23 RX ADMIN — SODIUM CHLORIDE 1130 ML: 9 INJECTION, SOLUTION INTRAVENOUS at 20:27

## 2022-11-23 RX ADMIN — PIPERACILLIN AND TAZOBACTAM 3375 MG: 3; .375 INJECTION, POWDER, FOR SOLUTION INTRAVENOUS at 23:41

## 2022-11-23 RX ADMIN — HYDROMORPHONE HYDROCHLORIDE 0.5 MG: 1 INJECTION, SOLUTION INTRAMUSCULAR; INTRAVENOUS; SUBCUTANEOUS at 16:44

## 2022-11-23 RX ADMIN — PIPERACILLIN AND TAZOBACTAM 3375 MG: 3; .375 INJECTION, POWDER, FOR SOLUTION INTRAVENOUS at 16:56

## 2022-11-23 RX ADMIN — IOPAMIDOL 75 ML: 755 INJECTION, SOLUTION INTRAVENOUS at 15:21

## 2022-11-23 RX ADMIN — MORPHINE SULFATE 4 MG: 4 INJECTION, SOLUTION INTRAMUSCULAR; INTRAVENOUS at 14:32

## 2022-11-23 RX ADMIN — SODIUM CHLORIDE: 9 INJECTION, SOLUTION INTRAVENOUS at 19:26

## 2022-11-23 RX ADMIN — LORAZEPAM 1 MG: 2 INJECTION INTRAMUSCULAR; INTRAVENOUS at 19:26

## 2022-11-23 RX ADMIN — ONDANSETRON 4 MG: 2 INJECTION INTRAMUSCULAR; INTRAVENOUS at 14:31

## 2022-11-23 RX ADMIN — SODIUM CHLORIDE 1000 ML: 9 INJECTION, SOLUTION INTRAVENOUS at 16:58

## 2022-11-23 RX ADMIN — MORPHINE SULFATE 2 MG: 2 INJECTION, SOLUTION INTRAMUSCULAR; INTRAVENOUS at 23:42

## 2022-11-23 ASSESSMENT — PAIN DESCRIPTION - FREQUENCY: FREQUENCY: INTERMITTENT

## 2022-11-23 ASSESSMENT — PAIN DESCRIPTION - LOCATION
LOCATION: NECK
LOCATION: ABDOMEN
LOCATION: NECK

## 2022-11-23 ASSESSMENT — ENCOUNTER SYMPTOMS
DIARRHEA: 1
SORE THROAT: 0
NAUSEA: 1
ABDOMINAL PAIN: 1
CHEST TIGHTNESS: 0
CONSTIPATION: 0
SHORTNESS OF BREATH: 0
BLOOD IN STOOL: 0
RHINORRHEA: 0
VOMITING: 0

## 2022-11-23 ASSESSMENT — PAIN DESCRIPTION - DESCRIPTORS: DESCRIPTORS: ACHING

## 2022-11-23 ASSESSMENT — LIFESTYLE VARIABLES: HOW OFTEN DO YOU HAVE A DRINK CONTAINING ALCOHOL: NEVER

## 2022-11-23 ASSESSMENT — PAIN - FUNCTIONAL ASSESSMENT: PAIN_FUNCTIONAL_ASSESSMENT: ACTIVITIES ARE NOT PREVENTED

## 2022-11-23 ASSESSMENT — PAIN SCALES - GENERAL
PAINLEVEL_OUTOF10: 9
PAINLEVEL_OUTOF10: 4
PAINLEVEL_OUTOF10: 9
PAINLEVEL_OUTOF10: 7

## 2022-11-23 ASSESSMENT — PAIN DESCRIPTION - PAIN TYPE: TYPE: CHRONIC PAIN

## 2022-11-23 NOTE — PROGRESS NOTES
Pharmacy Medication Reconciliation Note     List of medications patient is currently taking is complete. Source of information:   1. Conversation with patient and patient's family at bedside  2. EMR    Notes regarding home medications:   1. Patient states he took all of his morning home medication doses today before presenting to the ER. 2. Patient on Fioricet with codeine, lorazepam, clonazepam, and methylphenidate. All fills confirmed with [facility] report. Patient filling all 4 of these meds regularly for at least the past year. 3. Patient and family state patient is no longer taking quetiapine for sleep and was switched back to doxepin by his PCP, Dr. Jarvis Alonso. No documentation of this in recent notes or on patient's dispense history.       Inga Gan PharmD  11/23/2022 5:02 PM

## 2022-11-23 NOTE — H&P
Hospital Medicine History & Physical      PCP: Mine Hu MD    Date of Admission: 11/23/2022    Chief Complaint:  Abd pain    History Of Present Illness:    Patient is a 61-year-old male with past medical history of seizures, COPD who presents to the hospital for abdominal pain. Patient mentions he has been having pain for 3 days, its mostly generalized abdomen, rates his pain 8/10 intensity, associated with nausea, he mentions he has abdominal pains in the past from diverticulitis. He has associated diarrhea as well-nonbloody. Denied chest pain shortness of breath dysuria. Past Medical History:          Diagnosis Date    Anxiety     Bipolar 1 disorder ([address]     Community acquired bacterial pneumonia 6/12/2015    COPD (chronic obstructive pulmonary disease) (HCC)     Depression     Fibromyalgia     Headache(784.0)     Hyperlipidemia     Manic depressive disease manic phase ([facility] [address]     On home O2     PTSD (post-traumatic stress disorder)     Seizures ([facility] [address]     Tardive dyskinesia        Past Surgical History:          Procedure Laterality Date    MOUTH SURGERY  05/2013       Medications Prior to Admission:      Prior to Admission medications    Medication Sig Start Date End Date Taking? Authorizing Provider   butalbital-acetaminophen-caffeine-codeine (FIORICET WITH CODEINE) -98-30 MG per capsule Take 1 capsule by mouth 2 times daily.  7/13/22 1/9/23 Yes Historical Provider, MD   cetirizine (ZYRTEC) 10 MG tablet Take 10 mg by mouth daily as needed 10/27/22  Yes Historical Provider, MD   doxepin (SINEQUAN) 25 MG capsule Take 25 mg by mouth nightly   Yes Historical Provider, MD   ondansetron (ZOFRAN) 8 MG tablet Take 8 mg by mouth every 8 hours as needed for Nausea 11/16/22   Historical Provider, MD   cyclobenzaprine (FLEXERIL) 10 MG tablet Take 10 mg by mouth 3 times daily as needed 10/7/22   Historical Provider, MD   acyclovir (ZOVIRAX) 800 MG tablet Take 800 mg by mouth 2 times daily Historical Provider, MD   clonazePAM (KLONOPIN) 1 MG tablet Take 1 mg by mouth in the morning, at noon, and at bedtime. AM-Afternoon- PM    Historical Provider, MD   LORazepam (ATIVAN) 1 MG tablet Take 2 mg by mouth at bedtime. Historical Provider, MD   methylphenidate (RITALIN) 20 MG tablet Take 40 mg by mouth 3 times daily. Historical Provider, MD   OXYGEN Inhale 2 L/min into the lungs as needed    Historical Provider, MD   albuterol sulfate HFA (PROVENTIL;VENTOLIN;PROAIR) 108 (90 Base) MCG/ACT inhaler Inhale 2 puffs into the lungs every 6 hours as needed 10/16/18   Historical Provider, MD   omeprazole (PRILOSEC) 40 MG delayed release capsule Take 40 mg by mouth daily    Historical Provider, MD       Allergies:  Prednisone, Serotonin reuptake inhibitors (ssris), and Tricyclic antidepressants    Social History:      TOBACCO:   reports that he has been smoking cigarettes. He has a 21.00 pack-year smoking history. He has never used smokeless tobacco.  ETOH:   reports no history of alcohol use. Family History:       Reviewed in detail and non contributory          Problem Relation Age of Onset    Mental Illness Mother     Mental Illness Father        REVIEW OF SYSTEMS:   Pertinent positives as noted in the HPI. All other systems reviewed and negative. PHYSICAL EXAM PERFORMED:    BP (!) 156/80   Pulse (!) 111   Temp 98.5 °F (36.9 °C) (Oral)   Resp 26   Ht 5' 6.5\" (1.689 m)   Wt 156 lb 4.9 oz (70.9 kg)   SpO2 97%   BMI 24.85 kg/m²     General appearance:  No apparent distress, cooperative. HEENT:  Normal cephalic, atraumatic without obvious deformity. Conjunctivae/corneas clear. Neck: Supple, with full range of motion. No cervical lymphadenopathy  Respiratory:  Normal respiratory effort. Clear to auscultation, bilaterally without Rales/Wheezes/Rhonchi. Cardiovascular:  Regular rate and rhythm with normal S1/S2 without murmurs, rubs or gallops.   Abdomen: Soft, tender to palpation, non-distended, normal bowel sounds. Musculoskeletal:  No edema noted bilaterally. No tenderness on palpation   Skin: no rash visible  Neurologic:  Neurologically intact without any focal sensory/motor deficits. grossly non-focal.  Psychiatric:  Alert and oriented, normal mood  Peripheral Pulses: +2 palpable, equal bilaterally       Labs:     Recent Labs     11/23/22  1427   WBC 17.5*   HGB 15.3   HCT 46.2        Recent Labs     11/23/22  1427      K 4.8      CO2 30   BUN 14   CREATININE 0.7*   CALCIUM 9.1     Recent Labs     11/23/22  1427   AST 29   ALT 42*   BILITOT 0.3   ALKPHOS 118     No results for input(s): INR in the last 72 hours. No results for input(s): Haleigh Hasmukh in the last 72 hours. Urinalysis:      Lab Results   Component Value Date/Time    NITRU Negative 11/23/2022 02:27 PM    WBCUA 4 06/05/2016 12:30 AM    RBCUA 2 06/05/2016 12:30 AM    BLOODU Negative 11/23/2022 02:27 PM    SPECGRAV 1.007 11/23/2022 02:27 PM    GLUCOSEU Negative 11/23/2022 02:27 PM    GLUCOSEU NEGATIVE 04/27/2012 10:50 PM       Radiology:       CT ABDOMEN PELVIS W IV CONTRAST Additional Contrast? None   Final Result   Findings compatible with acute diverticulitis involving the junction of the   descending colon with the sigmoid colon. There is evidence of perforation with extraluminal gas. The majority of the   gas is near the region of inflammation, scattered bubbles of gas are seen   more distally within the peritoneal cavity. No diverticular abscess is seen at this time. Findings were discussed with Maureen Durant at 4:20 pm on 11/23/2022. Active Hospital Problems    Diagnosis Date Noted    Sepsis [facility]) [A41.9] 12/29/2020         Patient is a 72-year-old male with past medical history of seizures, COPD who presents to the hospital for abdominal pain.   Patient mentions he has been having pain for 3 days, its mostly generalized abdomen, associated with nausea, he mentions he has abdominal pains in the past from diverticulitis. He has associated diarrhea as well-nonbloody. Denied chest pain shortness of breath dysuria. Assessment  Diverticulitis associated with colonic perforation  Sepsis present on admission likely source abdominal  Lactic acidosis  History of COPD  Seizures    Plan  IV Zosyn  N.p.o.  IV fluids  Pain control  Consulted general surgery from ED-recommends no intervention for now  Monitor lactic acid  Check blood cultures  DVT prophylaxis-on Lovenox  Diet: Diet NPO  Code Status: Prior    PT/OT Eval Status: ordered    Dispo - pending clinical improvement       Jose M Barrientos MD    The note was completed using EMR and Dragon dictation system. Every effort was made to ensure accuracy; however, inadvertent computerized transcription errors may be present. Thank you Eugene Corley MD for the opportunity to be involved in this patient's care. If you have any questions or concerns please feel free to contact me at [phone].     Jose M Barrientos MD

## 2022-11-23 NOTE — ED TRIAGE NOTES
Abdominal pain, center, describes as stabbing. Onset 3 hours prior.   Denies chest pain, nausea, or SOB, or lightheadedness

## 2022-11-23 NOTE — ED PROVIDER NOTES
[facility]  e[facility]ENCY dEP[facility]MENT[facility]      Pt Name: Purvi Grove  MRN: [id number]  [place] 1950  Date ofevaluation: 11/23/2022  Provider: Josh Leblanc MD    CHIEF COMPLAINT       Chief Complaint   Patient presents with    Abdominal Pain     Stabbing pain, center of abdomen. Onset 3 hours ago         HISTORY OF PRESENT ILLNESS   (Location/Symptom, Timing/Onset,Context/Setting, Quality, Duration, Modifying Factors, Severity)  Note limiting factors. Purvi Grove is a 81 y.o. male who  has a past medical history of Anxiety, Bipolar 1 disorder ([address] Community acquired bacterial pneumonia, COPD (chronic obstructive pulmonary disease) ([address] Depression, Fibromyalgia, Headache(784.0), Hyperlipidemia, Manic depressive disease manic phase ([address] On home O2, PTSD (post-traumatic stress disorder), Seizures ([address] and Tardive dyskinesia. presents to the emergency department with EMS for evaluation of abdominal pain has been present over the past 3 days. Patient states it feels like \"Paul Scott took a samurai sword and stabbed it right into my gut\". Patient states the pain is constant. Denies any exacerbating relieving factors. States he has had associated loose stool. Denies any melanotic stool or bright red blood per rectum. States he occasionally has nausea. No vomiting. No documented fevers. Denies any chest pain or shortness of breath. No cough. Normal urinary habits. States he does have history of diverticulitis and this does feel similar. Denies any recent antibiotic use. States he been taking Tylenol as needed for pain control with minimal relief. Patient also states he has been having night terrors which is chronic for him. States he has history of PTSD and frequently has night terrors. Denies any suicidal homicidal ideation. Also has chronic neck pain which is unchanged from prior. Denies any falls or trauma.   No numbness or weakness. No fevers or chills. No headache or vision changes. The history is provided by the patient and medical records. NursingNotes were reviewed. Pt was seen during the [place] 19 pandemic. Appropriate PPE worn by ME during patient encounters. Patient was cared for during a time with constrained hospital bed capacity with nationwide stress on resources and staffing. REVIEW OF SYSTEMS    (2-9 systems for level 4, 10 or more for level 5)     Review of Systems   Constitutional:  Negative for chills, diaphoresis and fever. HENT:  Negative for congestion, rhinorrhea and sore throat. Respiratory:  Negative for chest tightness and shortness of breath. Cardiovascular:  Negative for chest pain. Gastrointestinal:  Positive for abdominal pain, diarrhea and nausea. Negative for blood in stool, constipation and vomiting. Genitourinary:  Negative for dysuria, frequency and urgency. Musculoskeletal:  Negative for arthralgias and neck pain. Skin:  Negative for rash and wound. Neurological:  Negative for dizziness, weakness and numbness. Psychiatric/Behavioral:  Negative for agitation and behavioral problems. Except as noted above the remainder of the review of systems was reviewed and negative.        PAST MEDICAL HISTORY     Past Medical History:   Diagnosis Date    Anxiety     Bipolar 1 disorder ([facility]     Community acquired bacterial pneumonia 6/12/2015    COPD (chronic obstructive pulmonary disease) ([facility]     Depression     Fibromyalgia     Headache(784.0)     Hyperlipidemia     Manic depressive disease manic phase (HCC)     On home O2     PTSD (post-traumatic stress disorder)     Seizures ([facility] [address]     Tardive dyskinesia          SURGICALHISTORY       Past Surgical History:   Procedure Laterality Date    MOUTH SURGERY  05/2013         CURRENT MEDICATIONS       Previous Medications    ACYCLOVIR (ZOVIRAX) 800 MG TABLET    Take 800 mg by mouth 2 times daily    ALBUTEROL SULFATE HFA (PROVENTIL;VENTOLIN;PROAIR) 108 (90 BASE) MCG/ACT INHALER    Inhale 2 puffs into the lungs every 6 hours as needed    BUTALBITAL-ACETAMINOPHEN-CAFFEINE-CODEINE (FIORICET WITH CODEINE) -77-30 MG PER CAPSULE    Take 1 capsule by mouth 2 times daily. CETIRIZINE (ZYRTEC) 10 MG TABLET    Take 10 mg by mouth daily as needed    CLONAZEPAM (KLONOPIN) 1 MG TABLET    Take 1 mg by mouth in the morning, at noon, and at bedtime. AM-Afternoon- PM    CYCLOBENZAPRINE (FLEXERIL) 10 MG TABLET    Take 10 mg by mouth 3 times daily as needed    DOXEPIN (SINEQUAN) 25 MG CAPSULE    Take 25 mg by mouth nightly    LORAZEPAM (ATIVAN) 1 MG TABLET    Take 2 mg by mouth at bedtime. METHYLPHENIDATE (RITALIN) 20 MG TABLET    Take 40 mg by mouth 3 times daily.      OMEPRAZOLE (PRILOSEC) 40 MG DELAYED RELEASE CAPSULE    Take 40 mg by mouth daily    ONDANSETRON (ZOFRAN) 8 MG TABLET    Take 8 mg by mouth every 8 hours as needed for Nausea    OXYGEN    Inhale 2 L/min into the lungs as needed            Prednisone, Serotonin reuptake inhibitors (ssris), and Tricyclic antidepressants    FAMILY HISTORY       Family History   Problem Relation Age of Onset    Mental Illness Mother     Mental Illness Father           SOCIAL HISTORY       Social History     Socioeconomic History    Marital status:    Tobacco Use    Smoking status: Every Day     Packs/day: 0.50     Years: 42.00     Pack years: 21.00     Types: Cigarettes    Smokeless tobacco: Never   Substance and Sexual Activity    Alcohol use: No     Comment: hasn't drunk in 10-12 years    Drug use: No    Sexual activity: Not Currently     Partners: Female     Comment:        SCREENINGS    [place] Coma Scale  Eye Opening: Spontaneous  Best Verbal Response: Oriented  Best Motor Response: Obeys commands  [place] Coma Scale Score: 15        PHYSICAL EXAM    (up to 7 for level 4, 8 or more for level 5)     Vitals:    11/23/22 1400 11/23/22 1406 11/23/22 1415 11/23/22 1529   BP: 123/85 123/85 124/73 (!) 156/80   Pulse: 96 97 93 (!) 111   Resp: 28 19 (!) 32 26   Temp: 98.5 °F (36.9 °C)      TempSrc: Oral      SpO2: 100% 100% 98% 97%   Weight: 156 lb 4.9 oz (70.9 kg)      Height: 5' 6.5\" (1.689 m)          Physical Exam  Vitals and nursing note reviewed. Constitutional:       General: He is not in acute distress. Appearance: He is well-developed. He is not diaphoretic. HENT:      Head: Normocephalic and atraumatic. Right Ear: Tympanic membrane normal.      Left Ear: Tympanic membrane normal.      Nose: Nose normal.      Mouth/Throat:      Mouth: Mucous membranes are moist.      Pharynx: Oropharynx is clear. Eyes:      Extraocular Movements: Extraocular movements intact. Conjunctiva/sclera: Conjunctivae normal.      Pupils: Pupils are equal, round, and reactive to light. Cardiovascular:      Rate and Rhythm: Normal rate and regular rhythm. Pulses: Normal pulses. Heart sounds: Normal heart sounds. No murmur heard. No friction rub. No gallop. Pulmonary:      Effort: Pulmonary effort is normal.      Breath sounds: Normal breath sounds. No wheezing, rhonchi or rales. Abdominal:      General: Bowel sounds are normal. There is no distension. Palpations: Abdomen is soft. Tenderness: abdominal tenderness in the periumbilical area and left lower quadrant There is no guarding or rebound. Negative signs include Lee's sign, Rovsing's sign and McBurney's sign. Musculoskeletal:         General: No tenderness. Normal range of motion. Cervical back: Normal range of motion and neck supple. Skin:     General: Skin is warm and dry. Capillary Refill: Capillary refill takes less than 2 seconds. Findings: No erythema. Neurological:      General: No focal deficit present. Mental Status: He is alert and oriented to person, place, and time.    Psychiatric:         Mood and Affect: Mood normal.         Behavior: Behavior normal.         Thought Content: Thought content normal.         Judgment: Judgment normal.       RESULTS     EKG: All EKG's are interpreted by the Emergency Department Physician who either signs or Co-signsthis chart in the absence of a cardiologist.      RADIOLOGY:   Daryel Spray such as CT, Ultrasound and MRI are read by the radiologist. Plain radiographic images are visualized and preliminarily interpreted by the emergency physician with the below findings:      Interpretation per the Radiologist below, if available at the time ofthis note:    CT ABDOMEN PELVIS W IV CONTRAST Additional Contrast? None   Final Result   Findings compatible with acute diverticulitis involving the junction of the   descending colon with the sigmoid colon. There is evidence of perforation with extraluminal gas. The majority of the   gas is near the region of inflammation, scattered bubbles of gas are seen   more distally within the peritoneal cavity. No diverticular abscess is seen at this time. Findings were discussed with Kalyn Craven at 4:20 pm on 11/23/2022. ED BEDSIDE ULTRASOUND:   Performed by ED Physician - none    LABS:  Labs Reviewed   CBC WITH AUTO DIFFERENTIAL - Abnormal; Notable for the following components:       Result Value    WBC 17.5 (*)     Neutrophils Absolute 15.2 (*)     All other components within normal limits   COMPREHENSIVE METABOLIC PANEL W/ REFLEX TO MG FOR LOW K - Abnormal; Notable for the following components:    Glucose 111 (*)     Creatinine 0.7 (*)     ALT 42 (*)     All other components within normal limits   LACTIC ACID - Abnormal; Notable for the following components:    Lactic Acid 2.5 (*)     All other components within normal limits   CULTURE, BLOOD 2   CULTURE, BLOOD 1   LIPASE   URINALYSIS WITH REFLEX TO CULTURE   LACTIC ACID       All other labs were within normal range or not returned as of this dictation.     EMERGENCY DEPARTMENT COURSE and DIFFERENTIAL DIAGNOSIS/MDM:   Vitals: Vitals:    11/23/22 1400 11/23/22 1406 11/23/22 1415 11/23/22 1529   BP: 123/85 123/85 124/73 (!) 156/80   Pulse: 96 97 93 (!) 111   Resp: 28 19 (!) 32 26   Temp: 98.5 °F (36.9 °C)      TempSrc: Oral      SpO2: 100% 100% 98% 97%   Weight: 156 lb 4.9 oz (70.9 kg)      Height: 5' 6.5\" (1.689 m)          MDM  Number of Diagnoses or Management Options  Diagnosis management comments: GERD, PUD, pancreatitis, cholecystitis, GB colic, cholangitis, Sorb-Chfd-Hiodly, ACS/ MI, pneumonia, SBO, DKA, AAA, mesenteric ischemia, perforated viscous, acute gastroenteritis, NSAP, pyelonephritis, kidney stone, appendicitis, hernia, constipation    Patient seen and evaluated. History and physical as above. Nontoxic and afebrile. Patient has normal vital signs on arrival.  Patient presents for abdominal pain. Has history of diverticulitis with similar pain in the past.  No blood per rectum. Patient does arrive tachycardic on arrival.       Amount and/or Complexity of Data Reviewed  Clinical lab tests: reviewed and ordered  Tests in the radiology section of CPT®: ordered and reviewed  Tests in the medicine section of CPT®: reviewed and ordered  Review and summarize past medical records: yes  Independent visualization of images, tracings, or specimens: yes          REASSESSMENT     ED Course as of 11/23/22 1851   Wed Nov 23, 2022   1621 Notified by radiology that the patient has some free air on his abdomen with diverticulitis present in the descending colon at the junction of the sigmoid colon. He states there is a few areas of free air but no evidence of abscess. Lactic acid and blood cultures added on. Consult placed to general surgery. [DS]   1 Spoke with Dr. Denver Anderson regarding patient's diverticulitis with perforation. He is requesting that patient be admitted to the medicine team and is agreeable with Zosyn for antibiotic coverage. No plan for surgery at this time.    [DS]   3035 Consult placed to hospitalist for

## 2022-11-23 NOTE — ED NOTES
Pt verbalizes no change in his pain after administration of pain medication. Provider notified, states that he will re-evaluate after CT results are back. Pt informed and agreeable. Call light remains in reach, side rails up x2.      Connie Sarkar RN  11/23/22 7223

## 2022-11-24 LAB
ANION GAP SERPL CALCULATED.3IONS-SCNC: 12 MMOL/L (ref 3–16)
BASOPHILS ABSOLUTE: 0.1 K/UL (ref 0–0.2)
BASOPHILS RELATIVE PERCENT: 0.3 %
BUN BLDV-MCNC: 9 MG/DL (ref 7–20)
CALCIUM SERPL-MCNC: 8.3 MG/DL (ref 8.3–10.6)
CHLORIDE BLD-SCNC: 105 MMOL/L (ref 99–110)
CO2: 24 MMOL/L (ref 21–32)
CREAT SERPL-MCNC: 0.8 MG/DL (ref 0.8–1.3)
EOSINOPHILS ABSOLUTE: 0 K/UL (ref 0–0.6)
EOSINOPHILS RELATIVE PERCENT: 0.1 %
GFR SERPL CREATININE-BSD FRML MDRD: >60 ML/MIN/{1.73_M2}
GLUCOSE BLD-MCNC: 111 MG/DL (ref 70–99)
HCT VFR BLD CALC: 39.1 % (ref 40.5–52.5)
HEMOGLOBIN: 13 G/DL (ref 13.5–17.5)
LYMPHOCYTES ABSOLUTE: 1.8 K/UL (ref 1–5.1)
LYMPHOCYTES RELATIVE PERCENT: 10.2 %
MCH RBC QN AUTO: 31 PG (ref 26–34)
MCHC RBC AUTO-ENTMCNC: 33.3 G/DL (ref 31–36)
MCV RBC AUTO: 93.3 FL (ref 80–100)
MONOCYTES ABSOLUTE: 0.9 K/UL (ref 0–1.3)
MONOCYTES RELATIVE PERCENT: 5.1 %
NEUTROPHILS ABSOLUTE: 15.1 K/UL (ref 1.7–7.7)
NEUTROPHILS RELATIVE PERCENT: 84.3 %
PDW BLD-RTO: 14.5 % (ref 12.4–15.4)
PLATELET # BLD: 179 K/UL (ref 135–450)
PMV BLD AUTO: 9.1 FL (ref 5–10.5)
POTASSIUM REFLEX MAGNESIUM: 4 MMOL/L (ref 3.5–5.1)
RBC # BLD: 4.19 M/UL (ref 4.2–5.9)
SODIUM BLD-SCNC: 141 MMOL/L (ref 136–145)
WBC # BLD: 17.9 K/UL (ref 4–11)

## 2022-11-24 PROCEDURE — 2700000000 HC OXYGEN THERAPY PER DAY

## 2022-11-24 PROCEDURE — 6370000000 HC RX 637 (ALT 250 FOR IP): Performed by: INTERNAL MEDICINE

## 2022-11-24 PROCEDURE — 80048 BASIC METABOLIC PNL TOTAL CA: CPT

## 2022-11-24 PROCEDURE — 6360000002 HC RX W HCPCS: Performed by: INTERNAL MEDICINE

## 2022-11-24 PROCEDURE — 2580000003 HC RX 258: Performed by: INTERNAL MEDICINE

## 2022-11-24 PROCEDURE — 94761 N-INVAS EAR/PLS OXIMETRY MLT: CPT

## 2022-11-24 PROCEDURE — 85025 COMPLETE CBC W/AUTO DIFF WBC: CPT

## 2022-11-24 PROCEDURE — 36415 COLL VENOUS BLD VENIPUNCTURE: CPT

## 2022-11-24 PROCEDURE — 2060000000 HC ICU INTERMEDIATE R&B

## 2022-11-24 RX ORDER — LORAZEPAM 0.5 MG/1
0.5 TABLET ORAL EVERY EVENING
Status: DISCONTINUED | OUTPATIENT
Start: 2022-11-24 | End: 2022-11-27 | Stop reason: HOSPADM

## 2022-11-24 RX ORDER — ACYCLOVIR 800 MG/1
800 TABLET ORAL 2 TIMES DAILY
Status: DISCONTINUED | OUTPATIENT
Start: 2022-11-24 | End: 2022-11-27 | Stop reason: HOSPADM

## 2022-11-24 RX ORDER — NICOTINE 21 MG/24HR
1 PATCH, TRANSDERMAL 24 HOURS TRANSDERMAL DAILY
Status: DISCONTINUED | OUTPATIENT
Start: 2022-11-24 | End: 2022-11-27 | Stop reason: HOSPADM

## 2022-11-24 RX ORDER — METHYLPHENIDATE HYDROCHLORIDE 10 MG/1
40 TABLET ORAL 3 TIMES DAILY
Status: DISCONTINUED | OUTPATIENT
Start: 2022-11-24 | End: 2022-11-27 | Stop reason: HOSPADM

## 2022-11-24 RX ORDER — CLONAZEPAM 1 MG/1
1 TABLET ORAL 3 TIMES DAILY
Status: DISCONTINUED | OUTPATIENT
Start: 2022-11-24 | End: 2022-11-27 | Stop reason: HOSPADM

## 2022-11-24 RX ORDER — CYCLOBENZAPRINE HCL 10 MG
10 TABLET ORAL 3 TIMES DAILY PRN
Status: DISCONTINUED | OUTPATIENT
Start: 2022-11-24 | End: 2022-11-27 | Stop reason: HOSPADM

## 2022-11-24 RX ORDER — DOXEPIN HYDROCHLORIDE 25 MG/1
25 CAPSULE ORAL NIGHTLY
Status: DISCONTINUED | OUTPATIENT
Start: 2022-11-24 | End: 2022-11-27 | Stop reason: HOSPADM

## 2022-11-24 RX ADMIN — PIPERACILLIN AND TAZOBACTAM 3375 MG: 3; .375 INJECTION, POWDER, FOR SOLUTION INTRAVENOUS at 23:26

## 2022-11-24 RX ADMIN — PIPERACILLIN AND TAZOBACTAM 3375 MG: 3; .375 INJECTION, POWDER, FOR SOLUTION INTRAVENOUS at 06:35

## 2022-11-24 RX ADMIN — MORPHINE SULFATE 2 MG: 2 INJECTION, SOLUTION INTRAMUSCULAR; INTRAVENOUS at 12:27

## 2022-11-24 RX ADMIN — METHYLPHENIDATE HYDROCHLORIDE 40 MG: 10 TABLET ORAL at 14:42

## 2022-11-24 RX ADMIN — PIPERACILLIN AND TAZOBACTAM 3375 MG: 3; .375 INJECTION, POWDER, FOR SOLUTION INTRAVENOUS at 14:33

## 2022-11-24 RX ADMIN — MORPHINE SULFATE 2 MG: 2 INJECTION, SOLUTION INTRAMUSCULAR; INTRAVENOUS at 03:15

## 2022-11-24 RX ADMIN — METHYLPHENIDATE HYDROCHLORIDE 40 MG: 10 TABLET ORAL at 22:45

## 2022-11-24 RX ADMIN — SODIUM CHLORIDE: 9 INJECTION, SOLUTION INTRAVENOUS at 23:24

## 2022-11-24 RX ADMIN — DOXEPIN HYDROCHLORIDE 25 MG: 25 CAPSULE ORAL at 20:48

## 2022-11-24 RX ADMIN — CLONAZEPAM 1 MG: 1 TABLET ORAL at 14:31

## 2022-11-24 RX ADMIN — LORAZEPAM 1 MG: 2 INJECTION INTRAMUSCULAR; INTRAVENOUS at 04:19

## 2022-11-24 RX ADMIN — CLONAZEPAM 1 MG: 1 TABLET ORAL at 20:48

## 2022-11-24 RX ADMIN — MORPHINE SULFATE 2 MG: 2 INJECTION, SOLUTION INTRAMUSCULAR; INTRAVENOUS at 09:36

## 2022-11-24 RX ADMIN — MORPHINE SULFATE 2 MG: 2 INJECTION, SOLUTION INTRAMUSCULAR; INTRAVENOUS at 06:40

## 2022-11-24 RX ADMIN — LORAZEPAM 0.5 MG: 0.5 TABLET ORAL at 17:27

## 2022-11-24 RX ADMIN — MORPHINE SULFATE 2 MG: 2 INJECTION, SOLUTION INTRAMUSCULAR; INTRAVENOUS at 22:37

## 2022-11-24 RX ADMIN — CLONAZEPAM 1 MG: 1 TABLET ORAL at 09:59

## 2022-11-24 RX ADMIN — ONDANSETRON 4 MG: 2 INJECTION INTRAMUSCULAR; INTRAVENOUS at 18:58

## 2022-11-24 RX ADMIN — MORPHINE SULFATE 2 MG: 2 INJECTION, SOLUTION INTRAMUSCULAR; INTRAVENOUS at 16:50

## 2022-11-24 RX ADMIN — ACYCLOVIR 800 MG: 800 TABLET ORAL at 09:59

## 2022-11-24 ASSESSMENT — PAIN DESCRIPTION - ORIENTATION
ORIENTATION: MID;LEFT
ORIENTATION: MID
ORIENTATION: LOWER;LEFT
ORIENTATION: MID

## 2022-11-24 ASSESSMENT — PAIN DESCRIPTION - DESCRIPTORS
DESCRIPTORS: CRAMPING
DESCRIPTORS: ACHING
DESCRIPTORS: ACHING
DESCRIPTORS: CRAMPING
DESCRIPTORS: CRAMPING

## 2022-11-24 ASSESSMENT — PAIN SCALES - GENERAL
PAINLEVEL_OUTOF10: 10
PAINLEVEL_OUTOF10: 9
PAINLEVEL_OUTOF10: 9
PAINLEVEL_OUTOF10: 8
PAINLEVEL_OUTOF10: 9

## 2022-11-24 ASSESSMENT — PAIN DESCRIPTION - LOCATION
LOCATION: ABDOMEN;HEAD
LOCATION: ABDOMEN

## 2022-11-24 ASSESSMENT — PAIN - FUNCTIONAL ASSESSMENT: PAIN_FUNCTIONAL_ASSESSMENT: ACTIVITIES ARE NOT PREVENTED

## 2022-11-24 ASSESSMENT — PAIN DESCRIPTION - FREQUENCY: FREQUENCY: INTERMITTENT

## 2022-11-24 ASSESSMENT — PAIN DESCRIPTION - PAIN TYPE: TYPE: CHRONIC PAIN;ACUTE PAIN

## 2022-11-24 NOTE — PROGRESS NOTES
Hospitalist Progress Note      PCP: Aris Sun MD    Date of Admission: 11/23/2022        Hospital Course: 70-year-old male presented to the hospital with abdominal pain with history of diverticulitis, admitted to the hospital General surgery consulted. Is working diagnosis of diverticulitis with colonic perforation, antibiotics started and general surgery consulted  Subjective: Still having some abdominal pain, improved though, no nausea vomiting. No fever or chills. Nurse at bedside      Medications:  Reviewed    Infusion Medications    sodium chloride      sodium chloride 125 mL/hr at 11/23/22 1926     Scheduled Medications    sodium chloride flush  10 mL IntraVENous 2 times per day    enoxaparin  40 mg SubCUTAneous Daily    piperacillin-tazobactam  3,375 mg IntraVENous Q8H     PRN Meds: sodium chloride flush, sodium chloride, potassium chloride **OR** potassium alternative oral replacement **OR** potassium chloride, magnesium sulfate, promethazine **OR** ondansetron, magnesium hydroxide, acetaminophen **OR** acetaminophen, albuterol sulfate HFA, morphine, LORazepam      Intake/Output Summary (Last 24 hours) at 11/24/2022 0940  Last data filed at 11/24/2022 0202  Gross per 24 hour   Intake --   Output 250 ml   Net -250 ml       Physical Exam Performed:    /78   Pulse 96   Temp 99.4 °F (37.4 °C) (Oral)   Resp 18   Ht 5' 6.5\" (1.689 m)   Wt 152 lb 1.9 oz (69 kg)   SpO2 99%   BMI 24.18 kg/m²     General appearance: No apparent distress  Neck: Supple  Respiratory:  Normal respiratory effort. Clear to auscultation, bilaterally without Rales/Wheezes/Rhonchi. Cardiovascular: Regular rate and rhythm with normal S1/S2 without murmurs, rubs or gallops. Abdomen: Soft, tender to palpation  Musculoskeletal: No clubbing, cyanosis  Skin: Skin color, texture, turgor normal.  No rashes or lesions.   Neurologic: Moving his extremities  Psychiatric: Awake  Capillary Refill: Brisk, 3 seconds, normal Peripheral Pulses: +2 palpable, equal bilaterally       Labs:   Recent Labs     11/23/22  1427 11/24/22  0436   WBC 17.5* 17.9*   HGB 15.3 13.0*   HCT 46.2 39.1*    179     Recent Labs     11/23/22  1427 11/24/22  0436    141   K 4.8 4.0    105   CO2 30 24   BUN 14 9   CREATININE 0.7* 0.8   CALCIUM 9.1 8.3     Recent Labs     11/23/22  1427   AST 29   ALT 42*   BILITOT 0.3   ALKPHOS 118     No results for input(s): INR in the last 72 hours. No results for input(s): [employer] [employer] in the last 72 hours. Urinalysis:      Lab Results   Component Value Date/Time    NITRU Negative 11/23/2022 02:27 PM    WBCUA 4 06/05/2016 12:30 AM    RBCUA 2 06/05/2016 12:30 AM    BLOODU Negative 11/23/2022 02:27 PM    SPECGRAV 1.007 11/23/2022 02:27 PM    GLUCOSEU Negative 11/23/2022 02:27 PM    GLUCOSEU NEGATIVE 04/27/2012 10:50 PM       Radiology:  CT ABDOMEN PELVIS W IV CONTRAST Additional Contrast? None   Final Result   Findings compatible with acute diverticulitis involving the junction of the   descending colon with the sigmoid colon. There is evidence of perforation with extraluminal gas. The majority of the   gas is near the region of inflammation, scattered bubbles of gas are seen   more distally within the peritoneal cavity. No diverticular abscess is seen at this time. Findings were discussed with Dylon Wilkins at 4:20 pm on 11/23/2022.              IP CONSULT TO GENERAL SURGERY  IP CONSULT TO CASE MANAGEMENT    Assessment/Plan:    Active Hospital Problems    Diagnosis     Sepsis ([facility] [address] [A41.9]      Diverticulitis associated with colonic perforation, IV Zosyn, keep n.p.o., IV fluid, pain management, general surgery consulted for further recommendations, no immediate recommendation for surgical intervention at this time  Sepsis, due to diverticulitis, on IV Zosyn for now  lactic acidosis, IV fluid lactic acid decreased  History of COPD without acute exacerbation  History of seizures, no acute events  History of tardive dyskinesia monitor for now    Diet: ADULT DIET;  Clear Liquid  Code Status: Full Code      Chandni Moya MD

## 2022-11-24 NOTE — PLAN OF CARE
Problem: Discharge Planning  Goal: Discharge to home or other facility with appropriate resources  Outcome: Progressing  Flowsheets (Taken 11/23/2022 2307 by Mathieu Martinez, RN)  Discharge to home or other facility with appropriate resources:   Identify barriers to discharge with patient and caregiver   Identify discharge learning needs (meds, wound care, etc)   Arrange for needed discharge resources and transportation as appropriate     Problem: Pain  Goal: Verbalizes/displays adequate comfort level or baseline comfort level  Outcome: Progressing  Flowsheets (Taken 11/23/2022 2236 by Mathieu Martinez, RN)  Verbalizes/displays adequate comfort level or baseline comfort level:   Encourage patient to monitor pain and request assistance   Assess pain using appropriate pain scale   Administer analgesics based on type and severity of pain and evaluate response   Implement non-pharmacological measures as appropriate and evaluate response     Problem: Safety - Adult  Goal: Free from fall injury  Outcome: Progressing

## 2022-11-24 NOTE — ED NOTES
Report called to [address], all questions answered.      Tiffany Blankenship RN  11/23/22 2000

## 2022-11-24 NOTE — PROGRESS NOTES
Patient admitted to [address] via cart. Patient arrives verbally aggressive and unwilling to cooperate in his admission process. Admission completed. Physical assessment completed. See flow sheets. Plan of care discussed and all questions answered. Patient states that he will refuse any need for surgery. Stating that he will only see his GI doc he follows with at [facility]. Education provided to patient doctor privileges and his physician may not have privileges here at [facility]. Patient demanding all home meds. Education provided to being NPO and not having anything by mouth. Patient not satisfied with answers provided. Bed lock in low position, side rails up X2. Patient refuses bed alarm and calling for assistance before getting up.

## 2022-11-24 NOTE — PROGRESS NOTES
General Surgery    Asked to see for sigmoid diverticulitis    Tender LLQ    CT with uncomplicated diverticulitis of distal descending and proximal sigmoid colon    Continue IV antibiotics    Start clears    Will follow    Electronically signed by Capmos Maher MD on 11/24/2022 at 10:02 AM

## 2022-11-24 NOTE — PROGRESS NOTES
4 Eyes Skin Assessment     NAME:  Ely Herrera  YOB: 1950  MEDICAL RECORD NUMBER:  [id number]    The patient is being assessed for  Admission    I agree that One RN have performed a thorough Head to Toe Skin Assessment on the patient. ALL assessment sites listed below have been assessed. Areas assessed by both nurses:    Head, Face, Ears, Shoulders, Back, Chest, Arms, Elbows, Hands, Sacrum. Buttock, Coccyx, Ischium, and Legs. Feet and Heels        Does the Patient have a Wound?  No noted wound(s)       Kip Prevention initiated by RN: No   Wound Care Orders initiated by RN: No    Pressure Injury (Stage 3,4, Unstageable, DTI, NWPT, and Complex wounds) if present place referral order by RN under : No    New and Established Ostomies, if present place, referral order under : No      Nurse 1 eSignature: Electronically signed by Cesar Person RN on 11/23/22 at 10:25 PM EST    **SHARE this note so that the co-signing nurse is able to place an eSignature**    Nurse 2 eSignature: Electronically signed by Dalia Quevedo RN on 11/24/22 at 12:05 AM EST

## 2022-11-25 LAB
ANION GAP SERPL CALCULATED.3IONS-SCNC: 8 MMOL/L (ref 3–16)
BASOPHILS ABSOLUTE: 0 K/UL (ref 0–0.2)
BASOPHILS RELATIVE PERCENT: 0.2 %
BUN BLDV-MCNC: 6 MG/DL (ref 7–20)
CALCIUM SERPL-MCNC: 8.3 MG/DL (ref 8.3–10.6)
CHLORIDE BLD-SCNC: 106 MMOL/L (ref 99–110)
CO2: 29 MMOL/L (ref 21–32)
CREAT SERPL-MCNC: 0.8 MG/DL (ref 0.8–1.3)
EOSINOPHILS ABSOLUTE: 0.2 K/UL (ref 0–0.6)
EOSINOPHILS RELATIVE PERCENT: 1.3 %
GFR SERPL CREATININE-BSD FRML MDRD: >60 ML/MIN/{1.73_M2}
GLUCOSE BLD-MCNC: 97 MG/DL (ref 70–99)
HCT VFR BLD CALC: 35.5 % (ref 40.5–52.5)
HEMOGLOBIN: 11.8 G/DL (ref 13.5–17.5)
LYMPHOCYTES ABSOLUTE: 1.6 K/UL (ref 1–5.1)
LYMPHOCYTES RELATIVE PERCENT: 12.4 %
MCH RBC QN AUTO: 30.8 PG (ref 26–34)
MCHC RBC AUTO-ENTMCNC: 33.1 G/DL (ref 31–36)
MCV RBC AUTO: 93 FL (ref 80–100)
MONOCYTES ABSOLUTE: 1 K/UL (ref 0–1.3)
MONOCYTES RELATIVE PERCENT: 7.2 %
NEUTROPHILS ABSOLUTE: 10.5 K/UL (ref 1.7–7.7)
NEUTROPHILS RELATIVE PERCENT: 78.9 %
PDW BLD-RTO: 14.4 % (ref 12.4–15.4)
PLATELET # BLD: 156 K/UL (ref 135–450)
PMV BLD AUTO: 8.8 FL (ref 5–10.5)
POTASSIUM REFLEX MAGNESIUM: 4.2 MMOL/L (ref 3.5–5.1)
RBC # BLD: 3.82 M/UL (ref 4.2–5.9)
SODIUM BLD-SCNC: 143 MMOL/L (ref 136–145)
WBC # BLD: 13.3 K/UL (ref 4–11)

## 2022-11-25 PROCEDURE — 6360000002 HC RX W HCPCS: Performed by: INTERNAL MEDICINE

## 2022-11-25 PROCEDURE — 6370000000 HC RX 637 (ALT 250 FOR IP): Performed by: INTERNAL MEDICINE

## 2022-11-25 PROCEDURE — 97165 OT EVAL LOW COMPLEX 30 MIN: CPT

## 2022-11-25 PROCEDURE — 94760 N-INVAS EAR/PLS OXIMETRY 1: CPT

## 2022-11-25 PROCEDURE — 2060000000 HC ICU INTERMEDIATE R&B

## 2022-11-25 PROCEDURE — 97530 THERAPEUTIC ACTIVITIES: CPT

## 2022-11-25 PROCEDURE — 97162 PT EVAL MOD COMPLEX 30 MIN: CPT | Performed by: PHYSICAL THERAPIST

## 2022-11-25 PROCEDURE — 2580000003 HC RX 258: Performed by: INTERNAL MEDICINE

## 2022-11-25 PROCEDURE — 97116 GAIT TRAINING THERAPY: CPT | Performed by: PHYSICAL THERAPIST

## 2022-11-25 PROCEDURE — 36415 COLL VENOUS BLD VENIPUNCTURE: CPT

## 2022-11-25 PROCEDURE — 85025 COMPLETE CBC W/AUTO DIFF WBC: CPT

## 2022-11-25 PROCEDURE — 80048 BASIC METABOLIC PNL TOTAL CA: CPT

## 2022-11-25 RX ADMIN — SODIUM CHLORIDE: 9 INJECTION, SOLUTION INTRAVENOUS at 08:47

## 2022-11-25 RX ADMIN — SODIUM CHLORIDE: 9 INJECTION, SOLUTION INTRAVENOUS at 18:00

## 2022-11-25 RX ADMIN — ACETAMINOPHEN 650 MG: 325 TABLET ORAL at 01:17

## 2022-11-25 RX ADMIN — PIPERACILLIN AND TAZOBACTAM 3375 MG: 3; .375 INJECTION, POWDER, FOR SOLUTION INTRAVENOUS at 22:30

## 2022-11-25 RX ADMIN — CLONAZEPAM 1 MG: 1 TABLET ORAL at 20:50

## 2022-11-25 RX ADMIN — DOXEPIN HYDROCHLORIDE 25 MG: 25 CAPSULE ORAL at 20:50

## 2022-11-25 RX ADMIN — MORPHINE SULFATE 2 MG: 2 INJECTION, SOLUTION INTRAMUSCULAR; INTRAVENOUS at 09:00

## 2022-11-25 RX ADMIN — METHYLPHENIDATE HYDROCHLORIDE 40 MG: 10 TABLET ORAL at 18:12

## 2022-11-25 RX ADMIN — PIPERACILLIN AND TAZOBACTAM 3375 MG: 3; .375 INJECTION, POWDER, FOR SOLUTION INTRAVENOUS at 06:26

## 2022-11-25 RX ADMIN — LORAZEPAM 1 MG: 2 INJECTION INTRAMUSCULAR; INTRAVENOUS at 01:17

## 2022-11-25 RX ADMIN — CLONAZEPAM 1 MG: 1 TABLET ORAL at 15:23

## 2022-11-25 RX ADMIN — METHYLPHENIDATE HYDROCHLORIDE 40 MG: 10 TABLET ORAL at 10:06

## 2022-11-25 RX ADMIN — SODIUM CHLORIDE, PRESERVATIVE FREE 10 ML: 5 INJECTION INTRAVENOUS at 09:07

## 2022-11-25 RX ADMIN — LORAZEPAM 1 MG: 2 INJECTION INTRAMUSCULAR; INTRAVENOUS at 22:29

## 2022-11-25 RX ADMIN — SODIUM CHLORIDE, PRESERVATIVE FREE 10 ML: 5 INJECTION INTRAVENOUS at 20:50

## 2022-11-25 RX ADMIN — ONDANSETRON 4 MG: 2 INJECTION INTRAMUSCULAR; INTRAVENOUS at 11:49

## 2022-11-25 RX ADMIN — MORPHINE SULFATE 2 MG: 2 INJECTION, SOLUTION INTRAMUSCULAR; INTRAVENOUS at 20:50

## 2022-11-25 RX ADMIN — LORAZEPAM 0.5 MG: 0.5 TABLET ORAL at 18:12

## 2022-11-25 RX ADMIN — PIPERACILLIN AND TAZOBACTAM 3375 MG: 3; .375 INJECTION, POWDER, FOR SOLUTION INTRAVENOUS at 15:31

## 2022-11-25 RX ADMIN — MORPHINE SULFATE 2 MG: 2 INJECTION, SOLUTION INTRAMUSCULAR; INTRAVENOUS at 12:01

## 2022-11-25 RX ADMIN — MORPHINE SULFATE 2 MG: 2 INJECTION, SOLUTION INTRAMUSCULAR; INTRAVENOUS at 05:06

## 2022-11-25 RX ADMIN — CLONAZEPAM 1 MG: 1 TABLET ORAL at 09:00

## 2022-11-25 ASSESSMENT — ENCOUNTER SYMPTOMS
NAUSEA: 0
VOMITING: 0

## 2022-11-25 ASSESSMENT — PAIN SCALES - GENERAL
PAINLEVEL_OUTOF10: 8
PAINLEVEL_OUTOF10: 4
PAINLEVEL_OUTOF10: 8

## 2022-11-25 ASSESSMENT — PAIN DESCRIPTION - FREQUENCY
FREQUENCY: INTERMITTENT

## 2022-11-25 ASSESSMENT — PAIN DESCRIPTION - PAIN TYPE
TYPE: ACUTE PAIN;CHRONIC PAIN
TYPE: ACUTE PAIN;CHRONIC PAIN

## 2022-11-25 ASSESSMENT — PAIN DESCRIPTION - LOCATION
LOCATION: ABDOMEN
LOCATION: ABDOMEN

## 2022-11-25 ASSESSMENT — PAIN DESCRIPTION - DESCRIPTORS
DESCRIPTORS: ACHING;NAGGING
DESCRIPTORS: ACHING;NAGGING

## 2022-11-25 ASSESSMENT — PAIN DESCRIPTION - ORIENTATION
ORIENTATION: LEFT;LOWER
ORIENTATION: LEFT;LOWER

## 2022-11-25 NOTE — PROGRESS NOTES
Occupational Therapy  Facility/Department: [address] PROGRESSIVE CARE  Occupational Therapy Initial Assessment    Name: Deidre Ellington  : 1950  MRN: [id number]  Date of Service: 2022    Discharge Recommendations:  24 hour supervision or assist  OT Equipment Recommendations  Equipment Needed: No     Deidre Ellington scored a 19/24 on the AM-PAC ADL Inpatient form. At this time, no further OT is recommended upon discharge. Recommend patient returns to prior setting with prior services. Patient Diagnosis(es): The primary encounter diagnosis was Diverticulitis of large intestine with perforation without bleeding. A diagnosis of Severe sepsis (HCC) was also pertinent to this visit. Past Medical History:  has a past medical history of Anxiety, Bipolar 1 disorder ([address] Community acquired bacterial pneumonia, COPD (chronic obstructive pulmonary disease) ([address] Depression, Fibromyalgia, Headache(784.0), Hyperlipidemia, Manic depressive disease manic phase ([address] On home O2, PTSD (post-traumatic stress disorder), Seizures ([address] and Tardive dyskinesia. Past Surgical History:  has a past surgical history that includes Mouth surgery (2013). Assessment   Performance deficits / Impairments: Decreased functional mobility ; Decreased safe awareness;Decreased balance;Decreased ADL status; Decreased high-level IADLs;Decreased endurance  Assessment: 82 y/o male admitted 2022 with sepsis and abdominal pain. PTA pt lives at home with spouse and was independent with ADLs and functional mobility without AD. Today, pt required SBA for transfers and functional mobility in room. Pt limited by abdominal pain and chronic neck pain. Pt declined ADLs but anticipate pt will be SBA/CGA for ADLs based on balance observed. Pt will benefit from skilled therapy while in hospital with anticipation of returning home at discharge.   Prognosis: Good  Decision Making: Low Complexity  REQUIRES OT FOLLOW-UP: Yes  Activity Tolerance  Activity Tolerance: Patient limited by pain  Activity Tolerance Comments: reports significant abdominal pain and chronic neck pain        Plan   Occupational Therapy Plan  Times Per Week: 3-5  Current Treatment Recommendations: Strengthening, Balance training, Functional mobility training, Endurance training, Safety education & training, Self-Care / ADL, Patient/Caregiver education & training, Gait training     Restrictions  Restrictions/Precautions  Restrictions/Precautions: Fall Risk  Position Activity Restriction  Other position/activity restrictions: IV attached    Subjective   General  Chart Reviewed: Yes  Patient assessed for rehabilitation services?: Yes  Additional Pertinent Hx: 81 y.o male admitted 11/23/2022 with abdominal pain and sepsis. Pt found to have Diverticulitis associated with colonic perforation, general surgery consulted and no surgical intervention planned at this time. Family / Caregiver Present: No  Referring Practitioner: Florence Ferrer MD  Subjective  Subjective: Pt seen bedside and agreeable to therapy with encouragement. Pt reporting abdominal pain and chronic neck pain but did not rate  General Comment  Comments: per RN ok for therapy.      Social/Functional History  Social/Functional History  Lives With: Spouse  Type of Home: House  Home Layout: Two level, 1/2 bath on main level  Home Access: Stairs to enter with rails  Entrance Stairs - Number of Steps: 2 KIMBERLEY; pt sleeps on couch on main floor with 1/2 bath; full bathroom upstairs  Bathroom Shower/Tub: Walk-in shower, Shower chair with back  [employer]: Standard (vanity close by)  [employer]: Shower chair, Grab bars in shower  Bathroom Accessibility: Accessible  Has the patient had two or more falls in the past year or any fall with injury in the past year?: No  ADL Assistance: Independent (likes having someone in house for showers d/t fear of falling)  Homemaking Assistance: Independent  Ambulation Assistance: Independent  Transfer Assistance: Independent  Active : No  Occupation: Retired       Objective   Observation/Palpation  Observation: flexed neck; chronic neck pain  Safety Devices  Type of Devices: Chair alarm in place;Call light within reach;Nurse notified; Left in chair        AROM: Within functional limits  Strength: Generally decreased, functional  Coordination: Within functional limits    ADL  Additional Comments: Anticipate pt will be SBA/CGA for all ADLs based on balance/endurance. Activity Tolerance  Activity Tolerance: Patient tolerated evaluation without incident  Bed mobility  Bed Mobility Comments: pt sitting at EOB upon arrival and in chair at end of session    Pt sitting EOB at start of session with supervision and significantly flex'd neck. Briefly will hold in neutral before returning to flex posture. Pt requires inc time to complete all tasks 2/2 pain. Transfers  Sit to stand: Stand by assistance  Stand to sit: Stand by assistance  Transfer Comments: pt ambulated around bed to chair with SBA.     Vision  Vision: Impaired  Vision Exceptions: Wears glasses for reading  Hearing  Hearing: Within functional limits    Cognition  Overall Cognitive Status: WFL  Orientation  Overall Orientation Status: Within Functional Limits  Perception  Overall Perceptual Status: WFL               Education Given To: Patient  Education Provided: Role of Therapy;Plan of Care;Transfer Training  Education Method: Demonstration;Verbal  Barriers to Learning: None  Education Outcome: Verbalized understanding;Demonstrated understanding    LUE AROM (degrees)  LUE AROM : WFL  Left Hand AROM (degrees)  Left Hand AROM: WFL  RUE AROM (degrees)  RUE AROM : WFL  Right Hand AROM (degrees)  Right Hand AROM: [facility]      AM-[facility] Score  AM-[facility] Inpatient Daily Activity Raw Score: 19 (11/25/22 0938)  AM-PAC Inpatient ADL T-Scale Score : 40.22 (11/25/22 0938)  ADL Inpatient CMS 0-100% Score: 42.8 (11/25/22 7573)  ADL Inpatient CMS G-Code Modifier : CK (11/25/22 7710)    Goals  Short Term Goals  Time Frame for Short Term Goals: Prior to DC: Short Term Goal 1: Pt will complete ADL transfers/mobility with supervision  Short Term Goal 2: Pt will tolerate standing > 5 min for functional task with supervision  Short Term Goal 3: Pt will complete toileting with supervision  Short Term Goal 4: Pt will complete LB Dressing with supervision  Patient Goals   Patient goals : to return home       Therapy Time   Individual Concurrent Group Co-treatment   Time In 0905         Time Out 0938         Minutes 33         Timed Code Treatment Minutes: 15 Minutes     This note to serve as OT d/c summary if pt is d/c-ed prior to next therapy session.     Uzair Goode, OTR/L

## 2022-11-25 NOTE — ACP (ADVANCE CARE PLANNING)
Advance Care Planning     Advance Care Planning Activator (Inpatient)  Conversation Note      Date of ACP Conversation: 11/25/2022     Conversation Conducted with: Patient with Decision Making Capacity    ACP Activator: Lydia Zavaleta RN    Health Care Decision Maker:     Current Designated Health Care Decision Maker:     Primary Decision Maker: Beba Pal - Spouse - [phone]    Secondary Decision Maker: Alexandria Gonzáles Child - [phone]    Secondary Decision Maker: Prabhjot Pal - Child    Today we documented Decision Maker(s) consistent with Legal Next of Kin hierarchy. Care Preferences    Ventilation: \"If you were in your present state of health and suddenly became very ill and were unable to breathe on your own, what would your preference be about the use of a ventilator (breathing machine) if it were available to you? \"      Would the patient desire the use of ventilator (breathing machine)?: yes    \"If your health worsens and it becomes clear that your chance of recovery is unlikely, what would your preference be about the use of a ventilator (breathing machine) if it were available to you? \"     Would the patient desire the use of ventilator (breathing machine)?: Yes      Resuscitation  \"CPR works best to restart the heart when there is a sudden event, like a heart attack, in someone who is otherwise healthy. Unfortunately, CPR does not typically restart the heart for people who have serious health conditions or who are very sick. \"    \"In the event your heart stopped as a result of an underlying serious health condition, would you want attempts to be made to restart your heart (answer \"yes\" for attempt to resuscitate) or would you prefer a natural death (answer \"no\" for do not attempt to resuscitate)? \" yes       [] Yes   [] No   Educated Patient / [employer] regarding differences between Advance Directives and portable DNR orders.     Length of ACP Conversation in minutes: Conversation Outcomes:  [x] ACP discussion completed  [] Existing advance directive reviewed with patient; no changes to patient's previously recorded wishes  [] New Advance Directive completed  [] Portable Do Not Rescitate prepared for Provider review and signature  [] POLST/POST/MOLST/MOST prepared for Provider review and signature      Follow-up plan:    [] Schedule follow-up conversation to continue planning  [] Referred individual to Provider for additional questions/concerns   [] Advised patient/agent/surrogate to review completed ACP document and update if needed with changes in condition, patient preferences or care setting    [x] This note routed to one or more involved healthcare providers    #[phone]  Electronically signed by Lissette Valentin RN on 11/25/2022 at 12:03 PM

## 2022-11-25 NOTE — PROGRESS NOTES
Pt refused head to toe assessment. States he has \"too much pain,\" and \"they already did it today. \" I explained that each shift, a nurse will have to perform an assessment, and he still declined. He also refused his evening acyclovir. He states he, \"doesn't need it anymore. \" He also states he takes his evening ritalin at midnight, and that is how he wants to take it tonight.  Electronically signed by Jackson Rock RN on 11/24/2022 at 8:59 PM

## 2022-11-25 NOTE — PROGRESS NOTES
Progress Note  Date:2022       Room:E7Y-5799/5124-01  Patient Name:Aki Pal     YOB: 1950     Age:72 y.o. Subjective    Subjective:  Symptoms:  Stable. Diet:  No nausea or vomiting. Activity level: Impaired due to pain. Pain:  He complains of pain that is moderate. Review of Systems   Gastrointestinal:  Negative for nausea and vomiting. Objective         Vitals Last 24 Hours:  TEMPERATURE:  Temp  Av.4 °F (37.4 °C)  Min: 98.6 °F (37 °C)  Max: 100.4 °F (38 °C)  RESPIRATIONS RANGE: Resp  Av  Min: 18  Max: 31  PULSE OXIMETRY RANGE: SpO2  Av %  Min: 92 %  Max: 99 %  PULSE RANGE: Pulse  Av.2  Min: 87  Max: 108  BLOOD PRESSURE RANGE: Systolic (14ULO), TTM:944 , Min:110 , FDR:931   ; Diastolic (75EZY), RKX:35, Min:70, Max:89    I/O (24Hr): Intake/Output Summary (Last 24 hours) at 2022 1042  Last data filed at 2022 1009  Gross per 24 hour   Intake 1419.92 ml   Output 2250 ml   Net -830.08 ml     Objective  Labs/Imaging/Diagnostics    Labs:  CBC:  Recent Labs     22  1427 22  0436 22  0529   WBC 17.5* 17.9* 13.3*   RBC 4.97 4.19* 3.82*   HGB 15.3 13.0* 11.8*   HCT 46.2 39.1* 35.5*   MCV 92.9 93.3 93.0   RDW 14.7 14.5 14.4    179 156     CHEMISTRIES:  Recent Labs     22  1427 22  0436 22  0529    141 143   K 4.8 4.0 4.2    105 106   CO2 30 24 29   BUN 14 9 6*   CREATININE 0.7* 0.8 0.8   GLUCOSE 111* 111* 97     PT/INR:No results for input(s): PROTIME, INR in the last 72 hours. APTT:No results for input(s): APTT in the last 72 hours.   LIVER PROFILE:  Recent Labs     22  1427   AST 29   ALT 42*   BILITOT 0.3   ALKPHOS 118       Imaging Last 24 Hours:  CT ABDOMEN PELVIS W IV CONTRAST Additional Contrast? None    Result Date: 2022  EXAMINATION: CT OF THE ABDOMEN AND PELVIS WITH CONTRAST 2022 12:05 pm TECHNIQUE: CT of the abdomen and pelvis was performed with the administration of intravenous contrast. Multiplanar reformatted images are provided for review. Automated exposure control, iterative reconstruction, and/or weight based adjustment of the mA/kV was utilized to reduce the radiation dose to as low as reasonably achievable. COMPARISON: 03/09/2022 HISTORY: ORDERING SYSTEM PROVIDED HISTORY: LLQ abd pain and periumbilical pain x 3 days, h/o diverticulitis TECHNOLOGIST PROVIDED HISTORY: Reason for exam:->LLQ abd pain and periumbilical pain x 3 days, h/o diverticulitis Additional Contrast?->None Decision Support Exception - unselect if not a suspected or confirmed emergency medical condition->Emergency Medical Condition (MA) Reason for Exam: LLQ abd pain and periumbilical pain x 3 days, h/o diverticulitis FINDINGS: Lower Chest: Scarring within the lower lungs is present, greater on the right. Bronchial wall thickening noted within the lower lungs. Emphysema noted. Base of the heart is unremarkable. Organs: Liver unremarkable. Gallbladder unremarkable. Spleen unremarkable. Adrenals unremarkable. Pancreas unremarkable. No acute or suspicious renal abnormality identified. 3.5 cm cyst extending from the lower pole of the right kidney is unchanged and is a benign finding which requires no specific follow-up. GI/Bowel: There is mural thickening involving the junction of the descending colon and sigmoid colon, superimposed on a background of extensive diverticulosis, compatible with acute diverticulitis. There is evidence of perforation with extraluminal gas. Majority of that gas is seen in the region of the diverticulitis. However, more distant bubbles are noted, some of which extend into the upper abdomen, much of which is seen adjacent to the other bowel loops. At this time a diverticular abscess is not detected. Distal esophagus and stomach unremarkable. No small bowel abnormalities are identified. Pelvis: Urinary bladder and prostate unremarkable.   No free pelvic fluid is found. Peritoneum/Retroperitoneum: Abdominal aorta normal in caliber. Superior mesenteric artery is enhancing. No retroperitoneal lymphadenopathy. Bones/Soft Tissues: Small umbilical hernia contains fat only. The extra-abdominal and extra pelvic soft tissues are otherwise unremarkable. No acute or suspicious bony abnormalities are identified. Findings compatible with acute diverticulitis involving the junction of the descending colon with the sigmoid colon. There is evidence of perforation with extraluminal gas. The majority of the gas is near the region of inflammation, scattered bubbles of gas are seen more distally within the peritoneal cavity. No diverticular abscess is seen at this time. Findings were discussed with Lawrence Steven at 4:20 pm on 11/23/2022. Assessment//Plan           Hospital Problems             Last Modified POA    * (Principal) Sepsis ([address] 11/23/2022 Yes     Assessment & Plan    Sigmoid diverticulitis   Stable, maybe slightly improved overnight   WBC trending down   Tried clears.  No vomiting but not ready to advance   Continue Antibiotics    Electronically signed by Dalila Mendez MD on 11/25/2022 at 10:43 AM    Electronically signed by Dalila Mendez MD on 11/25/22 at 10:42 AM EST

## 2022-11-25 NOTE — PROGRESS NOTES
Physical Therapy  Facility/Department: [facility] PROGRESSIVE CARE  Physical Therapy Initial Assessment    Name: Ana Mariee  : 1950  MRN: [id number]  Date of Service: 2022    Discharge Recommendations:  Home with assist PRN   PT Equipment Recommendations  Equipment Needed: No    Ana Mariee scored a 20/24 on the AM-PAC short mobility form. At this time, no further PT is recommended upon discharge. Recommend patient returns to prior setting with prior services. Patient Diagnosis(es): The primary encounter diagnosis was Diverticulitis of large intestine with perforation without bleeding. A diagnosis of Severe sepsis (HCC) was also pertinent to this visit. Past Medical History:  has a past medical history of Anxiety, Bipolar 1 disorder ([address] Community acquired bacterial pneumonia, COPD (chronic obstructive pulmonary disease) ([address] Depression, Fibromyalgia, Headache(784.0), Hyperlipidemia, Manic depressive disease manic phase ([address] On home O2, PTSD (post-traumatic stress disorder), Seizures ([address] and Tardive dyskinesia. Past Surgical History:  has a past surgical history that includes Mouth surgery (2013). Assessment   Body Structures, Functions, Activity Limitations Requiring Skilled Therapeutic Intervention: Decreased functional mobility   Assessment: pt is a 80 yo male who was adm to hosp with acute diverticulitis involving the junction of the   descending colon with the sigmoid colon.  Pt at baseline is Ind without an AD; anticipate pt will be safe to return home at discharge with PRN assist  Therapy Prognosis: Good  Decision Making: Medium Complexity  Barriers to Learning: easily irritated  Requires PT Follow-Up: Yes (will follow to decreased secondary effects of immobility while in hosp)  Activity Tolerance  Activity Tolerance: Patient tolerated evaluation without incident     Plan   Physcial Therapy Plan  General Plan:  (1-2 more visits)  Current Treatment Recommendations: Functional mobility training, Gait training, Stair training  Safety Devices  Type of Devices: Chair alarm in place, Call light within reach, Nurse notified, Left in chair     Restrictions  Restrictions/Precautions  Restrictions/Precautions: Fall Risk  Position Activity Restriction  Other position/activity restrictions: IV attached     Subjective   General  Chart Reviewed: Yes  Patient assessed for rehabilitation services?: Yes  Additional Pertinent Hx: per Dr Catrina Cortez note: \"81year-old male presented to the hospital with abdominal pain with history of diverticulitis, admitted to the hospital General surgery consulted.   Is working diagnosis of diverticulitis with colonic perforation, antibiotics started and general surgery consulted\"  Response To Previous Treatment: Not applicable  Family / Caregiver Present: No  Referring Practitioner: Dr Barton Mew: Within Functional Limits  Subjective  Subjective: pt easily irritated but agreeable to working with therapy; needs extra time to complete tasks as he does not like to be rushed         Social/Functional History  Social/Functional History  Lives With: Spouse  Type of Home: House  Home Layout: Two level, 1/2 bath on main level  Home Access: Stairs to enter with rails  Entrance Stairs - Number of Steps: 2 KIMBERLEY; pt sleeps on couch on main floor with 1/2 bath; full bathroom upstairs  Bathroom Shower/Tub: Walk-in shower, Shower chair with back  [employer]: Standard (vanity close by)  [employer]: Shower chair, Grab bars in shower  Bathroom Accessibility: Accessible  Has the patient had two or more falls in the past year or any fall with injury in the past year?: No  ADL Assistance: Independent (likes having someone in house for showers d/t fear of falling)  Homemaking Assistance: Independent  Ambulation Assistance: Independent  Transfer Assistance: Independent  Active : No  Occupation: Retired  Vision/Hearing  Vision  Vision: Within Functional Limits  Hearing  Hearing: Within functional limits    Cognition   Orientation  Overall Orientation Status: Within Functional Limits  Cognition  Overall Cognitive Status: WFL     Objective   Heart Rate: 87  Heart Rate Source: Monitor  BP: 110/89  BP Location: Left upper arm  MAP (Calculated): 96  Resp: 22  SpO2: 96 %  O2 Device: Nasal cannula     Observation/Palpation  Observation: flexed neck; chronic neck pain        AROM RLE (degrees)  RLE AROM: WFL  AROM LLE (degrees)  LLE AROM : WFL  Strength RLE  Strength RLE: WFL  Strength LLE  Strength LLE: WFL           Bed mobility  Bed Mobility Comments: pt sitting at EOB upon arrival and in chair at end of session  Transfers  Sit to Stand: Stand by assistance  Stand to Sit: Stand by assistance  Ambulation  Surface: Level tile  Device: No Device  Assistance: Stand by assistance  Quality of Gait: slow pace with flexed posture; therapist managed IV pole  Distance: 20' to BS chair  Comments: assisted with positioning in chair for comfort with all needs in reach and breakfast tray in front of pt     Balance  Sitting - Static: Good  Sitting - Dynamic: Good  Standing - Static: Fair;+  Standing - Dynamic: Fair           OutComes Score                                                  AM-PAC Score  AM-PAC Inpatient Mobility Raw Score : 20 (11/25/22 0939)  AM-PAC Inpatient T-Scale Score : 47.67 (11/25/22 0939)  Mobility Inpatient CMS 0-100% Score: 35.83 (11/25/22 0939)  Mobility Inpatient CMS G-Code Modifier : CJ (11/25/22 0939)          Tinneti Score       Goals  Short Term Goals  Time Frame for Short Term Goals: by discharge  Short Term Goal 1: transfers MI  Short Term Goal 2: amb 100' without an AD SBA  Short Term Goal 3: negotiate stairs when able  Patient Goals   Patient Goals : to not have pain and get home       Education  Patient Education  Education Given To: Patient  Education Provided: Role of Therapy  Education Provided Comments: reviewed call light and not getting up without assist  Education Method: Verbal  Education Outcome: Verbalized understanding      Therapy Time   Individual Concurrent Group Co-treatment   Time In 0905         Time Out 0940         Minutes 35                 SHERLYN WELDON PT   Electronically signed by SHERLYN WELDON PT on 11/25/2022 at 9:40 AM

## 2022-11-25 NOTE — PLAN OF CARE
Problem: Discharge Planning  Goal: Discharge to home or other facility with appropriate resources  Outcome: Progressing  Flowsheets  Taken 11/25/2022 1159 by Ольга Schultz RN  Discharge to home or other facility with appropriate resources: Identify barriers to discharge with patient and caregiver    Problem: Safety - Adult  Goal: Free from fall injury  Outcome: Progressing  Flowsheets (Taken 11/25/2022 1159)  Free From Fall Injury: Instruct family/caregiver on patient safety

## 2022-11-25 NOTE — CARE COORDINATION
Case Management Assessment  Initial Evaluation    Date/Time of Evaluation: 11/25/2022 12:07 PM  Assessment Completed by: Ester Ramirez RN    If patient is discharged prior to next notation, then this note serves as note for discharge by case management. Patient Name: Delisa Glover                   YOB: 1950  Diagnosis: Sepsis ([facility] [A41.9]  Severe sepsis ([facility] [A41.9, R65.20]  Diverticulitis of large intestine with perforation without bleeding [K57.20]                   Date / Time: 11/23/2022  1:51 PM    Patient Admission Status: Inpatient   Readmission Risk (Low < 19, Mod (19-27), High > 27): Readmission Risk Score: 13.1    Current PCP: Vika Johns MD  PCP verified by CM? Yes    Chart Reviewed: Yes      History Provided by: Patient  Patient Orientation: Alert and Oriented, Person, Place, Situation    Patient Cognition: Alert    Hospitalization in the last 30 days (Readmission):  No    If yes, Readmission Assessment in  Navigator will be completed. Advance Directives:      Code Status: Full Code   Patient's Primary Decision Maker is: Legal Next of Kin    Primary Decision Maker: Beba Pal - Spouse - [phone]    Secondary Decision Maker: Carolynn Mckeon - Child - [phone]    Secondary Decision Maker: Prabhjot Pal    Discharge Planning:    Patient lives with: Spouse/Significant Other, Children Type of Home: House  Primary Care Giver: Spouse  Patient Support Systems include: Spouse/Significant Other, Children   Current Financial resources: Medicare  Current services prior to admission: Oxygen Therapy            Current DME:  Oxygen through [employer]            Type of Home Care services:  None    ADLS  Prior functional level: Independent in ADLs/IADLs  Current functional level: Independent in ADLs/IADLs    PT AM-PAC: 20 /24  OT AM-PAC: 23 /24    Family can provide assistance at DC:  Yes  Would you like Case Management to discuss the discharge plan with any other family members/significant others, and if so, who? No  Plans to Return to Present Housing: Yes  Other Identified Issues/Barriers to RETURNING to current housing: N/A  Potential Assistance needed at discharge: N/A            Potential DME:  None  Patient expects to discharge to: [facility] for transportation at discharge: Family    Financial    Payor: Miriam Major / Plan: [address] [place] [address] [address] [facility] PPO / Product Type: *No Product type* /     Does insurance require precert for SNF: Yes    Potential assistance Purchasing Medications: No  Meds-to-Beds request:        Aleah Burk [address], [address] [facility] -  [phone]  [address] [facility] [address]  Phone: [phone] Fax: [phone]    [facility] [address] [place] [address]), [address] [phone] - f [phone]   Mary Lora Barnhart) [address] [place] [address]  Phone: [phone] Fax: [phone]    [facility] [address], [address] [phone] [phone]  [place] [address] [address]  Phone: [phone] Fax: [phone]      Notes:    Factors facilitating achievement of predicted outcomes: Family support, Cooperative, Pleasant, and Sense of humor    Barriers to discharge: Pain and Decreased endurance    Additional Case Management Notes: Patient plans to return home with spouse. Spouse can transport at discharge. Denied any discharge needs. COA referral sent per request.  If Home Care is needed, patient would like to use [facility]. Case Management referral noted. Patient denied any safety concerns at home to this .     The Plan for Transition of Care is related to the following treatment goals of Sepsis ([address] [A41.9]  Severe sepsis ([address] [A41.9, R65.20]  Diverticulitis of large intestine with perforation without bleeding [K57.20]    The Patient and/or patient representative Ann Edouard and his family were provided with a choice of provider and agrees with the discharge plan. Freedom of choice list with basic dialogue that supports the patient's individualized plan of care/goals and shares the quality data associated with the providers was provided to: Patient   Patient Representative Name:   Isaiah Sandoval    The Patient and/or Patient Representative Agree with the Discharge Plan?  Yes    Temo Maldonado RN  Case Management Department  Ph: [phone]

## 2022-11-25 NOTE — PROGRESS NOTES
Hospitalist Progress Note      PCP: Maya Phillips MD    Date of Admission: 11/23/2022      Hospital Course:  61-year-old male presented to the hospital with abdominal pain with history of diverticulitis, admitted to the hospital General surgery consulted. Is working diagnosis of diverticulitis with colonic perforation, antibiotics started and general surgery consulted    Subjective: Up to chair, still having abdominal pain, less though, no nausea or vomiting. No fever or chills. Medications:  Reviewed    Infusion Medications    sodium chloride      sodium chloride 125 mL/hr at 11/24/22 2324     Scheduled Medications    acyclovir  800 mg Oral BID    clonazePAM  1 mg Oral TID    methylphenidate  40 mg Oral TID    doxepin  25 mg Oral Nightly    LORazepam  0.5 mg Oral QPM    nicotine  1 patch TransDERmal Daily    sodium chloride flush  10 mL IntraVENous 2 times per day    enoxaparin  40 mg SubCUTAneous Daily    piperacillin-tazobactam  3,375 mg IntraVENous Q8H     PRN Meds: cyclobenzaprine, sodium chloride flush, sodium chloride, potassium chloride **OR** potassium alternative oral replacement **OR** potassium chloride, magnesium sulfate, promethazine **OR** ondansetron, magnesium hydroxide, acetaminophen **OR** acetaminophen, albuterol sulfate HFA, morphine, LORazepam      Intake/Output Summary (Last 24 hours) at 11/25/2022 0845  Last data filed at 11/25/2022 0505  Gross per 24 hour   Intake 1409.92 ml   Output 1650 ml   Net -240.08 ml       Physical Exam Performed:    /89   Pulse 87   Temp 98.6 °F (37 °C) (Oral)   Resp 22   Ht 5' 6.5\" (1.689 m)   Wt 153 lb 3.5 oz (69.5 kg)   SpO2 96%   BMI 24.36 kg/m²     General appearance: No apparent distress  Neck: Supple  Respiratory:  Normal respiratory effort. Clear to auscultation, bilaterally without Rales/Wheezes/Rhonchi. Cardiovascular: Regular rate and rhythm with normal S1/S2 without murmurs, rubs or gallops.   Abdomen: Soft, mildly tender  Musculoskeletal: No clubbing, cyanosis  Skin: Skin color, texture, turgor normal.  No rashes or lesions. Neurologic: No focal weakness  Psychiatric: Alert and oriented  Capillary Refill: Brisk, 3 seconds, normal   Peripheral Pulses: +2 palpable, equal bilaterally       Labs:   Recent Labs     11/23/22  1427 11/24/22  0436 11/25/22  0529   WBC 17.5* 17.9* 13.3*   HGB 15.3 13.0* 11.8*   HCT 46.2 39.1* 35.5*    179 156     Recent Labs     11/23/22  1427 11/24/22  0436 11/25/22  0529    141 143   K 4.8 4.0 4.2    105 106   CO2 30 24 29   BUN 14 9 6*   CREATININE 0.7* 0.8 0.8   CALCIUM 9.1 8.3 8.3     Recent Labs     11/23/22  1427   AST 29   ALT 42*   BILITOT 0.3   ALKPHOS 118     No results for input(s): INR in the last 72 hours. No results for input(s): David Kishan in the last 72 hours. Urinalysis:      Lab Results   Component Value Date/Time    NITRU Negative 11/23/2022 02:27 PM    WBCUA 4 06/05/2016 12:30 AM    RBCUA 2 06/05/2016 12:30 AM    BLOODU Negative 11/23/2022 02:27 PM    SPECGRAV 1.007 11/23/2022 02:27 PM    GLUCOSEU Negative 11/23/2022 02:27 PM    GLUCOSEU NEGATIVE 04/27/2012 10:50 PM       Radiology:  CT ABDOMEN PELVIS W IV CONTRAST Additional Contrast? None   Final Result   Findings compatible with acute diverticulitis involving the junction of the   descending colon with the sigmoid colon. There is evidence of perforation with extraluminal gas. The majority of the   gas is near the region of inflammation, scattered bubbles of gas are seen   more distally within the peritoneal cavity. No diverticular abscess is seen at this time. Findings were discussed with Ingris Santoyo at 4:20 pm on 11/23/2022.              IP CONSULT TO GENERAL SURGERY  IP CONSULT TO CASE MANAGEMENT    Assessment/Plan:    Active Hospital Problems    Diagnosis     [facility] [A41.9]      Diverticulitis associated with colonic perforation, IV Zosyn, keep n.p.o., IV fluid, pain management, general surgery following, no immediate recommendation for surgical intervention at this time  Sepsis, due to diverticulitis, on IV Zosyn for now. White count improving. Had low-grade fever yesterday afternoon  lactic acidosis, IV fluid lactic acid decreased  History of COPD without acute exacerbation  History of seizures, no acute events  History of tardive dyskinesia monitor for now  Anemia, no obvious bleeding, likely dilutional and chronic      Diet: ADULT DIET;  Clear Liquid  Code Status: Full Code      Tavon Barrientos MD

## 2022-11-26 LAB
ANION GAP SERPL CALCULATED.3IONS-SCNC: 10 MMOL/L (ref 3–16)
BASOPHILS ABSOLUTE: 0 K/UL (ref 0–0.2)
BASOPHILS RELATIVE PERCENT: 0.4 %
BUN BLDV-MCNC: 5 MG/DL (ref 7–20)
CALCIUM SERPL-MCNC: 8.1 MG/DL (ref 8.3–10.6)
CHLORIDE BLD-SCNC: 105 MMOL/L (ref 99–110)
CO2: 25 MMOL/L (ref 21–32)
CREAT SERPL-MCNC: 0.7 MG/DL (ref 0.8–1.3)
EOSINOPHILS ABSOLUTE: 0.2 K/UL (ref 0–0.6)
EOSINOPHILS RELATIVE PERCENT: 2.2 %
GFR SERPL CREATININE-BSD FRML MDRD: >60 ML/MIN/{1.73_M2}
GLUCOSE BLD-MCNC: 92 MG/DL (ref 70–99)
HCT VFR BLD CALC: 34.1 % (ref 40.5–52.5)
HEMOGLOBIN: 11.5 G/DL (ref 13.5–17.5)
LYMPHOCYTES ABSOLUTE: 1.1 K/UL (ref 1–5.1)
LYMPHOCYTES RELATIVE PERCENT: 12.6 %
MCH RBC QN AUTO: 31.3 PG (ref 26–34)
MCHC RBC AUTO-ENTMCNC: 33.7 G/DL (ref 31–36)
MCV RBC AUTO: 93 FL (ref 80–100)
MONOCYTES ABSOLUTE: 0.8 K/UL (ref 0–1.3)
MONOCYTES RELATIVE PERCENT: 9.2 %
NEUTROPHILS ABSOLUTE: 6.7 K/UL (ref 1.7–7.7)
NEUTROPHILS RELATIVE PERCENT: 75.6 %
PDW BLD-RTO: 14.3 % (ref 12.4–15.4)
PLATELET # BLD: 156 K/UL (ref 135–450)
PMV BLD AUTO: 8.8 FL (ref 5–10.5)
POTASSIUM REFLEX MAGNESIUM: 3.7 MMOL/L (ref 3.5–5.1)
RBC # BLD: 3.66 M/UL (ref 4.2–5.9)
SODIUM BLD-SCNC: 140 MMOL/L (ref 136–145)
WBC # BLD: 8.8 K/UL (ref 4–11)

## 2022-11-26 PROCEDURE — 2700000000 HC OXYGEN THERAPY PER DAY

## 2022-11-26 PROCEDURE — 94761 N-INVAS EAR/PLS OXIMETRY MLT: CPT

## 2022-11-26 PROCEDURE — 2580000003 HC RX 258: Performed by: INTERNAL MEDICINE

## 2022-11-26 PROCEDURE — 80048 BASIC METABOLIC PNL TOTAL CA: CPT

## 2022-11-26 PROCEDURE — 36415 COLL VENOUS BLD VENIPUNCTURE: CPT

## 2022-11-26 PROCEDURE — 6360000002 HC RX W HCPCS: Performed by: INTERNAL MEDICINE

## 2022-11-26 PROCEDURE — 85025 COMPLETE CBC W/AUTO DIFF WBC: CPT

## 2022-11-26 PROCEDURE — 2060000000 HC ICU INTERMEDIATE R&B

## 2022-11-26 PROCEDURE — 6370000000 HC RX 637 (ALT 250 FOR IP): Performed by: FAMILY MEDICINE

## 2022-11-26 PROCEDURE — 94640 AIRWAY INHALATION TREATMENT: CPT

## 2022-11-26 PROCEDURE — 6370000000 HC RX 637 (ALT 250 FOR IP): Performed by: INTERNAL MEDICINE

## 2022-11-26 RX ADMIN — LORAZEPAM 0.5 MG: 0.5 TABLET ORAL at 21:04

## 2022-11-26 RX ADMIN — PIPERACILLIN AND TAZOBACTAM 3375 MG: 3; .375 INJECTION, POWDER, FOR SOLUTION INTRAVENOUS at 22:44

## 2022-11-26 RX ADMIN — SODIUM CHLORIDE: 9 INJECTION, SOLUTION INTRAVENOUS at 01:49

## 2022-11-26 RX ADMIN — MORPHINE SULFATE 2 MG: 2 INJECTION, SOLUTION INTRAMUSCULAR; INTRAVENOUS at 18:42

## 2022-11-26 RX ADMIN — MORPHINE SULFATE 2 MG: 2 INJECTION, SOLUTION INTRAMUSCULAR; INTRAVENOUS at 06:18

## 2022-11-26 RX ADMIN — MOMETASONE FUROATE AND FORMOTEROL FUMARATE DIHYDRATE 2 PUFF: 100; 5 AEROSOL RESPIRATORY (INHALATION) at 20:04

## 2022-11-26 RX ADMIN — CYCLOBENZAPRINE 10 MG: 10 TABLET, FILM COATED ORAL at 15:02

## 2022-11-26 RX ADMIN — PIPERACILLIN AND TAZOBACTAM 3375 MG: 3; .375 INJECTION, POWDER, FOR SOLUTION INTRAVENOUS at 06:20

## 2022-11-26 RX ADMIN — MORPHINE SULFATE 2 MG: 2 INJECTION, SOLUTION INTRAMUSCULAR; INTRAVENOUS at 22:42

## 2022-11-26 RX ADMIN — CLONAZEPAM 1 MG: 1 TABLET ORAL at 21:04

## 2022-11-26 RX ADMIN — ONDANSETRON 4 MG: 2 INJECTION INTRAMUSCULAR; INTRAVENOUS at 10:36

## 2022-11-26 RX ADMIN — METHYLPHENIDATE HYDROCHLORIDE 40 MG: 10 TABLET ORAL at 00:32

## 2022-11-26 RX ADMIN — MOMETASONE FUROATE AND FORMOTEROL FUMARATE DIHYDRATE 2 PUFF: 100; 5 AEROSOL RESPIRATORY (INHALATION) at 16:41

## 2022-11-26 RX ADMIN — METHYLPHENIDATE HYDROCHLORIDE 40 MG: 10 TABLET ORAL at 15:29

## 2022-11-26 RX ADMIN — CLONAZEPAM 1 MG: 1 TABLET ORAL at 15:02

## 2022-11-26 RX ADMIN — SODIUM CHLORIDE, PRESERVATIVE FREE 10 ML: 5 INJECTION INTRAVENOUS at 09:20

## 2022-11-26 RX ADMIN — METHYLPHENIDATE HYDROCHLORIDE 40 MG: 10 TABLET ORAL at 09:58

## 2022-11-26 RX ADMIN — DOXEPIN HYDROCHLORIDE 25 MG: 25 CAPSULE ORAL at 21:04

## 2022-11-26 RX ADMIN — SODIUM CHLORIDE: 9 INJECTION, SOLUTION INTRAVENOUS at 12:36

## 2022-11-26 RX ADMIN — CLONAZEPAM 1 MG: 1 TABLET ORAL at 09:19

## 2022-11-26 RX ADMIN — PIPERACILLIN AND TAZOBACTAM 3375 MG: 3; .375 INJECTION, POWDER, FOR SOLUTION INTRAVENOUS at 15:32

## 2022-11-26 RX ADMIN — CYCLOBENZAPRINE 10 MG: 10 TABLET, FILM COATED ORAL at 21:04

## 2022-11-26 RX ADMIN — METHYLPHENIDATE HYDROCHLORIDE 40 MG: 10 TABLET ORAL at 21:19

## 2022-11-26 ASSESSMENT — PAIN SCALES - GENERAL
PAINLEVEL_OUTOF10: 8
PAINLEVEL_OUTOF10: 6
PAINLEVEL_OUTOF10: 6
PAINLEVEL_OUTOF10: 8
PAINLEVEL_OUTOF10: 8
PAINLEVEL_OUTOF10: 7
PAINLEVEL_OUTOF10: 6
PAINLEVEL_OUTOF10: 8
PAINLEVEL_OUTOF10: 9

## 2022-11-26 ASSESSMENT — PAIN DESCRIPTION - PAIN TYPE
TYPE: ACUTE PAIN

## 2022-11-26 ASSESSMENT — PAIN - FUNCTIONAL ASSESSMENT
PAIN_FUNCTIONAL_ASSESSMENT: PREVENTS OR INTERFERES SOME ACTIVE ACTIVITIES AND ADLS
PAIN_FUNCTIONAL_ASSESSMENT: ACTIVITIES ARE NOT PREVENTED

## 2022-11-26 ASSESSMENT — PAIN DESCRIPTION - LOCATION
LOCATION: ABDOMEN

## 2022-11-26 ASSESSMENT — ENCOUNTER SYMPTOMS
NAUSEA: 0
VOMITING: 0

## 2022-11-26 ASSESSMENT — PAIN DESCRIPTION - ORIENTATION
ORIENTATION: LOWER
ORIENTATION: RIGHT
ORIENTATION: LEFT;RIGHT
ORIENTATION: RIGHT;LEFT
ORIENTATION: LOWER;RIGHT;LEFT
ORIENTATION: LEFT;LOWER

## 2022-11-26 ASSESSMENT — PAIN DESCRIPTION - DESCRIPTORS
DESCRIPTORS: ACHING
DESCRIPTORS: JABBING
DESCRIPTORS: ACHING
DESCRIPTORS: ACHING;STABBING
DESCRIPTORS: ACHING;CRUSHING
DESCRIPTORS: ACHING;DISCOMFORT;STABBING

## 2022-11-26 ASSESSMENT — PAIN DESCRIPTION - FREQUENCY
FREQUENCY: INTERMITTENT

## 2022-11-26 NOTE — PROGRESS NOTES
Progress Note  Date:2022       Room:F9T-4962/5124-01  Patient Name:Aki Pal     YOB: 1950     Age:72 y.o. Subjective    Subjective:  Symptoms:  Improved. Diet:  No nausea or vomiting. Activity level: Returning to normal.    Pain:  He complains of pain that is moderate. Review of Systems   Gastrointestinal:  Negative for nausea and vomiting. Objective         Vitals Last 24 Hours:  TEMPERATURE:  Temp  Av.8 °F (37.1 °C)  Min: 98.2 °F (36.8 °C)  Max: 99.6 °F (37.6 °C)  RESPIRATIONS RANGE: Resp  Av.3  Min: 17  Max: 25  PULSE OXIMETRY RANGE: SpO2  Av.8 %  Min: 92 %  Max: 98 %  PULSE RANGE: Pulse  Av.8  Min: 89  Max: 104  BLOOD PRESSURE RANGE: Systolic (56RVS), KFE:604 , Min:120 , MPV:947   ; Diastolic (02ENQ), ITJ:79, Min:61, Max:118    I/O (24Hr): Intake/Output Summary (Last 24 hours) at 2022 0956  Last data filed at 2022 0031  Gross per 24 hour   Intake --   Output 1800 ml   Net -1800 ml     Objective  Labs/Imaging/Diagnostics    Labs:  CBC:  Recent Labs     22   WBC 17.9* 13.3* 8.8   RBC 4.19* 3.82* 3.66*   HGB 13.0* 11.8* 11.5*   HCT 39.1* 35.5* 34.1*   MCV 93.3 93.0 93.0   RDW 14.5 14.4 14.3    156 156     CHEMISTRIES:  Recent Labs     22  0522    143 140   K 4.0 4.2 3.7    106 105   CO2 24 29 25   BUN 9 6* 5*   CREATININE 0.8 0.8 0.7*   GLUCOSE 111* 97 92     PT/INR:No results for input(s): PROTIME, INR in the last 72 hours. APTT:No results for input(s): APTT in the last 72 hours. LIVER PROFILE:  Recent Labs     22  1427   AST 29   ALT 42*   BILITOT 0.3   ALKPHOS 118       Imaging Last 24 Hours:  No results found.   Assessment//Plan           Hospital Problems             Last Modified POA    * (Principal) Sepsis ([facility] 2022 Yes     Assessment & Plan    Sigmoid diverticulitis   Improving   Now with pain mostly in his back, thinks from being in bed   Tolerating liquids, advance today   Continue antibiotics   No surgical plans at this time    Electronically signed by Zay Steinberg MD on 11/26/2022 at 9:57 AM    Electronically signed by Zay Steinberg MD on 11/26/22 at 9:56 AM EST

## 2022-11-26 NOTE — PROGRESS NOTES
normal S1/S2 without murmurs, rubs or gallops. Abdomen: Soft, nontender, nondistended, hypoactive bs but present  Musculoskeletal: No clubbing, cyanosis  Skin: Skin color, texture, turgor normal.  No rashes or lesions. Neurologic: No focal weakness  Psychiatric: Alert and oriented x3  Capillary Refill: Brisk, 3 seconds, normal   Peripheral Pulses: +2 palpable, equal bilaterally       Labs:   Recent Labs     11/24/22 0436 11/25/22 0529 11/26/22 0522   WBC 17.9* 13.3* 8.8   HGB 13.0* 11.8* 11.5*   HCT 39.1* 35.5* 34.1*    156 156     Recent Labs     11/24/22 0436 11/25/22 0529 11/26/22 0522    143 140   K 4.0 4.2 3.7    106 105   CO2 24 29 25   BUN 9 6* 5*   CREATININE 0.8 0.8 0.7*   CALCIUM 8.3 8.3 8.1*     Recent Labs     11/23/22  1427   AST 29   ALT 42*   BILITOT 0.3   ALKPHOS 118     No results for input(s): INR in the last 72 hours. No results for input(s): Lorena Nasuti in the last 72 hours. Urinalysis:      Lab Results   Component Value Date/Time    NITRU Negative 11/23/2022 02:27 PM    WBCUA 4 06/05/2016 12:30 AM    RBCUA 2 06/05/2016 12:30 AM    BLOODU Negative 11/23/2022 02:27 PM    SPECGRAV 1.007 11/23/2022 02:27 PM    GLUCOSEU Negative 11/23/2022 02:27 PM    GLUCOSEU NEGATIVE 04/27/2012 10:50 PM       Radiology:  CT ABDOMEN PELVIS W IV CONTRAST Additional Contrast? None   Final Result   Findings compatible with acute diverticulitis involving the junction of the   descending colon with the sigmoid colon. There is evidence of perforation with extraluminal gas. The majority of the   gas is near the region of inflammation, scattered bubbles of gas are seen   more distally within the peritoneal cavity. No diverticular abscess is seen at this time. Findings were discussed with Maia Bravo at 4:20 pm on 11/23/2022.              IP CONSULT TO GENERAL SURGERY  IP CONSULT TO CASE MANAGEMENT    Assessment/Plan:    Active Hospital Problems    Diagnosis     [facility] [A41.9]      Acute Diverticulitis associated with colonic perforation  - IV Zosyn day 4  - IV fluids  - diet per surgery, tolerating clear liquids, advanced to fulls  - general surgery following, no immediate recommendation for surgical intervention at this time  Sepsis, due to diverticulitis - improved  - lactic acid decreased, White count down now    Chronic conditions - continue home medications unless otherwise stated, will be managed while in pt  COPD without acute exacerbation  seizures, no acute events  tardive dyskinesia monitor for now  Anemia, no obvious bleeding, likely dilutional and chronic  Autoimmune encephalitis     Diet: ADULT DIET;  Clear Liquid  Code Status: Full Code    Shavon David, DO
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
## 2022-11-26 NOTE — PLAN OF CARE
Problem: Discharge Planning  Goal: Discharge to home or other facility with appropriate resources  Outcome: Progressing  Flowsheets (Taken 11/25/2022 9284 by Kayla Roberts RN)  Discharge to home or other facility with appropriate resources: Identify barriers to discharge with patient and caregiver     Problem: Pain  Goal: Verbalizes/displays adequate comfort level or baseline comfort level  Outcome: Progressing     Problem: Safety - Adult  Goal: Free from fall injury  Outcome: Progressing     Problem: Skin/Tissue Integrity  Goal: Absence of new skin breakdown  Description: 1. Monitor for areas of redness and/or skin breakdown  2. Assess vascular access sites hourly  3. Every 4-6 hours minimum:  Change oxygen saturation probe site  4. Every 4-6 hours:  If on nasal continuous positive airway pressure, respiratory therapy assess nares and determine need for appliance change or resting period.   Outcome: Progressing     Problem: ABCDS Injury Assessment  Goal: Absence of physical injury  Outcome: Progressing

## 2022-11-27 VITALS
RESPIRATION RATE: 14 BRPM | DIASTOLIC BLOOD PRESSURE: 62 MMHG | HEIGHT: 67 IN | OXYGEN SATURATION: 93 % | SYSTOLIC BLOOD PRESSURE: 100 MMHG | BODY MASS INDEX: 24.95 KG/M2 | HEART RATE: 106 BPM | TEMPERATURE: 99 F | WEIGHT: 158.95 LBS

## 2022-11-27 LAB
ANION GAP SERPL CALCULATED.3IONS-SCNC: 7 MMOL/L (ref 3–16)
BASOPHILS ABSOLUTE: 0 K/UL (ref 0–0.2)
BASOPHILS RELATIVE PERCENT: 0.4 %
BLOOD CULTURE, ROUTINE: NORMAL
BUN BLDV-MCNC: 5 MG/DL (ref 7–20)
CALCIUM SERPL-MCNC: 8.2 MG/DL (ref 8.3–10.6)
CHLORIDE BLD-SCNC: 103 MMOL/L (ref 99–110)
CO2: 31 MMOL/L (ref 21–32)
CREAT SERPL-MCNC: 0.9 MG/DL (ref 0.8–1.3)
CULTURE, BLOOD 2: NORMAL
EOSINOPHILS ABSOLUTE: 0.2 K/UL (ref 0–0.6)
EOSINOPHILS RELATIVE PERCENT: 2.8 %
GFR SERPL CREATININE-BSD FRML MDRD: >60 ML/MIN/{1.73_M2}
GLUCOSE BLD-MCNC: 97 MG/DL (ref 70–99)
HCT VFR BLD CALC: 34.3 % (ref 40.5–52.5)
HEMOGLOBIN: 11.5 G/DL (ref 13.5–17.5)
LYMPHOCYTES ABSOLUTE: 1.1 K/UL (ref 1–5.1)
LYMPHOCYTES RELATIVE PERCENT: 14.9 %
MCH RBC QN AUTO: 30.9 PG (ref 26–34)
MCHC RBC AUTO-ENTMCNC: 33.4 G/DL (ref 31–36)
MCV RBC AUTO: 92.4 FL (ref 80–100)
MONOCYTES ABSOLUTE: 1 K/UL (ref 0–1.3)
MONOCYTES RELATIVE PERCENT: 13.2 %
NEUTROPHILS ABSOLUTE: 5.2 K/UL (ref 1.7–7.7)
NEUTROPHILS RELATIVE PERCENT: 68.7 %
PDW BLD-RTO: 14.3 % (ref 12.4–15.4)
PLATELET # BLD: 163 K/UL (ref 135–450)
PMV BLD AUTO: 8.6 FL (ref 5–10.5)
POTASSIUM REFLEX MAGNESIUM: 3.9 MMOL/L (ref 3.5–5.1)
RBC # BLD: 3.71 M/UL (ref 4.2–5.9)
SODIUM BLD-SCNC: 141 MMOL/L (ref 136–145)
WBC # BLD: 7.6 K/UL (ref 4–11)

## 2022-11-27 PROCEDURE — 94640 AIRWAY INHALATION TREATMENT: CPT

## 2022-11-27 PROCEDURE — 6370000000 HC RX 637 (ALT 250 FOR IP): Performed by: INTERNAL MEDICINE

## 2022-11-27 PROCEDURE — 6360000002 HC RX W HCPCS: Performed by: INTERNAL MEDICINE

## 2022-11-27 PROCEDURE — 85025 COMPLETE CBC W/AUTO DIFF WBC: CPT

## 2022-11-27 PROCEDURE — 36415 COLL VENOUS BLD VENIPUNCTURE: CPT

## 2022-11-27 PROCEDURE — 2580000003 HC RX 258: Performed by: INTERNAL MEDICINE

## 2022-11-27 PROCEDURE — 80048 BASIC METABOLIC PNL TOTAL CA: CPT

## 2022-11-27 PROCEDURE — 94761 N-INVAS EAR/PLS OXIMETRY MLT: CPT

## 2022-11-27 RX ORDER — OXYCODONE HYDROCHLORIDE AND ACETAMINOPHEN 5; 325 MG/1; MG/1
1 TABLET ORAL EVERY 8 HOURS PRN
Qty: 9 TABLET | Refills: 0 | Status: SHIPPED | OUTPATIENT
Start: 2022-11-27 | End: 2022-11-30

## 2022-11-27 RX ORDER — METRONIDAZOLE 500 MG/1
500 TABLET ORAL 3 TIMES DAILY
Qty: 15 TABLET | Refills: 0 | Status: SHIPPED | OUTPATIENT
Start: 2022-11-27 | End: 2022-12-02

## 2022-11-27 RX ORDER — CIPROFLOXACIN 500 MG/1
500 TABLET, FILM COATED ORAL 2 TIMES DAILY
Qty: 10 TABLET | Refills: 0 | Status: SHIPPED | OUTPATIENT
Start: 2022-11-27 | End: 2022-12-02

## 2022-11-27 RX ADMIN — SODIUM CHLORIDE, PRESERVATIVE FREE 10 ML: 5 INJECTION INTRAVENOUS at 09:33

## 2022-11-27 RX ADMIN — MORPHINE SULFATE 2 MG: 2 INJECTION, SOLUTION INTRAMUSCULAR; INTRAVENOUS at 04:03

## 2022-11-27 RX ADMIN — CLONAZEPAM 1 MG: 1 TABLET ORAL at 09:30

## 2022-11-27 RX ADMIN — MOMETASONE FUROATE AND FORMOTEROL FUMARATE DIHYDRATE 2 PUFF: 100; 5 AEROSOL RESPIRATORY (INHALATION) at 08:50

## 2022-11-27 RX ADMIN — MORPHINE SULFATE 2 MG: 2 INJECTION, SOLUTION INTRAMUSCULAR; INTRAVENOUS at 10:08

## 2022-11-27 RX ADMIN — LORAZEPAM 1 MG: 2 INJECTION INTRAMUSCULAR; INTRAVENOUS at 04:04

## 2022-11-27 RX ADMIN — METHYLPHENIDATE HYDROCHLORIDE 40 MG: 10 TABLET ORAL at 10:07

## 2022-11-27 RX ADMIN — CLONAZEPAM 1 MG: 1 TABLET ORAL at 16:16

## 2022-11-27 RX ADMIN — MORPHINE SULFATE 2 MG: 2 INJECTION, SOLUTION INTRAMUSCULAR; INTRAVENOUS at 16:18

## 2022-11-27 RX ADMIN — PIPERACILLIN AND TAZOBACTAM 3375 MG: 3; .375 INJECTION, POWDER, FOR SOLUTION INTRAVENOUS at 06:10

## 2022-11-27 ASSESSMENT — PAIN SCALES - GENERAL
PAINLEVEL_OUTOF10: 9

## 2022-11-27 ASSESSMENT — PAIN DESCRIPTION - LOCATION
LOCATION: ABDOMEN
LOCATION: ABDOMEN

## 2022-11-27 ASSESSMENT — PAIN DESCRIPTION - ORIENTATION
ORIENTATION: RIGHT;LEFT;LOWER
ORIENTATION: RIGHT;LEFT;LOWER

## 2022-11-27 ASSESSMENT — PAIN - FUNCTIONAL ASSESSMENT
PAIN_FUNCTIONAL_ASSESSMENT: ACTIVITIES ARE NOT PREVENTED
PAIN_FUNCTIONAL_ASSESSMENT: PREVENTS OR INTERFERES SOME ACTIVE ACTIVITIES AND ADLS

## 2022-11-27 ASSESSMENT — PAIN DESCRIPTION - DESCRIPTORS
DESCRIPTORS: ACHING;JABBING
DESCRIPTORS: ACHING;STABBING

## 2022-11-27 ASSESSMENT — PAIN DESCRIPTION - PAIN TYPE: TYPE: ACUTE PAIN;CHRONIC PAIN

## 2022-11-27 NOTE — CARE COORDINATION
11/27/22 1513   IMM Letter   IMM Letter given to Patient/Family/Significant other/Guardian/POA/by: Provided to patient at bedside by Leo Garber RN. Education provided to patient, patient reported no questions and verbalized understanding. Patient aware of 4 hours allotted time to determine if they choose to pursue Medicare appeal process. Patient verbalized readiness for discharge.    IMM Letter date given: 11/27/22   IMM Letter time given: 1437   Electronically signed by Song Yousif RN on 11/27/2022 at 3:14 PM

## 2022-11-27 NOTE — DISCHARGE INSTR - COC
Continuity of Care Form    Patient Name: Ely Herrera   :  1950  MRN:  [id number]    Admit date:  2022  Discharge date: 2022    Code Status Order: Full Code   Advance Directives:     Admitting Physician:  Ishmael Palacios MD  PCP: Monique Mckinney MD    Discharging Nurse: JOANNE [facility] Unit/Room#: G4F-3908/1860-42  Discharging Unit Phone Number: [phone]    Emergency Contact:   Extended Emergency Contact Information  Primary Emergency Contact: Beba Pal  Address: [address] [facility], [address] [place] [address] Phone: [phone]  Relation: Spouse  Secondary Emergency Contact: [address] [facility]  Mobile Phone: [phone]  Relation: Child    Past Surgical History:  Past Surgical History:   Procedure Laterality Date    MOUTH SURGERY  2013       Immunization History:   Immunization History   Administered Date(s) Administered    COVID-19, J&J, (age 18y+), IM, 0.5 mL 2021    COVID-19, PFIZER PURPLE top, DILUTE for use, (age 15 y+), 30mcg/0.3mL 2022       Active Problems:  Patient Active Problem List   Diagnosis Code    Tardive dyskinesia G24.01    Community acquired bacterial pneumonia J15.9    Esophageal dysphagia R13.19    Hoarseness, chronic R49.0    Acute respiratory failure with hypoxia ([address] J96.01    Community acquired pneumonia of right lower lobe of lung J18.9    Centrilobular emphysema (HCC) J43.2    Lactic acidosis E87.20    Postnasal drip R09.82    Tobacco use Z72.0    Sepsis ([address] A41.9    Pneumonia J18.9    Generalized weakness R53.1    PNA (pneumonia) J18.9       Isolation/Infection:   Isolation            No Isolation          Patient Infection Status       Infection Onset Added Last Indicated Last Indicated By Review Planned Expiration Resolved Resolved By    None active    Resolved    COVID-19 (Rule Out) 22 SARS-CoV-2 NAAT (Rapid) (Ordered)   22 Rule-Out Test Resulted COVID-19 (Rule Out) 03/08/22 03/08/22 03/08/22 COVID-19, Rapid (Ordered)   03/09/22 Rule-Out Test Resulted    COVID-19 (Rule Out) 01/25/21 01/25/21 01/25/21 COVID-19 (Ordered)   01/26/21 Rule-Out Test Resulted    COVID-19 (Rule Out) 01/25/21 01/25/21 01/25/21 COVID-19 (Ordered)   01/25/21 Rule-Out Test Resulted    COVID-19 (Rule Out) 12/29/20 12/29/20 12/29/20 COVID-19 (Ordered)   12/29/20 Rule-Out Test Resulted    COVID-19 (Rule Out) 12/29/20 12/29/20 12/29/20 COVID-19 (Ordered)   12/29/20 Rule-Out Test Resulted            Nurse Assessment:  Last Vital Signs: /62   Pulse (!) 106   Temp 99 °F (37.2 °C) (Oral)   Resp 14   Ht 5' 6.5\" (1.689 m)   Wt 158 lb 15.2 oz (72.1 kg)   SpO2 93%   BMI 25.27 kg/m²     Last documented pain score (0-10 scale): Pain Level: 9  Last Weight:   Wt Readings from Last 1 Encounters:   11/27/22 158 lb 15.2 oz (72.1 kg)     Mental Status:  oriented and alert    IV Access:  - None    Nursing Mobility/ADLs:  Walking   Independent  Transfer  Independent  Bathing  Independent  Dressing  Independent  Toileting  Independent  Feeding  Independent  Med Admin  Independent  Med Delivery   whole    Wound Care Documentation and Therapy:        Elimination:  Continence: Bowel: Yes  Bladder: Yes  Urinary Catheter: None   Colostomy/Ileostomy/Ileal Conduit: No       Date of Last BM:11/26/2022    Intake/Output Summary (Last 24 hours) at 11/27/2022 1404  Last data filed at 11/27/2022 1034  Gross per 24 hour   Intake 240 ml   Output 1900 ml   Net -1660 ml     I/O last 3 completed shifts:  In: -   Out: 4300 [Urine:4300]    Safety Concerns: At Risk for Falls    Impairments/Disabilities:      None    Nutrition Therapy:  Current Nutrition Therapy:   - Oral Diet:  General    Routes of Feeding: Oral  Liquids:  Thin Liquids  Daily Fluid Restriction: no  Last Modified Barium Swallow with Video (Video Swallowing Test): not done    Treatments at the Time of Hospital Discharge:   Respiratory Treatments: Oxygen Therapy:  is on oxygen at 3 lpm  at night L/min per nasal cannula. Ventilator:    - No ventilator support    Rehab Therapies: Physical Therapy and Occupational Therapy  Weight Bearing Status/Restrictions: No weight bearing restrictions  Other Medical Equipment (for information only, NOT a DME order): Other Treatments:     Patient's personal belongings (please select all that are sent with patient):  None    RN SIGNATURE:  Electronically signed by Rick Han RN on 11/27/22 at 2:39 PM EST    CASE MANAGEMENT/SOCIAL WORK SECTION    Inpatient Status Date: ***    Readmission Risk Assessment Score:  Readmission Risk              Risk of Unplanned Readmission:  18           Discharging to Facility/ Agency   Name:   Address:  Phone:  Fax:    Dialysis Facility (if applicable)   Name:  Address:  Dialysis Schedule:  Phone:  Fax:    / signature: {Esignature:[id number]}    PHYSICIAN SECTION    Prognosis: Fair    Condition at Discharge: Stable    Rehab Potential (if transferring to Rehab): Good    Recommended Labs or Other Treatments After Discharge: nurse, lsw    Physician Certification: I certify the above information and transfer of Ruben Larose  is necessary for the continuing treatment of the diagnosis listed and that he requires Home Care for greater 30 days.      Update Admission H&P: No change in H&P    PHYSICIAN SIGNATURE:  Electronically signed by Shannan Larios DO on 11/27/22 at 2:04 PM EST

## 2022-11-27 NOTE — DISCHARGE SUMMARY
range of motion. No jugular venous distention. Trachea midline. Respiratory:  Normal respiratory effort. Clear to auscultation, bilaterally without Rales/Wheezes/Rhonchi. Cardiovascular:  Regular rate and rhythm with normal S1/S2 without murmurs, rubs or gallops. Abdomen: Soft, non-tender, non-distended with normal bowel sounds. Musculoskeletal:  No clubbing, cyanosis or edema bilaterally. Full range of motion without deformity. He is walking around his room without difficulty today  Skin: Skin color, texture, turgor normal.  No rashes or lesions. Neurologic:  Neurovascularly intact without any focal sensory/motor deficits. Cranial nerves: II-XII intact, grossly non-focal.   Psychiatric:  Alert and oriented, thought content appropriate, normal insight    Consults:     IP CONSULT TO GENERAL SURGERY  IP CONSULT TO CASE MANAGEMENT  IP CONSULT TO HOME CARE NEEDS    Labs: For convenience and continuity at follow-up the following most recent labs are provided:    Lab Results   Component Value Date/Time    WBC 7.6 11/27/2022 05:40 AM    HGB 11.5 11/27/2022 05:40 AM    HCT 34.3 11/27/2022 05:40 AM    MCV 92.4 11/27/2022 05:40 AM     11/27/2022 05:40 AM     11/27/2022 05:40 AM    K 3.9 11/27/2022 05:40 AM     11/27/2022 05:40 AM    CO2 31 11/27/2022 05:40 AM    BUN 5 11/27/2022 05:40 AM    CREATININE 0.9 11/27/2022 05:40 AM    CALCIUM 8.2 11/27/2022 05:40 AM    PHOS 1.9 01/01/2021 09:30 AM    ALKPHOS 118 11/23/2022 02:27 PM    ALT 42 11/23/2022 02:27 PM    AST 29 11/23/2022 02:27 PM    BILITOT 0.3 11/23/2022 02:27 PM    LABALBU 4.0 11/23/2022 02:27 PM     Lab Results   Component Value Date    INR 0.89 04/19/2015    INR 0.87 10/10/2013    INR 0.85 06/27/2013       Radiology:  CT ABDOMEN PELVIS W IV CONTRAST Additional Contrast? None   Final Result   Findings compatible with acute diverticulitis involving the junction of the   descending colon with the sigmoid colon.       There is evidence of perforation with extraluminal gas. The majority of the   gas is near the region of inflammation, scattered bubbles of gas are seen   more distally within the peritoneal cavity. No diverticular abscess is seen at this time. Findings were discussed with Rich Maldonado at 4:20 pm on 11/23/2022. Discharge Medications:   Current Discharge Medication List        START taking these medications    Details   ciprofloxacin (CIPRO) 500 MG tablet Take 1 tablet by mouth 2 times daily for 5 days  Qty: 10 tablet, Refills: 0      metroNIDAZOLE (FLAGYL) 500 MG tablet Take 1 tablet by mouth 3 times daily for 5 days  Qty: 15 tablet, Refills: 0           Current Discharge Medication List        Current Discharge Medication List        CONTINUE these medications which have NOT CHANGED    Details   butalbital-acetaminophen-caffeine-codeine (FIORICET WITH CODEINE) -86-30 MG per capsule Take 1 capsule by mouth 2 times daily. cetirizine (ZYRTEC) 10 MG tablet Take 10 mg by mouth daily as needed      doxepin (SINEQUAN) 25 MG capsule Take 25 mg by mouth nightly      ondansetron (ZOFRAN) 8 MG tablet Take 8 mg by mouth every 8 hours as needed for Nausea      cyclobenzaprine (FLEXERIL) 10 MG tablet Take 10 mg by mouth 3 times daily as needed      acyclovir (ZOVIRAX) 800 MG tablet Take 800 mg by mouth 2 times daily      clonazePAM (KLONOPIN) 1 MG tablet Take 1 mg by mouth in the morning, at noon, and at bedtime. AM-Afternoon- PM      LORazepam (ATIVAN) 1 MG tablet Take 2 mg by mouth at bedtime. methylphenidate (RITALIN) 20 MG tablet Take 40 mg by mouth 3 times daily.        OXYGEN Inhale 2 L/min into the lungs as needed      albuterol sulfate HFA (PROVENTIL;VENTOLIN;PROAIR) 108 (90 Base) MCG/ACT inhaler Inhale 2 puffs into the lungs every 6 hours as needed      omeprazole (PRILOSEC) 40 MG delayed release capsule Take 40 mg by mouth daily           Current Discharge Medication List        STOP taking these medications       QUEtiapine (SEROQUEL) 100 MG tablet Comments:   Reason for Stopping:         cloNIDine (CATAPRES) 0.1 MG tablet Comments:   Reason for Stopping:         budesonide-formoterol (SYMBICORT) 80-4.5 MCG/ACT AERO Comments:   Reason for Stopping:         butalbital-acetaminophen-caffeine (FIORICET, ESGIC) -40 MG per tablet Comments:   Reason for Stopping: Follow-up appointments:  one week    Provider Follow-up:    pcp    Condition at Discharge:  Stable    The patient was seen and examined on day of discharge and this discharge summary is in conjunction with any daily progress note from day of discharge. Time Spent on discharge is 45 minutes  in the examination, evaluation, counseling and review of medications and discharge plan. Signed:    Alberto Lopez DO   11/27/2022      Thank you Stella Norman MD for the opportunity to be involved in this patient's care. If you have any questions or concerns please feel free to contact me at [phone].

## 2022-11-27 NOTE — CARE COORDINATION
CASE MANAGEMENT DISCHARGE SUMMARY:    DISCHARGE DATE: 11/27/2022    DISCHARGED TO HOME     TRANSPORTATION: via spouse             TIME: TBD by patient and bedside RN              COMMENTS: Confirmed with patient at bedside that his plan is to return home. Home care orders noted. I discussed the opportunity for patient to receive ongoing therapy in-home, but patient respectfully declined at this time. He also confirmed that his wife would be by to pick him up. Patient denies any further discharge needs at this time.     Electronically signed by Song Yousif RN on 11/27/2022 at 3:12 PM

## 2022-11-27 NOTE — PROGRESS NOTES
Patient tolerated eating half a turkey sandwich and his dinner soup. He states he is \"tired of this place,\" and that he wants to leave.  Electronically signed by Deshaun Uribe RN on 11/27/2022 at 7:22 AM
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
## 2022-11-27 NOTE — PROGRESS NOTES
Pt stated he had a soft BM earlier today. He also expressed how he would like to be discharged soon.  Electronically signed by Rizwan Reed RN on 11/26/2022 at 8:05 PM

## 2022-12-01 ENCOUNTER — TELEPHONE (OUTPATIENT)
Dept: SURGERY | Age: 72
End: 2022-12-01

## 2022-12-01 DIAGNOSIS — K57.32 SIGMOID DIVERTICULITIS: Primary | ICD-10-CM

## 2022-12-01 RX ORDER — HYDROCODONE BITARTRATE AND ACETAMINOPHEN 5; 325 MG/1; MG/1
1 TABLET ORAL EVERY 6 HOURS PRN
Qty: 12 TABLET | Refills: 0 | Status: SHIPPED | OUTPATIENT
Start: 2022-12-01 | End: 2022-12-04

## 2022-12-01 NOTE — TELEPHONE ENCOUNTER
PT would like to have a prescription of pain medication. He is currently on an antibiotic but is still in a lot of pain.   PT has follow up appt on 12//5 @ 2:15

## 2023-06-07 ENCOUNTER — HOSPITAL ENCOUNTER (EMERGENCY)
Age: 73
Discharge: LEFT BEFORE DISCHARGE | End: 2023-06-07
Attending: EMERGENCY MEDICINE
Payer: MEDICARE

## 2023-06-07 ENCOUNTER — APPOINTMENT (OUTPATIENT)
Dept: GENERAL RADIOLOGY | Age: 73
End: 2023-06-07
Payer: MEDICARE

## 2023-06-07 ENCOUNTER — APPOINTMENT (OUTPATIENT)
Dept: CT IMAGING | Age: 73
End: 2023-06-07
Payer: MEDICARE

## 2023-06-07 VITALS
RESPIRATION RATE: 27 BRPM | HEART RATE: 106 BPM | OXYGEN SATURATION: 95 % | WEIGHT: 158.07 LBS | HEIGHT: 67 IN | SYSTOLIC BLOOD PRESSURE: 143 MMHG | BODY MASS INDEX: 24.81 KG/M2 | DIASTOLIC BLOOD PRESSURE: 79 MMHG | TEMPERATURE: 98.9 F

## 2023-06-07 DIAGNOSIS — J96.01 ACUTE RESPIRATORY FAILURE WITH HYPOXIA AND HYPERCAPNIA (HCC): Primary | ICD-10-CM

## 2023-06-07 DIAGNOSIS — Z86.69 HISTORY OF MIGRAINE: ICD-10-CM

## 2023-06-07 DIAGNOSIS — J96.02 ACUTE RESPIRATORY FAILURE WITH HYPOXIA AND HYPERCAPNIA (HCC): Primary | ICD-10-CM

## 2023-06-07 DIAGNOSIS — Z72.0 TOBACCO ABUSE: ICD-10-CM

## 2023-06-07 DIAGNOSIS — Z53.29 LEFT AGAINST MEDICAL ADVICE: ICD-10-CM

## 2023-06-07 DIAGNOSIS — J44.1 COPD EXACERBATION (HCC): ICD-10-CM

## 2023-06-07 LAB
ALBUMIN SERPL-MCNC: 4.2 G/DL (ref 3.4–5)
ALBUMIN/GLOB SERPL: 1.5 {RATIO} (ref 1.1–2.2)
ALP SERPL-CCNC: 109 U/L (ref 40–129)
ALT SERPL-CCNC: 24 U/L (ref 10–40)
ANION GAP SERPL CALCULATED.3IONS-SCNC: 7 MMOL/L (ref 3–16)
AST SERPL-CCNC: 20 U/L (ref 15–37)
BASE EXCESS BLDV CALC-SCNC: 5.1 MMOL/L
BASOPHILS # BLD: 0 K/UL (ref 0–0.2)
BASOPHILS NFR BLD: 0.3 %
BILIRUB SERPL-MCNC: <0.2 MG/DL (ref 0–1)
BUN SERPL-MCNC: 11 MG/DL (ref 7–20)
CALCIUM SERPL-MCNC: 8.5 MG/DL (ref 8.3–10.6)
CHLORIDE SERPL-SCNC: 101 MMOL/L (ref 99–110)
CO2 BLDV-SCNC: 37 MMOL/L
CO2 SERPL-SCNC: 32 MMOL/L (ref 21–32)
COHGB MFR BLDV: 1.4 %
CREAT SERPL-MCNC: 0.7 MG/DL (ref 0.8–1.3)
D DIMER: 0.53 UG/ML FEU (ref 0–0.6)
DEPRECATED RDW RBC AUTO: 15.3 % (ref 12.4–15.4)
EKG ATRIAL RATE: 107 BPM
EKG DIAGNOSIS: NORMAL
EKG P AXIS: 64 DEGREES
EKG P-R INTERVAL: 128 MS
EKG Q-T INTERVAL: 334 MS
EKG QRS DURATION: 98 MS
EKG QTC CALCULATION (BAZETT): 445 MS
EKG R AXIS: 61 DEGREES
EKG T AXIS: 44 DEGREES
EKG VENTRICULAR RATE: 107 BPM
EOSINOPHIL # BLD: 0.2 K/UL (ref 0–0.6)
EOSINOPHIL NFR BLD: 1.5 %
GFR SERPLBLD CREATININE-BSD FMLA CKD-EPI: >60 ML/MIN/{1.73_M2}
GLUCOSE SERPL-MCNC: 119 MG/DL (ref 70–99)
HCO3 BLDV-SCNC: 35 MMOL/L (ref 23–29)
HCT VFR BLD AUTO: 42.7 % (ref 40.5–52.5)
HGB BLD-MCNC: 14.1 G/DL (ref 13.5–17.5)
LACTATE BLDV-SCNC: 0.9 MMOL/L (ref 0.4–1.9)
LYMPHOCYTES # BLD: 1.5 K/UL (ref 1–5.1)
LYMPHOCYTES NFR BLD: 11.8 %
MCH RBC QN AUTO: 31.1 PG (ref 26–34)
MCHC RBC AUTO-ENTMCNC: 33.1 G/DL (ref 31–36)
MCV RBC AUTO: 94 FL (ref 80–100)
METHGB MFR BLDV: 0.2 %
MONOCYTES # BLD: 0.7 K/UL (ref 0–1.3)
MONOCYTES NFR BLD: 5.4 %
NEUTROPHILS # BLD: 10.6 K/UL (ref 1.7–7.7)
NEUTROPHILS NFR BLD: 81 %
NT-PROBNP SERPL-MCNC: 40 PG/ML (ref 0–124)
O2 THERAPY: ABNORMAL
PCO2 BLDV: 73.7 MMHG (ref 40–50)
PH BLDV: 7.28 [PH] (ref 7.35–7.45)
PLATELET # BLD AUTO: 179 K/UL (ref 135–450)
PMV BLD AUTO: 8.9 FL (ref 5–10.5)
PO2 BLDV: 63 MMHG
POTASSIUM SERPL-SCNC: 4.3 MMOL/L (ref 3.5–5.1)
PROT SERPL-MCNC: 7 G/DL (ref 6.4–8.2)
RBC # BLD AUTO: 4.55 M/UL (ref 4.2–5.9)
REASON FOR REJECTION: NORMAL
REJECTED TEST: NORMAL
SAO2 % BLDV: 90 %
SODIUM SERPL-SCNC: 140 MMOL/L (ref 136–145)
TROPONIN, HIGH SENSITIVITY: 10 NG/L (ref 0–22)
TROPONIN, HIGH SENSITIVITY: 11 NG/L (ref 0–22)
WBC # BLD AUTO: 13 K/UL (ref 4–11)

## 2023-06-07 PROCEDURE — 83880 ASSAY OF NATRIURETIC PEPTIDE: CPT

## 2023-06-07 PROCEDURE — 85025 COMPLETE CBC W/AUTO DIFF WBC: CPT

## 2023-06-07 PROCEDURE — 85379 FIBRIN DEGRADATION QUANT: CPT

## 2023-06-07 PROCEDURE — 6360000002 HC RX W HCPCS: Performed by: GENERAL ACUTE CARE HOSPITAL

## 2023-06-07 PROCEDURE — 70450 CT HEAD/BRAIN W/O DYE: CPT

## 2023-06-07 PROCEDURE — 83605 ASSAY OF LACTIC ACID: CPT

## 2023-06-07 PROCEDURE — 87040 BLOOD CULTURE FOR BACTERIA: CPT

## 2023-06-07 PROCEDURE — 82803 BLOOD GASES ANY COMBINATION: CPT

## 2023-06-07 PROCEDURE — 2700000000 HC OXYGEN THERAPY PER DAY

## 2023-06-07 PROCEDURE — 93010 ELECTROCARDIOGRAM REPORT: CPT | Performed by: INTERNAL MEDICINE

## 2023-06-07 PROCEDURE — 94660 CPAP INITIATION&MGMT: CPT

## 2023-06-07 PROCEDURE — 71045 X-RAY EXAM CHEST 1 VIEW: CPT

## 2023-06-07 PROCEDURE — 93005 ELECTROCARDIOGRAM TRACING: CPT | Performed by: STUDENT IN AN ORGANIZED HEALTH CARE EDUCATION/TRAINING PROGRAM

## 2023-06-07 PROCEDURE — 36415 COLL VENOUS BLD VENIPUNCTURE: CPT

## 2023-06-07 PROCEDURE — 94640 AIRWAY INHALATION TREATMENT: CPT

## 2023-06-07 PROCEDURE — 6370000000 HC RX 637 (ALT 250 FOR IP): Performed by: GENERAL ACUTE CARE HOSPITAL

## 2023-06-07 PROCEDURE — 80053 COMPREHEN METABOLIC PANEL: CPT

## 2023-06-07 PROCEDURE — 2580000003 HC RX 258: Performed by: GENERAL ACUTE CARE HOSPITAL

## 2023-06-07 PROCEDURE — 84484 ASSAY OF TROPONIN QUANT: CPT

## 2023-06-07 PROCEDURE — 94760 N-INVAS EAR/PLS OXIMETRY 1: CPT

## 2023-06-07 RX ORDER — DIPHENHYDRAMINE HYDROCHLORIDE 50 MG/ML
25 INJECTION INTRAMUSCULAR; INTRAVENOUS ONCE
Status: COMPLETED | OUTPATIENT
Start: 2023-06-07 | End: 2023-06-07

## 2023-06-07 RX ORDER — DEXAMETHASONE SODIUM PHOSPHATE 4 MG/ML
10 INJECTION, SOLUTION INTRA-ARTICULAR; INTRALESIONAL; INTRAMUSCULAR; INTRAVENOUS; SOFT TISSUE ONCE
Status: COMPLETED | OUTPATIENT
Start: 2023-06-07 | End: 2023-06-07

## 2023-06-07 RX ORDER — 0.9 % SODIUM CHLORIDE 0.9 %
500 INTRAVENOUS SOLUTION INTRAVENOUS ONCE
Status: COMPLETED | OUTPATIENT
Start: 2023-06-07 | End: 2023-06-07

## 2023-06-07 RX ORDER — METOCLOPRAMIDE HYDROCHLORIDE 5 MG/ML
10 INJECTION INTRAMUSCULAR; INTRAVENOUS ONCE
Status: COMPLETED | OUTPATIENT
Start: 2023-06-07 | End: 2023-06-07

## 2023-06-07 RX ORDER — IPRATROPIUM BROMIDE AND ALBUTEROL SULFATE 2.5; .5 MG/3ML; MG/3ML
1 SOLUTION RESPIRATORY (INHALATION) ONCE
Status: COMPLETED | OUTPATIENT
Start: 2023-06-07 | End: 2023-06-07

## 2023-06-07 RX ADMIN — DIPHENHYDRAMINE HYDROCHLORIDE 25 MG: 50 INJECTION, SOLUTION INTRAMUSCULAR; INTRAVENOUS at 06:37

## 2023-06-07 RX ADMIN — IPRATROPIUM BROMIDE AND ALBUTEROL SULFATE 1 DOSE: 2.5; .5 SOLUTION RESPIRATORY (INHALATION) at 06:44

## 2023-06-07 RX ADMIN — DEXAMETHASONE SODIUM PHOSPHATE 10 MG: 4 INJECTION, SOLUTION INTRAMUSCULAR; INTRAVENOUS at 06:37

## 2023-06-07 RX ADMIN — SODIUM CHLORIDE 500 ML: 9 INJECTION, SOLUTION INTRAVENOUS at 06:41

## 2023-06-07 RX ADMIN — METOCLOPRAMIDE 10 MG: 5 INJECTION, SOLUTION INTRAMUSCULAR; INTRAVENOUS at 06:38

## 2023-06-07 ASSESSMENT — ENCOUNTER SYMPTOMS
NAUSEA: 0
COUGH: 0
VOMITING: 0
SHORTNESS OF BREATH: 1
ABDOMINAL PAIN: 0
RHINORRHEA: 0
DIARRHEA: 0

## 2023-06-07 ASSESSMENT — LIFESTYLE VARIABLES: HOW OFTEN DO YOU HAVE A DRINK CONTAINING ALCOHOL: NEVER

## 2023-06-07 ASSESSMENT — PAIN DESCRIPTION - FREQUENCY: FREQUENCY: CONTINUOUS

## 2023-06-07 ASSESSMENT — PAIN - FUNCTIONAL ASSESSMENT: PAIN_FUNCTIONAL_ASSESSMENT: 0-10

## 2023-06-07 ASSESSMENT — PAIN DESCRIPTION - DESCRIPTORS: DESCRIPTORS: SQUEEZING

## 2023-06-07 ASSESSMENT — PAIN SCALES - GENERAL: PAINLEVEL_OUTOF10: 8

## 2023-06-07 ASSESSMENT — PAIN DESCRIPTION - PAIN TYPE: TYPE: ACUTE PAIN

## 2023-06-07 ASSESSMENT — PAIN DESCRIPTION - LOCATION: LOCATION: HEAD

## 2023-06-07 NOTE — ED NOTES
Pt per provided urinal upon pt request, pt's spouse remains at bedside      Renetta Martin, AIKN  06/07/23 0979

## 2023-06-07 NOTE — ED NOTES
Pt requesting to have bipap removed, Danni RT at bedside, bipap removed, per pt request Urmila STEWART aware pt placed on 4 liters nasal cannula per RT      Hershal Lesch, RN  06/07/23 9846

## 2023-06-07 NOTE — ED NOTES
Pt leaving AMA at this time, with spouse.  Dr Kadie Guerrero and Suzette Billy NP      Renetta Martin RN  06/07/23 8199

## 2023-06-07 NOTE — ED NOTES
Pt is requesting to \" leave and go home\" pt stated \" I don't want to stay here \" Jose Armando Hills NP aware of pt requesting to leave AMA, pt's wife remains at bedside      Barbra Ambrose, [facility]  06/07/23 0520

## 2023-06-07 NOTE — ED NOTES
Jose Armando Hills NP at bedside speaking with pt and pt's wife at this time      Barbra Ambrose, RN  06/07/23 1072

## 2023-06-07 NOTE — ED NOTES
Report given to Cresencio Barriga RN. All questions answered at this time.       Linda Gan RN  06/07/23 7365

## 2023-06-07 NOTE — ED TRIAGE NOTES
Pt arrives to ED via private vehicle. Pt states that he has had a constant headache for the past 2-3 days. Pt states that headache gets worse when he is awoken from his night terrors. Pt states that he checked his O2 this morning when he woke up and his SpO2 was at 80. Pt states that he wears 3 L O2 at night, but was not wearing it tonight. Pt states he has hx of COPD. Pt placed in gown and on cardiac monitor.

## 2023-06-07 NOTE — ED NOTES
Received report at this time from TEODORO VASQUES \[facility] [place] [facility]\""  06/07/23 0437

## 2023-06-10 ASSESSMENT — ENCOUNTER SYMPTOMS
SORE THROAT: 0
VOICE CHANGE: 0
WHEEZING: 1
CHEST TIGHTNESS: 1

## 2023-06-11 LAB
BACTERIA BLD CULT ORG #2: NORMAL
BACTERIA BLD CULT: NORMAL

## 2023-10-10 ENCOUNTER — HOSPITAL ENCOUNTER (EMERGENCY)
Age: 73
Discharge: HOME | End: 2023-10-10
Attending: EMERGENCY MEDICINE
Payer: MEDICARE

## 2023-10-10 VITALS
TEMPERATURE: 97.7 F | WEIGHT: 150.79 LBS | SYSTOLIC BLOOD PRESSURE: 127 MMHG | DIASTOLIC BLOOD PRESSURE: 90 MMHG | HEIGHT: 66 IN | OXYGEN SATURATION: 98 % | RESPIRATION RATE: 23 BRPM | BODY MASS INDEX: 24.23 KG/M2 | HEART RATE: 92 BPM

## 2023-10-10 DIAGNOSIS — T50.905A MEDICATION REACTION, INITIAL ENCOUNTER: ICD-10-CM

## 2023-10-10 DIAGNOSIS — F13.930 BENZODIAZEPINE WITHDRAWAL WITHOUT COMPLICATION (HCC): ICD-10-CM

## 2023-10-10 DIAGNOSIS — F41.9 ANXIETY: Primary | ICD-10-CM

## 2023-10-10 LAB
ALBUMIN SERPL-MCNC: 4.5 G/DL (ref 3.4–5)
ALBUMIN/GLOB SERPL: 1.7 {RATIO} (ref 1.1–2.2)
ALP SERPL-CCNC: 133 U/L (ref 40–129)
ALT SERPL-CCNC: 17 U/L (ref 10–40)
AMMONIA PLAS-SCNC: 31 UMOL/L (ref 16–60)
ANION GAP SERPL CALCULATED.3IONS-SCNC: 13 MMOL/L (ref 3–16)
AST SERPL-CCNC: 22 U/L (ref 15–37)
BACTERIA URNS QL MICRO: NORMAL /HPF
BASOPHILS # BLD: 0.1 K/UL (ref 0–0.2)
BASOPHILS NFR BLD: 0.4 %
BILIRUB SERPL-MCNC: 0.3 MG/DL (ref 0–1)
BILIRUB UR QL STRIP.AUTO: NEGATIVE
BUN SERPL-MCNC: 16 MG/DL (ref 7–20)
CALCIUM SERPL-MCNC: 9 MG/DL (ref 8.3–10.6)
CHLORIDE SERPL-SCNC: 95 MMOL/L (ref 99–110)
CLARITY UR: CLEAR
CO2 SERPL-SCNC: 28 MMOL/L (ref 21–32)
COLOR UR: YELLOW
CREAT SERPL-MCNC: 0.9 MG/DL (ref 0.8–1.3)
DEPRECATED RDW RBC AUTO: 14.4 % (ref 12.4–15.4)
EOSINOPHIL # BLD: 0 K/UL (ref 0–0.6)
EOSINOPHIL NFR BLD: 0.4 %
EPI CELLS #/AREA URNS AUTO: 1 /HPF (ref 0–5)
FLUAV RNA UPPER RESP QL NAA+PROBE: NEGATIVE
FLUBV AG NPH QL: NEGATIVE
GFR SERPLBLD CREATININE-BSD FMLA CKD-EPI: >60 ML/MIN/{1.73_M2}
GLUCOSE SERPL-MCNC: 101 MG/DL (ref 70–99)
GLUCOSE UR STRIP.AUTO-MCNC: NEGATIVE MG/DL
HCT VFR BLD AUTO: 45.8 % (ref 40.5–52.5)
HGB BLD-MCNC: 15.4 G/DL (ref 13.5–17.5)
HGB UR QL STRIP.AUTO: NEGATIVE
HYALINE CASTS #/AREA URNS AUTO: 2 /LPF (ref 0–8)
KETONES UR STRIP.AUTO-MCNC: ABNORMAL MG/DL
LEUKOCYTE ESTERASE UR QL STRIP.AUTO: NEGATIVE
LYMPHOCYTES # BLD: 1.7 K/UL (ref 1–5.1)
LYMPHOCYTES NFR BLD: 12.8 %
MCH RBC QN AUTO: 30.9 PG (ref 26–34)
MCHC RBC AUTO-ENTMCNC: 33.7 G/DL (ref 31–36)
MCV RBC AUTO: 91.9 FL (ref 80–100)
MONOCYTES # BLD: 1.1 K/UL (ref 0–1.3)
MONOCYTES NFR BLD: 8.2 %
NEUTROPHILS # BLD: 10.1 K/UL (ref 1.7–7.7)
NEUTROPHILS NFR BLD: 78.2 %
NITRITE UR QL STRIP.AUTO: NEGATIVE
PH UR STRIP.AUTO: 5.5 [PH] (ref 5–8)
PLATELET # BLD AUTO: 197 K/UL (ref 135–450)
PMV BLD AUTO: 8.8 FL (ref 5–10.5)
POTASSIUM SERPL-SCNC: 4.2 MMOL/L (ref 3.5–5.1)
PROT SERPL-MCNC: 7.1 G/DL (ref 6.4–8.2)
PROT UR STRIP.AUTO-MCNC: ABNORMAL MG/DL
RBC # BLD AUTO: 4.99 M/UL (ref 4.2–5.9)
RBC CLUMPS #/AREA URNS AUTO: 0 /HPF (ref 0–4)
SARS-COV-2 RDRP RESP QL NAA+PROBE: NOT DETECTED
SODIUM SERPL-SCNC: 136 MMOL/L (ref 136–145)
SP GR UR STRIP.AUTO: 1.02 (ref 1–1.03)
UA COMPLETE W REFLEX CULTURE PNL UR: ABNORMAL
UA DIPSTICK W REFLEX MICRO PNL UR: YES
URN SPEC COLLECT METH UR: ABNORMAL
UROBILINOGEN UR STRIP-ACNC: 0.2 E.U./DL
WBC # BLD AUTO: 12.9 K/UL (ref 4–11)
WBC #/AREA URNS AUTO: 0 /HPF (ref 0–5)

## 2023-10-10 PROCEDURE — 81001 URINALYSIS AUTO W/SCOPE: CPT

## 2023-10-10 PROCEDURE — 87804 INFLUENZA ASSAY W/OPTIC: CPT

## 2023-10-10 PROCEDURE — 99284 EMERGENCY DEPT VISIT MOD MDM: CPT

## 2023-10-10 PROCEDURE — 87635 SARS-COV-2 COVID-19 AMP PRB: CPT

## 2023-10-10 PROCEDURE — 85025 COMPLETE CBC W/AUTO DIFF WBC: CPT

## 2023-10-10 PROCEDURE — 6370000000 HC RX 637 (ALT 250 FOR IP): Performed by: EMERGENCY MEDICINE

## 2023-10-10 PROCEDURE — 80053 COMPREHEN METABOLIC PANEL: CPT

## 2023-10-10 PROCEDURE — 82140 ASSAY OF AMMONIA: CPT

## 2023-10-10 RX ORDER — CLONAZEPAM 0.5 MG/1
1 TABLET ORAL ONCE
Status: COMPLETED | OUTPATIENT
Start: 2023-10-10 | End: 2023-10-10

## 2023-10-10 RX ORDER — CLONAZEPAM 1 MG/1
1 TABLET ORAL 3 TIMES DAILY PRN
Qty: 9 TABLET | Refills: 0 | Status: SHIPPED | OUTPATIENT
Start: 2023-10-10 | End: 2023-10-13

## 2023-10-10 RX ADMIN — CLONAZEPAM 1 MG: 0.5 TABLET ORAL at 21:14

## 2023-10-10 ASSESSMENT — PAIN SCALES - GENERAL: PAINLEVEL_OUTOF10: 7

## 2023-10-10 ASSESSMENT — LIFESTYLE VARIABLES
HOW MANY STANDARD DRINKS CONTAINING ALCOHOL DO YOU HAVE ON A TYPICAL DAY: 1 OR 2
HOW OFTEN DO YOU HAVE A DRINK CONTAINING ALCOHOL: MONTHLY OR LESS

## 2023-10-10 ASSESSMENT — PAIN - FUNCTIONAL ASSESSMENT: PAIN_FUNCTIONAL_ASSESSMENT: 0-10

## 2023-10-11 ENCOUNTER — HOSPITAL ENCOUNTER (EMERGENCY)
Age: 73
Discharge: HOME | End: 2023-10-11

## 2023-10-11 NOTE — DISCHARGE INSTRUCTIONS
Stop taking prednisone. You are allergic to it. Follow-up with your physician for further instructions. Continue taking Zithromax as previously prescribed. Please continue your present treatments and medications until you receive further instructions from your healthcare professional. Take all your medications as prescribed unless a healthcare professional instructs you to do otherwise. You have been administered medications in the emergency department that may cause drowsiness. Do not be driving, operating heavy machinery, swimming, caring for small children, or engaging in dangerous activities of any type until these medications have worn off. This may be as long as 6-8 hours. You have been prescribed medications that may cause drowsiness. Do not be driving, operating heavy machinery, swimming, caring for small children, or engaging in dangerous activities of any type until these medications have worn off. This may be as long as 6-8 hours.

## 2023-10-11 NOTE — ED NOTES
D/C: Order noted for d/c. Pt confirmed d/c paperwork  have correct name. Discharge and education instructions reviewed with patient. Teach-back successful. Pt verbalized understanding and signed d/c papers. Pt denied questions at this time. No acute distress noted. Patient instructed to follow-up as noted - return to emergency department if symptoms worsen. Patient verbalized understanding. Discharged per EDMD with discharge instructions. Pt discharged  to private vehicle. Patient stable upon departure. Thanked patient for choosing [facility] for care.           Lincoln Mathew RN  10/10/23 1265

## 2023-10-11 NOTE — ED PROVIDER NOTES
appear ill or toxic. He does not appear anxious. He is speaking slowly and definitively. HENT: Normocephalic, Atraumatic, Bilateral external ears normal, TM's were normal, Mucus membranes are moist and oropharynx is patent and clear, No oral exudates, Nose normal.  Eyes: PERRLA, EOMI, Conjunctiva normal, No discharge. No scleral icterus. Neck: Normal range of motion, No tenderness, Supple. Lymphatic: No lymphadenopathy noted. Cardiovascular: Normal heart rate, Normal rhythm, no murmurs, no gallops, no rubs. Thorax & Lungs: Normal breath sounds, no respiratory distress, no wheezing, no rales, no rhonchi  Abdomen: Soft, Nontender, No hepatosplenomegaly, No masses, No pulsatile masses, No distension, normal bowel sounds  Skin: Warm, Dry  Extremities: No edema, No tenderness, No cyanosis, no major deformities noted. Neurologic: Alert & oriented x 3, CN II through XII are intact, normal motor function, normal sensory function, no focal deficits noted, no clonus, no tremors. Psychiatric: Affect normal, Mood normal.    COURSE & MEDICAL DECISION MAKING  Pertinent Labs reviewed.  (See chart for details)    LABORATORY  Labs Reviewed   CBC WITH AUTO DIFFERENTIAL - Abnormal; Notable for the following components:       Result Value    WBC 12.9 (*)     Neutrophils Absolute 10.1 (*)     All other components within normal limits   COMPREHENSIVE METABOLIC PANEL W/ REFLEX TO MG FOR LOW K - Abnormal; Notable for the following components:    Chloride 95 (*)     Glucose 101 (*)     Alkaline Phosphatase 133 (*)     All other components within normal limits   URINALYSIS WITH REFLEX TO CULTURE - Abnormal; Notable for the following components:    Ketones, Urine TRACE (*)     Protein, UA TRACE (*)     All other components within normal limits   COVID-19, RAPID   RAPID INFLUENZA A/B ANTIGENS   AMMONIA   MICROSCOPIC URINALYSIS           Vitals:    10/10/23 2048 10/10/23 2115   BP: (!) 127/90    Pulse: 97 92   Resp: 25 23   Temp: 97.7

## 2023-11-21 ENCOUNTER — APPOINTMENT (OUTPATIENT)
Dept: GENERAL RADIOLOGY | Age: 73
End: 2023-11-21
Payer: MEDICARE

## 2023-11-21 ENCOUNTER — HOSPITAL ENCOUNTER (EMERGENCY)
Age: 73
Discharge: HOME | End: 2023-11-21
Attending: EMERGENCY MEDICINE
Payer: MEDICARE

## 2023-11-21 VITALS
BODY MASS INDEX: 25.34 KG/M2 | RESPIRATION RATE: 16 BRPM | DIASTOLIC BLOOD PRESSURE: 73 MMHG | HEART RATE: 86 BPM | WEIGHT: 157 LBS | TEMPERATURE: 98 F | SYSTOLIC BLOOD PRESSURE: 117 MMHG | OXYGEN SATURATION: 96 %

## 2023-11-21 DIAGNOSIS — F17.200 SMOKER: ICD-10-CM

## 2023-11-21 DIAGNOSIS — J44.1 COPD EXACERBATION (HCC): Primary | ICD-10-CM

## 2023-11-21 LAB
ANION GAP SERPL CALCULATED.3IONS-SCNC: 4 MMOL/L (ref 3–16)
BASOPHILS # BLD: 0 K/UL (ref 0–0.2)
BASOPHILS NFR BLD: 0.4 %
BUN SERPL-MCNC: 18 MG/DL (ref 7–20)
CALCIUM SERPL-MCNC: 8.2 MG/DL (ref 8.3–10.6)
CHLORIDE SERPL-SCNC: 102 MMOL/L (ref 99–110)
CO2 SERPL-SCNC: 32 MMOL/L (ref 21–32)
CREAT SERPL-MCNC: 0.6 MG/DL (ref 0.8–1.3)
DEPRECATED RDW RBC AUTO: 14.9 % (ref 12.4–15.4)
EKG ATRIAL RATE: 87 BPM
EKG DIAGNOSIS: NORMAL
EKG P AXIS: 64 DEGREES
EKG P-R INTERVAL: 124 MS
EKG Q-T INTERVAL: 386 MS
EKG QRS DURATION: 94 MS
EKG QTC CALCULATION (BAZETT): 495 MS
EKG R AXIS: 59 DEGREES
EKG T AXIS: 43 DEGREES
EKG VENTRICULAR RATE: 99 BPM
EOSINOPHIL # BLD: 0.1 K/UL (ref 0–0.6)
EOSINOPHIL NFR BLD: 1.6 %
GFR SERPLBLD CREATININE-BSD FMLA CKD-EPI: >60 ML/MIN/{1.73_M2}
GLUCOSE SERPL-MCNC: 110 MG/DL (ref 70–99)
HCT VFR BLD AUTO: 41.7 % (ref 40.5–52.5)
HGB BLD-MCNC: 13.3 G/DL (ref 13.5–17.5)
LYMPHOCYTES # BLD: 1.8 K/UL (ref 1–5.1)
LYMPHOCYTES NFR BLD: 21.8 %
MCH RBC QN AUTO: 30.4 PG (ref 26–34)
MCHC RBC AUTO-ENTMCNC: 31.9 G/DL (ref 31–36)
MCV RBC AUTO: 95.1 FL (ref 80–100)
MONOCYTES # BLD: 0.5 K/UL (ref 0–1.3)
MONOCYTES NFR BLD: 6.1 %
NEUTROPHILS # BLD: 5.7 K/UL (ref 1.7–7.7)
NEUTROPHILS NFR BLD: 70.1 %
NT-PROBNP SERPL-MCNC: 67 PG/ML (ref 0–124)
PLATELET # BLD AUTO: 140 K/UL (ref 135–450)
PMV BLD AUTO: 9 FL (ref 5–10.5)
POTASSIUM SERPL-SCNC: 4.3 MMOL/L (ref 3.5–5.1)
RBC # BLD AUTO: 4.39 M/UL (ref 4.2–5.9)
SODIUM SERPL-SCNC: 138 MMOL/L (ref 136–145)
TROPONIN, HIGH SENSITIVITY: 9 NG/L (ref 0–22)
WBC # BLD AUTO: 8.1 K/UL (ref 4–11)

## 2023-11-21 PROCEDURE — 85025 COMPLETE CBC W/AUTO DIFF WBC: CPT

## 2023-11-21 PROCEDURE — 96375 TX/PRO/DX INJ NEW DRUG ADDON: CPT

## 2023-11-21 PROCEDURE — 71045 X-RAY EXAM CHEST 1 VIEW: CPT

## 2023-11-21 PROCEDURE — 6370000000 HC RX 637 (ALT 250 FOR IP): Performed by: EMERGENCY MEDICINE

## 2023-11-21 PROCEDURE — 2700000000 HC OXYGEN THERAPY PER DAY

## 2023-11-21 PROCEDURE — 94640 AIRWAY INHALATION TREATMENT: CPT

## 2023-11-21 PROCEDURE — 93010 ELECTROCARDIOGRAM REPORT: CPT | Performed by: INTERNAL MEDICINE

## 2023-11-21 PROCEDURE — 6360000002 HC RX W HCPCS: Performed by: EMERGENCY MEDICINE

## 2023-11-21 PROCEDURE — 87040 BLOOD CULTURE FOR BACTERIA: CPT

## 2023-11-21 PROCEDURE — 83880 ASSAY OF NATRIURETIC PEPTIDE: CPT

## 2023-11-21 PROCEDURE — 94761 N-INVAS EAR/PLS OXIMETRY MLT: CPT

## 2023-11-21 PROCEDURE — 84484 ASSAY OF TROPONIN QUANT: CPT

## 2023-11-21 PROCEDURE — 96374 THER/PROPH/DIAG INJ IV PUSH: CPT

## 2023-11-21 PROCEDURE — 80048 BASIC METABOLIC PNL TOTAL CA: CPT

## 2023-11-21 PROCEDURE — 93005 ELECTROCARDIOGRAM TRACING: CPT | Performed by: EMERGENCY MEDICINE

## 2023-11-21 PROCEDURE — 99285 EMERGENCY DEPT VISIT HI MDM: CPT

## 2023-11-21 RX ORDER — DEXAMETHASONE 4 MG/1
4 TABLET ORAL
Qty: 5 TABLET | Refills: 0 | Status: SHIPPED | OUTPATIENT
Start: 2023-11-21 | End: 2023-11-26

## 2023-11-21 RX ORDER — IPRATROPIUM BROMIDE AND ALBUTEROL SULFATE 2.5; .5 MG/3ML; MG/3ML
1 SOLUTION RESPIRATORY (INHALATION)
Status: DISCONTINUED | OUTPATIENT
Start: 2023-11-21 | End: 2023-11-21 | Stop reason: HOSPADM

## 2023-11-21 RX ORDER — DEXAMETHASONE SODIUM PHOSPHATE 4 MG/ML
4 INJECTION, SOLUTION INTRA-ARTICULAR; INTRALESIONAL; INTRAMUSCULAR; INTRAVENOUS; SOFT TISSUE ONCE
Status: COMPLETED | OUTPATIENT
Start: 2023-11-21 | End: 2023-11-21

## 2023-11-21 RX ORDER — LORAZEPAM 2 MG/ML
2 INJECTION INTRAMUSCULAR ONCE
Status: COMPLETED | OUTPATIENT
Start: 2023-11-21 | End: 2023-11-21

## 2023-11-21 RX ADMIN — IPRATROPIUM BROMIDE AND ALBUTEROL SULFATE 1 DOSE: .5; 3 SOLUTION RESPIRATORY (INHALATION) at 11:40

## 2023-11-21 RX ADMIN — DEXAMETHASONE SODIUM PHOSPHATE 4 MG: 4 INJECTION INTRA-ARTICULAR; INTRALESIONAL; INTRAMUSCULAR; INTRAVENOUS; SOFT TISSUE at 11:18

## 2023-11-21 RX ADMIN — LORAZEPAM 2 MG: 2 INJECTION INTRAMUSCULAR; INTRAVENOUS at 11:19

## 2023-11-21 ASSESSMENT — ENCOUNTER SYMPTOMS
CHEST TIGHTNESS: 1
VOMITING: 0
SHORTNESS OF BREATH: 1
WHEEZING: 1
NAUSEA: 0
ABDOMINAL PAIN: 0
DIARRHEA: 0
COUGH: 1

## 2023-11-21 NOTE — ED PROVIDER NOTES
[facility]        Pt Name: Rufina Marie  MRN: [id number]  [address] [facility] May 1950  Date of evaluation: 11/21/2023  Provider: DANDRE Carter - CNP  PCP: Porfirio Best MD  Note Started: 12:05 PM EST 11/21/23       I have seen and evaluated this patient with my supervising physician Dusty Melissa MD.      [address]       Chief Complaint   Patient presents with    Shortness of Breath     Pt arrives from home via [facility]. Pt c/o incresed sob over the last few days. Pt has hx of copd and smokes 1pack daily. Pt baseline 3L. Pt received 1 douneb tx en route       HISTORY OF PRESENT ILLNESS: 1 or more Elements     History from : Patient    Limitations to history : None    Rufina Marie is a 72 y.o. male who presents to the emergency department per [facility] with complaint of increased shortness of breath with history of COPD, continues to smoke about a pack per day. He does wear 3 L of oxygen at nighttime. He endorses generalized weakness. Patient was given a DuoNeb and route. He is wheezing. Denies any headache, fever, lightheadedness, dizziness, visual disturbances. No chest pain or pressure. No neck or back pain. No abdominal pain, nausea, vomiting, diarrhea, constipation, or dysuria. No rash. Nursing Notes were all reviewed and agreed with or any disagreements were addressed in the HPI. REVIEW OF SYSTEMS :      Review of Systems   Constitutional:  Positive for fatigue. Negative for activity change, chills and fever. Respiratory:  Positive for cough, chest tightness, shortness of breath and wheezing. Cardiovascular:  Negative for chest pain. Gastrointestinal:  Negative for abdominal pain, diarrhea, nausea and vomiting. Genitourinary:  Negative for dysuria. All other systems reviewed and are negative. Positives and Pertinent negatives as per HPI.      SURGICAL HISTORY

## 2023-11-23 ENCOUNTER — HOSPITAL ENCOUNTER (EMERGENCY)
Age: 73
Discharge: HOME | End: 2023-11-23

## 2023-11-23 VITALS
HEART RATE: 90 BPM | OXYGEN SATURATION: 93 % | RESPIRATION RATE: 18 BRPM | HEIGHT: 66 IN | BODY MASS INDEX: 25.23 KG/M2 | SYSTOLIC BLOOD PRESSURE: 160 MMHG | WEIGHT: 157 LBS | DIASTOLIC BLOOD PRESSURE: 85 MMHG | TEMPERATURE: 98 F

## 2023-11-23 LAB
GLUCOSE BLD-MCNC: 89 MG/DL (ref 70–99)
PERFORMED ON: NORMAL

## 2023-11-23 ASSESSMENT — PAIN DESCRIPTION - LOCATION: LOCATION: GENERALIZED

## 2023-11-23 ASSESSMENT — PAIN DESCRIPTION - DESCRIPTORS: DESCRIPTORS: ACHING

## 2023-11-23 ASSESSMENT — PAIN SCALES - GENERAL: PAINLEVEL_OUTOF10: 8

## 2023-11-23 ASSESSMENT — PAIN - FUNCTIONAL ASSESSMENT: PAIN_FUNCTIONAL_ASSESSMENT: 0-10

## 2023-11-24 NOTE — ED NOTES
Attempted to contact pt regarding visit of 11/23/23; home phone \"can not be completed as dialed\". Message left on cell phone; encouraged pt to return if any problems/concerns.      Bg Morillo RN  11/24/23 4436

## 2023-11-25 LAB
BACTERIA BLD CULT ORG #2: NORMAL
BACTERIA BLD CULT: NORMAL

## 2023-11-27 ENCOUNTER — HOSPITAL ENCOUNTER (EMERGENCY)
Age: 73
Discharge: HOME | End: 2023-11-27
Attending: EMERGENCY MEDICINE
Payer: MEDICARE

## 2023-11-27 ENCOUNTER — APPOINTMENT (OUTPATIENT)
Dept: GENERAL RADIOLOGY | Age: 73
End: 2023-11-27
Payer: MEDICARE

## 2023-11-27 VITALS
BODY MASS INDEX: 25.23 KG/M2 | HEIGHT: 66 IN | DIASTOLIC BLOOD PRESSURE: 69 MMHG | OXYGEN SATURATION: 97 % | WEIGHT: 157 LBS | HEART RATE: 77 BPM | SYSTOLIC BLOOD PRESSURE: 109 MMHG | TEMPERATURE: 97.8 F | RESPIRATION RATE: 15 BRPM

## 2023-11-27 DIAGNOSIS — J44.1 COPD EXACERBATION (HCC): Primary | ICD-10-CM

## 2023-11-27 LAB
ANION GAP SERPL CALCULATED.3IONS-SCNC: 7 MMOL/L (ref 3–16)
BASE EXCESS BLDV CALC-SCNC: 7.3 MMOL/L (ref -3–3)
BASOPHILS # BLD: 0.2 K/UL (ref 0–0.2)
BASOPHILS NFR BLD: 2.2 %
BUN SERPL-MCNC: 20 MG/DL (ref 7–20)
CALCIUM SERPL-MCNC: 8.5 MG/DL (ref 8.3–10.6)
CHLORIDE SERPL-SCNC: 102 MMOL/L (ref 99–110)
CO2 BLDV-SCNC: 80 MMOL/L
CO2 SERPL-SCNC: 33 MMOL/L (ref 21–32)
COHGB MFR BLDV: 2.2 % (ref 0–1.5)
CREAT SERPL-MCNC: 0.7 MG/DL (ref 0.8–1.3)
DEPRECATED RDW RBC AUTO: 15.1 % (ref 12.4–15.4)
DO-HGB MFR BLDV: 3 %
EKG ATRIAL RATE: 75 BPM
EKG DIAGNOSIS: NORMAL
EKG P AXIS: 69 DEGREES
EKG P-R INTERVAL: 136 MS
EKG Q-T INTERVAL: 390 MS
EKG QRS DURATION: 100 MS
EKG QTC CALCULATION (BAZETT): 435 MS
EKG R AXIS: 70 DEGREES
EKG T AXIS: 48 DEGREES
EKG VENTRICULAR RATE: 75 BPM
EOSINOPHIL # BLD: 0.1 K/UL (ref 0–0.6)
EOSINOPHIL NFR BLD: 2 %
GFR SERPLBLD CREATININE-BSD FMLA CKD-EPI: >60 ML/MIN/{1.73_M2}
GLUCOSE SERPL-MCNC: 140 MG/DL (ref 70–99)
HCO3 BLDV-SCNC: 33.9 MMOL/L (ref 23–29)
HCT VFR BLD AUTO: 41.2 % (ref 40.5–52.5)
HGB BLD-MCNC: 13.4 G/DL (ref 13.5–17.5)
LYMPHOCYTES # BLD: 1.4 K/UL (ref 1–5.1)
LYMPHOCYTES NFR BLD: 19 %
MCH RBC QN AUTO: 30.7 PG (ref 26–34)
MCHC RBC AUTO-ENTMCNC: 32.4 G/DL (ref 31–36)
MCV RBC AUTO: 94.7 FL (ref 80–100)
METHGB MFR BLDV: 0.6 %
MONOCYTES # BLD: 0.4 K/UL (ref 0–1.3)
MONOCYTES NFR BLD: 5.3 %
NEUTROPHILS # BLD: 5.1 K/UL (ref 1.7–7.7)
NEUTROPHILS NFR BLD: 71.5 %
NT-PROBNP SERPL-MCNC: <36 PG/ML (ref 0–124)
O2 CT VFR BLDV CALC: 19 VOL %
O2 THERAPY: ABNORMAL
PCO2 BLDV: 55 MMHG (ref 40–50)
PH BLDV: 7.4 [PH] (ref 7.35–7.45)
PLATELET # BLD AUTO: 146 K/UL (ref 135–450)
PMV BLD AUTO: 8.8 FL (ref 5–10.5)
PO2 BLDV: 209 MMHG (ref 25–40)
POTASSIUM SERPL-SCNC: 4.1 MMOL/L (ref 3.5–5.1)
RBC # BLD AUTO: 4.35 M/UL (ref 4.2–5.9)
SAO2 % BLDV: 97 %
SODIUM SERPL-SCNC: 142 MMOL/L (ref 136–145)
WBC # BLD AUTO: 7.1 K/UL (ref 4–11)

## 2023-11-27 PROCEDURE — 80048 BASIC METABOLIC PNL TOTAL CA: CPT

## 2023-11-27 PROCEDURE — 93010 ELECTROCARDIOGRAM REPORT: CPT | Performed by: INTERNAL MEDICINE

## 2023-11-27 PROCEDURE — 96375 TX/PRO/DX INJ NEW DRUG ADDON: CPT

## 2023-11-27 PROCEDURE — 87040 BLOOD CULTURE FOR BACTERIA: CPT

## 2023-11-27 PROCEDURE — 82803 BLOOD GASES ANY COMBINATION: CPT

## 2023-11-27 PROCEDURE — 6360000002 HC RX W HCPCS: Performed by: EMERGENCY MEDICINE

## 2023-11-27 PROCEDURE — 99285 EMERGENCY DEPT VISIT HI MDM: CPT

## 2023-11-27 PROCEDURE — 85025 COMPLETE CBC W/AUTO DIFF WBC: CPT

## 2023-11-27 PROCEDURE — 93005 ELECTROCARDIOGRAM TRACING: CPT | Performed by: EMERGENCY MEDICINE

## 2023-11-27 PROCEDURE — 96374 THER/PROPH/DIAG INJ IV PUSH: CPT

## 2023-11-27 PROCEDURE — 83880 ASSAY OF NATRIURETIC PEPTIDE: CPT

## 2023-11-27 PROCEDURE — 71045 X-RAY EXAM CHEST 1 VIEW: CPT

## 2023-11-27 RX ORDER — LORAZEPAM 2 MG/ML
1 INJECTION INTRAMUSCULAR ONCE
Status: COMPLETED | OUTPATIENT
Start: 2023-11-27 | End: 2023-11-27

## 2023-11-27 RX ORDER — DEXAMETHASONE SODIUM PHOSPHATE 4 MG/ML
4 INJECTION, SOLUTION INTRA-ARTICULAR; INTRALESIONAL; INTRAMUSCULAR; INTRAVENOUS; SOFT TISSUE EVERY 6 HOURS
Status: DISCONTINUED | OUTPATIENT
Start: 2023-11-27 | End: 2023-11-27 | Stop reason: HOSPADM

## 2023-11-27 RX ORDER — IPRATROPIUM BROMIDE AND ALBUTEROL SULFATE 2.5; .5 MG/3ML; MG/3ML
1 SOLUTION RESPIRATORY (INHALATION)
Status: DISCONTINUED | OUTPATIENT
Start: 2023-11-27 | End: 2023-11-27 | Stop reason: HOSPADM

## 2023-11-27 RX ADMIN — DEXAMETHASONE SODIUM PHOSPHATE 4 MG: 4 INJECTION, SOLUTION INTRAMUSCULAR; INTRAVENOUS at 13:47

## 2023-11-27 RX ADMIN — LORAZEPAM 1 MG: 2 INJECTION INTRAMUSCULAR; INTRAVENOUS at 13:47

## 2023-11-27 ASSESSMENT — PAIN SCALES - GENERAL: PAINLEVEL_OUTOF10: 8

## 2023-11-27 ASSESSMENT — PAIN DESCRIPTION - LOCATION: LOCATION: GENERALIZED

## 2023-11-27 ASSESSMENT — PAIN DESCRIPTION - PAIN TYPE: TYPE: ACUTE PAIN

## 2023-11-27 ASSESSMENT — LIFESTYLE VARIABLES
HOW OFTEN DO YOU HAVE A DRINK CONTAINING ALCOHOL: MONTHLY OR LESS
HOW MANY STANDARD DRINKS CONTAINING ALCOHOL DO YOU HAVE ON A TYPICAL DAY: 1 OR 2

## 2023-11-27 ASSESSMENT — PAIN - FUNCTIONAL ASSESSMENT: PAIN_FUNCTIONAL_ASSESSMENT: 0-10

## 2023-11-27 NOTE — ED NOTES
Report given to [facility] EMT. All questions answered.      Susanna Harvey RN  11/27/23 8059

## 2023-11-29 NOTE — ED PROVIDER NOTES
sob ro pna FINDINGS: Medical devices: None. Mediastinum/Heart: The cardiomediastinal contours are unchanged compared to prior examination with patient rotation to the right. Lungs: Lung volumes are low. Interstitial lung markings are similar compared to prior examination. No developing consolidation. Pleura: No findings to suggest pneumothorax or large pleural effusion. Bones/Soft tissues: Nothing acute. Low lung volumes with no developing consolidation to suggest pneumonia. EKG (if obtained): (All EKG's are interpreted by myself in the absence of a cardiologist)      MDM:  Patient presenting to the emergency department with likely reactive airway disease exacerbation. Patient's pulse ox is normal.  Based on clinical presentation history and physical exam I do not see any evidence to suggest pneumonia, pulmonary embolus, acute coronary syndrome or aortic dissection. The patient did respond well to emergency department therapy, including aerosol treatments and steroids. I do believe it is reasonable to discharge the patient home at this time and treat on an outpatient basis. The patient was instructed on use of albuterol, they will also be provided a short course of prednisone via prescription. There is no evidence to suggest more malignant etiology for the patient's presentation at this time, these were discussed with the patient, the patient understands this and will follow up as an outpatient, they understand and agree with the plan, return warnings given. Vital signs reviewed  Pulse ox: 97 % on Patient 4l.  - normal    Key differential diagnoses include: copd, pna, ptx, chf, acs    ORDERS:  Orders Placed This Encounter   Procedures    Culture, Blood 1    Culture, Blood 2    XR CHEST PORTABLE    Blood gas, venous    Basic Metabolic Panel    CBC with Auto Differential    Brain Natriuretic Peptide    EKG 12 Lead       MEDICATIONS GIVEN:   Medications   LORazepam (ATIVAN) injection 1 mg (1 mg

## 2023-12-01 LAB
BACTERIA BLD CULT ORG #2: NORMAL
BACTERIA BLD CULT: NORMAL

## 2024-03-15 ENCOUNTER — HOSPITAL ENCOUNTER (INPATIENT)
Age: 74
LOS: 3 days | Discharge: HOME | DRG: 189 | End: 2024-03-18
Attending: EMERGENCY MEDICINE | Admitting: INTERNAL MEDICINE
Payer: COMMERCIAL

## 2024-03-15 ENCOUNTER — APPOINTMENT (OUTPATIENT)
Dept: GENERAL RADIOLOGY | Age: 74
DRG: 189 | End: 2024-03-15
Payer: COMMERCIAL

## 2024-03-15 DIAGNOSIS — J96.22 ACUTE ON CHRONIC RESPIRATORY FAILURE WITH HYPOXIA AND HYPERCAPNIA (HCC): ICD-10-CM

## 2024-03-15 DIAGNOSIS — J45.901 ACUTE EXACERBATION OF COPD WITH ASTHMA (HCC): Primary | ICD-10-CM

## 2024-03-15 DIAGNOSIS — J96.21 ACUTE ON CHRONIC RESPIRATORY FAILURE WITH HYPOXIA AND HYPERCAPNIA (HCC): ICD-10-CM

## 2024-03-15 DIAGNOSIS — J44.1 ACUTE EXACERBATION OF COPD WITH ASTHMA (HCC): Primary | ICD-10-CM

## 2024-03-15 DIAGNOSIS — R68.89 INCREASED OXYGEN DEMAND: ICD-10-CM

## 2024-03-15 LAB
ALBUMIN SERPL-MCNC: 4 G/DL (ref 3.4–5)
ALBUMIN/GLOB SERPL: 1.7 {RATIO} (ref 1.1–2.2)
ALP SERPL-CCNC: 89 U/L (ref 40–129)
ALT SERPL-CCNC: 10 U/L (ref 10–40)
ANION GAP SERPL CALCULATED.3IONS-SCNC: 10 MMOL/L (ref 3–16)
AST SERPL-CCNC: 14 U/L (ref 15–37)
BACTERIA URNS QL MICRO: NORMAL /HPF
BASE EXCESS BLDV CALC-SCNC: 7.7 MMOL/L
BASOPHILS # BLD: 0 K/UL (ref 0–0.2)
BASOPHILS NFR BLD: 0.3 %
BILIRUB SERPL-MCNC: <0.2 MG/DL (ref 0–1)
BILIRUB UR QL STRIP.AUTO: NEGATIVE
BUN SERPL-MCNC: 13 MG/DL (ref 7–20)
CALCIUM SERPL-MCNC: 8.6 MG/DL (ref 8.3–10.6)
CHLORIDE SERPL-SCNC: 95 MMOL/L (ref 99–110)
CLARITY UR: CLEAR
CO2 BLDV-SCNC: 42 MMOL/L
CO2 SERPL-SCNC: 33 MMOL/L (ref 21–32)
COHGB MFR BLDV: 1.8 %
COLOR UR: YELLOW
CREAT SERPL-MCNC: 0.7 MG/DL (ref 0.8–1.3)
DEPRECATED RDW RBC AUTO: 14.2 % (ref 12.4–15.4)
EOSINOPHIL # BLD: 0 K/UL (ref 0–0.6)
EOSINOPHIL NFR BLD: 0.1 %
EPI CELLS #/AREA URNS AUTO: 1 /HPF (ref 0–5)
GFR SERPLBLD CREATININE-BSD FMLA CKD-EPI: >60 ML/MIN/{1.73_M2}
GLUCOSE SERPL-MCNC: 140 MG/DL (ref 70–99)
GLUCOSE UR STRIP.AUTO-MCNC: NEGATIVE MG/DL
HCO3 BLDV-SCNC: 39 MMOL/L (ref 23–29)
HCT VFR BLD AUTO: 42.5 % (ref 40.5–52.5)
HGB BLD-MCNC: 14 G/DL (ref 13.5–17.5)
HGB UR QL STRIP.AUTO: NEGATIVE
HYALINE CASTS #/AREA URNS AUTO: 3 /LPF (ref 0–8)
KETONES UR STRIP.AUTO-MCNC: NEGATIVE MG/DL
LEUKOCYTE ESTERASE UR QL STRIP.AUTO: NEGATIVE
LYMPHOCYTES # BLD: 1.2 K/UL (ref 1–5.1)
LYMPHOCYTES NFR BLD: 8.4 %
MCH RBC QN AUTO: 31.2 PG (ref 26–34)
MCHC RBC AUTO-ENTMCNC: 33 G/DL (ref 31–36)
MCV RBC AUTO: 94.6 FL (ref 80–100)
METHGB MFR BLDV: 0.8 %
MONOCYTES # BLD: 0.6 K/UL (ref 0–1.3)
MONOCYTES NFR BLD: 4.1 %
NEUTROPHILS # BLD: 12.4 K/UL (ref 1.7–7.7)
NEUTROPHILS NFR BLD: 87.1 %
NITRITE UR QL STRIP.AUTO: NEGATIVE
NT-PROBNP SERPL-MCNC: 38 PG/ML (ref 0–124)
O2 THERAPY: ABNORMAL
PCO2 BLDV: 92.1 MMHG (ref 40–50)
PH BLDV: 7.24 [PH] (ref 7.35–7.45)
PH UR STRIP.AUTO: 5 [PH] (ref 5–8)
PLATELET # BLD AUTO: 131 K/UL (ref 135–450)
PMV BLD AUTO: 9.7 FL (ref 5–10.5)
PO2 BLDV: <30 MMHG
POTASSIUM SERPL-SCNC: 4.1 MMOL/L (ref 3.5–5.1)
PROT SERPL-MCNC: 6.3 G/DL (ref 6.4–8.2)
PROT UR STRIP.AUTO-MCNC: ABNORMAL MG/DL
RBC # BLD AUTO: 4.49 M/UL (ref 4.2–5.9)
RBC CLUMPS #/AREA URNS AUTO: 0 /HPF (ref 0–4)
SAO2 % BLDV: 32 %
SODIUM SERPL-SCNC: 138 MMOL/L (ref 136–145)
SP GR UR STRIP.AUTO: 1.02 (ref 1–1.03)
TROPONIN, HIGH SENSITIVITY: 11 NG/L (ref 0–22)
TROPONIN, HIGH SENSITIVITY: 9 NG/L (ref 0–22)
UA COMPLETE W REFLEX CULTURE PNL UR: ABNORMAL
UA DIPSTICK W REFLEX MICRO PNL UR: YES
URN SPEC COLLECT METH UR: ABNORMAL
UROBILINOGEN UR STRIP-ACNC: 0.2 E.U./DL
WBC # BLD AUTO: 14.3 K/UL (ref 4–11)
WBC #/AREA URNS AUTO: 1 /HPF (ref 0–5)

## 2024-03-15 PROCEDURE — 94761 N-INVAS EAR/PLS OXIMETRY MLT: CPT

## 2024-03-15 PROCEDURE — 84484 ASSAY OF TROPONIN QUANT: CPT

## 2024-03-15 PROCEDURE — 71045 X-RAY EXAM CHEST 1 VIEW: CPT

## 2024-03-15 PROCEDURE — 85025 COMPLETE CBC W/AUTO DIFF WBC: CPT

## 2024-03-15 PROCEDURE — 82803 BLOOD GASES ANY COMBINATION: CPT

## 2024-03-15 PROCEDURE — 2700000000 HC OXYGEN THERAPY PER DAY

## 2024-03-15 PROCEDURE — 2060000000 HC ICU INTERMEDIATE R&B

## 2024-03-15 PROCEDURE — 99285 EMERGENCY DEPT VISIT HI MDM: CPT

## 2024-03-15 PROCEDURE — 81001 URINALYSIS AUTO W/SCOPE: CPT

## 2024-03-15 PROCEDURE — 80053 COMPREHEN METABOLIC PANEL: CPT

## 2024-03-15 PROCEDURE — 93005 ELECTROCARDIOGRAM TRACING: CPT | Performed by: EMERGENCY MEDICINE

## 2024-03-15 PROCEDURE — 6370000000 HC RX 637 (ALT 250 FOR IP): Performed by: EMERGENCY MEDICINE

## 2024-03-15 PROCEDURE — 94640 AIRWAY INHALATION TREATMENT: CPT

## 2024-03-15 PROCEDURE — 83880 ASSAY OF NATRIURETIC PEPTIDE: CPT

## 2024-03-15 RX ORDER — SODIUM CHLORIDE 0.9 % (FLUSH) 0.9 %
5-40 SYRINGE (ML) INJECTION PRN
Status: DISCONTINUED | OUTPATIENT
Start: 2024-03-15 | End: 2024-03-18 | Stop reason: HOSPADM

## 2024-03-15 RX ORDER — ENOXAPARIN SODIUM 100 MG/ML
40 INJECTION SUBCUTANEOUS DAILY
Status: DISCONTINUED | OUTPATIENT
Start: 2024-03-16 | End: 2024-03-18 | Stop reason: HOSPADM

## 2024-03-15 RX ORDER — IPRATROPIUM BROMIDE AND ALBUTEROL SULFATE 2.5; .5 MG/3ML; MG/3ML
1 SOLUTION RESPIRATORY (INHALATION) ONCE
Status: COMPLETED | OUTPATIENT
Start: 2024-03-15 | End: 2024-03-15

## 2024-03-15 RX ORDER — ALBUTEROL SULFATE 2.5 MG/3ML
2.5 SOLUTION RESPIRATORY (INHALATION)
Status: DISCONTINUED | OUTPATIENT
Start: 2024-03-15 | End: 2024-03-18 | Stop reason: HOSPADM

## 2024-03-15 RX ORDER — ACETAMINOPHEN 325 MG/1
650 TABLET ORAL EVERY 6 HOURS PRN
Status: DISCONTINUED | OUTPATIENT
Start: 2024-03-15 | End: 2024-03-18 | Stop reason: HOSPADM

## 2024-03-15 RX ORDER — IPRATROPIUM BROMIDE AND ALBUTEROL SULFATE 2.5; .5 MG/3ML; MG/3ML
1 SOLUTION RESPIRATORY (INHALATION)
Status: DISCONTINUED | OUTPATIENT
Start: 2024-03-16 | End: 2024-03-18 | Stop reason: HOSPADM

## 2024-03-15 RX ORDER — NICOTINE 21 MG/24HR
1 PATCH, TRANSDERMAL 24 HOURS TRANSDERMAL NIGHTLY
Status: DISCONTINUED | OUTPATIENT
Start: 2024-03-15 | End: 2024-03-18 | Stop reason: HOSPADM

## 2024-03-15 RX ORDER — ACETAMINOPHEN 650 MG/1
650 SUPPOSITORY RECTAL EVERY 6 HOURS PRN
Status: DISCONTINUED | OUTPATIENT
Start: 2024-03-15 | End: 2024-03-18 | Stop reason: HOSPADM

## 2024-03-15 RX ORDER — METHYLPHENIDATE HYDROCHLORIDE 5 MG/1
40 TABLET ORAL 3 TIMES DAILY
Status: DISCONTINUED | OUTPATIENT
Start: 2024-03-16 | End: 2024-03-16

## 2024-03-15 RX ORDER — PANTOPRAZOLE SODIUM 40 MG/1
40 TABLET, DELAYED RELEASE ORAL
Status: DISCONTINUED | OUTPATIENT
Start: 2024-03-16 | End: 2024-03-16

## 2024-03-15 RX ORDER — POLYETHYLENE GLYCOL 3350 17 G/17G
17 POWDER, FOR SOLUTION ORAL DAILY PRN
Status: DISCONTINUED | OUTPATIENT
Start: 2024-03-15 | End: 2024-03-18 | Stop reason: HOSPADM

## 2024-03-15 RX ORDER — ACYCLOVIR 800 MG/1
800 TABLET ORAL 2 TIMES DAILY
Status: DISCONTINUED | OUTPATIENT
Start: 2024-03-16 | End: 2024-03-16

## 2024-03-15 RX ORDER — SODIUM CHLORIDE 9 MG/ML
INJECTION, SOLUTION INTRAVENOUS PRN
Status: DISCONTINUED | OUTPATIENT
Start: 2024-03-15 | End: 2024-03-18 | Stop reason: HOSPADM

## 2024-03-15 RX ORDER — ONDANSETRON 2 MG/ML
4 INJECTION INTRAMUSCULAR; INTRAVENOUS EVERY 6 HOURS PRN
Status: DISCONTINUED | OUTPATIENT
Start: 2024-03-15 | End: 2024-03-18 | Stop reason: HOSPADM

## 2024-03-15 RX ORDER — ONDANSETRON 4 MG/1
4 TABLET, ORALLY DISINTEGRATING ORAL EVERY 8 HOURS PRN
Status: DISCONTINUED | OUTPATIENT
Start: 2024-03-15 | End: 2024-03-18 | Stop reason: HOSPADM

## 2024-03-15 RX ORDER — DOXEPIN HYDROCHLORIDE 25 MG/1
25 CAPSULE ORAL NIGHTLY
Status: DISCONTINUED | OUTPATIENT
Start: 2024-03-16 | End: 2024-03-18 | Stop reason: HOSPADM

## 2024-03-15 RX ORDER — SODIUM CHLORIDE 0.9 % (FLUSH) 0.9 %
5-40 SYRINGE (ML) INJECTION EVERY 12 HOURS SCHEDULED
Status: DISCONTINUED | OUTPATIENT
Start: 2024-03-16 | End: 2024-03-18 | Stop reason: HOSPADM

## 2024-03-15 RX ORDER — CETIRIZINE HYDROCHLORIDE 10 MG/1
10 TABLET ORAL DAILY PRN
Status: DISCONTINUED | OUTPATIENT
Start: 2024-03-15 | End: 2024-03-16

## 2024-03-15 RX ORDER — CYCLOBENZAPRINE HCL 10 MG
10 TABLET ORAL 3 TIMES DAILY PRN
Status: DISCONTINUED | OUTPATIENT
Start: 2024-03-15 | End: 2024-03-18 | Stop reason: HOSPADM

## 2024-03-15 RX ADMIN — IPRATROPIUM BROMIDE AND ALBUTEROL SULFATE 1 DOSE: .5; 2.5 SOLUTION RESPIRATORY (INHALATION) at 18:49

## 2024-03-15 ASSESSMENT — PAIN SCALES - GENERAL: PAINLEVEL_OUTOF10: 7

## 2024-03-15 ASSESSMENT — LIFESTYLE VARIABLES
HOW MANY STANDARD DRINKS CONTAINING ALCOHOL DO YOU HAVE ON A TYPICAL DAY: 1 OR 2
HOW OFTEN DO YOU HAVE A DRINK CONTAINING ALCOHOL: 2-3 TIMES A WEEK

## 2024-03-15 ASSESSMENT — PAIN - FUNCTIONAL ASSESSMENT: PAIN_FUNCTIONAL_ASSESSMENT: 0-10

## 2024-03-15 NOTE — ED TRIAGE NOTES
Pt came to ED via EMS from home C/O SOB. EMS stated upon arrivel Pt O2 was low and they put him on 3L and gave one nebulizer treatment.

## 2024-03-16 LAB
ANION GAP SERPL CALCULATED.3IONS-SCNC: 4 MMOL/L (ref 3–16)
BASE EXCESS BLDV CALC-SCNC: 11.5 MMOL/L
BASE EXCESS BLDV CALC-SCNC: 11.5 MMOL/L
BASE EXCESS BLDV CALC-SCNC: 12.2 MMOL/L
BASOPHILS # BLD: 0.1 K/UL (ref 0–0.2)
BASOPHILS NFR BLD: 0.6 %
BUN SERPL-MCNC: 8 MG/DL (ref 7–20)
CALCIUM SERPL-MCNC: 8.4 MG/DL (ref 8.3–10.6)
CHLORIDE SERPL-SCNC: 99 MMOL/L (ref 99–110)
CO2 BLDV-SCNC: 39 MMOL/L
CO2 BLDV-SCNC: 44 MMOL/L
CO2 BLDV-SCNC: 46 MMOL/L
CO2 SERPL-SCNC: 39 MMOL/L (ref 21–32)
COHGB MFR BLDV: 0 %
COHGB MFR BLDV: 0.7 %
COHGB MFR BLDV: 0.8 %
CREAT SERPL-MCNC: 0.6 MG/DL (ref 0.8–1.3)
DEPRECATED RDW RBC AUTO: 14.4 % (ref 12.4–15.4)
EKG ATRIAL RATE: 93 BPM
EKG DIAGNOSIS: NORMAL
EKG P-R INTERVAL: 144 MS
EKG Q-T INTERVAL: 356 MS
EKG QRS DURATION: 84 MS
EKG QTC CALCULATION (BAZETT): 442 MS
EKG R AXIS: 72 DEGREES
EKG T AXIS: 69 DEGREES
EKG VENTRICULAR RATE: 93 BPM
EOSINOPHIL # BLD: 0.1 K/UL (ref 0–0.6)
EOSINOPHIL NFR BLD: 0.7 %
GFR SERPLBLD CREATININE-BSD FMLA CKD-EPI: >60 ML/MIN/{1.73_M2}
GLUCOSE SERPL-MCNC: 91 MG/DL (ref 70–99)
HCO3 BLDV-SCNC: 37 MMOL/L (ref 23–29)
HCO3 BLDV-SCNC: 42 MMOL/L (ref 23–29)
HCO3 BLDV-SCNC: 43 MMOL/L (ref 23–29)
HCT VFR BLD AUTO: 38.7 % (ref 40.5–52.5)
HGB BLD-MCNC: 13 G/DL (ref 13.5–17.5)
LYMPHOCYTES # BLD: 1.8 K/UL (ref 1–5.1)
LYMPHOCYTES NFR BLD: 16.8 %
MCH RBC QN AUTO: 31.3 PG (ref 26–34)
MCHC RBC AUTO-ENTMCNC: 33.6 G/DL (ref 31–36)
MCV RBC AUTO: 93.4 FL (ref 80–100)
METHGB MFR BLDV: 0.3 %
METHGB MFR BLDV: 0.3 %
METHGB MFR BLDV: 0.7 %
MONOCYTES # BLD: 0.8 K/UL (ref 0–1.3)
MONOCYTES NFR BLD: 7.1 %
NEUTROPHILS # BLD: 8 K/UL (ref 1.7–7.7)
NEUTROPHILS NFR BLD: 74.8 %
O2 THERAPY: ABNORMAL
PCO2 BLDV: 50.3 MMHG (ref 40–50)
PCO2 BLDV: 86.7 MMHG (ref 40–50)
PCO2 BLDV: 92.3 MMHG (ref 40–50)
PH BLDV: 7.28 [PH] (ref 7.35–7.45)
PH BLDV: 7.29 [PH] (ref 7.35–7.45)
PH BLDV: 7.48 [PH] (ref 7.35–7.45)
PLATELET # BLD AUTO: 124 K/UL (ref 135–450)
PMV BLD AUTO: 9.3 FL (ref 5–10.5)
PO2 BLDV: 43 MMHG
PO2 BLDV: 46 MMHG
PO2 BLDV: <30 MMHG
POTASSIUM SERPL-SCNC: 4 MMOL/L (ref 3.5–5.1)
RBC # BLD AUTO: 4.15 M/UL (ref 4.2–5.9)
SAO2 % BLDV: 40 %
SAO2 % BLDV: 78 %
SAO2 % BLDV: 85 %
SODIUM SERPL-SCNC: 142 MMOL/L (ref 136–145)
WBC # BLD AUTO: 10.7 K/UL (ref 4–11)

## 2024-03-16 PROCEDURE — 6370000000 HC RX 637 (ALT 250 FOR IP): Performed by: NURSE PRACTITIONER

## 2024-03-16 PROCEDURE — 2580000003 HC RX 258: Performed by: INTERNAL MEDICINE

## 2024-03-16 PROCEDURE — 94640 AIRWAY INHALATION TREATMENT: CPT

## 2024-03-16 PROCEDURE — 80048 BASIC METABOLIC PNL TOTAL CA: CPT

## 2024-03-16 PROCEDURE — 6360000002 HC RX W HCPCS: Performed by: STUDENT IN AN ORGANIZED HEALTH CARE EDUCATION/TRAINING PROGRAM

## 2024-03-16 PROCEDURE — 6360000002 HC RX W HCPCS: Performed by: INTERNAL MEDICINE

## 2024-03-16 PROCEDURE — 6370000000 HC RX 637 (ALT 250 FOR IP): Performed by: INTERNAL MEDICINE

## 2024-03-16 PROCEDURE — 36415 COLL VENOUS BLD VENIPUNCTURE: CPT

## 2024-03-16 PROCEDURE — 93010 ELECTROCARDIOGRAM REPORT: CPT | Performed by: INTERNAL MEDICINE

## 2024-03-16 PROCEDURE — 6370000000 HC RX 637 (ALT 250 FOR IP): Performed by: STUDENT IN AN ORGANIZED HEALTH CARE EDUCATION/TRAINING PROGRAM

## 2024-03-16 PROCEDURE — 94761 N-INVAS EAR/PLS OXIMETRY MLT: CPT

## 2024-03-16 PROCEDURE — 2060000000 HC ICU INTERMEDIATE R&B

## 2024-03-16 PROCEDURE — 85025 COMPLETE CBC W/AUTO DIFF WBC: CPT

## 2024-03-16 PROCEDURE — 94660 CPAP INITIATION&MGMT: CPT

## 2024-03-16 PROCEDURE — 5A09457 ASSISTANCE WITH RESPIRATORY VENTILATION, 24-96 CONSECUTIVE HOURS, CONTINUOUS POSITIVE AIRWAY PRESSURE: ICD-10-PCS | Performed by: INTERNAL MEDICINE

## 2024-03-16 PROCEDURE — 2700000000 HC OXYGEN THERAPY PER DAY

## 2024-03-16 PROCEDURE — 82803 BLOOD GASES ANY COMBINATION: CPT

## 2024-03-16 RX ORDER — BUTALBITAL, ACETAMINOPHEN AND CAFFEINE 50; 325; 40 MG/1; MG/1; MG/1
1 TABLET ORAL 2 TIMES DAILY
Status: DISCONTINUED | OUTPATIENT
Start: 2024-03-16 | End: 2024-03-18 | Stop reason: HOSPADM

## 2024-03-16 RX ORDER — BUTALBITAL, ACETAMINOPHEN AND CAFFEINE 50; 325; 40 MG/1; MG/1; MG/1
1 TABLET ORAL 2 TIMES DAILY
COMMUNITY

## 2024-03-16 RX ORDER — METHYLPREDNISOLONE SODIUM SUCCINATE 40 MG/ML
40 INJECTION, POWDER, LYOPHILIZED, FOR SOLUTION INTRAMUSCULAR; INTRAVENOUS EVERY 6 HOURS
Status: COMPLETED | OUTPATIENT
Start: 2024-03-16 | End: 2024-03-18

## 2024-03-16 RX ORDER — METHYLPHENIDATE HYDROCHLORIDE 5 MG/1
20 TABLET ORAL 3 TIMES DAILY
Status: DISCONTINUED | OUTPATIENT
Start: 2024-03-16 | End: 2024-03-18 | Stop reason: HOSPADM

## 2024-03-16 RX ORDER — METHYLPHENIDATE HYDROCHLORIDE 5 MG/1
20 TABLET ORAL 2 TIMES DAILY WITH MEALS
Status: DISCONTINUED | OUTPATIENT
Start: 2024-03-16 | End: 2024-03-16

## 2024-03-16 RX ADMIN — DOXEPIN HYDROCHLORIDE 25 MG: 25 CAPSULE ORAL at 00:55

## 2024-03-16 RX ADMIN — METHYLPREDNISOLONE SODIUM SUCCINATE 40 MG: 40 INJECTION INTRAMUSCULAR; INTRAVENOUS at 16:50

## 2024-03-16 RX ADMIN — METHYLPHENIDATE HYDROCHLORIDE 20 MG: 5 TABLET ORAL at 14:15

## 2024-03-16 RX ADMIN — METHYLPHENIDATE HYDROCHLORIDE 20 MG: 5 TABLET ORAL at 18:34

## 2024-03-16 RX ADMIN — METHYLPREDNISOLONE SODIUM SUCCINATE 40 MG: 40 INJECTION INTRAMUSCULAR; INTRAVENOUS at 10:38

## 2024-03-16 RX ADMIN — ENOXAPARIN SODIUM 40 MG: 100 INJECTION SUBCUTANEOUS at 08:42

## 2024-03-16 RX ADMIN — IPRATROPIUM BROMIDE AND ALBUTEROL SULFATE 1 DOSE: 2.5; .5 SOLUTION RESPIRATORY (INHALATION) at 08:41

## 2024-03-16 RX ADMIN — IPRATROPIUM BROMIDE AND ALBUTEROL SULFATE 1 DOSE: 2.5; .5 SOLUTION RESPIRATORY (INHALATION) at 19:41

## 2024-03-16 RX ADMIN — CEFTRIAXONE 1000 MG: 1 INJECTION, POWDER, FOR SOLUTION INTRAMUSCULAR; INTRAVENOUS at 20:16

## 2024-03-16 RX ADMIN — DOXEPIN HYDROCHLORIDE 25 MG: 25 CAPSULE ORAL at 20:16

## 2024-03-16 RX ADMIN — CEFTRIAXONE 1000 MG: 1 INJECTION, POWDER, FOR SOLUTION INTRAMUSCULAR; INTRAVENOUS at 00:13

## 2024-03-16 RX ADMIN — Medication 10 ML: at 20:18

## 2024-03-16 RX ADMIN — METHYLPHENIDATE HYDROCHLORIDE 20 MG: 5 TABLET ORAL at 09:22

## 2024-03-16 RX ADMIN — IPRATROPIUM BROMIDE AND ALBUTEROL SULFATE 1 DOSE: 2.5; .5 SOLUTION RESPIRATORY (INHALATION) at 12:11

## 2024-03-16 RX ADMIN — Medication 10 ML: at 08:43

## 2024-03-16 RX ADMIN — BUTALBITAL, ACETAMINOPHEN AND CAFFEINE 1 TABLET: 325; 50; 40 TABLET ORAL at 20:16

## 2024-03-16 RX ADMIN — IPRATROPIUM BROMIDE AND ALBUTEROL SULFATE 1 DOSE: 2.5; .5 SOLUTION RESPIRATORY (INHALATION) at 15:58

## 2024-03-16 RX ADMIN — METHYLPREDNISOLONE SODIUM SUCCINATE 40 MG: 40 INJECTION INTRAMUSCULAR; INTRAVENOUS at 20:17

## 2024-03-16 RX ADMIN — BUTALBITAL, ACETAMINOPHEN AND CAFFEINE 1 TABLET: 325; 50; 40 TABLET ORAL at 14:15

## 2024-03-16 ASSESSMENT — PAIN SCALES - GENERAL
PAINLEVEL_OUTOF10: 0
PAINLEVEL_OUTOF10: 8

## 2024-03-16 ASSESSMENT — PAIN DESCRIPTION - LOCATION: LOCATION: NECK

## 2024-03-16 ASSESSMENT — PAIN DESCRIPTION - DESCRIPTORS: DESCRIPTORS: THROBBING;DISCOMFORT

## 2024-03-16 NOTE — ED PROVIDER NOTES
03/15/24 2050 03/15/24 2120   BP: 121/72 120/66 118/67 117/65   Pulse: 81 85 (!) 101 92   Resp: 29 20 23 22   Temp:       TempSrc:       SpO2: 100% 100% 100% 98%   Weight:       Height:           Medications   ipratropium 0.5 mg-albuterol 2.5 mg (DUONEB) nebulizer solution 1 Dose (1 Dose Inhalation Given 3/15/24 1849)       New Prescriptions    No medications on file       SEP-1 CORE MEASURE DATA  Exclusion criteria: the patient is NOT to be included for sepsis due to:  Infection is not suspected    Patient remained stable in the ED. Presents with shortness of breath and increasing oxygen requirements at home.  Normally he only wears his oxygen at nighttime but he is increased 24 hours a day.  He has a history of COPD. physical exam reveals patient be in mild distress.  He is speaking in 8-10 word sentences.  Breath sounds are decreased.  But no wheezes.  Laboratory work revealed a mildly elevated white count at 14.3.  However, his VBG shows a pH of 7.235 with a venous pCO2 of 92 which normally runs 60s and 70s.  Troponin and repeat troponin were negative.  Chest x-ray was negative.  Therefore, I feel patient has an exacerbation of COPD.  He was given nebulizer treatment in the emergency department.  He is allergic to prednisone.  No steroids were given in the emergency department at this time.  Hospitalist was notified at 2143.    Diagnostic considerations include but are not limited to:  Acute Coronary Syndrome, Congestive Heart Failure, Myocardial Infarction, Pulmonary Embolus, Pneumonia, Pneumothorax, sepsis, DKA, airway obstruction, bronchitis, burn injury, other    EKG-ordered to rule out myocardial infarction, rhythm disturbances, conduction disturbances, LVH, GIRMA, pericarditis, voltage abnormalities, or other pathology that might be causing the patient's symptoms.    Chest x-ray-chest x-ray was ordered to rule out pneumonia, pneumothorax, congestive heart failure, cardiomegaly, chest masses, aortic aneurysm,  #1:

## 2024-03-16 NOTE — ED NOTES
Report called to AKIN Sanders for Rm [id number]. Questions addressed and report accepted. Pt transport to be arranged as soon as possible.

## 2024-03-16 NOTE — H&P
of breath TECHNOLOGIST PROVIDED HISTORY: Reason for exam:->Short of breath Reason for Exam: Shortness of Breath FINDINGS: HEART/MEDIASTINUM: The cardiomediastinal silhouette is within normal limits. PLEURA/LUNGS: There are no focal consolidations or pleural effusions. There is no appreciable pneumothorax. BONES/SOFT TISSUE: No acute abnormality.     No radiographic evidence of acute pulmonary disease.         Electronically signed by Kristi Posey MD on 3/15/2024 at 10:29 PM

## 2024-03-16 NOTE — PLAN OF CARE
Problem: Discharge Planning  Goal: Discharge to home or other facility with appropriate resources  3/16/2024 1457 by Bobbi Clancy RN  Outcome: Progressing     Problem: Pain  Goal: Verbalizes/displays adequate comfort level or baseline comfort level  3/16/2024 1457 by Bobbi Clancy RN  Outcome: Progressing     Problem: Safety - Adult  Goal: Free from fall injury  3/16/2024 1457 by Bobbi Clancy RN  Outcome: Progressing

## 2024-03-16 NOTE — PLAN OF CARE
Problem: Safety - Adult  Goal: Free from fall injury  Outcome: Progressing     Problem: Pain  Goal: Verbalizes/displays adequate comfort level or baseline comfort level  Outcome: Progressing     Problem: Discharge Planning  Goal: Discharge to home or other facility with appropriate resources  Outcome: Progressing

## 2024-03-17 LAB
ANION GAP SERPL CALCULATED.3IONS-SCNC: 8 MMOL/L (ref 3–16)
BASE EXCESS BLDV CALC-SCNC: 10.2 MMOL/L
BASE EXCESS BLDV CALC-SCNC: 11.1 MMOL/L
BASOPHILS # BLD: 0 K/UL (ref 0–0.2)
BASOPHILS NFR BLD: 0.1 %
BUN SERPL-MCNC: 14 MG/DL (ref 7–20)
CALCIUM SERPL-MCNC: 8.9 MG/DL (ref 8.3–10.6)
CHLORIDE SERPL-SCNC: 96 MMOL/L (ref 99–110)
CO2 BLDV-SCNC: 38 MMOL/L
CO2 BLDV-SCNC: 40 MMOL/L
CO2 SERPL-SCNC: 35 MMOL/L (ref 21–32)
COHGB MFR BLDV: 0 %
COHGB MFR BLDV: 0.2 %
CREAT SERPL-MCNC: 0.8 MG/DL (ref 0.8–1.3)
DEPRECATED RDW RBC AUTO: 14.4 % (ref 12.4–15.4)
EOSINOPHIL # BLD: 0 K/UL (ref 0–0.6)
EOSINOPHIL NFR BLD: 0 %
GFR SERPLBLD CREATININE-BSD FMLA CKD-EPI: >60 ML/MIN/{1.73_M2}
GLUCOSE SERPL-MCNC: 144 MG/DL (ref 70–99)
HCO3 BLDV-SCNC: 36 MMOL/L (ref 23–29)
HCO3 BLDV-SCNC: 38 MMOL/L (ref 23–29)
HCT VFR BLD AUTO: 40.6 % (ref 40.5–52.5)
HGB BLD-MCNC: 13.8 G/DL (ref 13.5–17.5)
LYMPHOCYTES # BLD: 1.4 K/UL (ref 1–5.1)
LYMPHOCYTES NFR BLD: 13.7 %
MCH RBC QN AUTO: 31.8 PG (ref 26–34)
MCHC RBC AUTO-ENTMCNC: 33.9 G/DL (ref 31–36)
MCV RBC AUTO: 93.7 FL (ref 80–100)
METHGB MFR BLDV: 0.6 %
METHGB MFR BLDV: 0.7 %
MONOCYTES # BLD: 0.6 K/UL (ref 0–1.3)
MONOCYTES NFR BLD: 5.6 %
NEUTROPHILS # BLD: 8.4 K/UL (ref 1.7–7.7)
NEUTROPHILS NFR BLD: 80.6 %
O2 THERAPY: ABNORMAL
O2 THERAPY: ABNORMAL
PCO2 BLDV: 47 MMHG (ref 40–50)
PCO2 BLDV: 62.6 MMHG (ref 40–50)
PH BLDV: 7.39 [PH] (ref 7.35–7.45)
PH BLDV: 7.49 [PH] (ref 7.35–7.45)
PLATELET # BLD AUTO: 147 K/UL (ref 135–450)
PMV BLD AUTO: 10.1 FL (ref 5–10.5)
PO2 BLDV: 46 MMHG
PO2 BLDV: <30 MMHG
POTASSIUM SERPL-SCNC: 4.6 MMOL/L (ref 3.5–5.1)
RBC # BLD AUTO: 4.33 M/UL (ref 4.2–5.9)
SAO2 % BLDV: 55 %
SAO2 % BLDV: 82 %
SODIUM SERPL-SCNC: 139 MMOL/L (ref 136–145)
WBC # BLD AUTO: 10.4 K/UL (ref 4–11)

## 2024-03-17 PROCEDURE — 6370000000 HC RX 637 (ALT 250 FOR IP): Performed by: STUDENT IN AN ORGANIZED HEALTH CARE EDUCATION/TRAINING PROGRAM

## 2024-03-17 PROCEDURE — 94640 AIRWAY INHALATION TREATMENT: CPT

## 2024-03-17 PROCEDURE — 80048 BASIC METABOLIC PNL TOTAL CA: CPT

## 2024-03-17 PROCEDURE — 2580000003 HC RX 258

## 2024-03-17 PROCEDURE — 6370000000 HC RX 637 (ALT 250 FOR IP): Performed by: INTERNAL MEDICINE

## 2024-03-17 PROCEDURE — 6360000002 HC RX W HCPCS: Performed by: INTERNAL MEDICINE

## 2024-03-17 PROCEDURE — 2580000003 HC RX 258: Performed by: INTERNAL MEDICINE

## 2024-03-17 PROCEDURE — 2060000000 HC ICU INTERMEDIATE R&B

## 2024-03-17 PROCEDURE — 94660 CPAP INITIATION&MGMT: CPT

## 2024-03-17 PROCEDURE — 82803 BLOOD GASES ANY COMBINATION: CPT

## 2024-03-17 PROCEDURE — 94761 N-INVAS EAR/PLS OXIMETRY MLT: CPT

## 2024-03-17 PROCEDURE — 2700000000 HC OXYGEN THERAPY PER DAY

## 2024-03-17 PROCEDURE — 36415 COLL VENOUS BLD VENIPUNCTURE: CPT

## 2024-03-17 PROCEDURE — 6360000002 HC RX W HCPCS: Performed by: STUDENT IN AN ORGANIZED HEALTH CARE EDUCATION/TRAINING PROGRAM

## 2024-03-17 PROCEDURE — 85025 COMPLETE CBC W/AUTO DIFF WBC: CPT

## 2024-03-17 RX ORDER — WATER 10 ML/10ML
INJECTION INTRAMUSCULAR; INTRAVENOUS; SUBCUTANEOUS
Status: COMPLETED
Start: 2024-03-17 | End: 2024-03-17

## 2024-03-17 RX ORDER — CLONAZEPAM 1 MG/1
1 TABLET ORAL 3 TIMES DAILY PRN
Status: DISCONTINUED | OUTPATIENT
Start: 2024-03-17 | End: 2024-03-18 | Stop reason: HOSPADM

## 2024-03-17 RX ADMIN — WATER 10 ML: 1 INJECTION INTRAMUSCULAR; INTRAVENOUS; SUBCUTANEOUS at 17:08

## 2024-03-17 RX ADMIN — IPRATROPIUM BROMIDE AND ALBUTEROL SULFATE 1 DOSE: 2.5; .5 SOLUTION RESPIRATORY (INHALATION) at 08:45

## 2024-03-17 RX ADMIN — METHYLPREDNISOLONE SODIUM SUCCINATE 40 MG: 40 INJECTION INTRAMUSCULAR; INTRAVENOUS at 21:07

## 2024-03-17 RX ADMIN — METHYLPHENIDATE HYDROCHLORIDE 20 MG: 5 TABLET ORAL at 09:43

## 2024-03-17 RX ADMIN — IPRATROPIUM BROMIDE AND ALBUTEROL SULFATE 1 DOSE: 2.5; .5 SOLUTION RESPIRATORY (INHALATION) at 20:02

## 2024-03-17 RX ADMIN — BUTALBITAL, ACETAMINOPHEN AND CAFFEINE 1 TABLET: 325; 50; 40 TABLET ORAL at 09:44

## 2024-03-17 RX ADMIN — METHYLPHENIDATE HYDROCHLORIDE 20 MG: 5 TABLET ORAL at 13:25

## 2024-03-17 RX ADMIN — ENOXAPARIN SODIUM 40 MG: 100 INJECTION SUBCUTANEOUS at 09:43

## 2024-03-17 RX ADMIN — METHYLPREDNISOLONE SODIUM SUCCINATE 40 MG: 40 INJECTION INTRAMUSCULAR; INTRAVENOUS at 05:04

## 2024-03-17 RX ADMIN — Medication 10 ML: at 21:07

## 2024-03-17 RX ADMIN — IPRATROPIUM BROMIDE AND ALBUTEROL SULFATE 1 DOSE: 2.5; .5 SOLUTION RESPIRATORY (INHALATION) at 12:35

## 2024-03-17 RX ADMIN — CEFTRIAXONE 1000 MG: 1 INJECTION, POWDER, FOR SOLUTION INTRAMUSCULAR; INTRAVENOUS at 21:07

## 2024-03-17 RX ADMIN — METHYLPHENIDATE HYDROCHLORIDE 20 MG: 5 TABLET ORAL at 17:07

## 2024-03-17 RX ADMIN — METHYLPREDNISOLONE SODIUM SUCCINATE 40 MG: 40 INJECTION INTRAMUSCULAR; INTRAVENOUS at 17:08

## 2024-03-17 RX ADMIN — CLONAZEPAM 1 MG: 1 TABLET ORAL at 22:13

## 2024-03-17 RX ADMIN — CLONAZEPAM 1 MG: 1 TABLET ORAL at 14:38

## 2024-03-17 RX ADMIN — BUTALBITAL, ACETAMINOPHEN AND CAFFEINE 1 TABLET: 325; 50; 40 TABLET ORAL at 21:05

## 2024-03-17 RX ADMIN — METHYLPREDNISOLONE SODIUM SUCCINATE 40 MG: 40 INJECTION INTRAMUSCULAR; INTRAVENOUS at 09:44

## 2024-03-17 RX ADMIN — DOXEPIN HYDROCHLORIDE 25 MG: 25 CAPSULE ORAL at 21:06

## 2024-03-17 RX ADMIN — Medication 10 ML: at 09:46

## 2024-03-17 RX ADMIN — IPRATROPIUM BROMIDE AND ALBUTEROL SULFATE 1 DOSE: 2.5; .5 SOLUTION RESPIRATORY (INHALATION) at 16:11

## 2024-03-17 ASSESSMENT — PAIN DESCRIPTION - DESCRIPTORS: DESCRIPTORS: ACHING;DISCOMFORT;STABBING

## 2024-03-17 ASSESSMENT — PAIN DESCRIPTION - LOCATION: LOCATION: BACK

## 2024-03-17 ASSESSMENT — PAIN SCALES - GENERAL
PAINLEVEL_OUTOF10: 4
PAINLEVEL_OUTOF10: 6
PAINLEVEL_OUTOF10: 0

## 2024-03-17 ASSESSMENT — PAIN - FUNCTIONAL ASSESSMENT: PAIN_FUNCTIONAL_ASSESSMENT: ACTIVITIES ARE NOT PREVENTED

## 2024-03-17 NOTE — PLAN OF CARE
Problem: Discharge Planning  Goal: Discharge to home or other facility with appropriate resources  3/17/2024 0042 by Winsome Sorenson RN  Outcome: Progressing  3/16/2024 1457 by Bobbi Clancy RN  Outcome: Progressing     Problem: Pain  Goal: Verbalizes/displays adequate comfort level or baseline comfort level  3/17/2024 0042 by Winsome Sorenson RN  Outcome: Progressing  3/16/2024 1457 by Bobbi Clancy RN  Outcome: Progressing     Problem: Safety - Adult  Goal: Free from fall injury  3/17/2024 0042 by Winsome Sorenson, RN  Outcome: Progressing  3/16/2024 1457 by Bobbi Clancy RN  Outcome: Progressing   Alert and oriented with occasional forgetfulness but easily redirected. Dyspnic with exertion Lungs diminished Cough and deep breathing exercises encouraged Sats. WNL on 3L O2/BIPAP No c/o pain Cont. Per urinal Turns and repositions self Free from fall/injury

## 2024-03-17 NOTE — PLAN OF CARE
Problem: Discharge Planning  Goal: Discharge to home or other facility with appropriate resources  3/17/2024 1241 by Luli Lora RN  Outcome: Progressing  Flowsheets  Taken 3/17/2024 1241  Discharge to home or other facility with appropriate resources:   Identify barriers to discharge with patient and caregiver   Identify discharge learning needs (meds, wound care, etc)   Arrange for needed discharge resources and transportation as appropriate  Taken 3/17/2024 0953  Discharge to home or other facility with appropriate resources:   Identify barriers to discharge with patient and caregiver   Arrange for needed discharge resources and transportation as appropriate   Identify discharge learning needs (meds, wound care, etc)  3/17/2024 0042 by Winsome Sorenson RN  Outcome: Progressing     Problem: Pain  Goal: Verbalizes/displays adequate comfort level or baseline comfort level  3/17/2024 1241 by Luli Lora RN  Outcome: Progressing  Flowsheets (Taken 3/17/2024 1241)  Verbalizes/displays adequate comfort level or baseline comfort level:   Encourage patient to monitor pain and request assistance   Administer analgesics based on type and severity of pain and evaluate response   Assess pain using appropriate pain scale  3/17/2024 0042 by Winsome Sorenson, RN  Outcome: Progressing     Problem: Safety - Adult  Goal: Free from fall injury  3/17/2024 1241 by Luli Lora RN  Outcome: Progressing  Flowsheets (Taken 3/17/2024 1241)  Free From Fall Injury: Instruct family/caregiver on patient safety  3/17/2024 0042 by Winsome Sorenson RN  Outcome: Progressing     Problem: Neurosensory - Adult  Goal: Achieves stable or improved neurological status  Outcome: Progressing  Flowsheets (Taken 3/17/2024 1241)  Achieves stable or improved neurological status: Assess for and report changes in neurological status     Problem: Respiratory - Adult  Goal: Achieves optimal ventilation and oxygenation  Outcome: Progressing  Flowsheets (Taken

## 2024-03-18 VITALS
RESPIRATION RATE: 24 BRPM | OXYGEN SATURATION: 95 % | SYSTOLIC BLOOD PRESSURE: 129 MMHG | BODY MASS INDEX: 25.19 KG/M2 | HEIGHT: 67 IN | TEMPERATURE: 97.7 F | WEIGHT: 160.5 LBS | DIASTOLIC BLOOD PRESSURE: 79 MMHG | HEART RATE: 103 BPM

## 2024-03-18 LAB
ANION GAP SERPL CALCULATED.3IONS-SCNC: 6 MMOL/L (ref 3–16)
BASE EXCESS BLDV CALC-SCNC: 8.2 MMOL/L
BASOPHILS # BLD: 0 K/UL (ref 0–0.2)
BASOPHILS NFR BLD: 0 %
BUN SERPL-MCNC: 21 MG/DL (ref 7–20)
CALCIUM SERPL-MCNC: 9 MG/DL (ref 8.3–10.6)
CHLORIDE SERPL-SCNC: 100 MMOL/L (ref 99–110)
CO2 BLDV-SCNC: 35 MMOL/L
CO2 SERPL-SCNC: 32 MMOL/L (ref 21–32)
COHGB MFR BLDV: <0 %
CREAT SERPL-MCNC: 0.7 MG/DL (ref 0.8–1.3)
DEPRECATED RDW RBC AUTO: 14.2 % (ref 12.4–15.4)
EOSINOPHIL # BLD: 0 K/UL (ref 0–0.6)
EOSINOPHIL NFR BLD: 0 %
GFR SERPLBLD CREATININE-BSD FMLA CKD-EPI: >60 ML/MIN/{1.73_M2}
GLUCOSE SERPL-MCNC: 141 MG/DL (ref 70–99)
HCO3 BLDV-SCNC: 33 MMOL/L (ref 23–29)
HCT VFR BLD AUTO: 38.5 % (ref 40.5–52.5)
HGB BLD-MCNC: 12.9 G/DL (ref 13.5–17.5)
LYMPHOCYTES # BLD: 1.1 K/UL (ref 1–5.1)
LYMPHOCYTES NFR BLD: 9.1 %
MCH RBC QN AUTO: 31.2 PG (ref 26–34)
MCHC RBC AUTO-ENTMCNC: 33.5 G/DL (ref 31–36)
MCV RBC AUTO: 93.2 FL (ref 80–100)
METHGB MFR BLDV: 0.4 %
MONOCYTES # BLD: 0.8 K/UL (ref 0–1.3)
MONOCYTES NFR BLD: 6.8 %
NEUTROPHILS # BLD: 10.3 K/UL (ref 1.7–7.7)
NEUTROPHILS NFR BLD: 84.1 %
O2 THERAPY: ABNORMAL
PCO2 BLDV: 46.6 MMHG (ref 40–50)
PH BLDV: 7.46 [PH] (ref 7.35–7.45)
PLATELET # BLD AUTO: 151 K/UL (ref 135–450)
PMV BLD AUTO: 9.7 FL (ref 5–10.5)
PO2 BLDV: 81 MMHG
POTASSIUM SERPL-SCNC: 4.3 MMOL/L (ref 3.5–5.1)
RBC # BLD AUTO: 4.13 M/UL (ref 4.2–5.9)
REASON FOR REJECTION: NORMAL
REJECTED TEST: NORMAL
SAO2 % BLDV: 94 %
SODIUM SERPL-SCNC: 138 MMOL/L (ref 136–145)
WBC # BLD AUTO: 12.3 K/UL (ref 4–11)

## 2024-03-18 PROCEDURE — 6370000000 HC RX 637 (ALT 250 FOR IP): Performed by: STUDENT IN AN ORGANIZED HEALTH CARE EDUCATION/TRAINING PROGRAM

## 2024-03-18 PROCEDURE — 82803 BLOOD GASES ANY COMBINATION: CPT

## 2024-03-18 PROCEDURE — 2580000003 HC RX 258: Performed by: INTERNAL MEDICINE

## 2024-03-18 PROCEDURE — 6360000002 HC RX W HCPCS: Performed by: STUDENT IN AN ORGANIZED HEALTH CARE EDUCATION/TRAINING PROGRAM

## 2024-03-18 PROCEDURE — 80048 BASIC METABOLIC PNL TOTAL CA: CPT

## 2024-03-18 PROCEDURE — 6370000000 HC RX 637 (ALT 250 FOR IP): Performed by: INTERNAL MEDICINE

## 2024-03-18 PROCEDURE — 97530 THERAPEUTIC ACTIVITIES: CPT

## 2024-03-18 PROCEDURE — 36415 COLL VENOUS BLD VENIPUNCTURE: CPT

## 2024-03-18 PROCEDURE — 97116 GAIT TRAINING THERAPY: CPT | Performed by: PHYSICAL THERAPIST

## 2024-03-18 PROCEDURE — 97162 PT EVAL MOD COMPLEX 30 MIN: CPT | Performed by: PHYSICAL THERAPIST

## 2024-03-18 PROCEDURE — 6360000002 HC RX W HCPCS: Performed by: INTERNAL MEDICINE

## 2024-03-18 PROCEDURE — 97530 THERAPEUTIC ACTIVITIES: CPT | Performed by: PHYSICAL THERAPIST

## 2024-03-18 PROCEDURE — 85025 COMPLETE CBC W/AUTO DIFF WBC: CPT

## 2024-03-18 PROCEDURE — 97165 OT EVAL LOW COMPLEX 30 MIN: CPT

## 2024-03-18 PROCEDURE — 2700000000 HC OXYGEN THERAPY PER DAY

## 2024-03-18 PROCEDURE — 94640 AIRWAY INHALATION TREATMENT: CPT

## 2024-03-18 PROCEDURE — 94761 N-INVAS EAR/PLS OXIMETRY MLT: CPT

## 2024-03-18 PROCEDURE — 99223 1ST HOSP IP/OBS HIGH 75: CPT | Performed by: INTERNAL MEDICINE

## 2024-03-18 RX ORDER — NICOTINE 21 MG/24HR
1 PATCH, TRANSDERMAL 24 HOURS TRANSDERMAL NIGHTLY
Qty: 30 PATCH | Refills: 3 | Status: SHIPPED | OUTPATIENT
Start: 2024-03-18

## 2024-03-18 RX ORDER — PREDNISONE 20 MG/1
20 TABLET ORAL DAILY
Qty: 2 TABLET | Refills: 0 | Status: SHIPPED | OUTPATIENT
Start: 2024-03-18 | End: 2024-03-20

## 2024-03-18 RX ORDER — LEVOFLOXACIN 750 MG/1
750 TABLET, FILM COATED ORAL DAILY
Qty: 3 TABLET | Refills: 0 | Status: SHIPPED | OUTPATIENT
Start: 2024-03-18 | End: 2024-03-21

## 2024-03-18 RX ADMIN — METHYLPREDNISOLONE SODIUM SUCCINATE 40 MG: 40 INJECTION INTRAMUSCULAR; INTRAVENOUS at 03:49

## 2024-03-18 RX ADMIN — IPRATROPIUM BROMIDE AND ALBUTEROL SULFATE 1 DOSE: 2.5; .5 SOLUTION RESPIRATORY (INHALATION) at 11:38

## 2024-03-18 RX ADMIN — METHYLPHENIDATE HYDROCHLORIDE 20 MG: 5 TABLET ORAL at 11:14

## 2024-03-18 RX ADMIN — Medication 10 ML: at 09:43

## 2024-03-18 RX ADMIN — IPRATROPIUM BROMIDE AND ALBUTEROL SULFATE 1 DOSE: 2.5; .5 SOLUTION RESPIRATORY (INHALATION) at 07:37

## 2024-03-18 RX ADMIN — BUTALBITAL, ACETAMINOPHEN AND CAFFEINE 1 TABLET: 325; 50; 40 TABLET ORAL at 09:56

## 2024-03-18 RX ADMIN — CLONAZEPAM 1 MG: 1 TABLET ORAL at 10:00

## 2024-03-18 RX ADMIN — MOMETASONE FUROATE AND FORMOTEROL FUMARATE DIHYDRATE 2 PUFF: 200; 5 AEROSOL RESPIRATORY (INHALATION) at 11:38

## 2024-03-18 NOTE — DISCHARGE INSTR - COC
Continuity of Care Form    Patient Name: Aki Pal   :  1950  MRN:  [id number]    Admit date:  3/15/2024  Discharge date:  ***    Code Status Order: Full Code   Advance Directives:     Admitting Physician:  Kristi Posey MD  PCP: Paul Crane MD    Discharging Nurse: ***  Discharging Hospital Unit/Room#: C5P-2776/5112-01  Discharging Unit Phone Number: ***    Emergency Contact:   Extended Emergency Contact Information  Primary Emergency Contact: Beba Pal  Address: [address] [place] [address] [facility] [place] [address] [facility]  Home Phone: [phone]  Relation: Spouse  Secondary Emergency Contact: Shahab Pal  Home Phone: [phone]  Mobile Phone: [phone]  Relation: Child    Past Surgical History:  Past Surgical History:   Procedure Laterality Date    MOUTH SURGERY  2013       Immunization History:   Immunization History   Administered Date(s) Administered    COVID-19, J&J, (age 18y+), IM, 0.5 mL 2021    COVID-19, PFIZER PURPLE top, DILUTE for use, (age 12 y+), 30mcg/0.3mL 2022       Active Problems:  Patient Active Problem List   Diagnosis Code    Tardive dyskinesia G24.01    Community acquired bacterial pneumonia J15.9    Esophageal dysphagia R13.19    Hoarseness, chronic R49.0    Acute respiratory failure with hypoxia (HCC) J96.01    Community acquired pneumonia of right lower lobe of lung J18.9    Centrilobular emphysema (HCC) J43.2    Lactic acidosis E87.20    Postnasal drip R09.82    Tobacco use Z72.0    Sepsis (HCC) A41.9    Pneumonia J18.9    Generalized weakness R53.1    PNA (pneumonia) J18.9    COPD exacerbation (HCC) J44.1       Isolation/Infection:   Isolation            No Isolation          Patient Infection Status       None to display                     Nurse Assessment:  Last Vital Signs: /79   Pulse (!) 103   Temp 97.7 °F (36.5 °C) (Oral)   Resp 24   Ht 1.702 m (5' 7\")   Wt 72.8 kg (160 lb 7.9 oz)   SpO2

## 2024-03-18 NOTE — CARE COORDINATION
DISCHARGE NEEDS:   Patient called regarding home care services. Patient reports no preference and has chosen to pursue [facility]. If unable to staff, patient is agreeable to agency in network with insurance and able to provide services.     Discharging to Facility/ Agency   Name:     [facility]  Address:  [address] [place] [address],  [facility] [address],  [place] [address]  Phone:     [phone]  Fax:         [phone]  Referral placed to Domenico with intake. Await confirmation if they are able to accept patient.     KAM Maldonado, LSW, Social Work/Case Management   [phone]  Electronically signed by KAM Maldonado on 3/18/2024 at 5:07 PM

## 2024-03-18 NOTE — CARE COORDINATION
[facility]    Referral received from  to follow for home care services.   I will follow for needs, and speak with patient to verify demos.    Domenico Jennings RN, BSN CTN  [facility] [phone]

## 2024-03-18 NOTE — PLAN OF CARE
Problem: Discharge Planning  Goal: Discharge to home or other facility with appropriate resources  3/17/2024 2220 by Lavell Rico RN  Outcome: Progressing     Problem: Pain  Goal: Verbalizes/displays adequate comfort level or baseline comfort level  3/17/2024 2220 by Lavell Rico RN  Outcome: Progressing     Problem: Safety - Adult  Goal: Free from fall injury  3/17/2024 2220 by Lavell Rico RN  Outcome: Progressing     Problem: Neurosensory - Adult  Goal: Achieves stable or improved neurological status  3/17/2024 2220 by Lavell Rico RN  Outcome: Progressing     Problem: Respiratory - Adult  Goal: Achieves optimal ventilation and oxygenation  3/17/2024 2220 by Lavell Rico RN  Outcome: Progressing     Problem: Cardiovascular - Adult  Goal: Maintains optimal cardiac output and hemodynamic stability  3/17/2024 2220 by Lavell Rico RN  Outcome: Progressing     Problem: Skin/Tissue Integrity - Adult  Goal: Skin integrity remains intact  3/17/2024 2220 by Lavell Rico RN  Outcome: Progressing  Flowsheets (Taken 3/17/2024 1243 by Luli Lora, RN)  Skin Integrity Remains Intact: Monitor for areas of redness and/or skin breakdown     Problem: Skin/Tissue Integrity - Adult  Goal: Oral mucous membranes remain intact  3/17/2024 2220 by Lavell Rico RN  Outcome: Progressing  Flowsheets (Taken 3/17/2024 1243 by Luli Lora, RN)  Oral Mucous Membranes Remain Intact: Assess oral mucosa and hygiene practices     Problem: Musculoskeletal - Adult  Goal: Return mobility to safest level of function  3/17/2024 2220 by Lavell Rico RN  Outcome: Progressing     Problem: Musculoskeletal - Adult  Goal: Return ADL status to a safe level of function  3/17/2024 2220 by Lavell Rico RN  Outcome: Progressing     Problem: Gastrointestinal - Adult  Goal: Minimal or absence of nausea and vomiting  3/17/2024 2220 by Lavell Rico RN  Outcome: Progressing     Problem: Genitourinary - Adult  Goal: Absence of urinary retention  3/17/2024

## 2024-03-18 NOTE — CARE COORDINATION
[facility]    DC order noted, all docs needed have been [facility]ed to [facility].    Home care to see patient within 24-48 hrs    Domenico Jennings RN, BSN CTN  [facility] [phone]

## 2024-03-18 NOTE — CARE COORDINATION
Case Management Assessment  Initial Evaluation    Date/Time of Evaluation: 3/18/2024 2:28 PM  Assessment Completed by: KAM Maldonado    If patient is discharged prior to next notation, then this note serves as note for discharge by case management.    Patient Name: Aki Pal                   YOB: 1950  Diagnosis: COPD exacerbation (HCC) [J44.1]  Acute exacerbation of COPD with asthma (HCC) [J44.1, J45.901]  Increased oxygen demand [R68.89]  Acute on chronic respiratory failure with hypoxia and hypercapnia (HCC) [J96.21, J96.22]                   Date / Time: 3/15/2024  5:29 PM    Patient Admission Status: Inpatient   Readmission Risk (Low < 19, Mod (19-27), High > 27): Readmission Risk Score: 10.2    Current PCP: Paul Crane MD  PCP verified by CM? (P) Yes    Chart Reviewed: Yes      History Provided by: (P) Patient  Patient Orientation: (P) Alert and Oriented    Patient Cognition: (P) Alert    Hospitalization in the last 30 days (Readmission):  Yes    If yes, Readmission Assessment in CM Navigator will be completed.    Advance Directives:      Code Status: Full Code   Patient's Primary Decision Maker is: (P) Legal Next of Kin    Primary Decision Maker: KaeBeba - Spouse - [phone]    Secondary Decision Maker: Shahab Pal Child - [phone]    Secondary Decision Maker: Prabhjot Pal Child    Discharge Planning:    Patient lives with: (P) Spouse/Significant Other Type of Home: (P) House  Primary Care Giver: (P) Self  Patient Support Systems include: (P) Spouse/Significant Other   Current Financial resources: (P) None  Current community resources: (P) ECF/Home Care  Current services prior to admission: (P) None            Current DME:              Type of Home Care services:  (P) None    ADLS  Prior functional level: (P) Independent in ADLs/IADLs  Current functional level: (P) Independent in ADLs/IADLs    PT AM-PAC: 20 /24  OT AM-PAC: 19

## 2024-03-18 NOTE — PROGRESS NOTES
4 Eyes Skin Assessment     NAME:  Aki Pal  YOB: 1950  MEDICAL RECORD NUMBER:  [id number]    The patient is being assessed for  Admission    I agree that at least one RN has performed a thorough Head to Toe Skin Assessment on the patient. ALL assessment sites listed below have been assessed.      Areas assessed by both nurses:    Head, Face, Ears, Shoulders, Back, Chest, Arms, Elbows, Hands, Sacrum. Buttock, Coccyx, Ischium, Legs. Feet and Heels, and Under Medical Devices         Does the Patient have a Wound? No noted wound(s), redness of the sacrum       Kip Prevention initiated by RN: Yes  Wound Care Orders initiated by RN: No    Pressure Injury (Stage 3,4, Unstageable, DTI, NWPT, and Complex wounds) if present, place Wound referral order by RN under : No    New Ostomies, if present place, Ostomy referral order under : No     Nurse 1 eSignature: Electronically signed by Marilyn Rosas RN on 3/16/24 at 12:39 AM EDT    **SHARE this note so that the co-signing nurse can place an eSignature**    Nurse 2 eSignature: Electronically signed by Eva Cordova RN on 3/16/24 at 1:08 AM EDT   
IV and telemetry monitor removed. Discharge paperwork completed and discussed with the patient and his wife. All questions answered. Patient has been made aware of follow up appointments that have been made and need to be made and patient verbalized understanding. [facility] All belongings have been packed up and sent with the patient. Patient will be driven home by his wife.   
MD Nathaniel informed RT Galina who informed this RN that patient may wear BIPAP with naps and at night, but otherwise may wear 3 L of O2 via nasal cannula. RN instructed patient to notify if he plans to rest so that BIPAP may be applied. Patient verbalized understanding and is willing to remain compliant with BIPAP. Patient aware of what signs and symptoms can occur if BIPAP is not worn.     Patient informed RN that he has been attempting to acquire assistance at home (home care). RN messaged MD Nathaniel via [employer] regarding possible PT/OT consult once patient is stabilized. MD to order PT/OT tomorrow, 3/18. Case Management also to see patient Monday, 3/18. RN to inform Night Shift RN tonight to informed Day Shift RN tomorrow of patient's interest in home care.     Patient is currently lying comfortably in bed. Patient uses urinal appropriately. Bed in lowest position, bed alarm on, call button within reach. Care ongoing.   
Most recent PCO2 much improved and less than 30. RN notified MD Nathaniel via [place]. MD aware.   
NP Lottie notified about pt refusing wearing BIPAP.  NP in the room educating pt about BIPAP.  
Occupational Therapy  Facility/Department: [address] PROGRESSIVE CARE  Occupational Therapy Initial Assessment    Name: Aki Pal  : 1950  MRN: [id number]  Date of Service: 3/18/2024    Discharge Recommendations:  Home with assist PRN, Home with Home health OT  OT Equipment Recommendations  Equipment Needed: No     Aki Pal scored a 19/24 on the AM-PAC ADL Inpatient form. Current research shows that an AM-PAC score of 18 or greater is typically associated with a discharge to the patient's home setting. Based on the patient's AM-PAC score, and their current ADL deficits, it is recommended that the patient have 2-3 sessions per week of Occupational Therapy at d/c to increase the patient's independence.  At this time, this patient demonstrates the endurance and safety to discharge home with home therapy and a follow up treatment frequency of 2-3x/wk.   Please see assessment section for further patient specific details.    If patient discharges prior to next session this note will serve as a discharge summary.  Please see below for the latest assessment towards goals.      Patient Diagnosis(es): The primary encounter diagnosis was Acute exacerbation of COPD with asthma (HCC). Diagnoses of Acute on chronic respiratory failure with hypoxia and hypercapnia (HCC) and Increased oxygen demand were also pertinent to this visit.  Past Medical History:  has a past medical history of Anxiety, Bipolar 1 disorder (HCC), Bipolar 2 disorder (HCC), Community acquired bacterial pneumonia, COPD (chronic obstructive pulmonary disease) (HCC), Depression, Fibromyalgia, Headache(784.0), Hyperlipidemia, Manic depressive disease manic phase (HCC), On home O2, PTSD (post-traumatic stress disorder), Seizures ([facility]), and Tardive dyskinesia.  Past Surgical History:  has a past surgical history that includes Mouth surgery (2013).           Assessment   Performance deficits / Impairments: Decreased functional mobility 
Patient CO2 much lower - 50.3. Secure messaged Dr. Armstrong to see if patient is ok to have a diet order. Order for regular diet placed. Patient understands he still needs to wear the BiPAP throughout the night.  
Patient arrived to room 5112 at 11.09 pm from the ED. Patient is alert and oriented. Vital signs checked and recorded, pt is on 3L of oxygen via nasal cannula and SPO2 is 96%, assessment  on admission done and due medications given. RN called respiratory about pt's arrival and his BIPAP order. Pt bed is in the lowest position and call light within reach. Pt denied any further needs or questions at this time.  
Patient in agreement of putting BiPAP on. RN placed. Call light within reach.   
Patient refused to wear BIPAP, RN reached out to hospitalist via perfect serve about patients refusal and his VBG on admission( PH 7.23, PaCO2 92, Hco3 39). Hospitalist responded \"was here to talk to pt but he still refused\", and ordered a repeat VBG @3am.   
Patient refusing to wear BiPAP. Educated patient. Still refusing, attending notified. Patient oriented but is lethargic.     Respiratory spoke with patient about events that will happen if his CO2 keeps climbing up. Patient said \"he does not want any of that\". Patient currently listed as a full code. Secure message sent to MD of need to talk about code status with patient.   
Patient was off BIPAP last night from dinner until 2230. Than he was on it until 0522 only off long enough to drink a cup of coffee maybe 10 min.  
Pharmacy Medication Reconciliation Note     List of medications patient is currently taking is complete.    Source of information:   1. Rx disp history  2. OARRS   3. Patient interview    Notes regarding home medications:   1. Changed Ritalin to 20mg tid  2. Added Fioricet 1 tab bid    Denies taking any other OTC or herbal medications    Otoniel Persaud RPH, [facility] 3/16/2024 11:21 AM      
Physical Therapy  Facility/Department: [address] PROGRESSIVE CARE  Physical Therapy Initial Assessment    Name: Aki Pal  : 1950  MRN: [id number]  Date of Service: 3/18/2024    Discharge Recommendations:  Home with assist PRN, Home with Home health PT   PT Equipment Recommendations  Equipment Needed: No      Aki Pal scored a 20/24 on the AM-PAC short mobility form. Current research shows that an AM-PAC score of 18 or greater is typically associated with a discharge to the patient's home setting. Based on the patient's AM-PAC score and their current functional mobility deficits, it is recommended that the patient have 2-3 sessions per week of Physical Therapy at d/c to increase the patient's independence.  At this time, this patient demonstrates the endurance and safety to discharge home with Home PT and a follow up treatment frequency of 2-3x/wk.  Please see assessment section for further patient specific details.    If patient discharges prior to next session this note will serve as a discharge summary.  Please see below for the latest assessment towards goals.       Patient Diagnosis(es): The primary encounter diagnosis was Acute exacerbation of COPD with asthma (HCC). Diagnoses of Acute on chronic respiratory failure with hypoxia and hypercapnia (HCC) and Increased oxygen demand were also pertinent to this visit.  Past Medical History:  has a past medical history of Anxiety, Bipolar 1 disorder (HCC), Bipolar 2 disorder (HCC), Community acquired bacterial pneumonia, COPD (chronic obstructive pulmonary disease) (HCC), Depression, Fibromyalgia, Headache(784.0), Hyperlipidemia, Manic depressive disease manic phase (HCC), On home O2, PTSD (post-traumatic stress disorder), Seizures ([facility]), and Tardive dyskinesia.  Past Surgical History:  has a past surgical history that includes Mouth surgery (2013).    Assessment   Body Structures, Functions, Activity Limitations Requiring Skilled 
Removed BIPAP for breakfast this AM  VBG improved,   pt pleasant this AM  Placed pt on 3L NC per home O2 settings  MD bedside    Electronically signed by Galina Heller RCP on 3/17/2024 at 9:28 AM    
Respiratory Therapy    This RT arrived to the room and found the pt on BIPAP 15/5 18 FIO2 40%   Pt tolerating fairly at this time. Pt asking to have BIPAP removed as soon as possible. Family and MD bedside.  Duoneb given inline with BIPAP via [employer]  SPO2 96%  Vt 500-600ml    Electronically signed by Galina Heller RCP on 3/16/2024 at 12:21 PM    
Respiratory Therapy    This RT reiterated the need to wear BIPAP and the benefits. Explained to the patient why he is increasingly obtunded. Pt became agitated and told me he \"doesn't want any of this\"   Wade WOODARD given  RN aware    Electronically signed by Galina Heller RCP on 3/16/2024 at 8:54 AM      
Tempted to place pt on BIPAP X3. Pt refused to wear BIPAP, Pt educated on risks of not wearing BIPAP and benefits of wearing BIPAP.  PH 7.23, PaCo2  92, HCo3 39.   Pt is on 3 lpm, pulse ox 97%. 3 RNs at bedside.  
4.0 4.6   CL 95* 99 96*   CO2 33* 39* 35*   BUN 13 8 14   CREATININE 0.7* 0.6* 0.8   GLUCOSE 140* 91 144*       Hepatic:   Recent Labs     03/15/24  1846   AST 14*   ALT 10   BILITOT <0.2   ALKPHOS 89       Lipids:   No results found for: \"CHOL\", \"HDL\", \"TRIG\"  Hemoglobin A1C: No results found for: \"LABA1C\"  TSH: No results found for: \"TSH\"  Troponin: No results found for: \"TROPONINT\"  Lactic Acid: No results for input(s): \"LACTA\" in the last 72 hours.  BNP:   Recent Labs     03/15/24  1846   PROBNP 38       UA:  Lab Results   Component Value Date/Time    NITRU Negative 03/15/2024 06:46 PM    COLORU Yellow 03/15/2024 06:46 PM    PHUR 5.0 03/15/2024 06:46 PM    WBCUA 1 03/15/2024 06:46 PM    RBCUA 0 03/15/2024 06:46 PM    BACTERIA None Seen 03/15/2024 06:46 PM    CLARITYU Clear 03/15/2024 06:46 PM    SPECGRAV 1.018 03/15/2024 06:46 PM    LEUKOCYTESUR Negative 03/15/2024 06:46 PM    UROBILINOGEN 0.2 03/15/2024 06:46 PM    BILIRUBINUR Negative 03/15/2024 06:46 PM    BILIRUBINUR NEGATIVE 04/27/2012 10:50 PM    BLOODU Negative 03/15/2024 06:46 PM    GLUCOSEU Negative 03/15/2024 06:46 PM    GLUCOSEU NEGATIVE 04/27/2012 10:50 PM    KETUA Negative 03/15/2024 06:46 PM     Urine Cultures: No results found for: \"LABURIN\"  Blood Cultures:   Lab Results   Component Value Date/Time    BC No Growth after 4 days of incubation. 11/27/2023 01:37 PM     Lab Results   Component Value Date/Time    BLOODCULT2 No Growth after 4 days of incubation. 11/27/2023 01:41 PM     Organism: No results found for: \"ORG\"      Assessment and Recommendations   Aki Pal is a 74 y.o. male who presents with COPD exacerbation      1.  Acute on chronic hypoxic and hypercapnic respiratory failure:   Patient with advanced COPD, presented to emergency with 1 week history of lethargy and shortness of breath, patient had been declining for the past 6 months as per my discussion with his wife.  Upon presentation patient lethargic, pH 7.23, CO2 92, 
      Electronically signed by Avani Armstrong MD on 3/16/2024 at 12:56 PM  Comment: Please note this report has been produced using speech recognition software and may contain errors related to that system including errors in grammar, punctuation, and spelling, as well as words and phrases that may be inappropriate. If there are any questions or concerns, please feel free to contact the dictating provider for clarification.

## 2024-03-18 NOTE — DISCHARGE SUMMARY
L/min into the lungs as needed      albuterol sulfate HFA (PROVENTIL;VENTOLIN;PROAIR) 108 (90 Base) MCG/ACT inhaler Inhale 2 puffs into the lungs every 6 hours as needed             Time Spent on discharge: 45 in the examination, evaluation, counseling and review of medications and discharge plan.      Signed:    Avani Armstrong MD   3/18/2024      Thank you Paul Crane MD for the opportunity to be involved in this patient's care. If you have any questions or concerns, please feel free to contact me at [phone].    Comment: Please note this report has been produced using speech recognition software and may contain errors related to that system including errors in grammar, punctuation, and spelling, as well as words and phrases that may be inappropriate. If there are any questions or concerns, please feel free to contact the dictating provider for clarification.

## 2024-03-18 NOTE — CONSULTS
REASON FOR CONSULTATION/CC: COPD exacerbation      Consult at request of Avani Armstrong MD     PCP: Paul Crane MD  Established Pulmonologist:  None    Chief Complaint   Patient presents with    Shortness of Breath     Pt came to ED via EMS from home C/O SOB. EMS stated upon arrivel Pt O2 was low and they put him on 3L and gave one nebulizer treatment.       HISTORY OF PRESENT ILLNESS: Aki Pal is a 74 y.o. year old male with a history of tobacco abuse, bipolar, COPD who presents with shortness of breath.  Symptoms present for greater than 24 hours.  Progressively worsening.  Has found that bronchodilator therapy improved symptoms.  Started on empiric antibiotics and steroids for COPD exacerbation as well as scheduled bronchodilators.  During my visit patient is tolerating room air states his breathing is much better.  Minimal cough.      Past Medical History:   Diagnosis Date    Anxiety     Bipolar 1 disorder (HCC)     Bipolar 2 disorder (HCC)     Community acquired bacterial pneumonia 06/12/2015    COPD (chronic obstructive pulmonary disease) ([facility])     Depression     Fibromyalgia     Headache(784.0)     Hyperlipidemia     Manic depressive disease manic phase ([facility])     On home O2     PTSD (post-traumatic stress disorder)     Seizures ([facility])     Tardive dyskinesia          Past Surgical History:   Procedure Laterality Date    MOUTH SURGERY  05/2013       Family Hx  family history includes Mental Illness in his father and mother.    Social Hx   reports that he has been smoking cigarettes. He has a 42.0 pack-year smoking history. He has never used smokeless tobacco.    Scheduled Meds:   mometasone-formoterol  2 puff Inhalation BID RT    methylphenidate  20 mg Oral TID    butalbital-acetaminophen-caffeine  1 tablet Oral BID    doxepin  25 mg Oral Nightly    sodium chloride flush  5-40 mL IntraVENous 2 times per day    enoxaparin  40 mg SubCUTAneous Daily    cefTRIAXone (ROCEPHIN) IV  1,000 mg

## 2024-03-18 NOTE — ACP (ADVANCE CARE PLANNING)
Advance Care Planning     Advance Care Planning Activator (Inpatient)  Conversation Note      Date of ACP Conversation: 3/18/2024     Conversation Conducted with: Patient with Decision Making Capacity    ACP Activator: KAM Maldonado    Health Care Decision Maker:     Current Designated Health Care Decision Maker:     Primary Decision Maker: Beba Pal - Spouse - [phone]    Secondary Decision Maker: Shahab Pal - Child - [phone]    Secondary Decision Maker: Prabhjot Pal - Child  Today we documented Decision Maker(s) consistent with Legal Next of Kin hierarchy.    Care Preferences    Ventilation:  \"If you were in your present state of health and suddenly became very ill and were unable to breathe on your own, what would your preference be about the use of a ventilator (breathing machine) if it were available to you?\"      Would the patient desire the use of ventilator (breathing machine)?: n/a    \"If your health worsens and it becomes clear that your chance of recovery is unlikely, what would your preference be about the use of a ventilator (breathing machine) if it were available to you?\"     Would the patient desire the use of ventilator (breathing machine)?: n/a       Resuscitation  \"CPR works best to restart the heart when there is a sudden event, like a heart attack, in someone who is otherwise healthy. Unfortunately, CPR does not typically restart the heart for people who have serious health conditions or who are very sick.\"    \"In the event your heart stopped as a result of an underlying serious health condition, would you want attempts to be made to restart your heart (answer \"yes\" for attempt to resuscitate) or would you prefer a natural death (answer \"no\" for do not attempt to resuscitate)?\" n/a       [] Yes   [x] No   Educated Patient / Decision Maker regarding differences between Advance Directives and portable DNR orders.    Length of ACP Conversation in minutes:

## 2024-03-21 NOTE — CARE COORDINATION
[facility]    Patient would not commit to SOC visit at first call, stated he was concerned about cost and informed  he would call back to speak to manager.  He did not ever call back.  Several calls placed to patient,  person answering phone hangs up once caller states they are from [facility].     Referral cancelled, not opened for HC.     Domenico Jennings RN, BSN CTN  [facility] [phone]

## 2024-05-05 ENCOUNTER — APPOINTMENT (OUTPATIENT)
Dept: CT IMAGING | Age: 74
End: 2024-05-05
Payer: MEDICARE

## 2024-05-05 ENCOUNTER — HOSPITAL ENCOUNTER (EMERGENCY)
Age: 74
Discharge: HOME | End: 2024-05-05
Attending: EMERGENCY MEDICINE
Payer: MEDICARE

## 2024-05-05 VITALS
RESPIRATION RATE: 18 BRPM | OXYGEN SATURATION: 97 % | WEIGHT: 165 LBS | TEMPERATURE: 99 F | HEIGHT: 67 IN | HEART RATE: 99 BPM | DIASTOLIC BLOOD PRESSURE: 81 MMHG | SYSTOLIC BLOOD PRESSURE: 129 MMHG | BODY MASS INDEX: 25.9 KG/M2

## 2024-05-05 DIAGNOSIS — R51.9 ACUTE NONINTRACTABLE HEADACHE, UNSPECIFIED HEADACHE TYPE: Primary | ICD-10-CM

## 2024-05-05 LAB
ALBUMIN SERPL-MCNC: 4.1 G/DL (ref 3.4–5)
ALBUMIN/GLOB SERPL: 1.6 {RATIO} (ref 1.1–2.2)
ALP SERPL-CCNC: 88 U/L (ref 40–129)
ALT SERPL-CCNC: 11 U/L (ref 10–40)
ANION GAP SERPL CALCULATED.3IONS-SCNC: 5 MMOL/L (ref 3–16)
AST SERPL-CCNC: 13 U/L (ref 15–37)
BASOPHILS # BLD: 0.1 K/UL (ref 0–0.2)
BASOPHILS NFR BLD: 0.7 %
BILIRUB SERPL-MCNC: <0.2 MG/DL (ref 0–1)
BUN SERPL-MCNC: 16 MG/DL (ref 7–20)
CALCIUM SERPL-MCNC: 8.8 MG/DL (ref 8.3–10.6)
CHLORIDE SERPL-SCNC: 102 MMOL/L (ref 99–110)
CO2 SERPL-SCNC: 36 MMOL/L (ref 21–32)
CREAT SERPL-MCNC: 0.5 MG/DL (ref 0.8–1.3)
DEPRECATED RDW RBC AUTO: 14.2 % (ref 12.4–15.4)
EOSINOPHIL # BLD: 0.2 K/UL (ref 0–0.6)
EOSINOPHIL NFR BLD: 2.3 %
GFR SERPLBLD CREATININE-BSD FMLA CKD-EPI: >90 ML/MIN/{1.73_M2}
GLUCOSE SERPL-MCNC: 109 MG/DL (ref 70–99)
HCT VFR BLD AUTO: 42.4 % (ref 40.5–52.5)
HGB BLD-MCNC: 13.7 G/DL (ref 13.5–17.5)
LYMPHOCYTES # BLD: 2.2 K/UL (ref 1–5.1)
LYMPHOCYTES NFR BLD: 21.1 %
MCH RBC QN AUTO: 30.9 PG (ref 26–34)
MCHC RBC AUTO-ENTMCNC: 32.2 G/DL (ref 31–36)
MCV RBC AUTO: 95.9 FL (ref 80–100)
MONOCYTES # BLD: 0.7 K/UL (ref 0–1.3)
MONOCYTES NFR BLD: 6.7 %
NEUTROPHILS # BLD: 7.1 K/UL (ref 1.7–7.7)
NEUTROPHILS NFR BLD: 69.2 %
PLATELET # BLD AUTO: 151 K/UL (ref 135–450)
PMV BLD AUTO: 9.6 FL (ref 5–10.5)
POTASSIUM SERPL-SCNC: 4.7 MMOL/L (ref 3.5–5.1)
PROT SERPL-MCNC: 6.6 G/DL (ref 6.4–8.2)
RBC # BLD AUTO: 4.42 M/UL (ref 4.2–5.9)
SODIUM SERPL-SCNC: 143 MMOL/L (ref 136–145)
WBC # BLD AUTO: 10.2 K/UL (ref 4–11)

## 2024-05-05 PROCEDURE — 70450 CT HEAD/BRAIN W/O DYE: CPT

## 2024-05-05 PROCEDURE — 96361 HYDRATE IV INFUSION ADD-ON: CPT

## 2024-05-05 PROCEDURE — 85025 COMPLETE CBC W/AUTO DIFF WBC: CPT

## 2024-05-05 PROCEDURE — 6360000002 HC RX W HCPCS: Performed by: EMERGENCY MEDICINE

## 2024-05-05 PROCEDURE — 99284 EMERGENCY DEPT VISIT MOD MDM: CPT

## 2024-05-05 PROCEDURE — 6370000000 HC RX 637 (ALT 250 FOR IP): Performed by: EMERGENCY MEDICINE

## 2024-05-05 PROCEDURE — 96374 THER/PROPH/DIAG INJ IV PUSH: CPT

## 2024-05-05 PROCEDURE — 80053 COMPREHEN METABOLIC PANEL: CPT

## 2024-05-05 PROCEDURE — 96375 TX/PRO/DX INJ NEW DRUG ADDON: CPT

## 2024-05-05 PROCEDURE — 2580000003 HC RX 258: Performed by: EMERGENCY MEDICINE

## 2024-05-05 RX ORDER — 0.9 % SODIUM CHLORIDE 0.9 %
1000 INTRAVENOUS SOLUTION INTRAVENOUS ONCE
Status: COMPLETED | OUTPATIENT
Start: 2024-05-05 | End: 2024-05-05

## 2024-05-05 RX ORDER — BUTALBITAL, ACETAMINOPHEN AND CAFFEINE 50; 325; 40 MG/1; MG/1; MG/1
1 TABLET ORAL ONCE
Status: COMPLETED | OUTPATIENT
Start: 2024-05-05 | End: 2024-05-05

## 2024-05-05 RX ORDER — DIPHENHYDRAMINE HYDROCHLORIDE 50 MG/ML
25 INJECTION INTRAMUSCULAR; INTRAVENOUS ONCE
Status: COMPLETED | OUTPATIENT
Start: 2024-05-05 | End: 2024-05-05

## 2024-05-05 RX ORDER — BUTALBITAL, ACETAMINOPHEN AND CAFFEINE 300; 40; 50 MG/1; MG/1; MG/1
1 CAPSULE ORAL EVERY 6 HOURS PRN
Qty: 20 CAPSULE | Refills: 0 | Status: SHIPPED | OUTPATIENT
Start: 2024-05-05 | End: 2024-05-10

## 2024-05-05 RX ORDER — METOCLOPRAMIDE HYDROCHLORIDE 5 MG/ML
10 INJECTION INTRAMUSCULAR; INTRAVENOUS ONCE
Status: COMPLETED | OUTPATIENT
Start: 2024-05-05 | End: 2024-05-05

## 2024-05-05 RX ADMIN — DIPHENHYDRAMINE HYDROCHLORIDE 25 MG: 50 INJECTION, SOLUTION INTRAMUSCULAR; INTRAVENOUS at 07:47

## 2024-05-05 RX ADMIN — METOCLOPRAMIDE 10 MG: 5 INJECTION, SOLUTION INTRAMUSCULAR; INTRAVENOUS at 07:47

## 2024-05-05 RX ADMIN — BUTALBITAL, ACETAMINOPHEN, AND CAFFEINE 1 TABLET: 50; 325; 40 TABLET ORAL at 07:48

## 2024-05-05 RX ADMIN — SODIUM CHLORIDE 1000 ML: 9 INJECTION, SOLUTION INTRAVENOUS at 07:46

## 2024-05-05 ASSESSMENT — LIFESTYLE VARIABLES
HOW OFTEN DO YOU HAVE A DRINK CONTAINING ALCOHOL: NEVER
HOW MANY STANDARD DRINKS CONTAINING ALCOHOL DO YOU HAVE ON A TYPICAL DAY: PATIENT DOES NOT DRINK

## 2024-05-05 ASSESSMENT — PAIN - FUNCTIONAL ASSESSMENT: PAIN_FUNCTIONAL_ASSESSMENT: 0-10

## 2024-05-05 ASSESSMENT — PAIN SCALES - GENERAL
PAINLEVEL_OUTOF10: 9
PAINLEVEL_OUTOF10: 9

## 2024-05-05 ASSESSMENT — PAIN DESCRIPTION - LOCATION: LOCATION: HEAD

## 2024-05-05 ASSESSMENT — PAIN DESCRIPTION - DESCRIPTORS: DESCRIPTORS: THROBBING

## 2024-05-05 NOTE — ED PROVIDER NOTES
[facility] Emergency Department [facility]    I, Ehsan Fraire MD, am the primary clinician of record.    CHIEF COMPLAINT  Chief Complaint   Patient presents with    Migraine     Pt arrives from home by [facility]. Pt C/O migraine for the last 2 days, Pt rates his pain a 9 ot of 10. Hx of migraines and COPD. Pt states that he ran out of his migraine medication, Fioricet.        HISTORY OF PRESENT ILLNESS  Aki Pal is a 74 y.o. male  who presents to the ED complaining of headache very similar to previous headaches, has been going on this time for about 2 days.  Denies fevers or neck stiffness or confusion.  Has history of migraines and this feels \"identical in every way\" and is not the worst headache of his life.  Worse with bright lights.  No head trauma or falls.  No numbness or weakness in the arms or legs.  No trouble with speech or swallowing.  Normally he has Fioricet which gets rid of these headaches but he says he is out of it.  He tells me that if he had Fioricet he likely would not have to be in the hospital.    History of bipolar, fibromyalgia, and chronic o2-dependence from COPD.    No other complaints, modifying factors or associated symptoms.     I have reviewed the following from the nursing documentation.    Past Medical History:   Diagnosis Date    Anxiety     Bipolar 1 disorder (HCC)     Bipolar 2 disorder (HCC)     Community acquired bacterial pneumonia 06/12/2015    COPD (chronic obstructive pulmonary disease) (HCC)     Depression     Fibromyalgia     Headache(784.0)     Hyperlipidemia     Manic depressive disease manic phase (HCC)     On home O2     PTSD (post-traumatic stress disorder)     Seizures (HCC)     Tardive dyskinesia      Past Surgical History:   Procedure Laterality Date    MOUTH SURGERY  05/2013     Family History   Problem Relation Age of Onset    Mental Illness Mother     Mental Illness Father      Social History     Socioeconomic History    Marital status:

## 2024-08-13 ENCOUNTER — HOSPITAL ENCOUNTER (EMERGENCY)
Age: 74
Discharge: HOME | End: 2024-08-14
Attending: EMERGENCY MEDICINE
Payer: MEDICARE

## 2024-08-13 DIAGNOSIS — R51.9 CHRONIC NONINTRACTABLE HEADACHE, UNSPECIFIED HEADACHE TYPE: Primary | ICD-10-CM

## 2024-08-13 DIAGNOSIS — G47.9 DIFFICULTY SLEEPING: ICD-10-CM

## 2024-08-13 DIAGNOSIS — G89.29 CHRONIC NONINTRACTABLE HEADACHE, UNSPECIFIED HEADACHE TYPE: Primary | ICD-10-CM

## 2024-08-13 PROCEDURE — 99284 EMERGENCY DEPT VISIT MOD MDM: CPT

## 2024-08-13 PROCEDURE — 96374 THER/PROPH/DIAG INJ IV PUSH: CPT

## 2024-08-13 RX ORDER — CLONAZEPAM 0.5 MG/1
1 TABLET ORAL ONCE
Status: COMPLETED | OUTPATIENT
Start: 2024-08-14 | End: 2024-08-14

## 2024-08-13 RX ORDER — KETOROLAC TROMETHAMINE 15 MG/ML
15 INJECTION, SOLUTION INTRAMUSCULAR; INTRAVENOUS ONCE
Status: COMPLETED | OUTPATIENT
Start: 2024-08-14 | End: 2024-08-14

## 2024-08-13 RX ORDER — ACETAMINOPHEN 325 MG/1
650 TABLET ORAL ONCE
Status: COMPLETED | OUTPATIENT
Start: 2024-08-14 | End: 2024-08-14

## 2024-08-13 RX ORDER — 0.9 % SODIUM CHLORIDE 0.9 %
500 INTRAVENOUS SOLUTION INTRAVENOUS ONCE
Status: COMPLETED | OUTPATIENT
Start: 2024-08-14 | End: 2024-08-14

## 2024-08-13 ASSESSMENT — PAIN SCALES - GENERAL: PAINLEVEL_OUTOF10: 8

## 2024-08-13 ASSESSMENT — PAIN DESCRIPTION - DESCRIPTORS: DESCRIPTORS: THROBBING

## 2024-08-13 ASSESSMENT — PAIN DESCRIPTION - LOCATION: LOCATION: HEAD

## 2024-08-13 ASSESSMENT — PAIN - FUNCTIONAL ASSESSMENT: PAIN_FUNCTIONAL_ASSESSMENT: 0-10

## 2024-08-14 ENCOUNTER — APPOINTMENT (OUTPATIENT)
Dept: CT IMAGING | Age: 74
End: 2024-08-14
Payer: MEDICARE

## 2024-08-14 VITALS
RESPIRATION RATE: 23 BRPM | WEIGHT: 166.01 LBS | HEIGHT: 67 IN | BODY MASS INDEX: 26.06 KG/M2 | HEART RATE: 80 BPM | DIASTOLIC BLOOD PRESSURE: 75 MMHG | TEMPERATURE: 99.1 F | OXYGEN SATURATION: 97 % | SYSTOLIC BLOOD PRESSURE: 113 MMHG

## 2024-08-14 PROCEDURE — 70450 CT HEAD/BRAIN W/O DYE: CPT

## 2024-08-14 PROCEDURE — 6360000002 HC RX W HCPCS: Performed by: EMERGENCY MEDICINE

## 2024-08-14 PROCEDURE — 6370000000 HC RX 637 (ALT 250 FOR IP): Performed by: EMERGENCY MEDICINE

## 2024-08-14 PROCEDURE — 2580000003 HC RX 258: Performed by: EMERGENCY MEDICINE

## 2024-08-14 RX ADMIN — SODIUM CHLORIDE 500 ML: 9 INJECTION, SOLUTION INTRAVENOUS at 00:00

## 2024-08-14 RX ADMIN — ACETAMINOPHEN 650 MG: 325 TABLET ORAL at 01:26

## 2024-08-14 RX ADMIN — KETOROLAC TROMETHAMINE 15 MG: 15 INJECTION, SOLUTION INTRAMUSCULAR; INTRAVENOUS at 01:27

## 2024-08-14 RX ADMIN — CLONAZEPAM 1 MG: 0.5 TABLET ORAL at 01:26

## 2024-08-14 NOTE — ED NOTES
Attempted to call patient's wife for transport home per his request. No answer at this time. Left HIPAA compliant voicemail to return call.

## 2024-08-14 NOTE — ED PROVIDER NOTES
[facility] EMERGENCY DEPARTMENT    CHIEF COMPLAINT  Headache (Pt into the Ed via EMS for a headache 8/10 . Pt states his headache as been going on for weeks but could not take the pain any longer.)       HISTORY OF PRESENT ILLNESS  Aki Pal is a 74 y.o. male with past medical history of bipolar disorder, anxiety, PTSD, chronic headaches, COPD on oxygen who presents to the ED with a headache.  He states that he has severe PTSD that causes him to have insomnia and significant difficulty sleeping.  States that nearly every night he wakes up with a night terror related to his PTSD and then has a headache after this.  This happened tonight and it was more severe than baseline.  He took the Fioricet he has been prescribed and is starting to have some improvement.  States that he is just desperate to get something to help him sleep.  Denies fever, nuchal rigidity, head trauma, focal weakness, sensory deficit, vision changes.  Headache is similar in quality and location to his baseline.  States it is a diffuse pounding headache.  States he has had sleep studies before and that he does not have sleep apnea.       I have reviewed the following from the nursing documentation:    Past Medical History:   Diagnosis Date    Anxiety     Bipolar 1 disorder (HCC)     Bipolar 2 disorder (HCC)     Community acquired bacterial pneumonia 06/12/2015    COPD (chronic obstructive pulmonary disease) (HCC)     Depression     Fibromyalgia     Headache(784.0)     Hyperlipidemia     Manic depressive disease manic phase (HCC)     On home O2     PTSD (post-traumatic stress disorder)     Seizures (HCC)     Tardive dyskinesia      Past Surgical History:   Procedure Laterality Date    MOUTH SURGERY  05/2013     Family History   Problem Relation Age of Onset    Mental Illness Mother     Mental Illness Father      Social History     Socioeconomic History    Marital status:      Spouse name: Not on file    Number of

## 2024-08-14 NOTE — ED NOTES
Provider order placed for patient's discharge. Provider reviewed decision to discharge with the patient. Discharge paperwork and any prescriptions were reviewed with the patient. Patient verbalized understanding of discharge education and any prescriptions and has no further questions or further needs at this time. Patient left with all personal belongings and was stable upon departure. Patient thanked for choosing [facility] and informed to return should any need arise.

## 2024-09-09 ENCOUNTER — APPOINTMENT (OUTPATIENT)
Dept: GENERAL RADIOLOGY | Age: 74
End: 2024-09-09
Payer: MEDICARE

## 2024-09-09 ENCOUNTER — HOSPITAL ENCOUNTER (EMERGENCY)
Age: 74
Discharge: HOME | End: 2024-09-09
Attending: STUDENT IN AN ORGANIZED HEALTH CARE EDUCATION/TRAINING PROGRAM
Payer: MEDICARE

## 2024-09-09 VITALS
RESPIRATION RATE: 26 BRPM | SYSTOLIC BLOOD PRESSURE: 116 MMHG | OXYGEN SATURATION: 99 % | HEART RATE: 96 BPM | WEIGHT: 154.32 LBS | DIASTOLIC BLOOD PRESSURE: 75 MMHG | HEIGHT: 64 IN | BODY MASS INDEX: 26.35 KG/M2 | TEMPERATURE: 97.8 F

## 2024-09-09 DIAGNOSIS — G43.809 OTHER MIGRAINE WITHOUT STATUS MIGRAINOSUS, NOT INTRACTABLE: Primary | ICD-10-CM

## 2024-09-09 DIAGNOSIS — R06.00 DYSPNEA, UNSPECIFIED TYPE: ICD-10-CM

## 2024-09-09 LAB
ANION GAP SERPL CALCULATED.3IONS-SCNC: 7 MMOL/L (ref 3–16)
BASE EXCESS BLDV CALC-SCNC: 5.1 MMOL/L
BASOPHILS # BLD: 0.1 K/UL (ref 0–0.2)
BASOPHILS NFR BLD: 0.7 %
BUN SERPL-MCNC: 14 MG/DL (ref 7–20)
CALCIUM SERPL-MCNC: 8.9 MG/DL (ref 8.3–10.6)
CHLORIDE SERPL-SCNC: 102 MMOL/L (ref 99–110)
CO2 BLDV-SCNC: 37 MMOL/L
CO2 SERPL-SCNC: 32 MMOL/L (ref 21–32)
COHGB MFR BLDV: 1.4 %
CREAT SERPL-MCNC: 0.8 MG/DL (ref 0.8–1.3)
DEPRECATED RDW RBC AUTO: 14.1 % (ref 12.4–15.4)
EKG ATRIAL RATE: 103 BPM
EKG DIAGNOSIS: NORMAL
EKG P-R INTERVAL: 216 MS
EKG Q-T INTERVAL: 344 MS
EKG QRS DURATION: 92 MS
EKG QTC CALCULATION (BAZETT): 450 MS
EKG R AXIS: 63 DEGREES
EKG T AXIS: 51 DEGREES
EKG VENTRICULAR RATE: 103 BPM
EOSINOPHIL # BLD: 0.3 K/UL (ref 0–0.6)
EOSINOPHIL NFR BLD: 2.5 %
GFR SERPLBLD CREATININE-BSD FMLA CKD-EPI: >90 ML/MIN/{1.73_M2}
GLUCOSE SERPL-MCNC: 107 MG/DL (ref 70–99)
HCO3 BLDV-SCNC: 35 MMOL/L (ref 23–29)
HCT VFR BLD AUTO: 41.1 % (ref 40.5–52.5)
HGB BLD-MCNC: 13.6 G/DL (ref 13.5–17.5)
LYMPHOCYTES # BLD: 2 K/UL (ref 1–5.1)
LYMPHOCYTES NFR BLD: 17.7 %
MCH RBC QN AUTO: 30.9 PG (ref 26–34)
MCHC RBC AUTO-ENTMCNC: 33.1 G/DL (ref 31–36)
MCV RBC AUTO: 93.5 FL (ref 80–100)
METHGB MFR BLDV: 0.7 %
MONOCYTES # BLD: 0.9 K/UL (ref 0–1.3)
MONOCYTES NFR BLD: 7.5 %
NEUTROPHILS # BLD: 8.2 K/UL (ref 1.7–7.7)
NEUTROPHILS NFR BLD: 71.6 %
O2 THERAPY: ABNORMAL
PCO2 BLDV: 77.5 MMHG (ref 40–50)
PH BLDV: 7.26 [PH] (ref 7.35–7.45)
PLATELET # BLD AUTO: 152 K/UL (ref 135–450)
PMV BLD AUTO: 8.9 FL (ref 5–10.5)
PO2 BLDV: 46 MMHG
POTASSIUM SERPL-SCNC: 4.4 MMOL/L (ref 3.5–5.1)
RBC # BLD AUTO: 4.4 M/UL (ref 4.2–5.9)
SAO2 % BLDV: 78 %
SODIUM SERPL-SCNC: 141 MMOL/L (ref 136–145)
TROPONIN, HIGH SENSITIVITY: 8 NG/L (ref 0–22)
TROPONIN, HIGH SENSITIVITY: 8 NG/L (ref 0–22)
WBC # BLD AUTO: 11.5 K/UL (ref 4–11)

## 2024-09-09 PROCEDURE — 36415 COLL VENOUS BLD VENIPUNCTURE: CPT

## 2024-09-09 PROCEDURE — 96374 THER/PROPH/DIAG INJ IV PUSH: CPT

## 2024-09-09 PROCEDURE — 84484 ASSAY OF TROPONIN QUANT: CPT

## 2024-09-09 PROCEDURE — 71045 X-RAY EXAM CHEST 1 VIEW: CPT

## 2024-09-09 PROCEDURE — 96375 TX/PRO/DX INJ NEW DRUG ADDON: CPT

## 2024-09-09 PROCEDURE — 80048 BASIC METABOLIC PNL TOTAL CA: CPT

## 2024-09-09 PROCEDURE — 93010 ELECTROCARDIOGRAM REPORT: CPT | Performed by: INTERNAL MEDICINE

## 2024-09-09 PROCEDURE — 85025 COMPLETE CBC W/AUTO DIFF WBC: CPT

## 2024-09-09 PROCEDURE — 82803 BLOOD GASES ANY COMBINATION: CPT

## 2024-09-09 PROCEDURE — 99285 EMERGENCY DEPT VISIT HI MDM: CPT

## 2024-09-09 PROCEDURE — 6360000002 HC RX W HCPCS: Performed by: STUDENT IN AN ORGANIZED HEALTH CARE EDUCATION/TRAINING PROGRAM

## 2024-09-09 PROCEDURE — 93005 ELECTROCARDIOGRAM TRACING: CPT | Performed by: STUDENT IN AN ORGANIZED HEALTH CARE EDUCATION/TRAINING PROGRAM

## 2024-09-09 RX ORDER — KETOROLAC TROMETHAMINE 15 MG/ML
15 INJECTION, SOLUTION INTRAMUSCULAR; INTRAVENOUS ONCE
Status: COMPLETED | OUTPATIENT
Start: 2024-09-09 | End: 2024-09-09

## 2024-09-09 RX ORDER — DIPHENHYDRAMINE HYDROCHLORIDE 50 MG/ML
25 INJECTION INTRAMUSCULAR; INTRAVENOUS ONCE
Status: COMPLETED | OUTPATIENT
Start: 2024-09-09 | End: 2024-09-09

## 2024-09-09 RX ORDER — PROCHLORPERAZINE EDISYLATE 5 MG/ML
10 INJECTION INTRAMUSCULAR; INTRAVENOUS EVERY 6 HOURS PRN
Status: DISCONTINUED | OUTPATIENT
Start: 2024-09-09 | End: 2024-09-09 | Stop reason: HOSPADM

## 2024-09-09 RX ADMIN — KETOROLAC TROMETHAMINE 15 MG: 15 INJECTION, SOLUTION INTRAMUSCULAR; INTRAVENOUS at 04:41

## 2024-09-09 RX ADMIN — DIPHENHYDRAMINE HYDROCHLORIDE 25 MG: 50 INJECTION INTRAMUSCULAR; INTRAVENOUS at 04:41

## 2024-09-09 RX ADMIN — PROCHLORPERAZINE EDISYLATE 10 MG: 5 INJECTION INTRAMUSCULAR; INTRAVENOUS at 04:41

## 2024-11-02 ENCOUNTER — APPOINTMENT (OUTPATIENT)
Dept: GENERAL RADIOLOGY | Age: 74
End: 2024-11-02
Payer: COMMERCIAL

## 2024-11-02 ENCOUNTER — APPOINTMENT (OUTPATIENT)
Dept: CT IMAGING | Age: 74
End: 2024-11-02
Payer: COMMERCIAL

## 2024-11-02 ENCOUNTER — HOSPITAL ENCOUNTER (EMERGENCY)
Age: 74
Discharge: ANOTHER HOSPITAL | End: 2024-11-03
Attending: EMERGENCY MEDICINE
Payer: COMMERCIAL

## 2024-11-02 DIAGNOSIS — F41.1 ANXIETY STATE: ICD-10-CM

## 2024-11-02 DIAGNOSIS — F22 PARANOIA (HCC): Primary | ICD-10-CM

## 2024-11-02 LAB
ALBUMIN SERPL-MCNC: 4.2 G/DL (ref 3.4–5)
ALBUMIN/GLOB SERPL: 1.5 {RATIO} (ref 1.1–2.2)
ALP SERPL-CCNC: 79 U/L (ref 40–129)
ALT SERPL-CCNC: 14 U/L (ref 10–40)
AMPHETAMINES UR QL SCN>1000 NG/ML: ABNORMAL
ANION GAP SERPL CALCULATED.3IONS-SCNC: 9 MMOL/L (ref 3–16)
APAP SERPL-MCNC: <5 UG/ML (ref 10–30)
AST SERPL-CCNC: 18 U/L (ref 15–37)
BARBITURATES UR QL SCN>200 NG/ML: ABNORMAL
BASOPHILS # BLD: 0.1 K/UL (ref 0–0.2)
BASOPHILS NFR BLD: 0.8 %
BENZODIAZ UR QL SCN>200 NG/ML: POSITIVE
BILIRUB SERPL-MCNC: <0.2 MG/DL (ref 0–1)
BILIRUB UR QL STRIP.AUTO: NEGATIVE
BUN SERPL-MCNC: 8 MG/DL (ref 7–20)
CALCIUM SERPL-MCNC: 9 MG/DL (ref 8.3–10.6)
CANNABINOIDS UR QL SCN>50 NG/ML: ABNORMAL
CHLORIDE SERPL-SCNC: 99 MMOL/L (ref 99–110)
CLARITY UR: CLEAR
CO2 SERPL-SCNC: 33 MMOL/L (ref 21–32)
COCAINE UR QL SCN: ABNORMAL
COLOR UR: YELLOW
CREAT SERPL-MCNC: 0.6 MG/DL (ref 0.8–1.3)
DEPRECATED RDW RBC AUTO: 14.3 % (ref 12.4–15.4)
DRUG SCREEN COMMENT UR-IMP: ABNORMAL
EKG ATRIAL RATE: 97 BPM
EKG DIAGNOSIS: NORMAL
EKG P AXIS: 78 DEGREES
EKG P-R INTERVAL: 132 MS
EKG Q-T INTERVAL: 352 MS
EKG QRS DURATION: 96 MS
EKG QTC CALCULATION (BAZETT): 447 MS
EKG R AXIS: 52 DEGREES
EKG T AXIS: 45 DEGREES
EKG VENTRICULAR RATE: 97 BPM
EOSINOPHIL # BLD: 0.3 K/UL (ref 0–0.6)
EOSINOPHIL NFR BLD: 3 %
ETHANOLAMINE SERPL-MCNC: NORMAL MG/DL (ref 0–0.08)
FENTANYL SCREEN, URINE: ABNORMAL
FLUAV RNA RESP QL NAA+PROBE: NOT DETECTED
FLUBV RNA RESP QL NAA+PROBE: NOT DETECTED
GFR SERPLBLD CREATININE-BSD FMLA CKD-EPI: >90 ML/MIN/{1.73_M2}
GLUCOSE SERPL-MCNC: 99 MG/DL (ref 70–99)
GLUCOSE UR STRIP.AUTO-MCNC: NEGATIVE MG/DL
HCT VFR BLD AUTO: 43.7 % (ref 40.5–52.5)
HGB BLD-MCNC: 14.4 G/DL (ref 13.5–17.5)
HGB UR QL STRIP.AUTO: NEGATIVE
KETONES UR STRIP.AUTO-MCNC: NEGATIVE MG/DL
LEUKOCYTE ESTERASE UR QL STRIP.AUTO: NEGATIVE
LYMPHOCYTES # BLD: 2.5 K/UL (ref 1–5.1)
LYMPHOCYTES NFR BLD: 25.7 %
MCH RBC QN AUTO: 31.4 PG (ref 26–34)
MCHC RBC AUTO-ENTMCNC: 33 G/DL (ref 31–36)
MCV RBC AUTO: 95.3 FL (ref 80–100)
METHADONE UR QL SCN>300 NG/ML: ABNORMAL
MONOCYTES # BLD: 0.7 K/UL (ref 0–1.3)
MONOCYTES NFR BLD: 7.3 %
NEUTROPHILS # BLD: 6.1 K/UL (ref 1.7–7.7)
NEUTROPHILS NFR BLD: 63.2 %
NITRITE UR QL STRIP.AUTO: NEGATIVE
OPIATES UR QL SCN>300 NG/ML: ABNORMAL
OXYCODONE UR QL SCN: ABNORMAL
PCP UR QL SCN>25 NG/ML: ABNORMAL
PH UR STRIP.AUTO: 5.5 [PH] (ref 5–8)
PH UR STRIP: 5.5 [PH]
PLATELET # BLD AUTO: 165 K/UL (ref 135–450)
PMV BLD AUTO: 8.9 FL (ref 5–10.5)
POTASSIUM SERPL-SCNC: 4.3 MMOL/L (ref 3.5–5.1)
PROT SERPL-MCNC: 7 G/DL (ref 6.4–8.2)
PROT UR STRIP.AUTO-MCNC: NEGATIVE MG/DL
RBC # BLD AUTO: 4.59 M/UL (ref 4.2–5.9)
SALICYLATES SERPL-MCNC: 0.5 MG/DL (ref 15–30)
SARS-COV-2 RNA RESP QL NAA+PROBE: NOT DETECTED
SODIUM SERPL-SCNC: 141 MMOL/L (ref 136–145)
SP GR UR STRIP.AUTO: <=1.005 (ref 1–1.03)
UA COMPLETE W REFLEX CULTURE PNL UR: NORMAL
UA DIPSTICK W REFLEX MICRO PNL UR: NORMAL
URN SPEC COLLECT METH UR: NORMAL
UROBILINOGEN UR STRIP-ACNC: 0.2 E.U./DL
WBC # BLD AUTO: 9.7 K/UL (ref 4–11)

## 2024-11-02 PROCEDURE — 80307 DRUG TEST PRSMV CHEM ANLYZR: CPT

## 2024-11-02 PROCEDURE — 80179 DRUG ASSAY SALICYLATE: CPT

## 2024-11-02 PROCEDURE — 80053 COMPREHEN METABOLIC PANEL: CPT

## 2024-11-02 PROCEDURE — 99285 EMERGENCY DEPT VISIT HI MDM: CPT

## 2024-11-02 PROCEDURE — 82077 ASSAY SPEC XCP UR&BREATH IA: CPT

## 2024-11-02 PROCEDURE — 84484 ASSAY OF TROPONIN QUANT: CPT

## 2024-11-02 PROCEDURE — 87636 SARSCOV2 & INF A&B AMP PRB: CPT

## 2024-11-02 PROCEDURE — 81003 URINALYSIS AUTO W/O SCOPE: CPT

## 2024-11-02 PROCEDURE — 71045 X-RAY EXAM CHEST 1 VIEW: CPT

## 2024-11-02 PROCEDURE — 85025 COMPLETE CBC W/AUTO DIFF WBC: CPT

## 2024-11-02 PROCEDURE — 80143 DRUG ASSAY ACETAMINOPHEN: CPT

## 2024-11-02 PROCEDURE — 6370000000 HC RX 637 (ALT 250 FOR IP): Performed by: PHYSICIAN ASSISTANT

## 2024-11-02 PROCEDURE — 70450 CT HEAD/BRAIN W/O DYE: CPT

## 2024-11-02 RX ORDER — LORAZEPAM 1 MG/1
1 TABLET ORAL ONCE
Status: COMPLETED | OUTPATIENT
Start: 2024-11-02 | End: 2024-11-02

## 2024-11-02 RX ORDER — ACETAMINOPHEN 500 MG
1000 TABLET ORAL ONCE
Status: COMPLETED | OUTPATIENT
Start: 2024-11-02 | End: 2024-11-02

## 2024-11-02 RX ADMIN — LORAZEPAM 1 MG: 1 TABLET ORAL at 22:56

## 2024-11-02 RX ADMIN — ACETAMINOPHEN 1000 MG: 500 TABLET ORAL at 22:56

## 2024-11-02 ASSESSMENT — PAIN DESCRIPTION - LOCATION: LOCATION: GENERALIZED

## 2024-11-02 ASSESSMENT — PAIN DESCRIPTION - DESCRIPTORS: DESCRIPTORS: ACHING

## 2024-11-02 ASSESSMENT — PAIN - FUNCTIONAL ASSESSMENT: PAIN_FUNCTIONAL_ASSESSMENT: 0-10

## 2024-11-02 ASSESSMENT — PAIN SCALES - GENERAL: PAINLEVEL_OUTOF10: 7

## 2024-11-03 ENCOUNTER — HOSPITAL ENCOUNTER (INPATIENT)
Age: 74
LOS: 1 days | Discharge: HOME | DRG: 881 | End: 2024-11-04
Attending: PSYCHIATRY & NEUROLOGY | Admitting: PSYCHIATRY & NEUROLOGY
Payer: COMMERCIAL

## 2024-11-03 VITALS
OXYGEN SATURATION: 97 % | SYSTOLIC BLOOD PRESSURE: 143 MMHG | DIASTOLIC BLOOD PRESSURE: 81 MMHG | TEMPERATURE: 97.4 F | HEART RATE: 104 BPM | RESPIRATION RATE: 18 BRPM

## 2024-11-03 PROBLEM — F43.10 PTSD (POST-TRAUMATIC STRESS DISORDER): Status: ACTIVE | Noted: 2024-11-03

## 2024-11-03 PROBLEM — J44.9 COPD (CHRONIC OBSTRUCTIVE PULMONARY DISEASE) (HCC): Status: ACTIVE | Noted: 2024-11-03

## 2024-11-03 PROBLEM — J96.11 CHRONIC RESPIRATORY FAILURE WITH HYPOXIA: Status: ACTIVE | Noted: 2024-11-03

## 2024-11-03 PROBLEM — F39 MOOD DISORDER (HCC): Status: ACTIVE | Noted: 2024-11-03

## 2024-11-03 PROBLEM — F32.A DEPRESSION, UNSPECIFIED: Status: ACTIVE | Noted: 2024-11-03

## 2024-11-03 PROBLEM — G43.909 MIGRAINES: Status: ACTIVE | Noted: 2024-11-03

## 2024-11-03 LAB
EKG ATRIAL RATE: 93 BPM
EKG DIAGNOSIS: NORMAL
EKG P AXIS: 72 DEGREES
EKG P-R INTERVAL: 144 MS
EKG Q-T INTERVAL: 360 MS
EKG QRS DURATION: 106 MS
EKG QTC CALCULATION (BAZETT): 447 MS
EKG R AXIS: 53 DEGREES
EKG T AXIS: 53 DEGREES
EKG VENTRICULAR RATE: 93 BPM
TROPONIN, HIGH SENSITIVITY: 12 NG/L (ref 0–22)
TROPONIN, HIGH SENSITIVITY: 16 NG/L (ref 0–22)

## 2024-11-03 PROCEDURE — 84484 ASSAY OF TROPONIN QUANT: CPT

## 2024-11-03 PROCEDURE — 1240000000 HC EMOTIONAL WELLNESS R&B

## 2024-11-03 PROCEDURE — 99223 1ST HOSP IP/OBS HIGH 75: CPT | Performed by: PSYCHIATRY & NEUROLOGY

## 2024-11-03 PROCEDURE — 6370000000 HC RX 637 (ALT 250 FOR IP): Performed by: NURSE PRACTITIONER

## 2024-11-03 PROCEDURE — 96375 TX/PRO/DX INJ NEW DRUG ADDON: CPT

## 2024-11-03 PROCEDURE — 6370000000 HC RX 637 (ALT 250 FOR IP): Performed by: PSYCHIATRY & NEUROLOGY

## 2024-11-03 PROCEDURE — 99221 1ST HOSP IP/OBS SF/LOW 40: CPT | Performed by: NURSE PRACTITIONER

## 2024-11-03 PROCEDURE — 6360000002 HC RX W HCPCS: Performed by: PHYSICIAN ASSISTANT

## 2024-11-03 PROCEDURE — 96374 THER/PROPH/DIAG INJ IV PUSH: CPT

## 2024-11-03 PROCEDURE — 94640 AIRWAY INHALATION TREATMENT: CPT

## 2024-11-03 RX ORDER — BUTALBITAL, ACETAMINOPHEN AND CAFFEINE 50; 325; 40 MG/1; MG/1; MG/1
1 TABLET ORAL EVERY 6 HOURS PRN
Status: DISCONTINUED | OUTPATIENT
Start: 2024-11-03 | End: 2024-11-04 | Stop reason: HOSPADM

## 2024-11-03 RX ORDER — DOXEPIN HYDROCHLORIDE 25 MG/1
25 CAPSULE ORAL NIGHTLY
Status: DISCONTINUED | OUTPATIENT
Start: 2024-11-03 | End: 2024-11-04 | Stop reason: HOSPADM

## 2024-11-03 RX ORDER — HYDROXYZINE HYDROCHLORIDE 50 MG/1
50 TABLET, FILM COATED ORAL 3 TIMES DAILY PRN
Status: DISCONTINUED | OUTPATIENT
Start: 2024-11-03 | End: 2024-11-03

## 2024-11-03 RX ORDER — POLYETHYLENE GLYCOL 3350 17 G
2 POWDER IN PACKET (EA) ORAL
Status: DISCONTINUED | OUTPATIENT
Start: 2024-11-03 | End: 2024-11-04 | Stop reason: HOSPADM

## 2024-11-03 RX ORDER — METHYLPHENIDATE HYDROCHLORIDE 10 MG/1
20 TABLET ORAL
Status: DISCONTINUED | OUTPATIENT
Start: 2024-11-04 | End: 2024-11-04 | Stop reason: HOSPADM

## 2024-11-03 RX ORDER — PROCHLORPERAZINE EDISYLATE 5 MG/ML
5 INJECTION INTRAMUSCULAR; INTRAVENOUS ONCE
Status: COMPLETED | OUTPATIENT
Start: 2024-11-03 | End: 2024-11-03

## 2024-11-03 RX ORDER — DIPHENHYDRAMINE HYDROCHLORIDE 50 MG/ML
12.5 INJECTION INTRAMUSCULAR; INTRAVENOUS ONCE
Status: COMPLETED | OUTPATIENT
Start: 2024-11-03 | End: 2024-11-03

## 2024-11-03 RX ORDER — HYDROXYZINE HYDROCHLORIDE 25 MG/1
25 TABLET, FILM COATED ORAL 3 TIMES DAILY PRN
Status: DISCONTINUED | OUTPATIENT
Start: 2024-11-03 | End: 2024-11-04 | Stop reason: HOSPADM

## 2024-11-03 RX ORDER — FLUOXETINE 10 MG/1
10 CAPSULE ORAL DAILY
COMMUNITY

## 2024-11-03 RX ORDER — ACETAMINOPHEN 325 MG/1
650 TABLET ORAL EVERY 8 HOURS PRN
Status: DISCONTINUED | OUTPATIENT
Start: 2024-11-03 | End: 2024-11-04 | Stop reason: HOSPADM

## 2024-11-03 RX ORDER — METHYLPHENIDATE HYDROCHLORIDE 10 MG/1
20 TABLET ORAL 3 TIMES DAILY
Status: DISCONTINUED | OUTPATIENT
Start: 2024-11-03 | End: 2024-11-03

## 2024-11-03 RX ORDER — ALPRAZOLAM 1 MG/1
1 TABLET ORAL NIGHTLY PRN
COMMUNITY
Start: 2024-10-13

## 2024-11-03 RX ORDER — KETOROLAC TROMETHAMINE 15 MG/ML
15 INJECTION, SOLUTION INTRAMUSCULAR; INTRAVENOUS ONCE
Status: COMPLETED | OUTPATIENT
Start: 2024-11-03 | End: 2024-11-03

## 2024-11-03 RX ORDER — ALBUTEROL SULFATE 90 UG/1
2 INHALANT RESPIRATORY (INHALATION) EVERY 6 HOURS PRN
Status: DISCONTINUED | OUTPATIENT
Start: 2024-11-03 | End: 2024-11-04 | Stop reason: HOSPADM

## 2024-11-03 RX ORDER — ALPRAZOLAM 0.5 MG
1 TABLET ORAL NIGHTLY PRN
Status: DISCONTINUED | OUTPATIENT
Start: 2024-11-03 | End: 2024-11-04 | Stop reason: HOSPADM

## 2024-11-03 RX ORDER — BUDESONIDE AND FORMOTEROL FUMARATE DIHYDRATE 160; 4.5 UG/1; UG/1
2 AEROSOL RESPIRATORY (INHALATION)
Status: DISCONTINUED | OUTPATIENT
Start: 2024-11-03 | End: 2024-11-04 | Stop reason: HOSPADM

## 2024-11-03 RX ORDER — OLANZAPINE 5 MG/1
5 TABLET ORAL 3 TIMES DAILY PRN
Status: DISCONTINUED | OUTPATIENT
Start: 2024-11-03 | End: 2024-11-03

## 2024-11-03 RX ORDER — TRAZODONE HYDROCHLORIDE 50 MG/1
50 TABLET, FILM COATED ORAL NIGHTLY PRN
Status: DISCONTINUED | OUTPATIENT
Start: 2024-11-03 | End: 2024-11-04 | Stop reason: HOSPADM

## 2024-11-03 RX ADMIN — BUDESONIDE AND FORMOTEROL FUMARATE DIHYDRATE 2 PUFF: 160; 4.5 AEROSOL RESPIRATORY (INHALATION) at 21:22

## 2024-11-03 RX ADMIN — KETOROLAC TROMETHAMINE 15 MG: 15 INJECTION, SOLUTION INTRAMUSCULAR; INTRAVENOUS at 01:35

## 2024-11-03 RX ADMIN — HYDROXYZINE HYDROCHLORIDE 25 MG: 25 TABLET ORAL at 11:53

## 2024-11-03 RX ADMIN — DOXEPIN HYDROCHLORIDE 25 MG: 25 CAPSULE ORAL at 20:20

## 2024-11-03 RX ADMIN — PROCHLORPERAZINE EDISYLATE 5 MG: 5 INJECTION INTRAMUSCULAR; INTRAVENOUS at 01:35

## 2024-11-03 RX ADMIN — METHYLPHENIDATE HYDROCHLORIDE 20 MG: 10 TABLET ORAL at 13:38

## 2024-11-03 RX ADMIN — DIPHENHYDRAMINE HYDROCHLORIDE 12.5 MG: 50 INJECTION INTRAMUSCULAR; INTRAVENOUS at 01:35

## 2024-11-03 RX ADMIN — BUTALBITAL, ACETAMINOPHEN, AND CAFFEINE 1 TABLET: 50; 325; 40 TABLET ORAL at 16:43

## 2024-11-03 ASSESSMENT — PAIN SCALES - GENERAL
PAINLEVEL_OUTOF10: 0
PAINLEVEL_OUTOF10: 8
PAINLEVEL_OUTOF10: 0
PAINLEVEL_OUTOF10: 10
PAINLEVEL_OUTOF10: 5

## 2024-11-03 ASSESSMENT — SLEEP AND FATIGUE QUESTIONNAIRES
DO YOU HAVE DIFFICULTY SLEEPING: YES
DO YOU USE A SLEEP AID: YES
SLEEP PATTERN: NIGHTMARES/TERRORS;DIFFICULTY FALLING ASLEEP;DISTURBED/INTERRUPTED SLEEP
DO YOU HAVE DIFFICULTY SLEEPING: YES
AVERAGE NUMBER OF SLEEP HOURS: 1
DO YOU USE A SLEEP AID: NO
SLEEP PATTERN: DIFFICULTY FALLING ASLEEP
AVERAGE NUMBER OF SLEEP HOURS: 0

## 2024-11-03 ASSESSMENT — PATIENT HEALTH QUESTIONNAIRE - PHQ9
3. TROUBLE FALLING OR STAYING ASLEEP: NEARLY EVERY DAY
2. FEELING DOWN, DEPRESSED OR HOPELESS: NEARLY EVERY DAY
SUM OF ALL RESPONSES TO PHQ QUESTIONS 1-9: 15
1. LITTLE INTEREST OR PLEASURE IN DOING THINGS: NEARLY EVERY DAY
9. THOUGHTS THAT YOU WOULD BE BETTER OFF DEAD, OR OF HURTING YOURSELF: SEVERAL DAYS
SUM OF ALL RESPONSES TO PHQ9 QUESTIONS 1 & 2: 6
6. FEELING BAD ABOUT YOURSELF - OR THAT YOU ARE A FAILURE OR HAVE LET YOURSELF OR YOUR FAMILY DOWN: NEARLY EVERY DAY
8. MOVING OR SPEAKING SO SLOWLY THAT OTHER PEOPLE COULD HAVE NOTICED. OR THE OPPOSITE, BEING SO FIGETY OR RESTLESS THAT YOU HAVE BEEN MOVING AROUND A LOT MORE THAN USUAL: NOT AT ALL
4. FEELING TIRED OR HAVING LITTLE ENERGY: SEVERAL DAYS
7. TROUBLE CONCENTRATING ON THINGS, SUCH AS READING THE NEWSPAPER OR WATCHING TELEVISION: MORE THAN HALF THE DAYS
SUM OF ALL RESPONSES TO PHQ QUESTIONS 1-9: 16
5. POOR APPETITE OR OVEREATING: NOT AT ALL
10. IF YOU CHECKED OFF ANY PROBLEMS, HOW DIFFICULT HAVE THESE PROBLEMS MADE IT FOR YOU TO DO YOUR WORK, TAKE CARE OF THINGS AT HOME, OR GET ALONG WITH OTHER PEOPLE: VERY DIFFICULT

## 2024-11-03 ASSESSMENT — LIFESTYLE VARIABLES
HOW MANY STANDARD DRINKS CONTAINING ALCOHOL DO YOU HAVE ON A TYPICAL DAY: 1 OR 2
HOW OFTEN DO YOU HAVE A DRINK CONTAINING ALCOHOL: 4 OR MORE TIMES A WEEK

## 2024-11-03 ASSESSMENT — PAIN DESCRIPTION - LOCATION
LOCATION: HEAD;BUTTOCKS
LOCATION: HEAD

## 2024-11-03 ASSESSMENT — HEART SCORE: ECG: NORMAL

## 2024-11-03 ASSESSMENT — PAIN - FUNCTIONAL ASSESSMENT: PAIN_FUNCTIONAL_ASSESSMENT: ACTIVITIES ARE NOT PREVENTED

## 2024-11-03 ASSESSMENT — PAIN DESCRIPTION - ORIENTATION: ORIENTATION: MID

## 2024-11-03 ASSESSMENT — PAIN DESCRIPTION - DESCRIPTORS: DESCRIPTORS: STABBING

## 2024-11-03 NOTE — PROGRESS NOTES
Behavioral Services  Medicare Certification Upon Admission    I certify that this patient's inpatient psychiatric hospital admission is medically necessary for:    [x] (1) Treatment which could reasonably be expected to improve this patient's condition,       [x] (2) Or for diagnostic study;     AND     [x](2) The inpatient psychiatric services are provided while the individual is under the care of a physician and are included in the individualized plan of care.    Estimated length of stay/service 3-5 days    Plan for post-hospital care incomplete     Electronically signed by IVAN ANDREWS MD on 11/3/2024 at 11:11 AM
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
## 2024-11-03 NOTE — H&P
Hospital Medicine History & Physical      PCP: Paul Crane MD    Date of Admission: 11/3/2024    Date of Service: Pt seen/examined on 11/3/2024     Chief Complaint:  anxiety      History Of Present Illness:      The patient is a 74 y.o. male with COPD, chronic respiratory failure on home O2, hyperlipidemia, Fibromyalgia, tardive dyskinesia, seizures, PTSD, bipolar disorder, depression, and anxiety  who presented to [facility] ED with complaint of anxiety.  Patient was seen and evaluated in the ED by the ED medical provider, patient was medically cleared for admission to [facility].  This note serves as an admission medical H&P.    PCP: Paul Crane MD  Tobacco use: current  ETOH use: denies  Illicit drug use: denies    Patient denies any symptoms/complaints.    Past Medical History:        Diagnosis Date    Anxiety     Bipolar 1 disorder (HCC)     Bipolar 2 disorder (HCC)     Community acquired bacterial pneumonia 06/12/2015    COPD (chronic obstructive pulmonary disease) ([facility])     Depression     Fibromyalgia     Headache(784.0)     Hyperlipidemia     Manic depressive disease manic phase ([facility])     On home O2     PTSD (post-traumatic stress disorder)     Seizures ([facility])     Tardive dyskinesia        Past Surgical History:        Procedure Laterality Date    MOUTH SURGERY  05/2013       Medications Prior to Admission:    Prior to Admission medications    Medication Sig Start Date End Date Taking? Authorizing Provider   ALPRAZolam (XANAX) 1 MG tablet Take 1 tablet by mouth nightly as needed for Sleep or Anxiety. 10/13/24  Yes Lenny Fung MD   FLUoxetine (PROZAC) 10 MG capsule Take 1 capsule by mouth daily   Yes Lenny Fung MD   butalbital-APAP-caffeine (FIORICET) -40 MG CAPS per capsule Take 1 capsule by mouth every 6 hours as needed for Headaches (CAUTION: Can cause dizziness, don't drive while taking.)  Patient taking differently: Take 1 capsule by mouth 2 times

## 2024-11-03 NOTE — ED PROVIDER NOTES
[facility] EMERGENCY DEPARTMENT  EMERGENCY DEPARTMENT ENCOUNTER        Pt Name: Aki Pal  MRN: [id number]  Birthdate 1950  Date of evaluation: 11/2/2024  Provider: Shannen Harding PA-C  PCP: Paul Crane MD  Note Started: 1:49 AM EDT 11/3/24       I have seen and evaluated this patient with my supervising physician Romina Marks MD.      CHIEF COMPLAINT       Chief Complaint   Patient presents with    Anxiety       HISTORY OF PRESENT ILLNESS: 1 or more Elements     History From: Patient  Limitations to history : None    Aki Pal is a 74 y.o. male who presents to the emergency department today for evaluation for concerns of anxiety.  Patient has a history of bipolar disorder, be history of PTSD, history of agoraphobia.  Patient reports that over the past 2 to 3 weeks, he feels that his anxiety is to the point where he is \"not functioning\".  He reports that he is not sleeping well, he is not eating well.  Patient reports that he will \"wake up in a panic\".  Patient reports that he is afraid of his wife and when asked about this he states that \"she is from [place]\".  The patient does not elaborate on this any further.  He denies any alcohol use or drug use.  Patient feels that his anxiety is progressively worsening.  He reports that he is in the emergency room tonight because he was short of breath, and had a headache earlier, he states that he is feeling better however felt that his symptoms today were due to his anxiety.  Patient reports that he does have a history of migraine headaches and he did not take his Fioricet.  He reports that this headache was not sudden onset or thunderclap in nature this is not the worst headache of his life.  He has no chest pain.  No recent illnesses no other complaints    Nursing Notes were all reviewed and agreed with or any disagreements were addressed in the HPI.    REVIEW OF SYSTEMS :      Review of Systems

## 2024-11-03 NOTE — ED NOTES
EKG in process  
as needed   albuterol sulfate HFA (PROVENTIL;VENTOLIN;PROAIR) 108 (90 Base) MCG/ACT inhaler   Yes No   Sig: Inhale 2 puffs into the lungs every 6 hours as needed   butalbital-APAP-caffeine (FIORICET) -40 MG CAPS per capsule   No No   Sig: Take 1 capsule by mouth every 6 hours as needed for Headaches (CAUTION: Can cause dizziness, don't drive while taking.)   butalbital-acetaminophen-caffeine (FIORICET, ESGIC) -40 MG per tablet   Yes No   Sig: Take 1 tablet by mouth in the morning and at bedtime   Patient not taking: Reported on 5/5/2024   clonazePAM (KLONOPIN) 1 MG tablet   No No   Sig: Take 1 tablet by mouth 3 times daily as needed for Anxiety for up to 3 days. Max Daily Amount: 3 mg   cyclobenzaprine (FLEXERIL) 10 MG tablet   Yes No   Sig: Take 1 tablet by mouth 3 times daily as needed   doxepin (SINEQUAN) 25 MG capsule   Yes No   Sig: Take 1 capsule by mouth nightly   methylphenidate (RITALIN) 20 MG tablet   Yes No   Sig: Take 1 tablet by mouth 3 times daily. In the morning, before lunch,& 5pm   mometasone-formoterol (DULERA) 200-5 MCG/ACT inhaler   No No   Sig: Inhale 2 puffs into the lungs in the morning and 2 puffs in the evening.   nicotine (NICODERM CQ) 21 MG/24HR   No No   Sig: Place 1 patch onto the skin at bedtime      Facility-Administered Medications: None          Additional Information  Isolation:      HIV Positive: no    Oxygen Use: Yes: Comment: wears 2 liters at baseline    Any Legal Issues (Current or Past): no    Does Patient have POA or Guardian: No    Current Living Situation:      Roommate Appropriate: Yes    Is this a special case or have any other considerations:  No  (pregnancy, autism, developmental disabled, etc.)    Does the patient have confirmed or suspected COVID-19 Symptoms: No  (if yes, test must be completed, if no test is not required)       Last COVID Lab SARS-CoV-2 (no units)   Date Value   01/25/2021 Not Detected     SARS-CoV-2 RNA, RT PCR (no units)   Date Value

## 2024-11-03 NOTE — BH NOTE
4 Eyes Skin Assessment     The patient is being assessed for  Admission    I agree that 2 RN's have performed a thorough Head to Toe Skin Assessment on the patient. ALL assessment sites listed below have been assessed.       Areas assessed for pressure by both nurses:   [x]   Head, Face, and Ears   [x]   Shoulders, Back, and Chest  [x]   Arms, Elbows, and Hands   [x]   Coccyx, Sacrum, and Ischum  [x]   Legs, Feet, and Heels                                Skin Assessed Under all Medical Devices by both nurses:  O2 device tubing              All Mepilex Borders were peeled back and area peeked at by both nurses:  Yes  Please list where Mepilex Borders are located:  Left buttocks    Open area on left buttocks, approx 3/4\" inch long, 1/2\" wide. It was under a bandage and tape. Old bandage and tape removed and replace with new Mediplex. Area light pink with defined edges.       Circular scabbed over area on upper left side of back. Approx 1\"x 1/2\". Left open to air.               Does the Patient have Skin Breakdown related to pressure?  No            Kip Prevention initiated:  No   Wound Care Orders initiated:  Yes      [facility] nurse consulted for Pressure Injury (Stage 3,4, Unstageable, DTI, NWPT, Complex wounds)and New or Established Ostomies:  NA        Nurse 1 eSignature: Electronically signed by Josselin Leija RN on 11/3/24 at 3:09 PM EST    **SHARE this note so that the co-signing nurse is able to place an eSignature**    Nurse 2 eSignature: Electronically signed by ERNESTO LEONARD RN on 11/3/24 at 3:49 PM EST     Patient of Dr. 

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
## 2024-11-03 NOTE — ED PROVIDER NOTES
ED Attending Attestation Note    This patient was seen by the advanced practice provider.     I personally saw the patient. Management plan and care decisions have been discussed with me and I made/approved the management plan and take responsibility for patient management.     Briefly, 74 y.o. male presents with anxiety.   Hx of PTSD, bipolar disorder, agoraphobia   Sees psychiatrist; decompensated lately, increased anxiety, not sleeping, not showering, increased restlessness   Also complained of chest tightness but states that it is not his heart and is because of his anxiety.     Focused exam:   Gen: 74 y.o. male, NAD, nontoxic appearing, restless  HEENT: NCAT. MMM, PERRL. EOMI.   CV: RRR w/o MRG  Vascular: intact and symmetric radial and DP pulses bilaterally  Lungs: CTAB. No incr WOB.   Abdomen: Soft, nontender, nondistended. No rebound/guarding.   Neuro: awake and alert, speech clear w/o aphasia; intact and symmetric strength and sensation in all 4 extremities, CN 2-12 intact bilaterally  Psych: Poor eye contact, restless, fidgeting    MDM:   This is a 74-year-old gentleman with history of PTSD, bipolar disorder, agoraphobia is presenting with increased anxiety, difficulty sleeping, restlessness.  Upon presentation, he was anxious but answering questions appropriately.    Nursing notes, vital signs reviewed.     Records reviewed:   Seen 9/9 for migraine  Seen 8/14/2024 for headache and difficulty sleeping    I independently interpreted the following studies   CT HEAD WO CONTRAST   Final Result   No acute intracranial abnormality.         XR CHEST PORTABLE   Final Result   No acute cardiopulmonary disease.           Labs Reviewed   COMPREHENSIVE METABOLIC PANEL - Abnormal; Notable for the following components:       Result Value    CO2 33 (*)     Creatinine 0.6 (*)     All other components within normal limits   URINE DRUG SCREEN - Abnormal; Notable for the following components:    Benzodiazepine Screen, Urine

## 2024-11-03 NOTE — PROGRESS NOTES
At 0420 Arrived from [facility] accompanied by ems and security. Security protocols done with no contrabands found. Assisted to room. V/s taken and recorded. Offered snacks and was able to consumed.During the course of assessment, pt responded to some questions but with difficulty to concentrate. He was alert and oriented x4. Pt states that he is tired and wants to rest. A bit irritable and anxious but can be managed. Pt was cooperative but labile. Pt uses O2 due to COPD. Pt refused to do BMAT and 4 eyes.unable to finish admission process due to pt unstable condition. Pt states \" I have panic attacks and PTSD and that's the only thing you need to know for my treatment. Interaction terminated. Pt signed the voluntary form but refused to sign others forms. Home medication needs to verify. Still for further observation. Pt refused to wear hospital gown. Needs attended.

## 2024-11-03 NOTE — PROGRESS NOTES
[facility]  Admission Note     Admission Type:   Admission Type: Voluntary    Reason for admission:  Reason for Admission: anxiety, panic attacks      Addictive Behavior:        Medical Problems:   Past Medical History:   Diagnosis Date    Anxiety     Bipolar 1 disorder ([facility])     Bipolar 2 disorder ([facility])     Community acquired bacterial pneumonia 06/12/2015    COPD (chronic obstructive pulmonary disease) ([facility])     Depression     Fibromyalgia     Headache(784.0)     Hyperlipidemia     Manic depressive disease manic phase ([facility])     On home O2     PTSD (post-traumatic stress disorder)     Seizures ([facility])     Tardive dyskinesia        Status EXAM:  Mental Status and Behavioral Exam  Normal: No  Level of Assistance: Independent/Self  Facial Expression: Worried  Affect: Congruent  Level of Consciousness: Alert  Frequency of Checks: 4 times per hour, close  Mood:Normal: Yes  Motor Activity:Normal: Yes  Eye Contact: Fair  Observed Behavior: Preoccupied, Cooperative  Sexual Misconduct History: Current - no  Preception: Collins to person, Collins to place, Collins to situation, [place] to time  Attention:Normal: Yes  Thought Processes: Blocking  Thought Content:Normal: No  Thought Content: Preoccupations  Depression Symptoms: Feelings of hopelessess, Change in energy level, Impaired concentration  Anxiety Symptoms: Panic attack, Generalized  Britany Symptoms: Labile  Hallucinations: None  Delusions: No  Memory:Normal: No  Memory: Poor recent  Insight and Judgment: No  Insight and Judgment: Poor judgment, Poor insight    Tobacco Screening:  Practical Counseling, on admission, ghazala X, if applicable and completed (first 3 are required if patient doesn't refuse):            ( ) Recognizing danger situations (included triggers and roadblocks)                    ( ) Coping skills (new ways to manage stress,relaxation techniques, changing routine, distraction)                                                           ( )

## 2024-11-03 NOTE — PROGRESS NOTES
Home Medication Reconciliation Status          [] COMPLETE       Medication history has been reviewed and obtained from the following source(s):       [] patient/family verbal report             [] patient/family provided written list       [] external pharmacy   [] external facility list         []  Provider notified that home medication reconciliation is complete          [x] IN PROGRESS       Medication reconciliation marked in progress at this time due to:       [] patient/family poor historian      [] waiting arrival of family to clarify       [] waiting for accurate list        [x] external pharmacy needs called      * Follow up is needed.          [] UNABLE TO ASSESS       Medication reconciliation is incomplete and unable to assess at this time due to:       [] critical patient condition   [] patient is unresponsive        [] no family available                       [] unknown pharmacy       [] anonymous patient          * Follow up is needed.      [] Pharmacy consult placed for medication reconciliation assistance   Additional comments:
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
## 2024-11-04 VITALS
TEMPERATURE: 98.8 F | RESPIRATION RATE: 18 BRPM | SYSTOLIC BLOOD PRESSURE: 146 MMHG | DIASTOLIC BLOOD PRESSURE: 88 MMHG | OXYGEN SATURATION: 92 % | HEART RATE: 103 BPM

## 2024-11-04 PROCEDURE — 99239 HOSP IP/OBS DSCHRG MGMT >30: CPT | Performed by: PSYCHIATRY & NEUROLOGY

## 2024-11-04 PROCEDURE — 6370000000 HC RX 637 (ALT 250 FOR IP): Performed by: PSYCHIATRY & NEUROLOGY

## 2024-11-04 PROCEDURE — 5130000000 HC BRIDGE APPOINTMENT

## 2024-11-04 PROCEDURE — 94640 AIRWAY INHALATION TREATMENT: CPT

## 2024-11-04 RX ADMIN — BUDESONIDE AND FORMOTEROL FUMARATE DIHYDRATE 2 PUFF: 160; 4.5 AEROSOL RESPIRATORY (INHALATION) at 09:00

## 2024-11-04 RX ADMIN — METHYLPHENIDATE HYDROCHLORIDE 20 MG: 10 TABLET ORAL at 11:50

## 2024-11-04 RX ADMIN — BUTALBITAL, ACETAMINOPHEN, AND CAFFEINE 1 TABLET: 50; 325; 40 TABLET ORAL at 01:12

## 2024-11-04 RX ADMIN — BUTALBITAL, ACETAMINOPHEN, AND CAFFEINE 1 TABLET: 50; 325; 40 TABLET ORAL at 15:22

## 2024-11-04 RX ADMIN — METHYLPHENIDATE HYDROCHLORIDE 20 MG: 10 TABLET ORAL at 06:06

## 2024-11-04 RX ADMIN — METHYLPHENIDATE HYDROCHLORIDE 20 MG: 10 TABLET ORAL at 15:26

## 2024-11-04 ASSESSMENT — PAIN DESCRIPTION - LOCATION
LOCATION: NECK
LOCATION: NECK

## 2024-11-04 ASSESSMENT — PAIN DESCRIPTION - DESCRIPTORS
DESCRIPTORS: SHARP;STABBING
DESCRIPTORS: THROBBING

## 2024-11-04 ASSESSMENT — PAIN DESCRIPTION - ORIENTATION: ORIENTATION: RIGHT;LEFT

## 2024-11-04 ASSESSMENT — PAIN SCALES - WONG BAKER: WONGBAKER_NUMERICALRESPONSE: HURTS A LITTLE BIT

## 2024-11-04 ASSESSMENT — PAIN - FUNCTIONAL ASSESSMENT: PAIN_FUNCTIONAL_ASSESSMENT: PREVENTS OR INTERFERES SOME ACTIVE ACTIVITIES AND ADLS

## 2024-11-04 ASSESSMENT — PAIN SCALES - GENERAL
PAINLEVEL_OUTOF10: 8
PAINLEVEL_OUTOF10: 9

## 2024-11-04 NOTE — BH NOTE
Patient requesting his fioricet for neck pain. Patient medicated with Fioricet 1 tablet po for pain.

## 2024-11-04 NOTE — TRANSITION OF CARE
Behavioral Health Transition Record    Patient Name: Aki Pal  YOB: 1950   Medical Record Number: [id number]  Date of Admission: 11/3/2024  4:24 AM   Date of Discharge: 11/04/2024    Attending Provider: Dr. Gold Flores MD  Discharging Provider: Dr. Gold Flores MD  To contact this individual call [phone] and ask the  to page.  If unavailable, ask to be transferred to Behavioral Health Provider on call.  A Behavioral Health Provider will be available on call 24/7 and during holidays.    Primary Care Provider: Paul Crane MD    Allergies   Allergen Reactions    Prednisone Anxiety    Serotonin Reuptake Inhibitors (Ssris) Anxiety    Tricyclic Antidepressants Anxiety       Reason for Admission: A 73 yo male admitted with severe anxiety resulting in inability to function. He endorsed anxious mood, not sleeping, not showering or caring for his ADLs, restlessness, and irritability. He stated symptoms had been worsening over the past 2-3 weeks precipitated by conflict with wife.     Admission Diagnosis: Mood disorder (HCC) [F39]    Discharge Diagnosis: Mood disorder (HCC) [F39]    Discharge Plan/Destination: Private Residence    The crisis number for [facility] is 281-CARE. This crisis line is available 24 hours a day, seven days a week.  The National Suicide and Crisis Hotline Number is 988.  You can call, chat, or text this number at any time to access emergency mental health services.      Follow-up Information       Follow up With Specialties Details Why Contact Info    Paul Crane MD Internal Medicine Follow up Hospital Follow-Up: Wednesday, November 6, 2024 at 2:15 pm    Please bring a photo ID, insurance card, current medications, anc co-pay if you have one. [address]  [address]  [facility] [address]  [phone]      Dr. Basurto  Call We have informed Dr. Basurto of your hospitalization and requested he contact you to schedule a hospital

## 2024-11-04 NOTE — CONSULTS
Pt out in day room, fully dressed in street clothes.  States he is being discharged today.  Pt states he had recent cyst removal from his buttock and it is healing well, denies pain.  He is just using Band-Aids.  Staff RN provided with foam dressing for home.  Pt denies needs.

## 2024-11-04 NOTE — PLAN OF CARE
Problem: Risk for Elopement  Goal: Patient will not exit the unit/facility without proper excort  Outcome: Progressing     Problem: Safety - Adult  Goal: Free from fall injury  Outcome: Progressing     Problem: Anxiety  Goal: Will report anxiety at manageable levels  Description: INTERVENTIONS:  1. Administer medication as ordered  2. Teach and rehearse alternative coping skills  3. Provide emotional support with 1:1 interaction with staff  Outcome: Progressing     Problem: Coping  Goal: Pt/Family able to verbalize concerns and demonstrate effective coping strategies  Description: INTERVENTIONS:  1. Assist patient/family to identify coping skills, available support systems and cultural and spiritual values  2. Provide emotional support, including active listening and acknowledgement of concerns of patient and caregivers  3. Reduce environmental stimuli, as able  4. Instruct patient/family in relaxation techniques, as appropriate  5. Assess for spiritual pain/suffering and initiate Spiritual Care, Psychosocial Clinical Specialist consults as needed  Outcome: Progressing     Problem: Confusion  Goal: Confusion, delirium, dementia, or psychosis is improved or at baseline  Description: INTERVENTIONS:  1. Assess for possible contributors to thought disturbance, including medications, impaired vision or hearing, underlying metabolic abnormalities, dehydration, psychiatric diagnoses, and notify attending LIP  2. [place] high risk fall precautions, as indicated  3. Provide frequent short contacts to provide reality reorientation, refocusing and direction  4. Decrease environmental stimuli, including noise as appropriate  5. Monitor and intervene to maintain adequate nutrition, hydration, elimination, sleep and activity  6. If unable to ensure safety without constant attention obtain sitter and review sitter guidelines with assigned personnel  7. Initiate Psychosocial CNS and Spiritual Care consult, as 
  Problem: Risk for Elopement  Goal: Patient will not exit the unit/facility without proper excort  Recent Flowsheet Documentation  Taken 11/4/2024 1546 by Romina Jenkins RN  Nursing Interventions for Elopement Risk: Make sure patient has all necessary personal care items  11/4/2024 1455 by Romina Jenkins RN  Outcome: Progressing     Problem: Safety - Adult  Goal: Free from fall injury  Outcome: Progressing     Problem: Anxiety  Goal: Will report anxiety at manageable levels  Description: INTERVENTIONS:  1. Administer medication as ordered  2. Teach and rehearse alternative coping skills  3. Provide emotional support with 1:1 interaction with staff  Recent Flowsheet Documentation  Taken 11/4/2024 1546 by Romina Jenkins RN  Will report anxiety at manageable levels: Teach and rehearse alternative coping skills  11/4/2024 1455 by Romina Jenkins RN  Outcome: Progressing     Problem: Coping  Goal: Pt/Family able to verbalize concerns and demonstrate effective coping strategies  Description: INTERVENTIONS:  1. Assist patient/family to identify coping skills, available support systems and cultural and spiritual values  2. Provide emotional support, including active listening and acknowledgement of concerns of patient and caregivers  3. Reduce environmental stimuli, as able  4. Instruct patient/family in relaxation techniques, as appropriate  5. Assess for spiritual pain/suffering and initiate Spiritual Care, Psychosocial Clinical Specialist consults as needed  Recent Flowsheet Documentation  Taken 11/4/2024 1546 by Romina Jenkins RN  Patient/family able to verbalize anxieties, fears, and concerns, and demonstrate effective coping: Reduce environmental stimuli, as able  11/4/2024 1455 by Romina Jenkins RN  Outcome: Progressing     Problem: Confusion  Goal: Confusion, delirium, dementia, or psychosis is improved or at baseline  Description: INTERVENTIONS:  1. Assess for possible contributors to thought disturbance, including 
Patient is alert and oriented x4, Sitter at bedside for safety, patient using O2 at 3L. Takes medications whole with water and without issue.  Patient is independent and continent. Calm, friendly and cooperative with staff and care. Exhibits a good sense of humor and has a quick wit. Is visible on unit, chatting with his safety sitter.  Interacting with peers appropriately. Denies AH/VH/SI/HI and no RTIS noted. Eating meals in dayroom. No signs or symptoms of distress. No signs of aggression. Patient denies pain at this time. Patient complaining of increased anxiety, requesting PRN atarax. Patient allowed 4 eyes assessment done. Small wound charted on left buttocks and on left side mid back. New mediplex applied to buttocks.  Patient found to have his shoes on with laces, a leather belt and an pulse oximeter with a 30\" cord hanging around his neck. Educated patient that these things are not allowed in patient rooms and the items were put In the patient locker.       Problem: Risk for Elopement  Goal: Patient will not exit the unit/facility without proper excort  Outcome: Progressing  Flowsheets (Taken 11/3/2024 0505 by Dami Starkey, RN)  Nursing Interventions for Elopement Risk:   Assist with personal care needs such as toileting, eating, dressing, as needed to reduce the risk of wandering   Collaborate with treatment team for drug withdrawal symptoms treatment   Escort with two staff members if patient must leave the unit   Communicate/escalate to /other team member the risk of elopement   Shoes and clothing collected and placed in gown attire   Communicate to physician the risk for elopement   Communicate/escalate to charge nurse the risk of elopement   Make sure patient has all necessary personal care items   Reduce environmental triggers   Collaborate with family members/caregivers to mitigate the elopement risk     Problem: Safety - Adult  Goal: Free from fall injury  Outcome: Progressing     Problem: 
Pt was newly admitted. Pt follows simple instructions but can be labile. Anxious at times. Denies SI but admits he is depressed. Still for further observation.   Problem: Depression/Self Harm  Goal: Effect of psychiatric condition will be minimized and patient will be protected from self harm  Flowsheets (Taken 11/3/2024 0523)  Effect of psychiatric condition will be minimized and patient will be protected from self harm:   Assess impact of patient’s symptoms on level of functioning, self care needs and offer support as indicated   Assess patient/family knowledge of depression, impact on illness and need for teaching   Provide emotional support, presence and reassurance   Assess for suicidal thoughts or ideation. If patient expresses suicidal thoughts or statements do not leave alone, initiate Suicide Precautions, move near nurse station, obtain sitter     Problem: Anxiety  Goal: Will report anxiety at manageable levels  Outcome: Not Progressing  Flowsheets (Taken 11/3/2024 0523)  Will report anxiety at manageable levels:   Administer medication as ordered   Provide emotional support with 1:1 interaction with staff   Teach and rehearse alternative coping skills     Problem: Behavior  Goal: Pt/Family maintain appropriate behavior and adhere to behavioral management agreement, if implemented  Outcome: Not Progressing  Flowsheets (Taken 11/3/2024 0523)  Patient/family maintains appropriate behavior and adheres to behavioral management agreement, if implemented:   Assess patient/family’s coping skills and  non-compliant behavior (including use of illegal substances)   Notify security of behavior or suspected illegal substances which indicate the need for search of the patient and/or belongings   Utilize positive, consistent limit setting strategies supporting safety of patient, staff and others   Encourage verbalization of thoughts and concerns in a socially appropriate manner   Encourage participation in the decision 
Professional, Spiritual Care, Psychosocial CNS, and consider a recommendation to the LIP for a Psychiatric Consultation  11/3/2024 2129 by Tawana Askew, RN  Outcome: Progressing  Flowsheets (Taken 11/3/2024 2127)  Effect of psychiatric condition will be minimized and patient will be protected from self harm: Provide emotional support, presence and reassurance  11/3/2024 1406 by Josselin Leija, RN  Outcome: Progressing

## 2024-11-04 NOTE — PROGRESS NOTES
11/03/24 2100   RT Protocol   History Pulmonary Disease 2   Respiratory pattern 0   Breath sounds 0   Cough 0   Indications for Bronchodilator Therapy None   Bronchodilator Assessment Score 2     RT Inhaler-Nebulizer Bronchodilator Protocol Note    There is a bronchodilator order in the chart from a provider indicating to follow the RT Bronchodilator Protocol and there is an “Initiate RT Inhaler-Nebulizer Bronchodilator Protocol” order as well (see protocol at bottom of note).    CXR Findings:  XR CHEST PORTABLE    Result Date: 11/2/2024  No acute cardiopulmonary disease.       The findings from the last RT Protocol Assessment were as follows:   History Pulmonary Disease: Chronic pulmonary disease  Respiratory Pattern: Regular pattern and RR 12-20 bpm  Breath Sounds: Clear breath sounds  Cough: Strong, spontaneous, non-productive  Indication for Bronchodilator Therapy: None  Bronchodilator Assessment Score: 2    Aerosolized bronchodilator medication orders have been revised according to the RT Inhaler-Nebulizer Bronchodilator Protocol below.    Respiratory Therapist to perform RT Therapy Protocol Assessment initially then follow the protocol.  Repeat RT Therapy Protocol Assessment PRN for score 0-3 or on second treatment, BID, and PRN for scores above 3.    No Indications - adjust the frequency to every 6 hours PRN wheezing or bronchospasm, if no treatments needed after 48 hours then discontinue using Per Protocol order mode.     If indication present, adjust the RT bronchodilator orders based on the Bronchodilator Assessment Score as indicated below.  Use Inhaler orders unless patient has one or more of the following: on home nebulizer, not able to hold breath for 10 seconds, is not alert and oriented, cannot activate and use MDI correctly, or respiratory rate 25 breaths per minute or more, then use the equivalent nebulizer order(s) with same Frequency and PRN reasons based on the score.  If a patient is on this

## 2024-11-04 NOTE — BH NOTE
Patient alert and oriented x 3. Patient rates Depression 8/10 and Anxiety 5/10. Patient visible and social on the milieu. Patient denies SI/HI/A/V/H. Patient took HS medication. Patient has 1:1 sitter d/t 02 @ 21/2  L/min per N/C. Patient  denies self humaira. No c/o's voiced @ present.

## 2024-11-04 NOTE — GROUP NOTE
Group Therapy Note    Date: 11/4/2024    Group Start Time: 1100  Group End Time: 1145  Group Topic: Cognitive Skills    [facility]    Nighat Marcano BSW    Group members played TV Trivia. They socialized and reminisced about prior TV shows from the past.    Group Therapy Note    Attendees: 3      Notes:   Pt was in attendance throughout group. Pt appropriately interacted with members and actively participated.    Status After Intervention:  Improved    Participation Level: Active Listener and Interactive    Participation Quality: Appropriate, Attentive, Sharing, and Supportive      Speech:  normal      Thought Process/Content: Logical      Affective Functioning: Congruent      Mood: euthymic      Level of consciousness:  Alert      Response to Learning: Able to verbalize current knowledge/experience, Capable of insight, and Progressing to goal      Endings: None Reported    Modes of Intervention: Support, Socialization, Exploration, Clarifying, Problem-solving, and Activity      Discipline Responsible: /Counselor      Signature:  MARTY Ramirez

## 2024-11-04 NOTE — PROGRESS NOTES
11/04/24 1108   Encounter Summary   Encounter Overview/Reason Behavioral Health   Service Provided For Patient   Last Encounter  11/04/24   Complexity of Encounter Low   Begin Time 1000   End Time  1040   Total Time Calculated 40 min   Spiritual/Emotional needs   Type Spiritual Support   Behavioral Health    Type  [facility]

## 2024-11-04 NOTE — GROUP NOTE
Group Therapy Note    Date: 11/4/2024    Group Start Time: 1345  Group End Time: 1430  Group Topic: Recreational    [facility]    Shannen Vargas        Group Therapy Note    Attendees: 5    Group members played 15 seconds of a theme song from the 70's and had to guess what show the song belonged to. The goal of group was to evoke memories, stimulate mental activity, promote social interaction, and improve well-being.     Notes:   Patient attended group for the full duration. Patient remained engaged and interacted approrpiately with other members of the group.     Status After Intervention:  Improved    Participation Level: Active Listener    Participation Quality: Appropriate      Speech:  normal      Thought Process/Content: Logical      Affective Functioning: Congruent      Mood: euthymic      Level of consciousness:  Alert      Response to Learning: Able to verbalize current knowledge/experience      Endings: None Reported    Modes of Intervention: Socialization, Exploration, and Activity      Discipline Responsible: Behavorial Health Tech      Signature:  KAM KISER

## 2024-11-05 ENCOUNTER — FOLLOWUP TELEPHONE ENCOUNTER (OUTPATIENT)
Dept: PSYCHIATRY | Age: 74
End: 2024-11-05

## 2024-11-25 ENCOUNTER — APPOINTMENT (OUTPATIENT)
Dept: CT IMAGING | Age: 74
End: 2024-11-25
Payer: MEDICARE

## 2024-11-25 ENCOUNTER — HOSPITAL ENCOUNTER (EMERGENCY)
Age: 74
Discharge: HOME | End: 2024-11-25
Attending: STUDENT IN AN ORGANIZED HEALTH CARE EDUCATION/TRAINING PROGRAM
Payer: MEDICARE

## 2024-11-25 ENCOUNTER — APPOINTMENT (OUTPATIENT)
Dept: GENERAL RADIOLOGY | Age: 74
End: 2024-11-25
Payer: MEDICARE

## 2024-11-25 VITALS
HEART RATE: 97 BPM | TEMPERATURE: 97.1 F | RESPIRATION RATE: 16 BRPM | OXYGEN SATURATION: 97 % | SYSTOLIC BLOOD PRESSURE: 172 MMHG | HEIGHT: 67 IN | BODY MASS INDEX: 25.11 KG/M2 | WEIGHT: 160 LBS | DIASTOLIC BLOOD PRESSURE: 93 MMHG

## 2024-11-25 DIAGNOSIS — G43.809 OTHER MIGRAINE WITHOUT STATUS MIGRAINOSUS, NOT INTRACTABLE: Primary | ICD-10-CM

## 2024-11-25 LAB
ANION GAP SERPL CALCULATED.3IONS-SCNC: 7 MMOL/L (ref 3–16)
BASE EXCESS BLDV CALC-SCNC: 8.2 MMOL/L (ref -3–3)
BASOPHILS # BLD: 0.1 K/UL (ref 0–0.2)
BASOPHILS NFR BLD: 0.5 %
BUN SERPL-MCNC: 12 MG/DL (ref 7–20)
CALCIUM SERPL-MCNC: 9.2 MG/DL (ref 8.3–10.6)
CHLORIDE SERPL-SCNC: 105 MMOL/L (ref 99–110)
CO2 BLDV-SCNC: 89 MMOL/L
CO2 SERPL-SCNC: 34 MMOL/L (ref 21–32)
COHGB MFR BLDV: 1.5 % (ref 0–1.5)
CREAT SERPL-MCNC: 0.6 MG/DL (ref 0.8–1.3)
DEPRECATED RDW RBC AUTO: 14.4 % (ref 12.4–15.4)
EOSINOPHIL # BLD: 0.3 K/UL (ref 0–0.6)
EOSINOPHIL NFR BLD: 2.6 %
GFR SERPLBLD CREATININE-BSD FMLA CKD-EPI: >90 ML/MIN/{1.73_M2}
GLUCOSE BLD-MCNC: 179 MG/DL (ref 70–99)
GLUCOSE SERPL-MCNC: 105 MG/DL (ref 70–99)
HCO3 BLDV-SCNC: 37.4 MMOL/L (ref 23–29)
HCT VFR BLD AUTO: 40.9 % (ref 40.5–52.5)
HGB BLD-MCNC: 13.8 G/DL (ref 13.5–17.5)
LYMPHOCYTES # BLD: 1.8 K/UL (ref 1–5.1)
LYMPHOCYTES NFR BLD: 16 %
MCH RBC QN AUTO: 32.1 PG (ref 26–34)
MCHC RBC AUTO-ENTMCNC: 33.8 G/DL (ref 31–36)
MCV RBC AUTO: 95 FL (ref 80–100)
METHGB MFR BLDV: 0.9 %
MONOCYTES # BLD: 0.6 K/UL (ref 0–1.3)
MONOCYTES NFR BLD: 5.7 %
NEUTROPHILS # BLD: 8.5 K/UL (ref 1.7–7.7)
NEUTROPHILS NFR BLD: 75.2 %
O2 CT VFR BLDV CALC: 19 VOL %
O2 THERAPY: ABNORMAL
PCO2 BLDV: 72.9 MMHG (ref 40–50)
PERFORMED ON: ABNORMAL
PH BLDV: 7.32 [PH] (ref 7.35–7.45)
PLATELET # BLD AUTO: 169 K/UL (ref 135–450)
PMV BLD AUTO: 8.5 FL (ref 5–10.5)
PO2 BLDV: 159 MMHG (ref 25–40)
POTASSIUM SERPL-SCNC: 4.6 MMOL/L (ref 3.5–5.1)
RBC # BLD AUTO: 4.31 M/UL (ref 4.2–5.9)
SAO2 % BLDV: 98 %
SODIUM SERPL-SCNC: 146 MMOL/L (ref 136–145)
WBC # BLD AUTO: 11.3 K/UL (ref 4–11)

## 2024-11-25 PROCEDURE — 96361 HYDRATE IV INFUSION ADD-ON: CPT

## 2024-11-25 PROCEDURE — 96374 THER/PROPH/DIAG INJ IV PUSH: CPT

## 2024-11-25 PROCEDURE — 6370000000 HC RX 637 (ALT 250 FOR IP): Performed by: STUDENT IN AN ORGANIZED HEALTH CARE EDUCATION/TRAINING PROGRAM

## 2024-11-25 PROCEDURE — 70450 CT HEAD/BRAIN W/O DYE: CPT

## 2024-11-25 PROCEDURE — 70498 CT ANGIOGRAPHY NECK: CPT

## 2024-11-25 PROCEDURE — 80048 BASIC METABOLIC PNL TOTAL CA: CPT

## 2024-11-25 PROCEDURE — 71046 X-RAY EXAM CHEST 2 VIEWS: CPT

## 2024-11-25 PROCEDURE — 6360000002 HC RX W HCPCS: Performed by: STUDENT IN AN ORGANIZED HEALTH CARE EDUCATION/TRAINING PROGRAM

## 2024-11-25 PROCEDURE — 2580000003 HC RX 258: Performed by: STUDENT IN AN ORGANIZED HEALTH CARE EDUCATION/TRAINING PROGRAM

## 2024-11-25 PROCEDURE — 82803 BLOOD GASES ANY COMBINATION: CPT

## 2024-11-25 PROCEDURE — 85025 COMPLETE CBC W/AUTO DIFF WBC: CPT

## 2024-11-25 PROCEDURE — 99285 EMERGENCY DEPT VISIT HI MDM: CPT

## 2024-11-25 PROCEDURE — 6360000004 HC RX CONTRAST MEDICATION: Performed by: EMERGENCY MEDICINE

## 2024-11-25 RX ORDER — SODIUM CHLORIDE, SODIUM LACTATE, POTASSIUM CHLORIDE, AND CALCIUM CHLORIDE .6; .31; .03; .02 G/100ML; G/100ML; G/100ML; G/100ML
500 INJECTION, SOLUTION INTRAVENOUS ONCE
Status: COMPLETED | OUTPATIENT
Start: 2024-11-25 | End: 2024-11-25

## 2024-11-25 RX ORDER — BUTALBITAL, ACETAMINOPHEN AND CAFFEINE 50; 325; 40 MG/1; MG/1; MG/1
1 TABLET ORAL EVERY 4 HOURS PRN
Status: DISCONTINUED | OUTPATIENT
Start: 2024-11-25 | End: 2024-11-25

## 2024-11-25 RX ORDER — BUTALBITAL, ACETAMINOPHEN AND CAFFEINE 300; 40; 50 MG/1; MG/1; MG/1
1 CAPSULE ORAL 2 TIMES DAILY PRN
Qty: 10 CAPSULE | Refills: 0 | Status: SHIPPED | OUTPATIENT
Start: 2024-11-25 | End: 2024-11-30

## 2024-11-25 RX ORDER — BUTALBITAL, ACETAMINOPHEN AND CAFFEINE 50; 325; 40 MG/1; MG/1; MG/1
1 TABLET ORAL ONCE
Status: COMPLETED | OUTPATIENT
Start: 2024-11-25 | End: 2024-11-25

## 2024-11-25 RX ORDER — METOCLOPRAMIDE HYDROCHLORIDE 5 MG/ML
10 INJECTION INTRAMUSCULAR; INTRAVENOUS ONCE
Status: COMPLETED | OUTPATIENT
Start: 2024-11-25 | End: 2024-11-25

## 2024-11-25 RX ORDER — IOPAMIDOL 755 MG/ML
75 INJECTION, SOLUTION INTRAVASCULAR
Status: COMPLETED | OUTPATIENT
Start: 2024-11-25 | End: 2024-11-25

## 2024-11-25 RX ADMIN — BUTALBITAL, ACETAMINOPHEN, AND CAFFEINE 1 TABLET: 325; 50; 40 TABLET ORAL at 05:34

## 2024-11-25 RX ADMIN — SODIUM CHLORIDE, POTASSIUM CHLORIDE, SODIUM LACTATE AND CALCIUM CHLORIDE 500 ML: 600; 310; 30; 20 INJECTION, SOLUTION INTRAVENOUS at 05:34

## 2024-11-25 RX ADMIN — IOPAMIDOL 75 ML: 755 INJECTION, SOLUTION INTRAVENOUS at 06:21

## 2024-11-25 RX ADMIN — METOCLOPRAMIDE HYDROCHLORIDE 10 MG: 5 INJECTION, SOLUTION INTRAMUSCULAR; INTRAVENOUS at 05:35

## 2024-11-25 ASSESSMENT — PAIN - FUNCTIONAL ASSESSMENT: PAIN_FUNCTIONAL_ASSESSMENT: 0-10

## 2024-11-25 ASSESSMENT — PAIN DESCRIPTION - LOCATION
LOCATION: HEAD
LOCATION: HEAD

## 2024-11-25 ASSESSMENT — PAIN DESCRIPTION - DESCRIPTORS
DESCRIPTORS: ACHING
DESCRIPTORS: ACHING

## 2024-11-25 ASSESSMENT — PAIN SCALES - GENERAL
PAINLEVEL_OUTOF10: 9
PAINLEVEL_OUTOF10: 9

## 2024-11-25 ASSESSMENT — PAIN DESCRIPTION - ORIENTATION
ORIENTATION: LEFT
ORIENTATION: LEFT
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
## 2024-12-22 ENCOUNTER — HOSPITAL ENCOUNTER (EMERGENCY)
Age: 74
Discharge: HOME | End: 2024-12-22
Attending: EMERGENCY MEDICINE
Payer: MEDICARE

## 2024-12-22 VITALS
TEMPERATURE: 98.1 F | DIASTOLIC BLOOD PRESSURE: 84 MMHG | HEART RATE: 93 BPM | SYSTOLIC BLOOD PRESSURE: 142 MMHG | OXYGEN SATURATION: 98 % | RESPIRATION RATE: 24 BRPM | WEIGHT: 158 LBS | HEIGHT: 67 IN | BODY MASS INDEX: 24.8 KG/M2

## 2024-12-22 DIAGNOSIS — G43.009 MIGRAINE WITHOUT AURA AND WITHOUT STATUS MIGRAINOSUS, NOT INTRACTABLE: Primary | ICD-10-CM

## 2024-12-22 PROCEDURE — 99283 EMERGENCY DEPT VISIT LOW MDM: CPT

## 2024-12-22 RX ORDER — CETIRIZINE HYDROCHLORIDE 10 MG/1
10 TABLET ORAL DAILY
COMMUNITY
Start: 2024-11-23

## 2024-12-22 RX ORDER — ONDANSETRON 8 MG/1
8 TABLET, FILM COATED ORAL EVERY 8 HOURS PRN
COMMUNITY
Start: 2024-11-25

## 2024-12-22 RX ORDER — OMEPRAZOLE 40 MG/1
40 CAPSULE, DELAYED RELEASE ORAL DAILY
COMMUNITY
Start: 2024-11-25

## 2024-12-22 ASSESSMENT — PAIN DESCRIPTION - ONSET: ONSET: ON-GOING

## 2024-12-22 ASSESSMENT — PAIN - FUNCTIONAL ASSESSMENT
PAIN_FUNCTIONAL_ASSESSMENT: 0-10
PAIN_FUNCTIONAL_ASSESSMENT: ACTIVITIES ARE NOT PREVENTED

## 2024-12-22 ASSESSMENT — PAIN DESCRIPTION - DESCRIPTORS: DESCRIPTORS: ACHING;BURNING

## 2024-12-22 ASSESSMENT — PAIN SCALES - GENERAL: PAINLEVEL_OUTOF10: 5

## 2024-12-22 ASSESSMENT — PAIN DESCRIPTION - LOCATION: LOCATION: HEAD

## 2024-12-22 ASSESSMENT — PAIN DESCRIPTION - FREQUENCY: FREQUENCY: CONTINUOUS

## 2024-12-22 ASSESSMENT — PAIN DESCRIPTION - PAIN TYPE: TYPE: ACUTE PAIN

## 2024-12-22 ASSESSMENT — PAIN DESCRIPTION - ORIENTATION: ORIENTATION: RIGHT;LEFT;POSTERIOR

## 2024-12-22 NOTE — ED PROVIDER NOTES
[facility] Emergency Department      Pt Name: Aki Pal  MRN: [id number]  Birthdate 1950  Date of evaluation: 12/22/2024  Provider: ANGELA MARCUS MD  CHIEF COMPLAINT  Chief Complaint   Patient presents with    Headache     Pt brought to the hospital due to having a migraine for the past 3-4 days by [facility] from home, denies an numbness/tingling or difficulty ambulating.  Hx of COPD, wears O2 at night or when he is sleeping.     HPI  Aki Pal is a 74 y.o. male who presents because of headache.  Pain started 3 days ago.  Slow rate of onset and pt rates the pain 0 on a scale of 10.  He says it resolved on his way to the ER by ambulance.  They did not administer any medications.  Pain was in the back part of his head when he was having it.  He denies any injury.  He does have a history of migraines for the past 40 years.  He typically takes Fioricet but he ran out of the prescription early and has a prescription that can be refilled within the next day or 2.  He denies any nausea or vomiting.  He did have some light sensitivity.  Denies any chest pain or shortness of breath.  He chronically wears oxygen, particularly at night or as needed.      REVIEW OF SYSTEMS:  No fever, no congestion, no chest pain, no abdominal pain Pertinent positives and negatives as per the HPI.  All other pertinent review of systems reviewed and negative.  Nursing notes reviewed.    PAST MEDICAL HISTORY  Past Medical History:   Diagnosis Date    Anxiety     Bipolar 1 disorder (HCC)     Bipolar 2 disorder (HCC)     Community acquired bacterial pneumonia 06/12/2015    COPD (chronic obstructive pulmonary disease) (HCC)     Depression     Fibromyalgia     Headache(784.0)     Hyperlipidemia     Manic depressive disease manic phase (HCC)     On home O2     PTSD (post-traumatic stress disorder)     Seizures (HCC)     Tardive dyskinesia      SURGICAL HISTORY  Past Surgical History:   Procedure Laterality Date

## 2025-02-03 ENCOUNTER — APPOINTMENT (OUTPATIENT)
Dept: CT IMAGING | Age: 75
DRG: 641 | End: 2025-02-03
Payer: COMMERCIAL

## 2025-02-03 ENCOUNTER — HOSPITAL ENCOUNTER (INPATIENT)
Age: 75
LOS: 8 days | Discharge: HOME | DRG: 641 | End: 2025-02-11
Attending: EMERGENCY MEDICINE | Admitting: INTERNAL MEDICINE
Payer: COMMERCIAL

## 2025-02-03 ENCOUNTER — APPOINTMENT (OUTPATIENT)
Dept: GENERAL RADIOLOGY | Age: 75
DRG: 641 | End: 2025-02-03
Payer: COMMERCIAL

## 2025-02-03 DIAGNOSIS — F43.10 PTSD (POST-TRAUMATIC STRESS DISORDER): ICD-10-CM

## 2025-02-03 DIAGNOSIS — E87.1 HYPONATREMIA: Primary | ICD-10-CM

## 2025-02-03 DIAGNOSIS — T79.6XXA TRAUMATIC RHABDOMYOLYSIS, INITIAL ENCOUNTER (HCC): ICD-10-CM

## 2025-02-03 DIAGNOSIS — E87.8 HYPOCHLOREMIA: ICD-10-CM
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
## 2025-02-03 LAB
ALBUMIN SERPL-MCNC: 4.3 G/DL (ref 3.4–5)
ALBUMIN/GLOB SERPL: 1.4 {RATIO} (ref 1.1–2.2)
ALP SERPL-CCNC: 129 U/L (ref 40–129)
ALT SERPL-CCNC: 26 U/L (ref 10–40)
AMMONIA PLAS-SCNC: 20 UMOL/L (ref 16–60)
AMPHETAMINES UR QL SCN>1000 NG/ML: ABNORMAL
ANION GAP SERPL CALCULATED.3IONS-SCNC: 18 MMOL/L (ref 3–16)
ANION GAP SERPL CALCULATED.3IONS-SCNC: 19 MMOL/L (ref 3–16)
APAP SERPL-MCNC: <5 UG/ML (ref 10–30)
AST SERPL-CCNC: 69 U/L (ref 15–37)
BACTERIA URNS QL MICRO: NORMAL /HPF
BARBITURATES UR QL SCN>200 NG/ML: POSITIVE
BASOPHILS # BLD: 0 K/UL (ref 0–0.2)
BASOPHILS NFR BLD: 0.1 %
BENZODIAZ UR QL SCN>200 NG/ML: ABNORMAL
BILIRUB SERPL-MCNC: 0.7 MG/DL (ref 0–1)
BILIRUB UR QL STRIP.AUTO: NEGATIVE
BUN SERPL-MCNC: 11 MG/DL (ref 7–20)
BUN SERPL-MCNC: 13 MG/DL (ref 7–20)
CALCIUM SERPL-MCNC: 8.6 MG/DL (ref 8.3–10.6)
CALCIUM SERPL-MCNC: 9.2 MG/DL (ref 8.3–10.6)
CANNABINOIDS UR QL SCN>50 NG/ML: ABNORMAL
CHLORIDE SERPL-SCNC: 81 MMOL/L (ref 99–110)
CHLORIDE SERPL-SCNC: 82 MMOL/L (ref 99–110)
CK SERPL-CCNC: 1416 U/L (ref 39–308)
CLARITY UR: CLEAR
CO2 SERPL-SCNC: 22 MMOL/L (ref 21–32)
CO2 SERPL-SCNC: 22 MMOL/L (ref 21–32)
COCAINE UR QL SCN: ABNORMAL
COLOR UR: YELLOW
CREAT SERPL-MCNC: 0.6 MG/DL (ref 0.8–1.3)
CREAT SERPL-MCNC: 0.7 MG/DL (ref 0.8–1.3)
DEPRECATED RDW RBC AUTO: 14.5 % (ref 12.4–15.4)
DRUG SCREEN COMMENT UR-IMP: ABNORMAL
EOSINOPHIL # BLD: 0 K/UL (ref 0–0.6)
EOSINOPHIL NFR BLD: 0 %
EPI CELLS #/AREA URNS AUTO: 1 /HPF (ref 0–5)
ETHANOLAMINE SERPL-MCNC: NORMAL MG/DL (ref 0–0.08)
FENTANYL SCREEN, URINE: ABNORMAL
FLUAV RNA RESP QL NAA+PROBE: NOT DETECTED
FLUBV RNA RESP QL NAA+PROBE: NOT DETECTED
GFR SERPLBLD CREATININE-BSD FMLA CKD-EPI: >90 ML/MIN/{1.73_M2}
GFR SERPLBLD CREATININE-BSD FMLA CKD-EPI: >90 ML/MIN/{1.73_M2}
GLUCOSE SERPL-MCNC: 112 MG/DL (ref 70–99)
GLUCOSE SERPL-MCNC: 91 MG/DL (ref 70–99)
GLUCOSE UR STRIP.AUTO-MCNC: NEGATIVE MG/DL
HCT VFR BLD AUTO: 47.8 % (ref 40.5–52.5)
HGB BLD-MCNC: 16 G/DL (ref 13.5–17.5)
HGB UR QL STRIP.AUTO: NEGATIVE
HYALINE CASTS #/AREA URNS AUTO: 2 /LPF (ref 0–8)
KETONES UR STRIP.AUTO-MCNC: 80 MG/DL
LACTATE BLDV-SCNC: 1.3 MMOL/L (ref 0.4–1.9)
LACTATE BLDV-SCNC: 1.4 MMOL/L (ref 0.4–1.9)
LEUKOCYTE ESTERASE UR QL STRIP.AUTO: NEGATIVE
LYMPHOCYTES # BLD: 0.4 K/UL (ref 1–5.1)
LYMPHOCYTES NFR BLD: 2.3 %
MCH RBC QN AUTO: 30.6 PG (ref 26–34)
MCHC RBC AUTO-ENTMCNC: 33.4 G/DL (ref 31–36)
MCV RBC AUTO: 91.4 FL (ref 80–100)
METHADONE UR QL SCN>300 NG/ML: ABNORMAL
MONOCYTES # BLD: 1.7 K/UL (ref 0–1.3)
MONOCYTES NFR BLD: 9.3 %
NEUTROPHILS # BLD: 15.8 K/UL (ref 1.7–7.7)
NEUTROPHILS NFR BLD: 88.3 %
NITRITE UR QL STRIP.AUTO: NEGATIVE
OPIATES UR QL SCN>300 NG/ML: ABNORMAL
OXYCODONE UR QL SCN: ABNORMAL
PCP UR QL SCN>25 NG/ML: ABNORMAL
PH UR STRIP.AUTO: 6 [PH] (ref 5–8)
PH UR STRIP: 6 [PH]
PLATELET # BLD AUTO: 274 K/UL (ref 135–450)
PMV BLD AUTO: 8.8 FL (ref 5–10.5)
POTASSIUM SERPL-SCNC: 4.1 MMOL/L (ref 3.5–5.1)
POTASSIUM SERPL-SCNC: 4.2 MMOL/L (ref 3.5–5.1)
PROCALCITONIN SERPL IA-MCNC: 0.19 NG/ML (ref 0–0.15)
PROT SERPL-MCNC: 7.3 G/DL (ref 6.4–8.2)
PROT UR STRIP.AUTO-MCNC: ABNORMAL MG/DL
RBC # BLD AUTO: 5.23 M/UL (ref 4.2–5.9)
RBC CLUMPS #/AREA URNS AUTO: 1 /HPF (ref 0–4)
SALICYLATES SERPL-MCNC: <0.5 MG/DL (ref 15–30)
SARS-COV-2 RNA RESP QL NAA+PROBE: NOT DETECTED
SODIUM SERPL-SCNC: 121 MMOL/L (ref 136–145)
SODIUM SERPL-SCNC: 123 MMOL/L (ref 136–145)
SP GR UR STRIP.AUTO: 1.01 (ref 1–1.03)
TROPONIN, HIGH SENSITIVITY: 18 NG/L (ref 0–22)
UA COMPLETE W REFLEX CULTURE PNL UR: ABNORMAL
UA DIPSTICK W REFLEX MICRO PNL UR: YES
URN SPEC COLLECT METH UR: ABNORMAL
UROBILINOGEN UR STRIP-ACNC: 1 E.U./DL
WBC # BLD AUTO: 17.9 K/UL (ref 4–11)
WBC #/AREA URNS AUTO: 1 /HPF (ref 0–5)

## 2025-02-03 PROCEDURE — 70450 CT HEAD/BRAIN W/O DYE: CPT

## 2025-02-03 PROCEDURE — 81001 URINALYSIS AUTO W/SCOPE: CPT

## 2025-02-03 PROCEDURE — 82550 ASSAY OF CK (CPK): CPT

## 2025-02-03 PROCEDURE — 93005 ELECTROCARDIOGRAM TRACING: CPT | Performed by: EMERGENCY MEDICINE

## 2025-02-03 PROCEDURE — 83605 ASSAY OF LACTIC ACID: CPT

## 2025-02-03 PROCEDURE — 2580000003 HC RX 258: Performed by: INTERNAL MEDICINE

## 2025-02-03 PROCEDURE — 80143 DRUG ASSAY ACETAMINOPHEN: CPT

## 2025-02-03 PROCEDURE — 82140 ASSAY OF AMMONIA: CPT

## 2025-02-03 PROCEDURE — 82077 ASSAY SPEC XCP UR&BREATH IA: CPT

## 2025-02-03 PROCEDURE — 99285 EMERGENCY DEPT VISIT HI MDM: CPT

## 2025-02-03 PROCEDURE — 74176 CT ABD & PELVIS W/O CONTRAST: CPT

## 2025-02-03 PROCEDURE — 72125 CT NECK SPINE W/O DYE: CPT

## 2025-02-03 PROCEDURE — 80053 COMPREHEN METABOLIC PANEL: CPT

## 2025-02-03 PROCEDURE — 2060000000 HC ICU INTERMEDIATE R&B

## 2025-02-03 PROCEDURE — 36415 COLL VENOUS BLD VENIPUNCTURE: CPT

## 2025-02-03 PROCEDURE — 71045 X-RAY EXAM CHEST 1 VIEW: CPT

## 2025-02-03 PROCEDURE — 6370000000 HC RX 637 (ALT 250 FOR IP): Performed by: INTERNAL MEDICINE

## 2025-02-03 PROCEDURE — 84145 PROCALCITONIN (PCT): CPT

## 2025-02-03 PROCEDURE — 84484 ASSAY OF TROPONIN QUANT: CPT

## 2025-02-03 PROCEDURE — 80179 DRUG ASSAY SALICYLATE: CPT

## 2025-02-03 PROCEDURE — 87636 SARSCOV2 & INF A&B AMP PRB: CPT

## 2025-02-03 PROCEDURE — 85025 COMPLETE CBC W/AUTO DIFF WBC: CPT

## 2025-02-03 PROCEDURE — 80307 DRUG TEST PRSMV CHEM ANLYZR: CPT

## 2025-02-03 RX ORDER — BUDESONIDE AND FORMOTEROL FUMARATE DIHYDRATE 160; 4.5 UG/1; UG/1
2 AEROSOL RESPIRATORY (INHALATION)
Status: DISCONTINUED | OUTPATIENT
Start: 2025-02-03 | End: 2025-02-04

## 2025-02-03 RX ORDER — LORAZEPAM 1 MG/1
4 TABLET ORAL
Status: DISCONTINUED | OUTPATIENT
Start: 2025-02-03 | End: 2025-02-06

## 2025-02-03 RX ORDER — CYCLOBENZAPRINE HCL 10 MG
10 TABLET ORAL 3 TIMES DAILY PRN
Status: DISCONTINUED | OUTPATIENT
Start: 2025-02-03 | End: 2025-02-11 | Stop reason: HOSPADM

## 2025-02-03 RX ORDER — METHYLPHENIDATE HYDROCHLORIDE 10 MG/1
20 TABLET ORAL 4 TIMES DAILY
Status: DISCONTINUED | OUTPATIENT
Start: 2025-02-04 | End: 2025-02-11 | Stop reason: HOSPADM

## 2025-02-03 RX ORDER — SODIUM CHLORIDE 9 MG/ML
INJECTION, SOLUTION INTRAVENOUS PRN
Status: DISCONTINUED | OUTPATIENT
Start: 2025-02-03 | End: 2025-02-11 | Stop reason: HOSPADM

## 2025-02-03 RX ORDER — SODIUM CHLORIDE 0.9 % (FLUSH) 0.9 %
5-40 SYRINGE (ML) INJECTION EVERY 12 HOURS SCHEDULED
Status: DISCONTINUED | OUTPATIENT
Start: 2025-02-03 | End: 2025-02-11 | Stop reason: HOSPADM

## 2025-02-03 RX ORDER — LORAZEPAM 1 MG/1
2 TABLET ORAL
Status: DISCONTINUED | OUTPATIENT
Start: 2025-02-03 | End: 2025-02-06

## 2025-02-03 RX ORDER — SODIUM CHLORIDE, SODIUM LACTATE, POTASSIUM CHLORIDE, AND CALCIUM CHLORIDE .6; .31; .03; .02 G/100ML; G/100ML; G/100ML; G/100ML
1000 INJECTION, SOLUTION INTRAVENOUS ONCE
Status: COMPLETED | OUTPATIENT
Start: 2025-02-03 | End: 2025-02-04

## 2025-02-03 RX ORDER — ONDANSETRON 2 MG/ML
4 INJECTION INTRAMUSCULAR; INTRAVENOUS EVERY 6 HOURS PRN
Status: DISCONTINUED | OUTPATIENT
Start: 2025-02-03 | End: 2025-02-11 | Stop reason: HOSPADM

## 2025-02-03 RX ORDER — M-VIT,TX,IRON,MINS/CALC/FOLIC 27MG-0.4MG
1 TABLET ORAL DAILY
Status: DISCONTINUED | OUTPATIENT
Start: 2025-02-03 | End: 2025-02-11 | Stop reason: HOSPADM

## 2025-02-03 RX ORDER — PANTOPRAZOLE SODIUM 40 MG/1
40 TABLET, DELAYED RELEASE ORAL
Status: DISCONTINUED | OUTPATIENT
Start: 2025-02-04 | End: 2025-02-11 | Stop reason: HOSPADM

## 2025-02-03 RX ORDER — SODIUM CHLORIDE 0.9 % (FLUSH) 0.9 %
5-40 SYRINGE (ML) INJECTION PRN
Status: DISCONTINUED | OUTPATIENT
Start: 2025-02-03 | End: 2025-02-11 | Stop reason: HOSPADM

## 2025-02-03 RX ORDER — CETIRIZINE HYDROCHLORIDE 10 MG/1
10 TABLET ORAL DAILY
Status: DISCONTINUED | OUTPATIENT
Start: 2025-02-04 | End: 2025-02-11 | Stop reason: HOSPADM

## 2025-02-03 RX ORDER — LORAZEPAM 1 MG/1
1 TABLET ORAL
Status: DISCONTINUED | OUTPATIENT
Start: 2025-02-03 | End: 2025-02-06

## 2025-02-03 RX ORDER — LORAZEPAM 1 MG/1
3 TABLET ORAL
Status: DISCONTINUED | OUTPATIENT
Start: 2025-02-03 | End: 2025-02-06

## 2025-02-03 RX ORDER — DOXEPIN HYDROCHLORIDE 25 MG/1
50 CAPSULE ORAL NIGHTLY
Status: DISCONTINUED | OUTPATIENT
Start: 2025-02-04 | End: 2025-02-05

## 2025-02-03 RX ORDER — SODIUM CHLORIDE 0.9 % (FLUSH) 0.9 %
10 SYRINGE (ML) INJECTION PRN
Status: DISCONTINUED | OUTPATIENT
Start: 2025-02-03 | End: 2025-02-11 | Stop reason: HOSPADM

## 2025-02-03 RX ORDER — ACETAMINOPHEN 650 MG/1
650 SUPPOSITORY RECTAL EVERY 6 HOURS PRN
Status: DISCONTINUED | OUTPATIENT
Start: 2025-02-03 | End: 2025-02-11 | Stop reason: HOSPADM

## 2025-02-03 RX ORDER — PROMETHAZINE HYDROCHLORIDE 25 MG/1
12.5 TABLET ORAL EVERY 6 HOURS PRN
Status: DISCONTINUED | OUTPATIENT
Start: 2025-02-03 | End: 2025-02-11 | Stop reason: HOSPADM

## 2025-02-03 RX ORDER — ALPRAZOLAM 0.5 MG
1 TABLET ORAL 4 TIMES DAILY
Status: DISCONTINUED | OUTPATIENT
Start: 2025-02-04 | End: 2025-02-06

## 2025-02-03 RX ORDER — GAUZE BANDAGE 2" X 2"
100 BANDAGE TOPICAL DAILY
Status: DISCONTINUED | OUTPATIENT
Start: 2025-02-03 | End: 2025-02-11 | Stop reason: HOSPADM

## 2025-02-03 RX ORDER — SODIUM CHLORIDE 0.9 % (FLUSH) 0.9 %
10 SYRINGE (ML) INJECTION EVERY 12 HOURS SCHEDULED
Status: DISCONTINUED | OUTPATIENT
Start: 2025-02-03 | End: 2025-02-11 | Stop reason: HOSPADM

## 2025-02-03 RX ORDER — CLONAZEPAM 1 MG/1
1 TABLET ORAL 3 TIMES DAILY PRN
Status: DISCONTINUED | OUTPATIENT
Start: 2025-02-03 | End: 2025-02-04

## 2025-02-03 RX ORDER — ACETAMINOPHEN 325 MG/1
650 TABLET ORAL EVERY 6 HOURS PRN
Status: DISCONTINUED | OUTPATIENT
Start: 2025-02-03 | End: 2025-02-11 | Stop reason: HOSPADM

## 2025-02-03 RX ORDER — FOLIC ACID 1 MG/1
1 TABLET ORAL DAILY
Status: DISCONTINUED | OUTPATIENT
Start: 2025-02-03 | End: 2025-02-11 | Stop reason: HOSPADM

## 2025-02-03 RX ORDER — NICOTINE 21 MG/24HR
1 PATCH, TRANSDERMAL 24 HOURS TRANSDERMAL DAILY PRN
Status: DISCONTINUED | OUTPATIENT
Start: 2025-02-03 | End: 2025-02-11 | Stop reason: HOSPADM

## 2025-02-03 RX ADMIN — THIAMINE HCL TAB 100 MG 100 MG: 100 TAB at 22:18

## 2025-02-03 RX ADMIN — Medication 1 TABLET: at 22:18

## 2025-02-03 RX ADMIN — FOLIC ACID 1 MG: 1 TABLET ORAL at 22:18

## 2025-02-03 RX ADMIN — MELATONIN TAB 3 MG 3 MG: 3 TAB at 22:18

## 2025-02-03 RX ADMIN — SODIUM CHLORIDE, POTASSIUM CHLORIDE, SODIUM LACTATE AND CALCIUM CHLORIDE 1000 ML: 600; 310; 30; 20 INJECTION, SOLUTION INTRAVENOUS at 21:54

## 2025-02-04 LAB
ALBUMIN SERPL-MCNC: 3.9 G/DL (ref 3.4–5)
ALBUMIN/GLOB SERPL: 1.4 {RATIO} (ref 1.1–2.2)
ALP SERPL-CCNC: 98 U/L (ref 40–129)
ALT SERPL-CCNC: 23 U/L (ref 10–40)
ANION GAP SERPL CALCULATED.3IONS-SCNC: 12 MMOL/L (ref 3–16)
ANION GAP SERPL CALCULATED.3IONS-SCNC: 13 MMOL/L (ref 3–16)
ANION GAP SERPL CALCULATED.3IONS-SCNC: 13 MMOL/L (ref 3–16)
AST SERPL-CCNC: 69 U/L (ref 15–37)
BASOPHILS # BLD: 0 K/UL (ref 0–0.2)
BASOPHILS NFR BLD: 0 %
BILIRUB SERPL-MCNC: 0.5 MG/DL (ref 0–1)
BUN SERPL-MCNC: 13 MG/DL (ref 7–20)
BUN SERPL-MCNC: 14 MG/DL (ref 7–20)
BUN SERPL-MCNC: 36 MG/DL (ref 7–20)
CALCIUM SERPL-MCNC: 8.4 MG/DL (ref 8.3–10.6)
CALCIUM SERPL-MCNC: 8.5 MG/DL (ref 8.3–10.6)
CALCIUM SERPL-MCNC: 8.5 MG/DL (ref 8.3–10.6)
CHLORIDE SERPL-SCNC: 85 MMOL/L (ref 99–110)
CHLORIDE SERPL-SCNC: 88 MMOL/L (ref 99–110)
CHLORIDE SERPL-SCNC: 89 MMOL/L (ref 99–110)
CO2 SERPL-SCNC: 23 MMOL/L (ref 21–32)
CO2 SERPL-SCNC: 24 MMOL/L (ref 21–32)
CO2 SERPL-SCNC: 26 MMOL/L (ref 21–32)
CREAT SERPL-MCNC: 0.6 MG/DL (ref 0.8–1.3)
CREAT SERPL-MCNC: 0.7 MG/DL (ref 0.8–1.3)
CREAT SERPL-MCNC: 0.8 MG/DL (ref 0.8–1.3)
DEPRECATED RDW RBC AUTO: 14.6 % (ref 12.4–15.4)
EKG ATRIAL RATE: 95 BPM
EKG DIAGNOSIS: NORMAL
EKG P AXIS: 107 DEGREES
EKG P-R INTERVAL: 122 MS
EKG Q-T INTERVAL: 362 MS
EKG QRS DURATION: 88 MS
EKG QTC CALCULATION (BAZETT): 454 MS
EKG R AXIS: 68 DEGREES
EKG T AXIS: 40 DEGREES
EKG VENTRICULAR RATE: 95 BPM
EOSINOPHIL # BLD: 0 K/UL (ref 0–0.6)
EOSINOPHIL NFR BLD: 0 %
GFR SERPLBLD CREATININE-BSD FMLA CKD-EPI: >90 ML/MIN/{1.73_M2}
GLUCOSE SERPL-MCNC: 116 MG/DL (ref 70–99)
GLUCOSE SERPL-MCNC: 149 MG/DL (ref 70–99)
GLUCOSE SERPL-MCNC: 83 MG/DL (ref 70–99)
HCT VFR BLD AUTO: 44.8 % (ref 40.5–52.5)
HGB BLD-MCNC: 15.3 G/DL (ref 13.5–17.5)
LACTATE BLDV-SCNC: 1.1 MMOL/L (ref 0.4–2)
LYMPHOCYTES # BLD: 0.7 K/UL (ref 1–5.1)
LYMPHOCYTES NFR BLD: 6 %
MAGNESIUM SERPL-MCNC: 2.75 MG/DL (ref 1.8–2.4)
MCH RBC QN AUTO: 31 PG (ref 26–34)
MCHC RBC AUTO-ENTMCNC: 34.1 G/DL (ref 31–36)
MCV RBC AUTO: 90.9 FL (ref 80–100)
MONOCYTES # BLD: 1.8 K/UL (ref 0–1.3)
MONOCYTES NFR BLD: 15 %
NEUTROPHILS # BLD: 9.7 K/UL (ref 1.7–7.7)
NEUTROPHILS NFR BLD: 78 %
NEUTS BAND NFR BLD MANUAL: 1 % (ref 0–7)
OSMOLALITY SERPL: 269 MOSM/KG (ref 280–301)
OSMOLALITY UR: 339 MOSM/KG (ref 390–1070)
PATH INTERP BLD-IMP: NORMAL
PATH INTERP BLD-IMP: YES
PHOSPHATE SERPL-MCNC: 3.1 MG/DL (ref 2.5–4.9)
PLATELET # BLD AUTO: 228 K/UL (ref 135–450)
PLATELET BLD QL SMEAR: ADEQUATE
PMV BLD AUTO: 8.5 FL (ref 5–10.5)
POTASSIUM SERPL-SCNC: 3.7 MMOL/L (ref 3.5–5.1)
POTASSIUM SERPL-SCNC: 4 MMOL/L (ref 3.5–5.1)
POTASSIUM SERPL-SCNC: 4.1 MMOL/L (ref 3.5–5.1)
PROT SERPL-MCNC: 6.6 G/DL (ref 6.4–8.2)
RBC # BLD AUTO: 4.93 M/UL (ref 4.2–5.9)
RBC MORPH BLD: NORMAL
REASON FOR REJECTION: NORMAL
REJECTED TEST: NORMAL
SLIDE REVIEW: ABNORMAL
SODIUM SERPL-SCNC: 122 MMOL/L (ref 136–145)
SODIUM SERPL-SCNC: 125 MMOL/L (ref 136–145)
SODIUM SERPL-SCNC: 126 MMOL/L (ref 136–145)
SODIUM UR-SCNC: <20 MMOL/L
TSH SERPL DL<=0.005 MIU/L-ACNC: 0.88 UIU/ML (ref 0.27–4.2)
WBC # BLD AUTO: 12.3 K/UL (ref 4–11)

## 2025-02-04 PROCEDURE — 51701 INSERT BLADDER CATHETER: CPT

## 2025-02-04 PROCEDURE — 93010 ELECTROCARDIOGRAM REPORT: CPT | Performed by: INTERNAL MEDICINE

## 2025-02-04 PROCEDURE — 1200000000 HC SEMI PRIVATE

## 2025-02-04 PROCEDURE — 6370000000 HC RX 637 (ALT 250 FOR IP): Performed by: INTERNAL MEDICINE

## 2025-02-04 PROCEDURE — 97535 SELF CARE MNGMENT TRAINING: CPT

## 2025-02-04 PROCEDURE — 36415 COLL VENOUS BLD VENIPUNCTURE: CPT

## 2025-02-04 PROCEDURE — 97166 OT EVAL MOD COMPLEX 45 MIN: CPT

## 2025-02-04 PROCEDURE — 92610 EVALUATE SWALLOWING FUNCTION: CPT

## 2025-02-04 PROCEDURE — 84100 ASSAY OF PHOSPHORUS: CPT

## 2025-02-04 PROCEDURE — 80053 COMPREHEN METABOLIC PANEL: CPT

## 2025-02-04 PROCEDURE — 92526 ORAL FUNCTION THERAPY: CPT

## 2025-02-04 PROCEDURE — 97530 THERAPEUTIC ACTIVITIES: CPT

## 2025-02-04 PROCEDURE — 83935 ASSAY OF URINE OSMOLALITY: CPT

## 2025-02-04 PROCEDURE — 83930 ASSAY OF BLOOD OSMOLALITY: CPT

## 2025-02-04 PROCEDURE — 83605 ASSAY OF LACTIC ACID: CPT

## 2025-02-04 PROCEDURE — 84300 ASSAY OF URINE SODIUM: CPT

## 2025-02-04 PROCEDURE — 85025 COMPLETE CBC W/AUTO DIFF WBC: CPT

## 2025-02-04 PROCEDURE — 51798 US URINE CAPACITY MEASURE: CPT

## 2025-02-04 PROCEDURE — 6370000000 HC RX 637 (ALT 250 FOR IP): Performed by: HOSPITALIST

## 2025-02-04 PROCEDURE — 84443 ASSAY THYROID STIM HORMONE: CPT

## 2025-02-04 PROCEDURE — 94760 N-INVAS EAR/PLS OXIMETRY 1: CPT

## 2025-02-04 PROCEDURE — 97162 PT EVAL MOD COMPLEX 30 MIN: CPT

## 2025-02-04 PROCEDURE — 83735 ASSAY OF MAGNESIUM: CPT

## 2025-02-04 PROCEDURE — 2060000000 HC ICU INTERMEDIATE R&B

## 2025-02-04 PROCEDURE — 97116 GAIT TRAINING THERAPY: CPT

## 2025-02-04 PROCEDURE — 94761 N-INVAS EAR/PLS OXIMETRY MLT: CPT

## 2025-02-04 PROCEDURE — 2500000003 HC RX 250 WO HCPCS: Performed by: INTERNAL MEDICINE

## 2025-02-04 PROCEDURE — 6370000000 HC RX 637 (ALT 250 FOR IP)

## 2025-02-04 PROCEDURE — 94640 AIRWAY INHALATION TREATMENT: CPT

## 2025-02-04 RX ORDER — BUDESONIDE AND FORMOTEROL FUMARATE DIHYDRATE 160; 4.5 UG/1; UG/1
2 AEROSOL RESPIRATORY (INHALATION)
Status: DISCONTINUED | OUTPATIENT
Start: 2025-02-04 | End: 2025-02-11 | Stop reason: HOSPADM

## 2025-02-04 RX ORDER — TAMSULOSIN HYDROCHLORIDE 0.4 MG/1
0.4 CAPSULE ORAL DAILY
Status: DISCONTINUED | OUTPATIENT
Start: 2025-02-04 | End: 2025-02-11 | Stop reason: HOSPADM

## 2025-02-04 RX ADMIN — METHYLPHENIDATE HYDROCHLORIDE 20 MG: 10 TABLET ORAL at 12:26

## 2025-02-04 RX ADMIN — ALPRAZOLAM 1 MG: 0.5 TABLET ORAL at 17:23

## 2025-02-04 RX ADMIN — TAMSULOSIN HYDROCHLORIDE 0.4 MG: 0.4 CAPSULE ORAL at 17:23

## 2025-02-04 RX ADMIN — ACETAMINOPHEN 650 MG: 325 TABLET ORAL at 03:59

## 2025-02-04 RX ADMIN — Medication 30 G: at 12:25

## 2025-02-04 RX ADMIN — METHYLPHENIDATE HYDROCHLORIDE 20 MG: 10 TABLET ORAL at 17:23

## 2025-02-04 RX ADMIN — THIAMINE HCL TAB 100 MG 100 MG: 100 TAB at 09:28

## 2025-02-04 RX ADMIN — FOLIC ACID 1 MG: 1 TABLET ORAL at 09:27

## 2025-02-04 RX ADMIN — METHYLPHENIDATE HYDROCHLORIDE 20 MG: 10 TABLET ORAL at 09:27

## 2025-02-04 RX ADMIN — ALPRAZOLAM 1 MG: 0.5 TABLET ORAL at 12:26

## 2025-02-04 RX ADMIN — Medication 10 ML: at 09:28

## 2025-02-04 RX ADMIN — Medication 1 TABLET: at 09:27

## 2025-02-04 RX ADMIN — ALPRAZOLAM 1 MG: 0.5 TABLET ORAL at 20:34

## 2025-02-04 RX ADMIN — CETIRIZINE HYDROCHLORIDE 10 MG: 10 TABLET, FILM COATED ORAL at 09:28

## 2025-02-04 RX ADMIN — MELATONIN TAB 3 MG 3 MG: 3 TAB at 20:34

## 2025-02-04 RX ADMIN — Medication 2 PUFF: at 19:31

## 2025-02-04 RX ADMIN — METHYLPHENIDATE HYDROCHLORIDE 20 MG: 10 TABLET ORAL at 20:34

## 2025-02-04 RX ADMIN — ALPRAZOLAM 1 MG: 0.5 TABLET ORAL at 09:27

## 2025-02-04 RX ADMIN — Medication 2 PUFF: at 11:20

## 2025-02-04 RX ADMIN — Medication 10 ML: at 20:35

## 2025-02-04 RX ADMIN — PANTOPRAZOLE SODIUM 40 MG: 40 TABLET, DELAYED RELEASE ORAL at 09:27

## 2025-02-04 RX ADMIN — ACETAMINOPHEN 650 MG: 325 TABLET ORAL at 19:45

## 2025-02-04 ASSESSMENT — PAIN DESCRIPTION - DESCRIPTORS
DESCRIPTORS: DISCOMFORT
DESCRIPTORS: SHARP;SHOOTING

## 2025-02-04 ASSESSMENT — PAIN - FUNCTIONAL ASSESSMENT: PAIN_FUNCTIONAL_ASSESSMENT: ACTIVITIES ARE NOT PREVENTED

## 2025-02-04 ASSESSMENT — PAIN DESCRIPTION - LOCATION
LOCATION: HIP
LOCATION: BACK;NECK

## 2025-02-04 ASSESSMENT — PAIN SCALES - GENERAL
PAINLEVEL_OUTOF10: 3
PAINLEVEL_OUTOF10: 10

## 2025-02-04 ASSESSMENT — PAIN DESCRIPTION - ORIENTATION: ORIENTATION: OUTER

## 2025-02-04 NOTE — CARE COORDINATION
Discharge Planning:    CM called pt's spouse Beba for SNF options.  No answer, could not leave voicemail due to full mailbox.    Pt is admitted on [facility] protocol.    Electronically signed by Michael Singh on 2/4/2025 at 4:16 PM

## 2025-02-04 NOTE — H&P
1,416*       Urinalysis:      Lab Results   Component Value Date/Time    NITRU Negative 02/03/2025 07:55 PM    WBCUA 1 02/03/2025 07:55 PM    BACTERIA None Seen 02/03/2025 07:55 PM    RBCUA 1 02/03/2025 07:55 PM    BLOODU Negative 02/03/2025 07:55 PM    GLUCOSEU Negative 02/03/2025 07:55 PM    GLUCOSEU NEGATIVE 04/27/2012 10:50 PM       Radiology:     CXR: I have reviewed the CXR with the following interpretation:   Atelectasis  EKG:  I have reviewed the EKG with the following interpretation:   Normal sinus rhythm    .  CT C-Spine W/O Contrast   Final Result   1. No acute cervical spine fracture.   2. Advanced multilevel degenerative disc disease and moderate multilevel   facet arthropathy in the spine.  Grade 1 anterolisthesis at C3-C4 and C4-C5   is again present and likely degenerative.   3. Kyphosis of the spine which could be related to patient positioning,   degenerative changes or muscle spasm.         CT Head W/O Contrast   Preliminary Result   Motion artifact degrades the images.  Cerebral atrophy.  Chronic small vessel   ischemic disease.  No acute brain parenchymal abnormality.         XR CHEST PORTABLE   Final Result   Left basilar atelectasis.         CT ABDOMEN PELVIS WO CONTRAST Additional Contrast? None    (Results Pending)                Sandra Acharya, DO

## 2025-02-04 NOTE — RT PROTOCOL NOTE
RT Inhaler-Nebulizer Bronchodilator Protocol Note    There is a bronchodilator order in the chart from a provider indicating to follow the RT Bronchodilator Protocol and there is an “Initiate RT Inhaler-Nebulizer Bronchodilator Protocol” order as well (see protocol at bottom of note).    CXR Findings:  XR CHEST PORTABLE    Result Date: 2/3/2025  Left basilar atelectasis.       The findings from the last RT Protocol Assessment were as follows:   History Pulmonary Disease: Chronic pulmonary disease  Respiratory Pattern: Regular pattern and RR 12-20 bpm  Breath Sounds: Slightly diminished and/or crackles  Cough: Strong, spontaneous, non-productive  Indication for Bronchodilator Therapy:    Bronchodilator Assessment Score: 4    Aerosolized bronchodilator medication orders have been revised according to the RT Inhaler-Nebulizer Bronchodilator Protocol below.    Respiratory Therapist to perform RT Therapy Protocol Assessment initially then follow the protocol.  Repeat RT Therapy Protocol Assessment PRN for score 0-3 or on second treatment, BID, and PRN for scores above 3.    No Indications - adjust the frequency to every 6 hours PRN wheezing or bronchospasm, if no treatments needed after 48 hours then discontinue using Per Protocol order mode.     If indication present, adjust the RT bronchodilator orders based on the Bronchodilator Assessment Score as indicated below.  Use Inhaler orders unless patient has one or more of the following: on home nebulizer, not able to hold breath for 10 seconds, is not alert and oriented, cannot activate and use MDI correctly, or respiratory rate 25 breaths per minute or more, then use the equivalent nebulizer order(s) with same Frequency and PRN reasons based on the score.  If a patient is on this medication at home then do not decrease Frequency below that used at home.    0-3 - enter or revise RT bronchodilator order(s) to equivalent RT Bronchodilator order with Frequency of every 4

## 2025-02-04 NOTE — ED PROVIDER NOTES
[facility] EMERGENCY DEPARTMENT  EMERGENCY DEPARTMENTENCOUNTER      Pt Name: Aki Pal  MRN: [id number]  Birthdate 1950  Date ofevaluation: 2/3/2025  Provider: Nikita Us MD    CHIEF COMPLAINT       Chief Complaint   Patient presents with    Altered Mental Status     Patient arrives from home via [facility] with complaints of AMS. Wife reports that patient has been falling several times times. Patient weak and tremulous. Poor historian. C-collar applied d/t patient complaining of neck pain. Has chronic neck pain. Patient febrile and tachycardic en route.        HPI    HISTORY OF PRESENT ILLNESS   (Location/Symptom, Timing/Onset,Context/Setting, Quality, Duration, Modifying Factors, Severity)  Note limiting factors.   Aki Pal is a 75 y.o. male who presents to the emergency department after falling.  This is a 75-year-old male who presents after having multiple falls at home.  The patient presents tremulous and with some generalized weakness per the wife.  The patient also has a history of chronic pain.  He was apparently also tachycardic en route.  He is not febrile here.        NursingNotes were reviewed.    Review of Systems    REVIEW OF SYSTEMS    (2-9 systems for level 4, 10 or more for level 5)     Review of Systems   Constitutional: Negative for fever.   HENT: Negative for rhinorrhea and sore throat.    Eyes: Negative for redness.   Respiratory: Negative for shortness of breath.    Cardiovascular: Negative for chest pain.   Gastrointestinal: Negative for abdominal pain.   Genitourinary: Negative for flank pain.   Neurological: Negative for headaches.   Hematological: Negative for adenopathy.   Psychiatric/Behavioral: Negative for confusion.              Except as noted above the remainder of the review of systems was reviewed and negative.       PAST MEDICAL HISTORY     Past Medical History:   Diagnosis Date    Anxiety     Bipolar 1 disorder (HCC)     Bipolar 2

## 2025-02-05 ENCOUNTER — APPOINTMENT (OUTPATIENT)
Dept: GENERAL RADIOLOGY | Age: 75
DRG: 641 | End: 2025-02-05
Payer: COMMERCIAL

## 2025-02-05 PROBLEM — F10.90 ALCOHOL USE: Status: ACTIVE | Noted: 2025-02-05

## 2025-02-05 PROBLEM — E87.8 ELECTROLYTE IMBALANCE: Status: ACTIVE | Noted: 2025-02-05

## 2025-02-05 PROBLEM — E87.1 HYPONATREMIA: Status: ACTIVE | Noted: 2025-02-05

## 2025-02-05 PROBLEM — Z78.9 ALCOHOL USE: Status: ACTIVE | Noted: 2025-02-05

## 2025-02-05 LAB
ALBUMIN SERPL-MCNC: 3.5 G/DL (ref 3.4–5)
ALBUMIN/GLOB SERPL: 1.5 {RATIO} (ref 1.1–2.2)
ALP SERPL-CCNC: 98 U/L (ref 40–129)
ALT SERPL-CCNC: 22 U/L (ref 10–40)
ANION GAP SERPL CALCULATED.3IONS-SCNC: 10 MMOL/L (ref 3–16)
AST SERPL-CCNC: 43 U/L (ref 15–37)
BASOPHILS # BLD: 0.1 K/UL (ref 0–0.2)
BASOPHILS NFR BLD: 0.4 %
BILIRUB SERPL-MCNC: <0.2 MG/DL (ref 0–1)
BUN SERPL-MCNC: 25 MG/DL (ref 7–20)
CALCIUM SERPL-MCNC: 8.5 MG/DL (ref 8.3–10.6)
CHLORIDE SERPL-SCNC: 96 MMOL/L (ref 99–110)
CO2 SERPL-SCNC: 26 MMOL/L (ref 21–32)
CREAT SERPL-MCNC: 0.8 MG/DL (ref 0.8–1.3)
DEPRECATED RDW RBC AUTO: 15 % (ref 12.4–15.4)
EOSINOPHIL # BLD: 0 K/UL (ref 0–0.6)
EOSINOPHIL NFR BLD: 0.3 %
GFR SERPLBLD CREATININE-BSD FMLA CKD-EPI: >90 ML/MIN/{1.73_M2}
GLUCOSE SERPL-MCNC: 119 MG/DL (ref 70–99)
HCT VFR BLD AUTO: 44.3 % (ref 40.5–52.5)
HGB BLD-MCNC: 14.9 G/DL (ref 13.5–17.5)
LYMPHOCYTES # BLD: 1 K/UL (ref 1–5.1)
LYMPHOCYTES NFR BLD: 8.6 %
MCH RBC QN AUTO: 31.1 PG (ref 26–34)
MCHC RBC AUTO-ENTMCNC: 33.6 G/DL (ref 31–36)
MCV RBC AUTO: 92.5 FL (ref 80–100)
MONOCYTES # BLD: 1.2 K/UL (ref 0–1.3)
MONOCYTES NFR BLD: 10.6 %
NEUTROPHILS # BLD: 9.3 K/UL (ref 1.7–7.7)
NEUTROPHILS NFR BLD: 80.1 %
PLATELET # BLD AUTO: 207 K/UL (ref 135–450)
PMV BLD AUTO: 8.7 FL (ref 5–10.5)
POTASSIUM SERPL-SCNC: 3.7 MMOL/L (ref 3.5–5.1)
PROT SERPL-MCNC: 5.8 G/DL (ref 6.4–8.2)
RBC # BLD AUTO: 4.78 M/UL (ref 4.2–5.9)
SODIUM SERPL-SCNC: 132 MMOL/L (ref 136–145)
SODIUM SERPL-SCNC: 133 MMOL/L (ref 136–145)
WBC # BLD AUTO: 11.7 K/UL (ref 4–11)

## 2025-02-05 PROCEDURE — 6370000000 HC RX 637 (ALT 250 FOR IP): Performed by: INTERNAL MEDICINE

## 2025-02-05 PROCEDURE — 2580000003 HC RX 258: Performed by: INTERNAL MEDICINE

## 2025-02-05 PROCEDURE — 84295 ASSAY OF SERUM SODIUM: CPT

## 2025-02-05 PROCEDURE — 94761 N-INVAS EAR/PLS OXIMETRY MLT: CPT

## 2025-02-05 PROCEDURE — 36415 COLL VENOUS BLD VENIPUNCTURE: CPT

## 2025-02-05 PROCEDURE — 73620 X-RAY EXAM OF FOOT: CPT

## 2025-02-05 PROCEDURE — 2700000000 HC OXYGEN THERAPY PER DAY

## 2025-02-05 PROCEDURE — 1200000000 HC SEMI PRIVATE

## 2025-02-05 PROCEDURE — 80053 COMPREHEN METABOLIC PANEL: CPT

## 2025-02-05 PROCEDURE — 2500000003 HC RX 250 WO HCPCS: Performed by: INTERNAL MEDICINE

## 2025-02-05 PROCEDURE — 94640 AIRWAY INHALATION TREATMENT: CPT

## 2025-02-05 PROCEDURE — 99222 1ST HOSP IP/OBS MODERATE 55: CPT | Performed by: NURSE PRACTITIONER

## 2025-02-05 PROCEDURE — 85025 COMPLETE CBC W/AUTO DIFF WBC: CPT

## 2025-02-05 RX ORDER — BUTALBITAL, ACETAMINOPHEN AND CAFFEINE 50; 325; 40 MG/1; MG/1; MG/1
1 TABLET ORAL 2 TIMES DAILY PRN
Status: DISCONTINUED | OUTPATIENT
Start: 2025-02-05 | End: 2025-02-11 | Stop reason: HOSPADM

## 2025-02-05 RX ORDER — DEXTROSE MONOHYDRATE 50 MG/ML
INJECTION, SOLUTION INTRAVENOUS CONTINUOUS
Status: DISCONTINUED | OUTPATIENT
Start: 2025-02-05 | End: 2025-02-05

## 2025-02-05 RX ADMIN — Medication 10 ML: at 08:36

## 2025-02-05 RX ADMIN — METHYLPHENIDATE HYDROCHLORIDE 20 MG: 10 TABLET ORAL at 14:12

## 2025-02-05 RX ADMIN — BUTALBITAL, ACETAMINOPHEN, AND CAFFEINE 1 TABLET: 50; 325; 40 TABLET ORAL at 20:58

## 2025-02-05 RX ADMIN — METHYLPHENIDATE HYDROCHLORIDE 20 MG: 10 TABLET ORAL at 19:38

## 2025-02-05 RX ADMIN — ACETAMINOPHEN 650 MG: 325 TABLET ORAL at 03:09

## 2025-02-05 RX ADMIN — ALPRAZOLAM 1 MG: 0.5 TABLET ORAL at 18:25

## 2025-02-05 RX ADMIN — DEXTROSE MONOHYDRATE: 50 INJECTION, SOLUTION INTRAVENOUS at 08:35

## 2025-02-05 RX ADMIN — ACETAMINOPHEN 650 MG: 325 TABLET ORAL at 08:37

## 2025-02-05 RX ADMIN — ALPRAZOLAM 1 MG: 0.5 TABLET ORAL at 13:47

## 2025-02-05 RX ADMIN — Medication 2 PUFF: at 09:00

## 2025-02-05 RX ADMIN — ALPRAZOLAM 1 MG: 0.5 TABLET ORAL at 08:36

## 2025-02-05 RX ADMIN — CETIRIZINE HYDROCHLORIDE 10 MG: 10 TABLET, FILM COATED ORAL at 08:37

## 2025-02-05 RX ADMIN — FOLIC ACID 1 MG: 1 TABLET ORAL at 08:37

## 2025-02-05 RX ADMIN — Medication 10 ML: at 20:59

## 2025-02-05 RX ADMIN — Medication 1 TABLET: at 08:37

## 2025-02-05 RX ADMIN — PANTOPRAZOLE SODIUM 40 MG: 40 TABLET, DELAYED RELEASE ORAL at 05:12

## 2025-02-05 RX ADMIN — THIAMINE HCL TAB 100 MG 100 MG: 100 TAB at 08:37

## 2025-02-05 RX ADMIN — ALPRAZOLAM 1 MG: 0.5 TABLET ORAL at 20:58

## 2025-02-05 RX ADMIN — METHYLPHENIDATE HYDROCHLORIDE 20 MG: 10 TABLET ORAL at 08:37

## 2025-02-05 RX ADMIN — CYCLOBENZAPRINE 10 MG: 10 TABLET, FILM COATED ORAL at 05:14

## 2025-02-05 ASSESSMENT — PAIN DESCRIPTION - FREQUENCY
FREQUENCY: CONTINUOUS

## 2025-02-05 ASSESSMENT — PAIN DESCRIPTION - DESCRIPTORS
DESCRIPTORS: STABBING
DESCRIPTORS: SHOOTING

## 2025-02-05 ASSESSMENT — PAIN DESCRIPTION - ONSET
ONSET: ON-GOING

## 2025-02-05 ASSESSMENT — PAIN SCALES - GENERAL
PAINLEVEL_OUTOF10: 9
PAINLEVEL_OUTOF10: 9
PAINLEVEL_OUTOF10: 8
PAINLEVEL_OUTOF10: 4
PAINLEVEL_OUTOF10: 8
PAINLEVEL_OUTOF10: 5
PAINLEVEL_OUTOF10: 8
PAINLEVEL_OUTOF10: 6

## 2025-02-05 ASSESSMENT — PAIN DESCRIPTION - LOCATION
LOCATION: HEAD
LOCATION: FOOT

## 2025-02-05 ASSESSMENT — PAIN DESCRIPTION - ORIENTATION
ORIENTATION: LEFT

## 2025-02-05 ASSESSMENT — PAIN - FUNCTIONAL ASSESSMENT
PAIN_FUNCTIONAL_ASSESSMENT: ACTIVITIES ARE NOT PREVENTED

## 2025-02-05 ASSESSMENT — PAIN DESCRIPTION - PAIN TYPE: TYPE: ACUTE PAIN

## 2025-02-05 NOTE — CARE COORDINATION
Case Management Assessment  Initial Evaluation    Date/Time of Evaluation: 2/5/2025 4:15 PM  Assessment Completed by: Michael Singh    If patient is discharged prior to next notation, then this note serves as note for discharge by case management.    Patient Name: Aki Pal                   YOB: 1950  Diagnosis: Hypochloremia [E87.8]  Hyponatremia [E87.1]  Fall, initial encounter [W19.XXXA]  Traumatic rhabdomyolysis, initial encounter ([facility]) [T79.6XXA]                   Date / Time: 2/3/2025  4:47 PM    Patient Admission Status: Inpatient   Readmission Risk (Low < 19, Mod (19-27), High > 27): Readmission Risk Score: 17.2    Current PCP: Paul Crane MD  PCP verified by CM? (P) Yes    Chart Reviewed: Yes      History Provided by: Patient  Patient Orientation: Alert and Oriented    Patient Cognition: Alert    Hospitalization in the last 30 days (Readmission):  No    If yes, Readmission Assessment in CM Navigator will be completed.    Advance Directives:      Code Status: Full Code   Patient's Primary Decision Maker is: Legal Next of Kin (spouse Beba)    Primary Decision Maker: Beba Pal - Spouse - [phone]    Secondary Decision Maker: KaeShahab - Child - [phone]    Secondary Decision Maker: KaePrabhjot - Child - [phone]    Discharge Planning:    Patient lives with: (P) Spouse/Significant Other Type of Home: (P) House  Primary Care Giver: Self  Patient Support Systems include: Spouse/Significant Other   Current Financial resources: (P) Medicare  Current community resources: (P) None  Current services prior to admission: (P) None            Current DME:              Type of Home Care services:  (P) None    ADLS  Prior functional level: (P) Independent in ADLs/IADLs  Current functional level: (P) Assistance with the following:, Shopping, Cooking, Housework    PT AM-PAC: 15 /24  OT AM-PAC: 16 /24    Family can provide assistance at DC: (P) Yes  Would you

## 2025-02-05 NOTE — CONSULTS
Consult received   
Nephrology Consult Note                                                                                                                                                                                                                                                                                                                                                               Office : [phone]     Fax :[phone]    Patient's Name: Aki Pal  10:21 AM  2/4/2025    Reason for Consult:  Hyponatremia  Requesting Physician:  Paul Crane MD  Chief Complaint:    Chief Complaint   Patient presents with    Altered Mental Status     Patient arrives from home via [facility] with complaints of AMS. Wife reports that patient has been falling several times times. Patient weak and tremulous. Poor historian. C-collar applied d/t patient complaining of neck pain. Has chronic neck pain. Patient febrile and tachycardic en route.        Assessment/Plan     # Hyponatremia  - likely multifactorial related to high fluid intake, poor protein intake  - Na 123--> 122  - urine studies reviewed  - 1.2 L fluid restriction  - increase protein intake  - Urea x 1 today  - Monitor Na q6    #Fall   - PT/OT eval     #Alcohol dependence  - on CIWA protocol     History of Present Ilness:    Aki Pal is a 75 y.o. male with PMHx of bipolar 1, bipolar 2, COPD, fibromyalgia, hyperlipidemia PTSD seizure who presented to the ED with chief complaint of falls. Patient denied having encephalopathy. He reports that he drinks every day at least 16 ounces of beer 3-4 times daily, no known alleviating or exacerbating factor.  Patient reports that he did have a seizure when he stopped previously.  Patient otherwise reports has fallen multiple times, reports no nausea vomiting palpitations prior to the fall reports that he normally sleeps and falls. Upon further workup, patient found to have hyponatremia. We are consulted for 
cetirizine  10 mg Oral Daily    methylphenidate  20 mg Oral 4x Daily    pantoprazole  40 mg Oral QAM AC     Continuous:   dextrose 100 mL/hr at 02/05/25 0835    sodium chloride      sodium chloride       PRN:sodium chloride flush, sodium chloride, promethazine **OR** ondansetron, melatonin, nicotine, acetaminophen **OR** acetaminophen, sodium chloride flush, sodium chloride, LORazepam **OR** LORazepam **OR** LORazepam **OR** LORazepam **OR** LORazepam **OR** LORazepam **OR** LORazepam **OR** LORazepam, cyclobenzaprine    Allergies:   Allergies   Allergen Reactions    Prednisone Anxiety    Serotonin Reuptake Inhibitors (Ssris) Anxiety    Tricyclic Antidepressants Anxiety       Review of Systems:  Constitutional: Negative for fever, chills, fatigue.   Skin:  Negative for pruritis, rash  Eyes: Negative for photophobia and visual disturbance.   ENT:  Negative for rhinorrhea, epistaxis, sore throat  Respiratory:  Negative for cough and shortness of breath.   Cardiovascular: Negative for chest pain.   Gastrointestinal: Negative for nausea, vomiting, diarrhea.  Genitourinary: Negative for dysuria and difficulty urinating.   Neurological: Negative for confusion, dysarthria, tremors, seizures.   Psychiatric:  Negative for depression or anxiety  Musculoskeletal:  Positive for left leg pain    Objective:  Vitals:    02/05/25 0901   BP:    Pulse: 93   Resp: 16   Temp:    SpO2: 93%      Physical Examination:  GENERAL: No apparent distress, well-nourished  SKIN:  Warm and dry  EYES: Nonicteric.   ENT: Mucous membranes moist  HEAD: Normocephalic, atraumatic  RESPIRATORY: Resp easy and unlabored  CARDIOVASCULAR: Regular rate and rhythm  GI: Abdomen soft, nontender  NEURO: Awake and alert.  No speech defect  PSYCHIATRIC: Appropriate affect; not agitated  MUSCULOSKELETAL:  LEFT foot  Inspection: On exam there are no ulcerations, rashes or lesions about the left foot.   There is NO pain to palpation of the left foot anywhere.  No 
history of Anxiety, Bipolar 1 disorder ([facility]), Bipolar 2 disorder ([facility]), Community acquired bacterial pneumonia (06/12/2015), COPD (chronic obstructive pulmonary disease) ([facility]), Depression, Fibromyalgia, Headache(784.0), Hyperlipidemia, Manic depressive disease manic phase ([facility]), On home O2, PTSD (post-traumatic stress disorder), Seizures ([facility]), and Tardive dyskinesia.     Hospital Problem List:  Principal Problem:    Fall, initial encounter  Resolved Problems:    * No resolved hospital problems. *      Past Surgical History:  He has a past surgical history that includes Mouth surgery (05/2013).     Social History:  He reports that he has been smoking cigarettes. He has a 42 pack-year smoking history. He has never used smokeless tobacco. He reports current alcohol use. He reports that he does not use drugs.     Family History:  family history includes Mental Illness in his father and mother.    Allergies:  Allergies   Allergen Reactions    Prednisone Anxiety    Serotonin Reuptake Inhibitors (Ssris) Anxiety    Tricyclic Antidepressants Anxiety       Medications:  Scheduled Meds:   budesonide-formoterol  2 puff Inhalation BID RT    tamsulosin  0.4 mg Oral Daily    sodium chloride flush  10 mL IntraVENous 2 times per day    sodium chloride flush  5-40 mL IntraVENous 2 times per day    thiamine  100 mg Oral Daily    multivitamin  1 tablet Oral Daily    folic acid  1 mg Oral Daily    ALPRAZolam  1 mg Oral 4x Daily    cetirizine  10 mg Oral Daily    methylphenidate  20 mg Oral 4x Daily    pantoprazole  40 mg Oral QAM AC     Continuous Infusions:   dextrose 100 mL/hr at 02/05/25 0835    sodium chloride      sodium chloride       PRN Meds:sodium chloride flush, sodium chloride, promethazine **OR** ondansetron, melatonin, nicotine, acetaminophen **OR** acetaminophen, sodium chloride flush, sodium chloride, LORazepam **OR** LORazepam **OR** LORazepam **OR** LORazepam **OR** LORazepam **OR** LORazepam **OR** LORazepam **OR**

## 2025-02-05 NOTE — CARE COORDINATION
Discharge Planning;    Wife chose [facility], [facility], [facility] and rehab, [facility].    CM sent the referrals to all SNFs, pending acceptance.    Electronically signed by Michael Singh on 2/5/2025 at 4:18 PM

## 2025-02-06 LAB
ALBUMIN SERPL-MCNC: 3.5 G/DL (ref 3.4–5)
ALBUMIN/GLOB SERPL: 1.3 {RATIO} (ref 1.1–2.2)
ALP SERPL-CCNC: 93 U/L (ref 40–129)
ALT SERPL-CCNC: 20 U/L (ref 10–40)
ANION GAP SERPL CALCULATED.3IONS-SCNC: 6 MMOL/L (ref 3–16)
AST SERPL-CCNC: 28 U/L (ref 15–37)
BASOPHILS # BLD: 0 K/UL (ref 0–0.2)
BASOPHILS NFR BLD: 0.4 %
BILIRUB SERPL-MCNC: <0.2 MG/DL (ref 0–1)
BUN SERPL-MCNC: 18 MG/DL (ref 7–20)
CALCIUM SERPL-MCNC: 8.8 MG/DL (ref 8.3–10.6)
CHLORIDE SERPL-SCNC: 98 MMOL/L (ref 99–110)
CO2 SERPL-SCNC: 31 MMOL/L (ref 21–32)
CREAT SERPL-MCNC: 0.8 MG/DL (ref 0.8–1.3)
DEPRECATED RDW RBC AUTO: 15.1 % (ref 12.4–15.4)
EOSINOPHIL # BLD: 0.1 K/UL (ref 0–0.6)
EOSINOPHIL NFR BLD: 1.1 %
GFR SERPLBLD CREATININE-BSD FMLA CKD-EPI: >90 ML/MIN/{1.73_M2}
GLUCOSE SERPL-MCNC: 89 MG/DL (ref 70–99)
HCT VFR BLD AUTO: 43.2 % (ref 40.5–52.5)
HGB BLD-MCNC: 14.3 G/DL (ref 13.5–17.5)
LYMPHOCYTES # BLD: 1.8 K/UL (ref 1–5.1)
LYMPHOCYTES NFR BLD: 17.1 %
MCH RBC QN AUTO: 31.4 PG (ref 26–34)
MCHC RBC AUTO-ENTMCNC: 33.2 G/DL (ref 31–36)
MCV RBC AUTO: 94.8 FL (ref 80–100)
MONOCYTES # BLD: 1.1 K/UL (ref 0–1.3)
MONOCYTES NFR BLD: 11.1 %
NEUTROPHILS # BLD: 7.2 K/UL (ref 1.7–7.7)
NEUTROPHILS NFR BLD: 70.3 %
PLATELET # BLD AUTO: 205 K/UL (ref 135–450)
PMV BLD AUTO: 8.7 FL (ref 5–10.5)
POTASSIUM SERPL-SCNC: 4.6 MMOL/L (ref 3.5–5.1)
PROT SERPL-MCNC: 6.1 G/DL (ref 6.4–8.2)
RBC # BLD AUTO: 4.56 M/UL (ref 4.2–5.9)
SODIUM SERPL-SCNC: 135 MMOL/L (ref 136–145)
WBC # BLD AUTO: 10.3 K/UL (ref 4–11)

## 2025-02-06 PROCEDURE — 6370000000 HC RX 637 (ALT 250 FOR IP): Performed by: HOSPITALIST

## 2025-02-06 PROCEDURE — 1200000000 HC SEMI PRIVATE

## 2025-02-06 PROCEDURE — 80053 COMPREHEN METABOLIC PANEL: CPT

## 2025-02-06 PROCEDURE — 97110 THERAPEUTIC EXERCISES: CPT

## 2025-02-06 PROCEDURE — 6370000000 HC RX 637 (ALT 250 FOR IP): Performed by: INTERNAL MEDICINE

## 2025-02-06 PROCEDURE — 85025 COMPLETE CBC W/AUTO DIFF WBC: CPT

## 2025-02-06 PROCEDURE — 97530 THERAPEUTIC ACTIVITIES: CPT

## 2025-02-06 PROCEDURE — 94640 AIRWAY INHALATION TREATMENT: CPT

## 2025-02-06 PROCEDURE — 2500000003 HC RX 250 WO HCPCS: Performed by: INTERNAL MEDICINE

## 2025-02-06 PROCEDURE — 36415 COLL VENOUS BLD VENIPUNCTURE: CPT

## 2025-02-06 PROCEDURE — 97116 GAIT TRAINING THERAPY: CPT

## 2025-02-06 RX ORDER — ALPRAZOLAM 1 MG/1
1 TABLET ORAL 4 TIMES DAILY
Qty: 3 TABLET | Refills: 0 | Status: SHIPPED | OUTPATIENT
Start: 2025-02-06 | End: 2025-03-08

## 2025-02-06 RX ORDER — FOLIC ACID 1 MG/1
1 TABLET ORAL DAILY
Qty: 30 TABLET | Refills: 3 | Status: SHIPPED | OUTPATIENT
Start: 2025-02-06

## 2025-02-06 RX ORDER — TAMSULOSIN HYDROCHLORIDE 0.4 MG/1
0.4 CAPSULE ORAL DAILY
Qty: 30 CAPSULE | Refills: 3 | Status: SHIPPED | OUTPATIENT
Start: 2025-02-06

## 2025-02-06 RX ORDER — ALPRAZOLAM 0.5 MG
1 TABLET ORAL 4 TIMES DAILY
Status: DISCONTINUED | OUTPATIENT
Start: 2025-02-06 | End: 2025-02-11 | Stop reason: HOSPADM

## 2025-02-06 RX ADMIN — THIAMINE HCL TAB 100 MG 100 MG: 100 TAB at 08:29

## 2025-02-06 RX ADMIN — PANTOPRAZOLE SODIUM 40 MG: 40 TABLET, DELAYED RELEASE ORAL at 08:29

## 2025-02-06 RX ADMIN — METHYLPHENIDATE HYDROCHLORIDE 20 MG: 10 TABLET ORAL at 17:26

## 2025-02-06 RX ADMIN — ALPRAZOLAM 1 MG: 0.5 TABLET ORAL at 09:11

## 2025-02-06 RX ADMIN — METHYLPHENIDATE HYDROCHLORIDE 20 MG: 10 TABLET ORAL at 21:03

## 2025-02-06 RX ADMIN — Medication 2 PUFF: at 21:51

## 2025-02-06 RX ADMIN — ALPRAZOLAM 1 MG: 0.5 TABLET ORAL at 13:17

## 2025-02-06 RX ADMIN — CETIRIZINE HYDROCHLORIDE 10 MG: 10 TABLET, FILM COATED ORAL at 08:29

## 2025-02-06 RX ADMIN — Medication 10 ML: at 21:03

## 2025-02-06 RX ADMIN — Medication 10 ML: at 08:29

## 2025-02-06 RX ADMIN — BUTALBITAL, ACETAMINOPHEN, AND CAFFEINE 1 TABLET: 50; 325; 40 TABLET ORAL at 11:02

## 2025-02-06 RX ADMIN — METHYLPHENIDATE HYDROCHLORIDE 20 MG: 10 TABLET ORAL at 08:30

## 2025-02-06 RX ADMIN — ALPRAZOLAM 1 MG: 0.5 TABLET ORAL at 21:03

## 2025-02-06 RX ADMIN — MELATONIN TAB 3 MG 3 MG: 3 TAB at 21:03

## 2025-02-06 RX ADMIN — ALPRAZOLAM 1 MG: 0.5 TABLET ORAL at 17:27

## 2025-02-06 RX ADMIN — FOLIC ACID 1 MG: 1 TABLET ORAL at 08:29

## 2025-02-06 RX ADMIN — Medication 1 TABLET: at 08:29

## 2025-02-06 RX ADMIN — CYCLOBENZAPRINE 10 MG: 10 TABLET, FILM COATED ORAL at 15:58

## 2025-02-06 RX ADMIN — BUTALBITAL, ACETAMINOPHEN, AND CAFFEINE 1 TABLET: 50; 325; 40 TABLET ORAL at 21:03

## 2025-02-06 RX ADMIN — ACETAMINOPHEN 650 MG: 325 TABLET ORAL at 15:58

## 2025-02-06 RX ADMIN — TAMSULOSIN HYDROCHLORIDE 0.4 MG: 0.4 CAPSULE ORAL at 08:29

## 2025-02-06 RX ADMIN — METHYLPHENIDATE HYDROCHLORIDE 20 MG: 10 TABLET ORAL at 13:17

## 2025-02-06 ASSESSMENT — PAIN DESCRIPTION - LOCATION
LOCATION: HEAD
LOCATION: HEAD
LOCATION: GENERALIZED
LOCATION: BUTTOCKS
LOCATION: HEAD

## 2025-02-06 ASSESSMENT — PAIN SCALES - GENERAL
PAINLEVEL_OUTOF10: 6
PAINLEVEL_OUTOF10: 8
PAINLEVEL_OUTOF10: 7
PAINLEVEL_OUTOF10: 5
PAINLEVEL_OUTOF10: 9
PAINLEVEL_OUTOF10: 8
PAINLEVEL_OUTOF10: 8

## 2025-02-06 ASSESSMENT — PAIN DESCRIPTION - ORIENTATION: ORIENTATION: LEFT

## 2025-02-06 ASSESSMENT — PAIN DESCRIPTION - DESCRIPTORS
DESCRIPTORS: ACHING;DISCOMFORT
DESCRIPTORS: ACHING;DISCOMFORT

## 2025-02-06 ASSESSMENT — PAIN SCALES - WONG BAKER: WONGBAKER_NUMERICALRESPONSE: HURTS EVEN MORE

## 2025-02-06 NOTE — DISCHARGE INSTR - COC
Continuity of Care Form    Patient Name: Aki Pal   :  1950  MRN:  [id number]    Admit date:  2/3/2025  Discharge date:        Code Status Order: Full Code   Advance Directives:   Advance Care Flowsheet Documentation             Admitting Physician:  Sandra Acharya DO  PCP: Paul Crane MD    Discharging Nurse: CRISSY  Discharging Hospital Unit/Room#: 3TN-3372/3372-01  Discharging Unit Phone Number: [phone]    Emergency Contact:   Extended Emergency Contact Information  Primary Emergency Contact: Beba Pal  Address: [address] [place] [address]            [place] [address] [facility]  Home Phone: [phone]  Relation: Spouse  Secondary Emergency Contact: Shahab Pal  Home Phone: [phone]  Mobile Phone: [phone]  Relation: Child    Past Surgical History:  Past Surgical History:   Procedure Laterality Date    MOUTH SURGERY  2013       Immunization History:   Immunization History   Administered Date(s) Administered    COVID-19, J&J, (age 18y+), IM, 0.5 mL 2021    COVID-19, PFIZER PURPLE top, DILUTE for use, (age 12 y+), 30mcg/0.3mL 2022       Active Problems:  Patient Active Problem List   Diagnosis Code    Tardive dyskinesia G24.01    Community acquired bacterial pneumonia J15.9    Esophageal dysphagia R13.19    Hoarseness, chronic R49.0    Acute respiratory failure with hypoxia J96.01    Community acquired pneumonia of right lower lobe of lung J18.9    Centrilobular emphysema (HCC) J43.2    Lactic acidosis E87.20    Postnasal drip R09.82    Tobacco use Z72.0    Sepsis (HCC) A41.9    Pneumonia J18.9    Generalized weakness R53.1    PNA (pneumonia) J18.9    COPD exacerbation (HCC) J44.1    Mood disorder (HCC) F39    COPD (chronic obstructive pulmonary disease) (HCC) J44.9    Migraines G43.909    PTSD (post-traumatic stress disorder) F43.10    Chronic respiratory failure with hypoxia J96.11    Fall, initial encounter W19.XXXA    Hyponatremia

## 2025-02-06 NOTE — CARE COORDINATION
Discharge Planning:     (CM) following up on the SNF referrals that were made yesterday:    [facility]- CM called Vikki in admissions [phone] who is still reviewing and will call back.     [facility]- CM called Naveen in admissions [phone] who advised he is reviewing and will call CM back.     [facility]- CM called Martha in admissions [phone] and LVM asking for a phone call back and provided call back information.     [facility]-  Spoke with Fanny in admissions [phone] who advised she didn't get the referral. Sent it through fax successfully.     [facility]- CM called admissions [phone] and LV with the referral information and asked for a phone call back and provided call back information.         UPDATE--    [facility] - unable to accept.     [facility] and [facility]- Can accept.     Electronically signed by KAM Lynch on 2/6/2025 at 2:45 PM      UPDATE :    CM called Patient's wife- Beba, [phone] and LVM asking for a phone call back, did not leave detailed VM as the VM did not identify who it was. CM provided call back number.       Electronically signed by KAM Lynch on 2/6/2025 at 2:56 PM

## 2025-02-07 PROCEDURE — 6370000000 HC RX 637 (ALT 250 FOR IP): Performed by: INTERNAL MEDICINE

## 2025-02-07 PROCEDURE — 6370000000 HC RX 637 (ALT 250 FOR IP): Performed by: HOSPITALIST

## 2025-02-07 PROCEDURE — 97110 THERAPEUTIC EXERCISES: CPT

## 2025-02-07 PROCEDURE — 2500000003 HC RX 250 WO HCPCS: Performed by: INTERNAL MEDICINE

## 2025-02-07 PROCEDURE — 94761 N-INVAS EAR/PLS OXIMETRY MLT: CPT

## 2025-02-07 PROCEDURE — 97116 GAIT TRAINING THERAPY: CPT

## 2025-02-07 PROCEDURE — 1200000000 HC SEMI PRIVATE

## 2025-02-07 PROCEDURE — 2700000000 HC OXYGEN THERAPY PER DAY

## 2025-02-07 PROCEDURE — 94640 AIRWAY INHALATION TREATMENT: CPT

## 2025-02-07 RX ORDER — POLYETHYLENE GLYCOL 3350 17 G/17G
17 POWDER, FOR SOLUTION ORAL DAILY PRN
Status: DISCONTINUED | OUTPATIENT
Start: 2025-02-07 | End: 2025-02-11 | Stop reason: HOSPADM

## 2025-02-07 RX ADMIN — ALPRAZOLAM 1 MG: 0.5 TABLET ORAL at 16:45

## 2025-02-07 RX ADMIN — METHYLPHENIDATE HYDROCHLORIDE 20 MG: 10 TABLET ORAL at 16:45

## 2025-02-07 RX ADMIN — Medication 1 TABLET: at 08:02

## 2025-02-07 RX ADMIN — ALPRAZOLAM 1 MG: 0.5 TABLET ORAL at 08:02

## 2025-02-07 RX ADMIN — PANTOPRAZOLE SODIUM 40 MG: 40 TABLET, DELAYED RELEASE ORAL at 08:02

## 2025-02-07 RX ADMIN — THIAMINE HCL TAB 100 MG 100 MG: 100 TAB at 08:03

## 2025-02-07 RX ADMIN — Medication 2 PUFF: at 19:29

## 2025-02-07 RX ADMIN — Medication 10 ML: at 08:07

## 2025-02-07 RX ADMIN — TAMSULOSIN HYDROCHLORIDE 0.4 MG: 0.4 CAPSULE ORAL at 08:02

## 2025-02-07 RX ADMIN — METHYLPHENIDATE HYDROCHLORIDE 20 MG: 10 TABLET ORAL at 08:02

## 2025-02-07 RX ADMIN — BUTALBITAL, ACETAMINOPHEN, AND CAFFEINE 1 TABLET: 50; 325; 40 TABLET ORAL at 21:26

## 2025-02-07 RX ADMIN — CETIRIZINE HYDROCHLORIDE 10 MG: 10 TABLET, FILM COATED ORAL at 08:02

## 2025-02-07 RX ADMIN — Medication 10 ML: at 08:03

## 2025-02-07 RX ADMIN — METHYLPHENIDATE HYDROCHLORIDE 20 MG: 10 TABLET ORAL at 13:08

## 2025-02-07 RX ADMIN — Medication 10 ML: at 21:26

## 2025-02-07 RX ADMIN — BUTALBITAL, ACETAMINOPHEN, AND CAFFEINE 1 TABLET: 50; 325; 40 TABLET ORAL at 08:02

## 2025-02-07 RX ADMIN — ALPRAZOLAM 1 MG: 0.5 TABLET ORAL at 21:25

## 2025-02-07 RX ADMIN — FOLIC ACID 1 MG: 1 TABLET ORAL at 08:02

## 2025-02-07 RX ADMIN — POLYETHYLENE GLYCOL 3350 17 G: 17 POWDER, FOR SOLUTION ORAL at 12:37

## 2025-02-07 RX ADMIN — ACETAMINOPHEN 650 MG: 325 TABLET ORAL at 01:56

## 2025-02-07 RX ADMIN — METHYLPHENIDATE HYDROCHLORIDE 20 MG: 10 TABLET ORAL at 21:26

## 2025-02-07 RX ADMIN — CYCLOBENZAPRINE 10 MG: 10 TABLET, FILM COATED ORAL at 04:52

## 2025-02-07 RX ADMIN — Medication 2 PUFF: at 09:37

## 2025-02-07 RX ADMIN — MELATONIN TAB 3 MG 3 MG: 3 TAB at 21:26

## 2025-02-07 RX ADMIN — ACETAMINOPHEN 650 MG: 325 TABLET ORAL at 14:11

## 2025-02-07 RX ADMIN — ALPRAZOLAM 1 MG: 0.5 TABLET ORAL at 12:38

## 2025-02-07 ASSESSMENT — PAIN SCALES - GENERAL
PAINLEVEL_OUTOF10: 7
PAINLEVEL_OUTOF10: 9
PAINLEVEL_OUTOF10: 5
PAINLEVEL_OUTOF10: 4
PAINLEVEL_OUTOF10: 5
PAINLEVEL_OUTOF10: 9
PAINLEVEL_OUTOF10: 9

## 2025-02-07 ASSESSMENT — PAIN DESCRIPTION - LOCATION
LOCATION: BACK;HEAD;NECK
LOCATION: ABDOMEN
LOCATION: HEAD;NECK
LOCATION: HEAD;NECK;BACK

## 2025-02-07 ASSESSMENT — PAIN SCALES - WONG BAKER
WONGBAKER_NUMERICALRESPONSE: HURTS LITTLE MORE
WONGBAKER_NUMERICALRESPONSE: HURTS WHOLE LOT

## 2025-02-07 ASSESSMENT — PAIN DESCRIPTION - DESCRIPTORS: DESCRIPTORS: THROBBING;STABBING;SHOOTING

## 2025-02-07 NOTE — CARE COORDINATION
Discharge Planning:    ISABEL called pt's wife Beba and she is okay with pt going to [facility] HALL called Naveen with [facility], no answer, LVM with CB information.    Update: ISABEL received a call from Naveen and he confirmed pt's [facility] is primary and will work with them, just in case if pt does not have benefits, then can go under medicare. Naveen will call CM back.  ISABEL updated pt as well and he is in agreement.    Electronically signed by Michael Singh on 2/7/2025 at 8:31 AM  Electronically signed by Michael Singh on 2/7/2025 at 8:59 AM

## 2025-02-07 NOTE — CARE COORDINATION
02/07/25 0852   IMM Letter   IMM Letter given to Patient/Family/Significant other/Guardian/POA/by: IMM given to pt, pt is in agreement with DC plan   IMM Letter date given: 02/07/25   IMM Letter time given: 0850     Electronically signed by Michael Singh on 2/7/2025 at 8:52 AM

## 2025-02-08 PROCEDURE — 94640 AIRWAY INHALATION TREATMENT: CPT

## 2025-02-08 PROCEDURE — 1200000000 HC SEMI PRIVATE

## 2025-02-08 PROCEDURE — 2700000000 HC OXYGEN THERAPY PER DAY

## 2025-02-08 PROCEDURE — 6370000000 HC RX 637 (ALT 250 FOR IP): Performed by: INTERNAL MEDICINE

## 2025-02-08 PROCEDURE — 2500000003 HC RX 250 WO HCPCS: Performed by: INTERNAL MEDICINE

## 2025-02-08 PROCEDURE — 6370000000 HC RX 637 (ALT 250 FOR IP): Performed by: HOSPITALIST

## 2025-02-08 PROCEDURE — 94761 N-INVAS EAR/PLS OXIMETRY MLT: CPT

## 2025-02-08 RX ADMIN — POLYETHYLENE GLYCOL 3350 17 G: 17 POWDER, FOR SOLUTION ORAL at 15:09

## 2025-02-08 RX ADMIN — Medication 1 TABLET: at 09:17

## 2025-02-08 RX ADMIN — CYCLOBENZAPRINE 10 MG: 10 TABLET, FILM COATED ORAL at 04:05

## 2025-02-08 RX ADMIN — ACETAMINOPHEN 650 MG: 325 TABLET ORAL at 04:05

## 2025-02-08 RX ADMIN — METHYLPHENIDATE HYDROCHLORIDE 20 MG: 10 TABLET ORAL at 09:17

## 2025-02-08 RX ADMIN — Medication 10 ML: at 09:30

## 2025-02-08 RX ADMIN — BUTALBITAL, ACETAMINOPHEN, AND CAFFEINE 1 TABLET: 50; 325; 40 TABLET ORAL at 20:40

## 2025-02-08 RX ADMIN — BUTALBITAL, ACETAMINOPHEN, AND CAFFEINE 1 TABLET: 50; 325; 40 TABLET ORAL at 09:24

## 2025-02-08 RX ADMIN — METHYLPHENIDATE HYDROCHLORIDE 20 MG: 10 TABLET ORAL at 17:23

## 2025-02-08 RX ADMIN — Medication 2 PUFF: at 09:54

## 2025-02-08 RX ADMIN — FOLIC ACID 1 MG: 1 TABLET ORAL at 09:17

## 2025-02-08 RX ADMIN — PANTOPRAZOLE SODIUM 40 MG: 40 TABLET, DELAYED RELEASE ORAL at 09:24

## 2025-02-08 RX ADMIN — Medication 2 PUFF: at 20:12

## 2025-02-08 RX ADMIN — ALPRAZOLAM 1 MG: 0.5 TABLET ORAL at 20:35

## 2025-02-08 RX ADMIN — ALPRAZOLAM 1 MG: 0.5 TABLET ORAL at 17:09

## 2025-02-08 RX ADMIN — Medication 10 ML: at 20:36

## 2025-02-08 RX ADMIN — ALPRAZOLAM 1 MG: 0.5 TABLET ORAL at 12:53

## 2025-02-08 RX ADMIN — TAMSULOSIN HYDROCHLORIDE 0.4 MG: 0.4 CAPSULE ORAL at 09:17

## 2025-02-08 RX ADMIN — ALPRAZOLAM 1 MG: 0.5 TABLET ORAL at 09:17

## 2025-02-08 RX ADMIN — THIAMINE HCL TAB 100 MG 100 MG: 100 TAB at 09:18

## 2025-02-08 RX ADMIN — CETIRIZINE HYDROCHLORIDE 10 MG: 10 TABLET, FILM COATED ORAL at 09:17

## 2025-02-08 RX ADMIN — ACETAMINOPHEN 650 MG: 325 TABLET ORAL at 12:52

## 2025-02-08 RX ADMIN — METHYLPHENIDATE HYDROCHLORIDE 20 MG: 10 TABLET ORAL at 20:35

## 2025-02-08 RX ADMIN — METHYLPHENIDATE HYDROCHLORIDE 20 MG: 10 TABLET ORAL at 12:53

## 2025-02-08 ASSESSMENT — PAIN DESCRIPTION - LOCATION
LOCATION: NECK

## 2025-02-08 ASSESSMENT — PAIN DESCRIPTION - DESCRIPTORS: DESCRIPTORS: THROBBING

## 2025-02-08 ASSESSMENT — PAIN SCALES - GENERAL
PAINLEVEL_OUTOF10: 9
PAINLEVEL_OUTOF10: 8

## 2025-02-09 LAB
ALBUMIN SERPL-MCNC: 4 G/DL (ref 3.4–5)
ANION GAP SERPL CALCULATED.3IONS-SCNC: 10 MMOL/L (ref 3–16)
BUN SERPL-MCNC: 16 MG/DL (ref 7–20)
CALCIUM SERPL-MCNC: 8.9 MG/DL (ref 8.3–10.6)
CHLORIDE SERPL-SCNC: 97 MMOL/L (ref 99–110)
CO2 SERPL-SCNC: 27 MMOL/L (ref 21–32)
CREAT SERPL-MCNC: 0.6 MG/DL (ref 0.8–1.3)
GFR SERPLBLD CREATININE-BSD FMLA CKD-EPI: >90 ML/MIN/{1.73_M2}
GLUCOSE SERPL-MCNC: 109 MG/DL (ref 70–99)
PHOSPHATE SERPL-MCNC: 3.7 MG/DL (ref 2.5–4.9)
POTASSIUM SERPL-SCNC: 4.8 MMOL/L (ref 3.5–5.1)
SODIUM SERPL-SCNC: 134 MMOL/L (ref 136–145)

## 2025-02-09 PROCEDURE — 6370000000 HC RX 637 (ALT 250 FOR IP): Performed by: HOSPITALIST

## 2025-02-09 PROCEDURE — 80069 RENAL FUNCTION PANEL: CPT

## 2025-02-09 PROCEDURE — 2500000003 HC RX 250 WO HCPCS: Performed by: INTERNAL MEDICINE

## 2025-02-09 PROCEDURE — 1200000000 HC SEMI PRIVATE

## 2025-02-09 PROCEDURE — 6370000000 HC RX 637 (ALT 250 FOR IP): Performed by: INTERNAL MEDICINE

## 2025-02-09 PROCEDURE — 94640 AIRWAY INHALATION TREATMENT: CPT

## 2025-02-09 PROCEDURE — 36415 COLL VENOUS BLD VENIPUNCTURE: CPT

## 2025-02-09 PROCEDURE — 94761 N-INVAS EAR/PLS OXIMETRY MLT: CPT

## 2025-02-09 RX ADMIN — BUTALBITAL, ACETAMINOPHEN, AND CAFFEINE 1 TABLET: 50; 325; 40 TABLET ORAL at 08:10

## 2025-02-09 RX ADMIN — CETIRIZINE HYDROCHLORIDE 10 MG: 10 TABLET, FILM COATED ORAL at 08:10

## 2025-02-09 RX ADMIN — CYCLOBENZAPRINE 10 MG: 10 TABLET, FILM COATED ORAL at 20:47

## 2025-02-09 RX ADMIN — THIAMINE HCL TAB 100 MG 100 MG: 100 TAB at 08:10

## 2025-02-09 RX ADMIN — PANTOPRAZOLE SODIUM 40 MG: 40 TABLET, DELAYED RELEASE ORAL at 06:15

## 2025-02-09 RX ADMIN — CYCLOBENZAPRINE 10 MG: 10 TABLET, FILM COATED ORAL at 01:39

## 2025-02-09 RX ADMIN — ALPRAZOLAM 1 MG: 0.5 TABLET ORAL at 20:31

## 2025-02-09 RX ADMIN — ALPRAZOLAM 1 MG: 0.5 TABLET ORAL at 08:10

## 2025-02-09 RX ADMIN — METHYLPHENIDATE HYDROCHLORIDE 20 MG: 10 TABLET ORAL at 20:31

## 2025-02-09 RX ADMIN — Medication 1 TABLET: at 08:10

## 2025-02-09 RX ADMIN — Medication 10 ML: at 20:31

## 2025-02-09 RX ADMIN — Medication 2 PUFF: at 11:25

## 2025-02-09 RX ADMIN — MELATONIN TAB 3 MG 3 MG: 3 TAB at 01:34

## 2025-02-09 RX ADMIN — CYCLOBENZAPRINE 10 MG: 10 TABLET, FILM COATED ORAL at 08:10

## 2025-02-09 RX ADMIN — ACETAMINOPHEN 650 MG: 325 TABLET ORAL at 08:10

## 2025-02-09 RX ADMIN — ALPRAZOLAM 1 MG: 0.5 TABLET ORAL at 17:05

## 2025-02-09 RX ADMIN — FOLIC ACID 1 MG: 1 TABLET ORAL at 08:10

## 2025-02-09 RX ADMIN — ACETAMINOPHEN 650 MG: 325 TABLET ORAL at 19:51

## 2025-02-09 RX ADMIN — Medication 2 PUFF: at 20:47

## 2025-02-09 RX ADMIN — Medication 10 ML: at 08:11

## 2025-02-09 RX ADMIN — ALPRAZOLAM 1 MG: 0.5 TABLET ORAL at 12:26

## 2025-02-09 RX ADMIN — METHYLPHENIDATE HYDROCHLORIDE 20 MG: 10 TABLET ORAL at 08:10

## 2025-02-09 RX ADMIN — TAMSULOSIN HYDROCHLORIDE 0.4 MG: 0.4 CAPSULE ORAL at 08:10

## 2025-02-09 RX ADMIN — ACETAMINOPHEN 650 MG: 325 TABLET ORAL at 00:05

## 2025-02-09 RX ADMIN — METHYLPHENIDATE HYDROCHLORIDE 20 MG: 10 TABLET ORAL at 12:26

## 2025-02-09 RX ADMIN — Medication 10 ML: at 08:10

## 2025-02-09 RX ADMIN — BUTALBITAL, ACETAMINOPHEN, AND CAFFEINE 1 TABLET: 50; 325; 40 TABLET ORAL at 20:47

## 2025-02-09 RX ADMIN — Medication 10 ML: at 20:32

## 2025-02-09 RX ADMIN — METHYLPHENIDATE HYDROCHLORIDE 20 MG: 10 TABLET ORAL at 17:05

## 2025-02-09 ASSESSMENT — PAIN SCALES - GENERAL
PAINLEVEL_OUTOF10: 3
PAINLEVEL_OUTOF10: 9

## 2025-02-09 ASSESSMENT — PAIN DESCRIPTION - FREQUENCY
FREQUENCY: CONTINUOUS
FREQUENCY: CONTINUOUS

## 2025-02-09 ASSESSMENT — PAIN DESCRIPTION - LOCATION
LOCATION: HEAD;NECK
LOCATION: HEAD;NECK

## 2025-02-09 ASSESSMENT — PAIN DESCRIPTION - ONSET
ONSET: ON-GOING
ONSET: ON-GOING

## 2025-02-09 ASSESSMENT — PAIN DESCRIPTION - PAIN TYPE
TYPE: ACUTE PAIN
TYPE: ACUTE PAIN

## 2025-02-09 ASSESSMENT — PAIN DESCRIPTION - DESCRIPTORS
DESCRIPTORS: ACHING;DISCOMFORT
DESCRIPTORS: ACHING;DISCOMFORT

## 2025-02-09 ASSESSMENT — PAIN DESCRIPTION - ORIENTATION
ORIENTATION: LEFT
ORIENTATION: LEFT

## 2025-02-09 NOTE — CARE COORDINATION
Discharge Planning:    ISABEL called Naveen with [facility] [phone] to check on pre-cert.  No answer, LVM with CB information.    Electronically signed by Michael Singh on 2/9/2025 at 8:01 AM

## 2025-02-10 PROCEDURE — 92526 ORAL FUNCTION THERAPY: CPT

## 2025-02-10 PROCEDURE — 94761 N-INVAS EAR/PLS OXIMETRY MLT: CPT

## 2025-02-10 PROCEDURE — 6370000000 HC RX 637 (ALT 250 FOR IP): Performed by: INTERNAL MEDICINE

## 2025-02-10 PROCEDURE — 6370000000 HC RX 637 (ALT 250 FOR IP): Performed by: HOSPITALIST

## 2025-02-10 PROCEDURE — 2500000003 HC RX 250 WO HCPCS: Performed by: INTERNAL MEDICINE

## 2025-02-10 PROCEDURE — 2700000000 HC OXYGEN THERAPY PER DAY

## 2025-02-10 PROCEDURE — 97530 THERAPEUTIC ACTIVITIES: CPT

## 2025-02-10 PROCEDURE — 97110 THERAPEUTIC EXERCISES: CPT

## 2025-02-10 PROCEDURE — 1200000000 HC SEMI PRIVATE

## 2025-02-10 PROCEDURE — 97116 GAIT TRAINING THERAPY: CPT

## 2025-02-10 PROCEDURE — 94640 AIRWAY INHALATION TREATMENT: CPT

## 2025-02-10 RX ADMIN — METHYLPHENIDATE HYDROCHLORIDE 20 MG: 10 TABLET ORAL at 19:59

## 2025-02-10 RX ADMIN — Medication 2 PUFF: at 08:22

## 2025-02-10 RX ADMIN — METHYLPHENIDATE HYDROCHLORIDE 20 MG: 10 TABLET ORAL at 16:50

## 2025-02-10 RX ADMIN — TAMSULOSIN HYDROCHLORIDE 0.4 MG: 0.4 CAPSULE ORAL at 08:07

## 2025-02-10 RX ADMIN — THIAMINE HCL TAB 100 MG 100 MG: 100 TAB at 08:08

## 2025-02-10 RX ADMIN — ALPRAZOLAM 1 MG: 0.5 TABLET ORAL at 12:03

## 2025-02-10 RX ADMIN — Medication 10 ML: at 20:01

## 2025-02-10 RX ADMIN — CETIRIZINE HYDROCHLORIDE 10 MG: 10 TABLET, FILM COATED ORAL at 08:08

## 2025-02-10 RX ADMIN — ACETAMINOPHEN 650 MG: 325 TABLET ORAL at 03:20

## 2025-02-10 RX ADMIN — FOLIC ACID 1 MG: 1 TABLET ORAL at 08:08

## 2025-02-10 RX ADMIN — Medication 10 ML: at 08:08

## 2025-02-10 RX ADMIN — METHYLPHENIDATE HYDROCHLORIDE 20 MG: 10 TABLET ORAL at 08:08

## 2025-02-10 RX ADMIN — PANTOPRAZOLE SODIUM 40 MG: 40 TABLET, DELAYED RELEASE ORAL at 06:22

## 2025-02-10 RX ADMIN — Medication 2 PUFF: at 19:38

## 2025-02-10 RX ADMIN — ALPRAZOLAM 1 MG: 0.5 TABLET ORAL at 19:59

## 2025-02-10 RX ADMIN — ALPRAZOLAM 1 MG: 0.5 TABLET ORAL at 16:49

## 2025-02-10 RX ADMIN — Medication 1 TABLET: at 08:08

## 2025-02-10 RX ADMIN — ALPRAZOLAM 1 MG: 0.5 TABLET ORAL at 08:08

## 2025-02-10 RX ADMIN — BUTALBITAL, ACETAMINOPHEN, AND CAFFEINE 1 TABLET: 50; 325; 40 TABLET ORAL at 06:56

## 2025-02-10 RX ADMIN — BUTALBITAL, ACETAMINOPHEN, AND CAFFEINE 1 TABLET: 50; 325; 40 TABLET ORAL at 21:54

## 2025-02-10 RX ADMIN — METHYLPHENIDATE HYDROCHLORIDE 20 MG: 10 TABLET ORAL at 12:03

## 2025-02-10 ASSESSMENT — PAIN DESCRIPTION - DESCRIPTORS
DESCRIPTORS: STABBING
DESCRIPTORS: STABBING

## 2025-02-10 ASSESSMENT — PAIN DESCRIPTION - LOCATION
LOCATION: HEAD
LOCATION: HEAD;NECK
LOCATION: HEAD;NECK

## 2025-02-10 ASSESSMENT — PAIN SCALES - WONG BAKER: WONGBAKER_NUMERICALRESPONSE: NO HURT

## 2025-02-10 ASSESSMENT — PAIN SCALES - GENERAL
PAINLEVEL_OUTOF10: 2
PAINLEVEL_OUTOF10: 7
PAINLEVEL_OUTOF10: 3
PAINLEVEL_OUTOF10: 0
PAINLEVEL_OUTOF10: 7
PAINLEVEL_OUTOF10: 0

## 2025-02-10 ASSESSMENT — PAIN DESCRIPTION - PAIN TYPE: TYPE: ACUTE PAIN

## 2025-02-10 ASSESSMENT — PAIN DESCRIPTION - ONSET: ONSET: ON-GOING

## 2025-02-10 ASSESSMENT — PAIN DESCRIPTION - FREQUENCY: FREQUENCY: CONTINUOUS

## 2025-02-10 NOTE — CARE COORDINATION
Discharge Planning:     (CM) called Naveen in admissions at [facility] [phone] who advised the pre-cert is still pending.       Electronically signed by KAM Lynch on 2/10/2025 at 11:41 AM

## 2025-02-10 NOTE — FLOWSHEET NOTE
Patient was able to void post rios removal. 225 ml       02/10/25 1448   Output (mL)   Urine 225 mL   Urine Assessment   Urine Color Yellow/straw   Urine Appearance Clear   Unmeasured Output   Urine Occurrence 1     Hiral Bradley RN

## 2025-02-11 VITALS
HEIGHT: 67 IN | BODY MASS INDEX: 24.4 KG/M2 | WEIGHT: 155.5 LBS | OXYGEN SATURATION: 92 % | HEART RATE: 99 BPM | SYSTOLIC BLOOD PRESSURE: 144 MMHG | RESPIRATION RATE: 19 BRPM | TEMPERATURE: 98.8 F | DIASTOLIC BLOOD PRESSURE: 82 MMHG

## 2025-02-11 PROCEDURE — 6370000000 HC RX 637 (ALT 250 FOR IP): Performed by: INTERNAL MEDICINE

## 2025-02-11 PROCEDURE — 97110 THERAPEUTIC EXERCISES: CPT

## 2025-02-11 PROCEDURE — 97116 GAIT TRAINING THERAPY: CPT

## 2025-02-11 PROCEDURE — 97535 SELF CARE MNGMENT TRAINING: CPT

## 2025-02-11 PROCEDURE — 2500000003 HC RX 250 WO HCPCS: Performed by: INTERNAL MEDICINE

## 2025-02-11 PROCEDURE — 6370000000 HC RX 637 (ALT 250 FOR IP): Performed by: HOSPITALIST

## 2025-02-11 RX ADMIN — PANTOPRAZOLE SODIUM 40 MG: 40 TABLET, DELAYED RELEASE ORAL at 07:00

## 2025-02-11 RX ADMIN — CETIRIZINE HYDROCHLORIDE 10 MG: 10 TABLET, FILM COATED ORAL at 08:08

## 2025-02-11 RX ADMIN — Medication 10 ML: at 08:09

## 2025-02-11 RX ADMIN — FOLIC ACID 1 MG: 1 TABLET ORAL at 08:07

## 2025-02-11 RX ADMIN — THIAMINE HCL TAB 100 MG 100 MG: 100 TAB at 08:10

## 2025-02-11 RX ADMIN — ACETAMINOPHEN 650 MG: 325 TABLET ORAL at 14:57

## 2025-02-11 RX ADMIN — TAMSULOSIN HYDROCHLORIDE 0.4 MG: 0.4 CAPSULE ORAL at 08:07

## 2025-02-11 RX ADMIN — METHYLPHENIDATE HYDROCHLORIDE 20 MG: 10 TABLET ORAL at 08:18

## 2025-02-11 RX ADMIN — BUTALBITAL, ACETAMINOPHEN, AND CAFFEINE 1 TABLET: 50; 325; 40 TABLET ORAL at 08:25

## 2025-02-11 RX ADMIN — MELATONIN TAB 3 MG 3 MG: 3 TAB at 01:37

## 2025-02-11 RX ADMIN — CYCLOBENZAPRINE 10 MG: 10 TABLET, FILM COATED ORAL at 01:37

## 2025-02-11 RX ADMIN — ALPRAZOLAM 1 MG: 0.5 TABLET ORAL at 08:07

## 2025-02-11 RX ADMIN — ALPRAZOLAM 1 MG: 0.5 TABLET ORAL at 12:35

## 2025-02-11 RX ADMIN — METHYLPHENIDATE HYDROCHLORIDE 20 MG: 10 TABLET ORAL at 12:36

## 2025-02-11 RX ADMIN — Medication 1 TABLET: at 08:08

## 2025-02-11 ASSESSMENT — PAIN SCALES - WONG BAKER: WONGBAKER_NUMERICALRESPONSE: NO HURT

## 2025-02-11 ASSESSMENT — PAIN SCALES - GENERAL: PAINLEVEL_OUTOF10: 1

## 2025-02-11 NOTE — DISCHARGE SUMMARY
related to chronic small vessel ischemic disease.  There is no midline shift or mass effect. No hemorrhage is identified in the brain parenchyma.  Motion artifact degrades the images. ORBITS: The visualized portion of the orbits demonstrate no acute abnormality. SINUSES: The visualized paranasal sinuses and mastoid air cells are for the most part clear. SOFT TISSUES/SKULL:  No acute abnormality of the visualized skull or soft tissues.     Motion artifact degrades the images.  Cerebral atrophy.  Chronic small vessel ischemic disease.  No acute brain parenchymal abnormality.     XR FOOT LEFT (2 VIEWS)    Result Date: 2/5/2025  EXAMINATION: 3 XRAY VIEWS OF THE LEFT FOOT 2/5/2025 9:16 am COMPARISON: None. HISTORY: ORDERING SYSTEM PROVIDED HISTORY: ankle pain TECHNOLOGIST PROVIDED HISTORY: Reason for exam:->ankle pain FINDINGS: External radiopaque foreign bodies degrade image quality.  There is an age-indeterminate avulsion fracture involving the anterior talus.  There is no dislocation.  The bones are normally mineralized.  There are no bony destructive lesions.  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  Small calcaneal enthesophyte is noted.     1. Age-indeterminate talar avulsion fractures; correlate with point tenderness.     CT ABDOMEN PELVIS WO CONTRAST Additional Contrast? None    Result Date: 2/4/2025  EXAMINATION: CT OF THE ABDOMEN AND PELVIS WITHOUT CONTRAST 2/3/2025 8:45 pm TECHNIQUE: CT of the abdomen and pelvis was performed without the administration of intravenous contrast. Multiplanar reformatted images are provided for review. Automated exposure control, iterative reconstruction, and/or weight based adjustment of the mA/kV was utilized to reduce the radiation dose to as low as reasonably achievable. COMPARISON: 11/23/2022 HISTORY: ORDERING SYSTEM PROVIDED HISTORY: fall, right hip pain, TECHNOLOGIST PROVIDED HISTORY: Reason for exam:->fall, right hip pain, Additional Contrast?->None Decision Support

## 2025-02-11 NOTE — CARE COORDINATION
Case Management -  Discharge Note      Patient Name: Aki Pal                   YOB: 1950  Room: [address] [place] [address]            Readmission Risk (Low < 19, Mod (19-27), High > 27): Readmission Risk Score: 17.1    Current PCP: Paul Crane MD      (IMM) Important Message from Medicare:    Has pt received appropriate compliance notices before being discharged if required: yes  Compliance doc:  [x] 2nd IMM; [] Code 44 [] García  Date Given: 2/11/25 Given By: Kari Damico    PT AM-PAC: 17 /24  OT AM-PAC: 19 /24    Patient/patient representative has been educated on the benefits of SNF as well as the possible risks of declining recommended services. Patient/patient representative has acknowledged the information provided and decided on the following discharge plan. Patient/ patient representative has been provided freedom of choice regarding service provider, supported by basic dialogue that supports the patient's individualized plan of care/goals.    [facility] @ 5:15PM    [facility] [place] [address] [address]  Phone: [phone]  Fax: [phone]    HENS: Document ID : [id number]   Prescriptions: Yes, Xanax     [facility] agency notified of discharge:  [] Yes [] No  [x] NA    Family notified of discharge:  [x] Yes  [] No  [] NA    Facility notified of discharge:  [x] Yes  [] No  [] NA    Pt is being discharged with Outpt IV Antibiotics  [] Yes [] No  [x] NA  If yes, make sure FRAN is faxed to [facility] agency, and meds are called in to pharmacy by RN from FRAN orders only.      Financial    Payor: [place] [employer] / Plan: [employer] - [place] P[place] / Product Type: *No Product type* /     Pharmacy:  Potential assistance Purchasing Medications: No  Meds-to-Beds request: No      [id number] [place], [place] - [address] - P [phone] - F [phone]  [address] [facility] [address]  Phone: [phone] Fax: [phone]    [facility] [address] [facility]

## 2025-02-11 NOTE — PROGRESS NOTES
ADVANCED CARE PLANNING    Name:Aki Pal       :  1950              MRN:  [id number]      Purpose of Encounter: Advanced care planning in light of problem listed above   Parties in attendance: :Earline Fitzpatrick MD, Family members:  Decisional Capacity:Yes    Diagnosis: Principal Problem:    Fall, initial encounter  Active Problems:    Hyponatremia    Electrolyte imbalance    Alcohol use  Resolved Problems:    * No resolved hospital problems. *    Patients Medical Story:Presented with worsening symptom of dx above. With at risk for life threatening event. Procedure and testing as noted in progress noted. We discussed patient long term goal and also wishes and aggressive care. Discussed in detail about code status and what it means with detailed explanation.   Goals of Care Determinations: Patient wishes to focus on full code with aggressive care, CPR, intubation long term vent and facility as well.   Plan: Will notify Paul Crane MD of change in care plan. Will look at further interventions as needed.   Code Status: At this time patient wishes to be Full Code  Time Spent with Patient: 21 minutes      Electronically signed by Earline Lewis MD on 2025 at 11:20 AM  Thank you Paul Crane MD for the opportunity to be involved in this patient's care.     
    V2.0    [facility] [place]      Name:  Aki Pal /Age/Sex: 1950  (75 y.o. male)   MRN & CSN:  [id number] & [id number] Encounter Date/Time: 2025 12:27 PM EST   Location:  [id number]/3372-01 PCP: Paul Crane MD     Attending:Earline Lewis MD       Hospital Day: 2    Assessment and Recommendations   Aki Pal is a 75 y.o. male who presents with Fall, initial encounter      Plan:   Recurrent fall.  Likely probably secondary to alcohol dependence also hyponatremia.  PT OT evaluation.    Hyponatremia probably likely secondary to high fluid intake including alcohol.  Sodium was 122 on presentation still 123 today.  Nephrology consulted further workup and place    Alcohol dependence at risk of withdrawal continue CIWA protocol.        Diet ADULT DIET; Regular; 3 carb choices (45 gm/meal); Low Fat/Low Chol/High Fiber/2 gm Na; Low Sodium (2 gm); 1200 ml  ADULT ORAL NUTRITION SUPPLEMENT; Breakfast, Lunch, Dinner; Standard High Calorie/High Protein Oral Supplement   DVT Prophylaxis    Code Status Full Code   Disposition One more day pending NA improvement          Personally reviewed Lab Studies and Imaging         Subjective:     Chief Complaint:     Aki Pal is a 75 y.o. male who presents with       Review of Systems:      Pertinent positives and negatives discussed in HPI    Objective:     Intake/Output Summary (Last 24 hours) at 2025 1227  Last data filed at 2025 0412  Gross per 24 hour   Intake --   Output 700 ml   Net -700 ml      Vitals:   Vitals:    25 0910 25 0915 25 1121 25 1145   BP:  132/75  115/79   Pulse:  89 64 95   Resp:  24 18 20   Temp:  98.8 °F (37.1 °C)  98.6 °F (37 °C)   TempSrc:  Oral  Oral   SpO2:  95% 92% 92%   Weight: 73.5 kg (162 lb)      Height:             Physical Exam:      General: NAD  Eyes: EOMI  ENT: neck supple  Cardiovascular: Regular rate.  Respiratory: Clear to auscultation  Gastrointestinal: Soft, 
    V2.0    [facility] [place]      Name:  Aki Pal /Age/Sex: 1950  (75 y.o. male)   MRN & CSN:  [id number] & [id number] Encounter Date/Time: 2025 11:15 AM EST   Location:  [facility]3372/3372-01 PCP: Paul Crane MD     Attending:Earline Lewis MD       Hospital Day: 4    Assessment and Recommendations   Aki Pal is a 75 y.o. male who presents with Fall, initial encounter      Plan:   Hyponatremia.  Presenting sodium of 118.  Significant improvement.   Resolved      Alcohol abuse.  DC CIWA protocol continue home Xanax    Urinary retention.  On Flomax.  Sandhu in place.  Voiding trial next 48 hours for 72 hours can be done in office.    Left ankle pain.  Concern for possible occult fracture patient is a poor historian.  Will get orthopedic consult get x-ray.    Eventually patient will need a nursing facility.      Diet ADULT ORAL NUTRITION SUPPLEMENT; Breakfast, Lunch, Dinner; Standard High Calorie/High Protein Oral Supplement  ADULT DIET; Easy to Chew; 3 carb choices (45 gm/meal); Low Fat/Low Chol/High Fiber/2 gm Na; Low Sodium (2 gm); 1200 ml; No Drinking Straws   DVT Prophylaxis    Code Status Full Code   Disposition Medically stable for discharge to skilled         Personally reviewed Lab Studies and Imaging         Subjective:     Chief Complaint:     Aki Pal is a 75 y.o. male who presents with       Review of Systems:      Pertinent positives and negatives discussed in HPI    Objective:     Intake/Output Summary (Last 24 hours) at 2025 1120  Last data filed at 2025 1005  Gross per 24 hour   Intake 1102.8 ml   Output 1750 ml   Net -647.2 ml      Vitals:   Vitals:    25 0345 25 0400 25 0723 25 0815   BP: 120/68   (!) 149/91   Pulse: (!) 103   98   Resp: 20   18   Temp: 98.5 °F (36.9 °C)   98.5 °F (36.9 °C)   TempSrc: Oral   Oral   SpO2: 91%   93%   Weight:  75.9 kg (167 lb 4.8 oz) 75.7 kg (166 lb 12.8 oz)    Height:     
    V2.0    [facility] [place] Progress Note      Name:  Aki Pal /Age/Sex: 1950  (75 y.o. male)   MRN & CSN:  [id number] & [id number] Encounter Date/Time: 2025 11:15 AM EST   Location:  [facility]3372/3372-01 PCP: Paul Crane MD     Attending:Earline Lewis MD       Hospital Day: 6    Assessment and Recommendations   Aki Pal is a 75 y.o. male who presents with Fall, initial encounter      Plan:   Hyponatremia.  Presenting sodium of 118.  Significant improvement.   Resolved      Alcohol abuse.  DC CIWA protocol continue home Xanax    Urinary retention.  On Flomax.  Sandhu in place.  Voiding trial next 48 hours for 72 hours can be done in office.    Left ankle pain.  Concern for possible occult fracture patient is a poor historian.  Will get orthopedic consult get x-ray.    Eventually patient will need a nursing facility.      Diet ADULT ORAL NUTRITION SUPPLEMENT; Breakfast, Lunch, Dinner; Standard High Calorie/High Protein Oral Supplement  ADULT DIET; Easy to Chew; 3 carb choices (45 gm/meal); Low Fat/Low Chol/High Fiber/2 gm Na; Low Sodium (2 gm); 1200 ml; No Drinking Straws   DVT Prophylaxis    Code Status Full Code   Disposition Medically stable for discharge to skilled         Personally reviewed Lab Studies and Imaging         Subjective:     Chief Complaint:     Aki Pal is a 75 y.o. male who presents with       Review of Systems:      Pertinent positives and negatives discussed in HPI    Objective:     Intake/Output Summary (Last 24 hours) at 2025 0845  Last data filed at 2025 2304  Gross per 24 hour   Intake 720 ml   Output 3200 ml   Net -2480 ml      Vitals:   Vitals:    25 1550 25 1945 25 2300 25 0405   BP: 136/85 (!) 146/80 130/80 (!) 142/91   Pulse: 92 98 (!) 105 100   Resp: 16 16 18 20   Temp: 98.4 °F (36.9 °C) 97.9 °F (36.6 °C) 98 °F (36.7 °C) 97.5 °F (36.4 °C)   TempSrc: Oral Oral Oral Oral   SpO2: 92% 90% 91% 96%   Weight:     
    V2.0    [facility] [place] Progress Note      Name:  Aki Pal /Age/Sex: 1950  (75 y.o. male)   MRN & CSN:  [id number] & [id number] Encounter Date/Time: 2025 11:15 AM EST   Location:  [facility]3372/3372-01 PCP: Paul Crane MD     Attending:Earline Lewis MD       Hospital Day: 7    Assessment and Recommendations   Aki Pal is a 75 y.o. male who presents with Fall, initial encounter      Plan:   Hyponatremia.  Presenting sodium of 118.  Significant improvement.   Resolved      Alcohol abuse.  DC CIWA protocol continue home Xanax    Urinary retention.  On Flomax.  Sandhu in place.  Voiding trial next 48 hours for 72 hours can be done in office.    Left ankle pain.  Concern for possible occult fracture patient is a poor historian.  Will get orthopedic consult get x-ray.    Eventually patient will need a nursing facility.      Diet ADULT ORAL NUTRITION SUPPLEMENT; Breakfast, Lunch, Dinner; Standard High Calorie/High Protein Oral Supplement  ADULT DIET; Easy to Chew; 3 carb choices (45 gm/meal); Low Fat/Low Chol/High Fiber/2 gm Na; Low Sodium (2 gm); 1200 ml; No Drinking Straws   DVT Prophylaxis    Code Status Full Code   Disposition Medically stable for discharge to skilled         Personally reviewed Lab Studies and Imaging         Subjective:     Chief Complaint:     Aki Pal is a 75 y.o. male who presents with       Review of Systems:      Pertinent positives and negatives discussed in HPI    Objective:     Intake/Output Summary (Last 24 hours) at 2025 1153  Last data filed at 2025 0416  Gross per 24 hour   Intake 240 ml   Output 2375 ml   Net -2135 ml      Vitals:   Vitals:    25 2345 25 0713 25 0730 25 1125   BP: 125/75  (!) 146/90    Pulse: (!) 107  94 96   Resp: 18  18 18   Temp: 97.8 °F (36.6 °C)  97.8 °F (36.6 °C)    TempSrc: Oral  Oral    SpO2: 93%  96% 94%   Weight:  75.6 kg (166 lb 11.2 oz)     Height:             Physical Exam:  
   02/03/25 2312   RT Protocol   History Pulmonary Disease 2   Respiratory pattern 0   Breath sounds 2   Cough 0   Bronchodilator Assessment Score 4       
  [facility] - Inpatient Rehabilitation Department   Phone: [phone]    Occupational Therapy    [x] Initial Evaluation            [] Daily Treatment Note         [] Discharge Summary      Patient: Aki Pal   : 1950   MRN: [id number]   Date of Service:  2025    Admitting Diagnosis:  Fall, initial encounter  Current Admission Summary: Per H&P \"75 y.o. male who presents to the emergency department after falling.  This is a 75-year-old male who presents after having multiple falls at home.  The patient presents tremulous and with some generalized weakness per the wife.  The patient also has a history of chronic pain.  He was apparently also tachycardic en route.  He is not febrile here. \"  Past Medical History:  has a past medical history of Anxiety, Bipolar 1 disorder (HCC), Bipolar 2 disorder (HCC), Community acquired bacterial pneumonia, COPD (chronic obstructive pulmonary disease) ([facility]), Depression, Fibromyalgia, Headache(784.0), Hyperlipidemia, Manic depressive disease manic phase (HCC), On home O2, PTSD (post-traumatic stress disorder), Seizures ([facility]), and Tardive dyskinesia.  Past Surgical History:  has a past surgical history that includes Mouth surgery (2013).    Discharge Recommendations: Aki Pal scored a 16/24 on the AM-PAC ADL Inpatient form. Current research shows that an AM-PAC score of 17 or less is typically not associated with a discharge to the patient's home setting. Based on the patient's AM-PAC score and their current ADL deficits, it is recommended that the patient have 3-5 sessions per week of Occupational Therapy at d/c to increase the patient's independence.  Please see assessment section for further patient specific details.    If patient discharges prior to next session this note will serve as a discharge summary.  Please see below for the latest assessment towards goals.      DME Required For Discharge: DME to be determined at next level 
  [facility] - Inpatient Rehabilitation Department   Phone: [phone]    Physical Therapy    [x] Initial Evaluation            [] Daily Treatment Note         [] Discharge Summary      Patient: Aki Pal   : 1950   MRN: [id number]   Date of Service:  2025  Admitting Diagnosis: Fall, initial encounter  Current Admission Summary: Patient arrives from home via [place] [facility] with complaints of AMS. Wife reports that patient has been falling several times times. Patient weak and tremulous. Poor historian. C-collar applied d/t patient complaining of neck pain. Has chronic neck pain. Patient febrile and tachycardic en route.   Past Medical History:  has a past medical history of Anxiety, Bipolar 1 disorder (formerly [facility]), Bipolar 2 disorder (HCC), Community acquired bacterial pneumonia, COPD (chronic obstructive pulmonary disease) (formerly [facility]), Depression, Fibromyalgia, Headache(784.0), Hyperlipidemia, Manic depressive disease manic phase (formerly [facility]), On home O2, PTSD (post-traumatic stress disorder), Seizures (formerly [facility]), and Tardive dyskinesia.  Past Surgical History:  has a past surgical history that includes Mouth surgery (2013).  Discharge Recommendations: Aki Pal scored a 15/24 on the AM-PAC short mobility form. Current research shows that an AM-PAC score of 17 or less is typically not associated with a discharge to the patient's home setting. Based on the patient's AM-PAC score and their current functional mobility deficits, it is recommended that the patient have 3-5 sessions per week of Physical Therapy at d/c to increase the patient's independence.  Please see assessment section for further patient specific details.    If patient discharges prior to next session this note will serve as a discharge summary.  Please see below for the latest assessment towards goals.    DME Required For Discharge: rolling walker  Precautions/Restrictions: high fall risk, seizure, up with assistance  Weight 
  [facility] - Inpatient Rehabilitation Department   Phone: [phone]    Occupational Therapy    [] Initial Evaluation            [x] Daily Treatment Note         [] Discharge Summary      Patient: Aki Pal   : 1950   MRN: [id number]   Date of Service:  2025    Admitting Diagnosis:  Fall, initial encounter  Current Admission Summary: Per H&P \"75 y.o. male who presents to the emergency department after falling.  This is a 75-year-old male who presents after having multiple falls at home.  The patient presents tremulous and with some generalized weakness per the wife.  The patient also has a history of chronic pain.  He was apparently also tachycardic en route.  He is not febrile here. \"  Past Medical History:  has a past medical history of Anxiety, Bipolar 1 disorder (HCC), Bipolar 2 disorder (HCC), Community acquired bacterial pneumonia, COPD (chronic obstructive pulmonary disease) ([facility]), Depression, Fibromyalgia, Headache(784.0), Hyperlipidemia, Manic depressive disease manic phase ([facility]), On home O2, PTSD (post-traumatic stress disorder), Seizures ([facility]), and Tardive dyskinesia.  Past Surgical History:  has a past surgical history that includes Mouth surgery (2013).    Discharge Recommendations: Aki Pal scored a 19/24 on the AM-PAC ADL Inpatient form. Current research shows that an AM-PAC score of 17 or less is typically not associated with a discharge to the patient's home setting. Based on the patient's AM-PAC score and their current ADL deficits, it is recommended that the patient have 3-5 sessions per week of Occupational Therapy at d/c to increase the patient's independence.  Please see assessment section for further patient specific details.    If patient discharges prior to next session this note will serve as a discharge summary.  Please see below for the latest assessment towards goals.      DME Required For Discharge: DME to be determined at next level 
  Speech Language Pathology  [facility] - Inpatient Rehabilitation Services  [phone]  SLP Clinical Swallow Evaluation       Patient: Aki Pal   : 1950   MRN: [id number]      Evaluation Date: 2025      Admitting Dx: Hypochloremia [E87.8]  Hyponatremia [E87.1]  Fall, initial encounter [W19.XXXA]  Traumatic rhabdomyolysis, initial encounter ([facility]) [T79.6XXA]  Treatment Diagnosis: Cognitive-Linguistic Deficits , Oropharyngeal Dysphagia   Pain: Denies                                  Recommendations      Recommended Diet and Intervention 2025:  Diet Solids Recommendation:  Easy to chew  Liquid Consistency Recommendation:  Thin liquids, No straws  Recommended form of Meds: Meds in puree    **Recommend completion of Modified Barium Swallow study to further assess pharyngeal phase of swallow However, Pt verbally declined recommended MBS     Compensatory strategies: Upright as possible with all PO intake , No straws , Small bites/sips , Swallow 2 times per bite , Eat/feed slowly, Remain upright 30-45 min , Aspiration Precautions     Discharge Recommendations:  Discharge recommendations to be determined pending ongoing follow-up during acute care stay    History/Course of Treatment     H&P: 75 y.o. male who presented to [facility] with past medical history of bipolar 1, bipolar 2, COPD, fibromyalgia, hyperlipidemia PTSD seizure presented ED with chief complaint of falls  Patient denied having encephalopathy reports that he drinks every day at least 16 ounces of beer 3-4 times daily no known alleviating or exacerbating factor.  Patient reports that he did have a seizure when he stopped previously.  Patient otherwise reports has fallen multiple times reports no nausea vomiting palpitations prior to the fall reports that he normally sleeps and falls.    Imaging:  Chest X-ray:   IMPRESSION:  Left basilar atelectasis.    Head CT:   IMPRESSION:  Motion artifact degrades the images.  
  [facility] - Inpatient Rehabilitation Department   Phone: [phone]    Physical Therapy    [] Initial Evaluation            [x] Daily Treatment Note         [] Discharge Summary      Patient: Aki Pal   : 1950   MRN: [id number]   Date of Service:  2025  Admitting Diagnosis: Fall, initial encounter  Current Admission Summary: Patient arrives from home via [place] [facility] with complaints of AMS. Wife reports that patient has been falling several times times. Patient weak and tremulous. Poor historian. C-collar applied d/t patient complaining of neck pain. Has chronic neck pain. Patient febrile and tachycardic en route.   Past Medical History:  has a past medical history of Anxiety, Bipolar 1 disorder ([facility]), Bipolar 2 disorder (HCC), Community acquired bacterial pneumonia, COPD (chronic obstructive pulmonary disease) ([facility]), Depression, Fibromyalgia, Headache(784.0), Hyperlipidemia, Manic depressive disease manic phase ([facility]), On home O2, PTSD (post-traumatic stress disorder), Seizures ([facility]), and Tardive dyskinesia.  Past Surgical History:  has a past surgical history that includes Mouth surgery (2013).  Discharge Recommendations: Aki Pal scored a 17/24 on the AM-PAC short mobility form. Current research shows that an AM-PAC score of 17 or less is typically not associated with a discharge to the patient's home setting. Based on the patient's AM-PAC score and their current functional mobility deficits, it is recommended that the patient have 3-5 sessions per week of Physical Therapy at d/c to increase the patient's independence.  Please see assessment section for further patient specific details.    If patient discharges prior to next session this note will serve as a discharge summary.  Please see below for the latest assessment towards goals.    DME Required For Discharge: rolling walker  Precautions/Restrictions: high fall risk, seizure, up with assistance  Weight 
..Shift assessment completed. Routine vitals stable. Scheduled medications given. Patient is awake, alert and oriented. Respirations are easy and unlabored. Patient does not appear to be in distress, resting comfortably at this time. Call light within reach.  PRN pain medication given for generalized pain, patient c/o he slept wrong and his neck hurts. Patient assisted to the bathroom and to the chair, set up for breakfast when it comes.   
..Shift assessment completed. Routine vitals stable. Scheduled medications given. Patient is awake, alert and oriented. Respirations are easy and unlabored. Patient does not appear to be in distress, resting comfortably at this time. Call light within reach.  Patient has discharge order but waiting on SNF placement.   
Hyponatremia sec to high fluid intake poor solute intake and may have underlying SIADH     Po urea   Fluid restriction   Monitor Na 
Na jump noted   Will start D5 for 5 hours only     Gregory Emery MD   
Nephrology Note                                                                                                                                                                                                                                                                                                                                                               Office : [phone]     Fax :[phone]    Patient's Name: Aki Pal  12:46 PM  2/8/2025    Reason for Consult:  Hyponatremia  Requesting Physician:  Paul Crane MD  Chief Complaint:    Chief Complaint   Patient presents with    Altered Mental Status     Patient arrives from home via [facility] with complaints of AMS. Wife reports that patient has been falling several times times. Patient weak and tremulous. Poor historian. C-collar applied d/t patient complaining of neck pain. Has chronic neck pain. Patient febrile and tachycardic en route.        Assessment/Plan     # Hyponatremia  - likely multifactorial related to high fluid intake, poor protein intake  - Na trending up, stable  - urine studies reviewed  - 1.2 L fluid restriction  - increase protein intake  - s/p urea x 1   - Monitor RFP    #Urinary retention  - rios in place  - urology on board  - on flomax    #Fall   - PT/OT following      History of Present Ilness:    Aki Pal is a 75 y.o. male with PMHx of bipolar 1, bipolar 2, COPD, fibromyalgia, hyperlipidemia PTSD seizure who presented to the ED with chief complaint of falls. Patient denied having encephalopathy. He reports that he drinks every day at least 16 ounces of beer 3-4 times daily, no known alleviating or exacerbating factor.  Patient reports that he did have a seizure when he stopped previously.  Patient otherwise reports has fallen multiple times, reports no nausea vomiting palpitations prior to the fall reports that he normally sleeps and falls. Upon further workup, patient found to have 
Nephrology Note                                                                                                                                                                                                                                                                                                                                                               Office : [phone]     Fax :[phone]    Patient's Name: Aki Pal  9:45 AM  2/7/2025    Reason for Consult:  Hyponatremia  Requesting Physician:  Paul Crane MD  Chief Complaint:    Chief Complaint   Patient presents with    Altered Mental Status     Patient arrives from home via [place] [facility] with complaints of AMS. Wife reports that patient has been falling several times times. Patient weak and tremulous. Poor historian. C-collar applied d/t patient complaining of neck pain. Has chronic neck pain. Patient febrile and tachycardic en route.        Assessment/Plan     # Hyponatremia  - likely multifactorial related to high fluid intake, poor protein intake  - Na trending up, stable  - urine studies reviewed  - 1.2 L fluid restriction  - increase protein intake  - s/p urea x 1   - Monitor RFP    #Urinary retention  - rios in place  - urology on board  - on flomax    #Fall   - PT/OT following      History of Present Ilness:    Aki Pal is a 75 y.o. male with PMHx of bipolar 1, bipolar 2, COPD, fibromyalgia, hyperlipidemia PTSD seizure who presented to the ED with chief complaint of falls. Patient denied having encephalopathy. He reports that he drinks every day at least 16 ounces of beer 3-4 times daily, no known alleviating or exacerbating factor.  Patient reports that he did have a seizure when he stopped previously.  Patient otherwise reports has fallen multiple times, reports no nausea vomiting palpitations prior to the fall reports that he normally sleeps and falls. Upon further workup, patient found to have 
Nephrology Note                                                                                                                                                                                                                                                                                                                                                               Office : [phone]     Fax :[phone]    Patient's Name: Aki Pal  2:32 PM  2/11/2025    Reason for Consult:  Hyponatremia  Requesting Physician:  Paul Crane MD  Chief Complaint:    Chief Complaint   Patient presents with    Altered Mental Status     Patient arrives from home via [facility] with complaints of AMS. Wife reports that patient has been falling several times times. Patient weak and tremulous. Poor historian. C-collar applied d/t patient complaining of neck pain. Has chronic neck pain. Patient febrile and tachycardic en route.        Assessment/Plan     # Hyponatremia  - likely multifactorial related to high fluid intake, poor protein intake  - Na level stable  - urine studies reviewed  - 1.2 L fluid restriction  - increase protein intake  - Monitor RFP    #Urinary retention  - s/p rios  - Rios removed - good UOP   - urology on board  - on flomax    #Fall   - PT/OT following      History of Present Ilness:    Aki Pal is a 75 y.o. male with PMHx of bipolar 1, bipolar 2, COPD, fibromyalgia, hyperlipidemia PTSD seizure who presented to the ED with chief complaint of falls. Patient denied having encephalopathy. He reports that he drinks every day at least 16 ounces of beer 3-4 times daily, no known alleviating or exacerbating factor.  Patient reports that he did have a seizure when he stopped previously.  Patient otherwise reports has fallen multiple times, reports no nausea vomiting palpitations prior to the fall reports that he normally sleeps and falls. Upon further workup, patient found to have 
Nephrology Note                                                                                                                                                                                                                                                                                                                                                               Office : [phone]     Fax :[phone]    Patient's Name: Aki Pal  1:55 PM  2/6/2025    Reason for Consult:  Hyponatremia  Requesting Physician:  Paul Crane MD  Chief Complaint:    Chief Complaint   Patient presents with    Altered Mental Status     Patient arrives from home via [facility] with complaints of AMS. Wife reports that patient has been falling several times times. Patient weak and tremulous. Poor historian. C-collar applied d/t patient complaining of neck pain. Has chronic neck pain. Patient febrile and tachycardic en route.        Assessment/Plan     # Hyponatremia  - likely multifactorial related to high fluid intake, poor protein intake  - Na trending up, stable  - urine studies reviewed  - 1.2 L fluid restriction  - increase protein intake  - s/p urea x 1   - Monitor Na q6    #Urinary retention  - rios in place  - urology on board  - on flomax    #Fall   - PT/OT following      History of Present Ilness:    Aki Pal is a 75 y.o. male with PMHx of bipolar 1, bipolar 2, COPD, fibromyalgia, hyperlipidemia PTSD seizure who presented to the ED with chief complaint of falls. Patient denied having encephalopathy. He reports that he drinks every day at least 16 ounces of beer 3-4 times daily, no known alleviating or exacerbating factor.  Patient reports that he did have a seizure when he stopped previously.  Patient otherwise reports has fallen multiple times, reports no nausea vomiting palpitations prior to the fall reports that he normally sleeps and falls. Upon further workup, patient found to have 
Patient has not voided throughout shift. Completed bladder scan and bladder was retaining 715ml. Notified NP. Straight cath ordered. Patient tolerated well. 700ml output.   
Patient sitting up in chair. Wife at the bedside. No change in assessment. Gemma Murry RN BSN   
Patient still has rios in place. Does not appear voiding trial has been complete since placed for urinary retention. Patient also requesting rios removal. Page placed to Dr. Lewis asking if we can complete voiding trial today at hospital. Agrees, rios removed and will monitor output. Patient verbalizes understanding that RN needs to document how much output when he gets urge to urinate.     Hiral Bradley RN    
Pt dc with [facility]. Iv out. No issues. Vss and charted   
Urology Progress Note  [facility]    Provider: DANDRE Suresh CNP  Patient ID:  Admission Date: 2/3/2025 Name: Aki Pal  OR Date: 2/3/2025 MRN: [id number]   Patient Location: [id number] : 1950  Attending: Earline Lewis MD Date of Service: 2025  PCP: Paul Crane MD     Diagnoses:  1. Hyponatremia    2. Hypochloremia    3. Traumatic rhabdomyolysis, initial encounter ([facility])    4. PTSD (post-traumatic stress disorder)         Assessment/Plan:  74 yo male admitted with fall and hyponatremia. Urology consulted for urinary retention. Rios placed 25 for Pvrs of 603 and 715. He has been started on flomax  He tells me he feels as if he voids well at baseline, he denies any  sx prior to rios placement. He has not seen a urologist.   Afvss. Cr stable. UA negative. CT negative from  standpoint, right renal cyst      Recommendations:  Patient hasn't been on flomax, needs flomax for 2-3 days before voiding trial  Can have VT in rehab when on flomax.      The patient had a chance to ask questions which were answered. he understands the above plan.    Subjective:   Aki Pal is a 75 y.o. male. He was seen and examined this morning. Today we discussed VT in rehab.      Objective:   Vitals:  Vitals:    25 0815   BP: (!) 149/91   Pulse: 98   Resp: 18   Temp: 98.5 °F (36.9 °C)   SpO2: 93%       Intake/Output Summary (Last 24 hours) at 2025 0837  Last data filed at 2025 1800  Gross per 24 hour   Intake 982.8 ml   Output 2325 ml   Net -1342.2 ml     Physical Exam:  Gen: Alert and oriented x3, no acute distress  CV: Regular rate   Resp: unlabored respirations  Abd: Soft, non-distended, non-tender, no masses  Ext: no peripheral edema noted, moves upper and lower extremities spontaneously  Skin: warmand well perfused, no rashes noted on the face, or arms.     Labs:  Lab Results   Component Value Date    WBC 10.3 2025    HGB 14.3 2025    
02/06/2025    MCV 94.8 02/06/2025     02/06/2025     Lab Results   Component Value Date    CREATININE 0.8 02/06/2025    BUN 18 02/06/2025     (L) 02/06/2025    K 4.6 02/06/2025    CL 98 (L) 02/06/2025    CO2 31 02/06/2025       Ramsey Aguilar PA-C   2/7/2025        
Bearing Restrictions: weight bearing as tolerated    [x] Left Lower Extremity     Required Braces/Orthotics: no braces required    [x] Left  Positional Restrictions:no positional restrictions    Pre-Admission Information   Lives With: spouse    Type of Home: house  Home Layout: two level, bedroom/bathroom upstairs - however, pt has been sleeping on couch, unable to get to 2nd floor  Home Access:  2 step to enter without rails   Bathroom Layout: tub/shower unit  Bathroom Equipment: grab bars in shower, shower chair, hand held shower head  Toilet Height: standard height  Home Equipment: no prior equipment  Transfer Assistance: Independent without use of device  Ambulation Assistance:Independent without use of device  ADL Assistance: independent with all ADL's  IADL Assistance: independent with homemaking tasks -shares with wife  Active :        [] Yes  [x] No  Hand Dominance: [] Left  [x] Right  Current Employment: retired.  Occupation: exterior decorating  Hobbies: cats  Recent Falls: 10 falls in past week.     Available Assistance at Discharge: available PRN - wife    Examination   Vision:   Vision Gross Assessment: WFL and Vision Corrective Device: wears glasses for reading  Hearing:   hard of hearing  Posture:   Flexed trunk, chronic trunk flexion and rounded shoulders  Sensation:   WFL and reports numbness and tingling in (B) LE  Proprioception:    diminished proprioception in (B) LE  Tone:   Normotonic  Coordination Testing:   Coordination and Movement Description: (R) LE, (L) LE, gross motor impairments, ataxia, decreased speed, decreased accuracy    ROM:   (B) LE AROM WFL  Strength:   (B) LE strength grossly WFL +3/5, pain with resistance R LE  Therapist Clinical Decision Making (Complexity): medium complexity  Clinical Presentation: stable      Subjective  General: Pt seated up in chair upon arrival and agreeable to working with PT. Pt reports feeling better overall since last week.   Pain: Patient does 
Bearing Restrictions: weight bearing as tolerated    [x] Left Lower Extremity     Required Braces/Orthotics: no braces required    [x] Left  Positional Restrictions:no positional restrictions    Pre-Admission Information   Lives With: spouse    Type of Home: house  Home Layout: two level, bedroom/bathroom upstairs - however, pt has been sleeping on couch, unable to get to 2nd floor  Home Access:  2 step to enter without rails   Bathroom Layout: tub/shower unit  Bathroom Equipment: grab bars in shower, shower chair, hand held shower head  Toilet Height: standard height  Home Equipment: no prior equipment  Transfer Assistance: Independent without use of device  Ambulation Assistance:Independent without use of device  ADL Assistance: independent with all ADL's  IADL Assistance: independent with homemaking tasks -shares with wife  Active :        [] Yes  [x] No  Hand Dominance: [] Left  [x] Right  Current Employment: retired.  Occupation: exterior decorating  Hobbies: cats  Recent Falls: 10 falls in past week.     Available Assistance at Discharge: available PRN - wife    Examination   Vision:   Vision Gross Assessment: WFL and Vision Corrective Device: wears glasses for reading  Hearing:   hard of hearing  Posture:   Flexed trunk, chronic trunk flexion and rounded shoulders  Sensation:   WFL and reports numbness and tingling in (B) LE  Proprioception:    diminished proprioception in (B) LE  Tone:   Normotonic  Coordination Testing:   Coordination and Movement Description: (R) LE, (L) LE, gross motor impairments, ataxia, decreased speed, decreased accuracy    ROM:   (B) LE AROM WFL  Strength:   (B) LE strength grossly WFL +3/5, pain with resistance R LE  Therapist Clinical Decision Making (Complexity): medium complexity  Clinical Presentation: stable      Subjective  General: pt was seated in chair upon PT arrival, had a headache earlier but has improved at this time, agreeable to PT  Pain: Patient does not rate 
NAD  Eyes: EOMI  ENT: neck supple  Cardiovascular: Regular rate.  Respiratory: Clear to auscultation  Gastrointestinal: Soft, non tender  Genitourinary: no suprapubic tenderness  Musculoskeletal: No edema  Skin: warm, dry  Neuro: Alert.  Psych: Mood appropriate.         Medications:   Medications:    ALPRAZolam  1 mg Oral 4x daily    budesonide-formoterol  2 puff Inhalation BID RT    tamsulosin  0.4 mg Oral Daily    sodium chloride flush  10 mL IntraVENous 2 times per day    sodium chloride flush  5-40 mL IntraVENous 2 times per day    thiamine  100 mg Oral Daily    multivitamin  1 tablet Oral Daily    folic acid  1 mg Oral Daily    cetirizine  10 mg Oral Daily    methylphenidate  20 mg Oral 4x Daily    pantoprazole  40 mg Oral QAM AC      Infusions:    sodium chloride      sodium chloride       PRN Meds: polyethylene glycol, 17 g, Daily PRN  butalbital-acetaminophen-caffeine, 1 tablet, BID PRN  sodium chloride flush, 10 mL, PRN  sodium chloride, , PRN  promethazine, 12.5 mg, Q6H PRN   Or  ondansetron, 4 mg, Q6H PRN  melatonin, 3 mg, Nightly PRN  nicotine, 1 patch, Daily PRN  acetaminophen, 650 mg, Q6H PRN   Or  acetaminophen, 650 mg, Q6H PRN  sodium chloride flush, 5-40 mL, PRN  sodium chloride, , PRN  cyclobenzaprine, 10 mg, TID PRN        Labs and Imaging   XR FOOT LEFT (2 VIEWS)    Result Date: 2/5/2025  EXAMINATION: 3 XRAY VIEWS OF THE LEFT FOOT 2/5/2025 9:16 am COMPARISON: None. HISTORY: ORDERING SYSTEM PROVIDED HISTORY: ankle pain TECHNOLOGIST PROVIDED HISTORY: Reason for exam:->ankle pain FINDINGS: External radiopaque foreign bodies degrade image quality.  There is an age-indeterminate avulsion fracture involving the anterior talus.  There is no dislocation.  The bones are normally mineralized.  There are no bony destructive lesions.  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  Small calcaneal enthesophyte is noted.     1. Age-indeterminate talar avulsion fractures; correlate with point tenderness.     CT 
hyponatremia. We are consulted for hyponatremia.      Interval hx     States he feels ok today  Labs reviewed- start d5 x 5 hrs   Good UOP  Bps controlled  On supplemental O2    Past Medical History:   Diagnosis Date    Anxiety     Bipolar 1 disorder (HCC)     Bipolar 2 disorder (HCC)     Community acquired bacterial pneumonia 06/12/2015    COPD (chronic obstructive pulmonary disease) ([facility])     Depression     Fibromyalgia     Headache(784.0)     Hyperlipidemia     Manic depressive disease manic phase ([facility])     On home O2     PTSD (post-traumatic stress disorder)     Seizures ([facility])     Tardive dyskinesia        Past Surgical History:   Procedure Laterality Date    MOUTH SURGERY  05/2013       Family History   Problem Relation Age of Onset    Mental Illness Mother     Mental Illness Father         reports that he has been smoking cigarettes. He has a 42 pack-year smoking history. He has never used smokeless tobacco. He reports current alcohol use. He reports that he does not use drugs.    Allergies:  Prednisone, Serotonin reuptake inhibitors (ssris), and Tricyclic antidepressants    Current Medications:    dextrose 5 % solution, Continuous  budesonide-formoterol (SYMBICORT) 160-4.5 MCG/ACT inhaler 2 puff, BID RT  tamsulosin (FLOMAX) capsule 0.4 mg, Daily  sodium chloride flush 0.9 % injection 10 mL, 2 times per day  sodium chloride flush 0.9 % injection 10 mL, PRN  0.9 % sodium chloride infusion, PRN  promethazine (PHENERGAN) tablet 12.5 mg, Q6H PRN   Or  ondansetron (ZOFRAN) injection 4 mg, Q6H PRN  melatonin tablet 3 mg, Nightly PRN  nicotine (NICODERM CQ) 21 MG/24HR 1 patch, Daily PRN  acetaminophen (TYLENOL) tablet 650 mg, Q6H PRN   Or  acetaminophen (TYLENOL) suppository 650 mg, Q6H PRN  sodium chloride flush 0.9 % injection 5-40 mL, 2 times per day  sodium chloride flush 0.9 % injection 5-40 mL, PRN  0.9 % sodium chloride infusion, PRN  LORazepam (ATIVAN) tablet 1 mg, Q1H PRN   Or  LORazepam (ATIVAN) 1 mg in 
M.A., [facility]   Speech-Language Pathologist  [id number]        
\"COMU\", \"HYALCAST\", \"WBCUA\", \"RBCUA\" in the last 72 hours.    Invalid input(s): \"EPIU\"    Urine Culture: No results for input(s): \"LABURIN\" in the last 72 hours.  Urine Chemistry:   No results for input(s): \"CLUR\", \"LABCREA\", \"PROTEINUR\", \"NAUR\" in the last 72 hours.        IMAGING:  XR FOOT LEFT (2 VIEWS)   Final Result   1. Age-indeterminate talar avulsion fractures; correlate with point   tenderness.         CT ABDOMEN PELVIS WO CONTRAST Additional Contrast? None   Final Result   1. No acute intra-abdominal or pelvic process.   2. Diverticulosis worse in the sigmoid but no acute diverticulitis.   3. Right renal cyst.   4. Nodular interstitial infiltrate right lower lobe. This was partly seen in   the previous evaluation as well.   5. Minimal fat-containing periumbilical hernia.   6. Degenerative changes at L5-S1.      RECOMMENDATIONS:   Pathology: Right Bosniak I/Bosniak II benign renal cyst.  No follow-up   imaging is recommended.  JACR 2018 Feb; 264-273, Management of the Incidental   Renal Mass on CT, [employer] 2021; 814-848, Bosniak Classification of   Cystic Renal Masses, Version 2019.         CT C-Spine W/O Contrast   Final Result   1. No acute cervical spine fracture.   2. Advanced multilevel degenerative disc disease and moderate multilevel   facet arthropathy in the spine.  Grade 1 anterolisthesis at C3-C4 and C4-C5   is again present and likely degenerative.   3. Kyphosis of the spine which could be related to patient positioning,   degenerative changes or muscle spasm.         CT Head W/O Contrast   Final Result   Motion artifact degrades the images.  Cerebral atrophy.  Chronic small vessel   ischemic disease.  No acute brain parenchymal abnormality.         XR CHEST PORTABLE   Final Result   Left basilar atelectasis.               Medical Decision Making:  The following items were considered in medical decision making:  Discussion of patient care with other providers  Reviewed clinical lab 
\"LABCAST\", \"BACTERIA\", \"COMU\", \"HYALCAST\", \"WBCUA\", \"RBCUA\" in the last 72 hours.    Invalid input(s): \"EPIU\"    Urine Culture: No results for input(s): \"LABURIN\" in the last 72 hours.  Urine Chemistry:   No results for input(s): \"CLUR\", \"LABCREA\", \"PROTEINUR\", \"NAUR\" in the last 72 hours.        IMAGING:  XR FOOT LEFT (2 VIEWS)   Final Result   1. Age-indeterminate talar avulsion fractures; correlate with point   tenderness.         CT ABDOMEN PELVIS WO CONTRAST Additional Contrast? None   Final Result   1. No acute intra-abdominal or pelvic process.   2. Diverticulosis worse in the sigmoid but no acute diverticulitis.   3. Right renal cyst.   4. Nodular interstitial infiltrate right lower lobe. This was partly seen in   the previous evaluation as well.   5. Minimal fat-containing periumbilical hernia.   6. Degenerative changes at L5-S1.      RECOMMENDATIONS:   Pathology: Right Bosniak I/Bosniak II benign renal cyst.  No follow-up   imaging is recommended.  JACR 2018 Feb; 264-273, Management of the Incidental   Renal Mass on CT, [employer] 2021; 814-848, Bosniak Classification of   Cystic Renal Masses, Version 2019.         CT C-Spine W/O Contrast   Final Result   1. No acute cervical spine fracture.   2. Advanced multilevel degenerative disc disease and moderate multilevel   facet arthropathy in the spine.  Grade 1 anterolisthesis at C3-C4 and C4-C5   is again present and likely degenerative.   3. Kyphosis of the spine which could be related to patient positioning,   degenerative changes or muscle spasm.         CT Head W/O Contrast   Final Result   Motion artifact degrades the images.  Cerebral atrophy.  Chronic small vessel   ischemic disease.  No acute brain parenchymal abnormality.         XR CHEST PORTABLE   Final Result   Left basilar atelectasis.               Medical Decision Making:  The following items were considered in medical decision making:  Discussion of patient care with other 
but having many falls, 10 in the past week. Pt showed improvement this date but continues with decreased balance, posture, strength and activity tolerance.  Pt is home alone during day and not able to care for himself. Pt would benefit from skilled PT services to address these issues.   Safety Interventions: patient left in chair, chair alarm in place, call light within reach, nurse notified, and family/caregiver present - nursing approved pt seated up in chair.     Plan  Frequency: 3-5 x/per week  Current Treatment Recommendations: strengthening, ROM, balance training, functional mobility training, transfer training, gait training, stair training, endurance training, patient/caregiver education, cognitive reorientation, pain management, home exercise program, safety education, and equipment evaluation/education    Goals  Patient Goals: To get stronger   Short Term Goals:  Time Frame: to be met by DC  Patient will complete bed mobility at supervision   Patient will complete transfers at stand by assistance   Patient will ambulate 50 ft with use of LRAD at stand by assistance  Patient will ascend/descend 2 stairs without use of HR at stand by assistance    Above goals reviewed on 2/6/2025.  All goals are ongoing at this time unless indicated above.      Therapy Session Time      Individual Group Co-treatment   Time In 1102       Time Out 1206       Minutes 64         Timed Code Treatment Minutes:  64 Minutes  Total Treatment Minutes: 64  minutes       Electronically Signed By: Monica Smith, PT DPT [id number]         
Value Date/Time    BLOODCULT2 No Growth after 4 days of incubation. 11/27/2023 01:41 PM     Organism: No results found for: \"ORG\"      Electronically signed by Earline Lewis MD on 2/5/2025 at 11:15 AM

## 2025-02-11 NOTE — CARE COORDINATION
02/11/25 1610   IMM Letter   IMM Letter given to Patient/Family/Significant other/Guardian/POA/by: patient wife signed   IMM Letter date given: 02/11/25   IMM Letter time given: 1611     Electronically signed by KAM Galindo on 2/11/25 at 4:11 PM EST

## 2025-02-11 NOTE — PLAN OF CARE
Problem: Discharge Planning  Goal: Discharge to home or other facility with appropriate resources  2/5/2025 1848 by Oma Jack RN  Outcome: Progressing  Flowsheets (Taken 2/5/2025 0830)  Discharge to home or other facility with appropriate resources:   Identify barriers to discharge with patient and caregiver   Arrange for needed discharge resources and transportation as appropriate   Identify discharge learning needs (meds, wound care, etc)  2/5/2025 0622 by Inga Sr RN  Outcome: Progressing     Problem: Safety - Adult  Goal: Free from fall injury  2/5/2025 1848 by Oma Jack RN  Outcome: Progressing  2/5/2025 0622 by Inga Sr RN  Outcome: Progressing     Problem: Pain  Goal: Verbalizes/displays adequate comfort level or baseline comfort level  2/5/2025 0622 by Inga Sr RN  Outcome: Progressing     Problem: Neurosensory - Adult  Goal: Absence of seizures  2/5/2025 1848 by Oma Jack RN  Outcome: Progressing  Flowsheets (Taken 2/5/2025 0830)  Absence of seizures:   Monitor for seizure activity.  If seizure occurs, document type and location of movements and any associated apnea   Support airway/breathing, administer oxygen as needed  2/5/2025 0622 by Inga Sr RN  Outcome: Progressing  Goal: Achieves maximal functionality and self care  2/5/2025 0622 by Inga Sr RN  Outcome: Progressing     Problem: Respiratory - Adult  Goal: Achieves optimal ventilation and oxygenation  2/5/2025 1848 by Oma Jack RN  Outcome: Progressing  Flowsheets (Taken 2/5/2025 0830)  Achieves optimal ventilation and oxygenation:   Assess for changes in respiratory status   Assess for changes in mentation and behavior   Position to facilitate oxygenation and minimize respiratory effort   Oxygen supplementation based on oxygen saturation or arterial blood gases   Initiate smoking cessation protocol as indicated   Encourage broncho-pulmonary hygiene including cough, deep 
  Problem: Discharge Planning  Goal: Discharge to home or other facility with appropriate resources  2/6/2025 0838 by Bebe Watson RN  Outcome: Adequate for Discharge  Flowsheets (Taken 2/6/2025 0821)  Discharge to home or other facility with appropriate resources: Identify barriers to discharge with patient and caregiver  2/5/2025 1848 by Oma Jack RN  Outcome: Progressing  Flowsheets (Taken 2/5/2025 0830)  Discharge to home or other facility with appropriate resources:   Identify barriers to discharge with patient and caregiver   Arrange for needed discharge resources and transportation as appropriate   Identify discharge learning needs (meds, wound care, etc)     Problem: Safety - Adult  Goal: Free from fall injury  2/6/2025 0838 by Bebe Watson RN  Outcome: Adequate for Discharge  2/5/2025 1848 by Oma Jack RN  Outcome: Progressing     Problem: Pain  Goal: Verbalizes/displays adequate comfort level or baseline comfort level  Outcome: Adequate for Discharge  Flowsheets (Taken 2/6/2025 0815)  Verbalizes/displays adequate comfort level or baseline comfort level: Encourage patient to monitor pain and request assistance     Problem: Neurosensory - Adult  Goal: Absence of seizures  2/6/2025 0838 by Bebe Watson RN  Outcome: Adequate for Discharge  Flowsheets (Taken 2/6/2025 0821)  Absence of seizures: Monitor for seizure activity.  If seizure occurs, document type and location of movements and any associated apnea  2/5/2025 1848 by Oma Jack RN  Outcome: Progressing  Flowsheets (Taken 2/5/2025 0830)  Absence of seizures:   Monitor for seizure activity.  If seizure occurs, document type and location of movements and any associated apnea   Support airway/breathing, administer oxygen as needed  Goal: Achieves maximal functionality and self care  Outcome: Adequate for Discharge     Problem: Respiratory - Adult  Goal: Achieves optimal ventilation and oxygenation  2/6/2025 0838 by Shirley 
  Problem: Discharge Planning  Goal: Discharge to home or other facility with appropriate resources  Outcome: Adequate for Discharge     Problem: Safety - Adult  Goal: Free from fall injury  Outcome: Adequate for Discharge     Problem: Pain  Goal: Verbalizes/displays adequate comfort level or baseline comfort level  Outcome: Adequate for Discharge  Flowsheets (Taken 2/7/2025 7234 by Carmel Canales)  Verbalizes/displays adequate comfort level or baseline comfort level: Encourage patient to monitor pain and request assistance     Problem: Neurosensory - Adult  Goal: Absence of seizures  Outcome: Adequate for Discharge  Goal: Achieves maximal functionality and self care  Outcome: Adequate for Discharge     Problem: Respiratory - Adult  Goal: Achieves optimal ventilation and oxygenation  Outcome: Adequate for Discharge     Problem: Cardiovascular - Adult  Goal: Maintains optimal cardiac output and hemodynamic stability  Outcome: Adequate for Discharge     Problem: Musculoskeletal - Adult  Goal: Return mobility to safest level of function  Outcome: Adequate for Discharge  Goal: Return ADL status to a safe level of function  Outcome: Adequate for Discharge     Problem: Gastrointestinal - Adult  Goal: Maintains or returns to baseline bowel function  Outcome: Adequate for Discharge  Goal: Maintains adequate nutritional intake  Outcome: Adequate for Discharge     Problem: Genitourinary - Adult  Goal: Absence of urinary retention  Outcome: Adequate for Discharge     Problem: Skin/Tissue Integrity  Goal: Skin integrity remains intact  Description: 1.  Monitor for areas of redness and/or skin breakdown  2.  Assess vascular access sites hourly  3.  Every 4-6 hours minimum:  Change oxygen saturation probe site  4.  Every 4-6 hours:  If on nasal continuous positive airway pressure, respiratory therapy assess nares and determine need for appliance change or resting period  Outcome: Adequate for Discharge     Problem: 
  Problem: Discharge Planning  Goal: Discharge to home or other facility with appropriate resources  Outcome: Progressing     Problem: Safety - Adult  Goal: Free from fall injury  Outcome: Progressing     Problem: Pain  Goal: Verbalizes/displays adequate comfort level or baseline comfort level  Outcome: Progressing     Problem: Neurosensory - Adult  Goal: Absence of seizures  Outcome: Progressing  Goal: Achieves maximal functionality and self care  Outcome: Progressing     Problem: Respiratory - Adult  Goal: Achieves optimal ventilation and oxygenation  Outcome: Progressing     Problem: Cardiovascular - Adult  Goal: Maintains optimal cardiac output and hemodynamic stability  Outcome: Progressing     Problem: Musculoskeletal - Adult  Goal: Return mobility to safest level of function  Outcome: Progressing  Goal: Return ADL status to a safe level of function  Outcome: Progressing     Problem: Gastrointestinal - Adult  Goal: Maintains or returns to baseline bowel function  Outcome: Progressing  Goal: Maintains adequate nutritional intake  Outcome: Progressing     Problem: Genitourinary - Adult  Goal: Absence of urinary retention  Outcome: Progressing     Problem: Skin/Tissue Integrity  Goal: Skin integrity remains intact  Description: 1.  Monitor for areas of redness and/or skin breakdown  2.  Assess vascular access sites hourly  3.  Every 4-6 hours minimum:  Change oxygen saturation probe site  4.  Every 4-6 hours:  If on nasal continuous positive airway pressure, respiratory therapy assess nares and determine need for appliance change or resting period  Outcome: Progressing     
  Problem: Discharge Planning  Goal: Discharge to home or other facility with appropriate resources  Outcome: Progressing     Problem: Safety - Adult  Goal: Free from fall injury  Outcome: Progressing     Problem: Pain  Goal: Verbalizes/displays adequate comfort level or baseline comfort level  Outcome: Progressing     Problem: Neurosensory - Adult  Goal: Absence of seizures  Outcome: Progressing  Goal: Achieves maximal functionality and self care  Outcome: Progressing     Problem: Respiratory - Adult  Goal: Achieves optimal ventilation and oxygenation  Outcome: Progressing     Problem: Cardiovascular - Adult  Goal: Maintains optimal cardiac output and hemodynamic stability  Outcome: Progressing     Problem: Musculoskeletal - Adult  Goal: Return mobility to safest level of function  Outcome: Progressing  Goal: Return ADL status to a safe level of function  Outcome: Progressing     Problem: Gastrointestinal - Adult  Goal: Maintains or returns to baseline bowel function  Outcome: Progressing  Goal: Maintains adequate nutritional intake  Outcome: Progressing     Problem: Genitourinary - Adult  Goal: Absence of urinary retention  Outcome: Progressing  Note: Pt continues to have rios catheter     Problem: Skin/Tissue Integrity  Goal: Skin integrity remains intact  Description: 1.  Monitor for areas of redness and/or skin breakdown  2.  Assess vascular access sites hourly  3.  Every 4-6 hours minimum:  Change oxygen saturation probe site  4.  Every 4-6 hours:  If on nasal continuous positive airway pressure, respiratory therapy assess nares and determine need for appliance change or resting period  Outcome: Progressing     Problem: Infection - Adult  Goal: Absence of infection at discharge  Outcome: Progressing     Problem: Decision Making  Goal: Pt/Family able to effectively weigh alternatives and participate in decision making related to treatment and care  Description: INTERVENTIONS:  1. Determine when there are 
  Problem: Discharge Planning  Goal: Discharge to home or other facility with appropriate resources  Outcome: Progressing  Flowsheets  Taken 2/9/2025 0800 by Mikaela Austin RN  Discharge to home or other facility with appropriate resources: Identify barriers to discharge with patient and caregiver  Taken 2/9/2025 0007 by Phoebe Jacobs RN  Discharge to home or other facility with appropriate resources: Identify barriers to discharge with patient and caregiver     Problem: Safety - Adult  Goal: Free from fall injury  Outcome: Progressing     Problem: Pain  Goal: Verbalizes/displays adequate comfort level or baseline comfort level  Outcome: Progressing  Flowsheets (Taken 2/9/2025 0730)  Verbalizes/displays adequate comfort level or baseline comfort level:   Encourage patient to monitor pain and request assistance   Assess pain using appropriate pain scale   Administer analgesics based on type and severity of pain and evaluate response   Implement non-pharmacological measures as appropriate and evaluate response     Problem: Neurosensory - Adult  Goal: Absence of seizures  Outcome: Progressing  Goal: Achieves maximal functionality and self care  Outcome: Progressing  Flowsheets  Taken 2/9/2025 0800 by Mikaela Austin RN  Achieves maximal functionality and self care:   Monitor swallowing and airway patency with patient fatigue and changes in neurological status   Encourage and assist patient to increase activity and self care with guidance from physical therapy/occupational therapy   Encourage visually impaired, hearing impaired and aphasic patients to use assistive/communication devices  Taken 2/9/2025 0007 by Phoebe Jacobs RN  Achieves maximal functionality and self care: Monitor swallowing and airway patency with patient fatigue and changes in neurological status     Problem: Respiratory - Adult  Goal: Achieves optimal ventilation and oxygenation  Outcome: Progressing  Flowsheets  Taken 2/9/2025 0800 by Norman 
  Problem: Discharge Planning  Goal: Discharge to home or other facility with appropriate resources  Outcome: Progressing  Flowsheets (Taken 2/10/2025 2000)  Discharge to home or other facility with appropriate resources: Identify barriers to discharge with patient and caregiver     Problem: Safety - Adult  Goal: Free from fall injury  Outcome: Progressing     Problem: Pain  Goal: Verbalizes/displays adequate comfort level or baseline comfort level  Outcome: Progressing     Problem: Neurosensory - Adult  Goal: Absence of seizures  Outcome: Progressing  Flowsheets (Taken 2/10/2025 2000)  Absence of seizures: Monitor for seizure activity.  If seizure occurs, document type and location of movements and any associated apnea  Goal: Achieves maximal functionality and self care  Outcome: Progressing  Flowsheets (Taken 2/10/2025 2000)  Achieves maximal functionality and self care: Monitor swallowing and airway patency with patient fatigue and changes in neurological status     Problem: Respiratory - Adult  Goal: Achieves optimal ventilation and oxygenation  Outcome: Progressing  Flowsheets (Taken 2/10/2025 2000)  Achieves optimal ventilation and oxygenation: Assess for changes in respiratory status     Problem: Cardiovascular - Adult  Goal: Maintains optimal cardiac output and hemodynamic stability  Outcome: Progressing  Flowsheets (Taken 2/10/2025 2000)  Maintains optimal cardiac output and hemodynamic stability: Monitor blood pressure and heart rate     Problem: Musculoskeletal - Adult  Goal: Return mobility to safest level of function  Outcome: Progressing  Flowsheets (Taken 2/10/2025 2000)  Return Mobility to Safest Level of Function: Assess patient stability and activity tolerance for standing, transferring and ambulating with or without assistive devices  Goal: Return ADL status to a safe level of function  Outcome: Progressing  Flowsheets (Taken 2/10/2025 2000)  Return ADL Status to a Safe Level of Function: 
  Problem: Hematologic - Adult  Goal: Maintains hematologic stability  Outcome: Progressing      02/10/25 0014   Vitals   Temp 97.6 °F (36.4 °C)   Temp Source Temporal   Pulse (!) 111   Heart Rate Source Radial;Brachial   Respirations 16   /65   MAP (Calculated) 77   BP Location Left upper arm   BP Upper/Lower Upper   BP Method Automatic   Patient Position Lying right side;Turns self   Cardiac Rhythm ST   Pain Assessment   Pain Assessment None - Denies Pain   Pain Level 0   Oxygen Therapy   SpO2 93 %   Pulse Oximeter Device Mode Intermittent   Pulse Oximeter Device Location Finger   O2 Device None (Room air)  (pt states that he wears 2L n/c HS at home; placed on 2L n/c for bedtime per pt request)     
  Problem: Pain  Goal: Verbalizes/displays adequate comfort level or baseline comfort level  Outcome: Progressing      02/10/25 0320   Pain Assessment   Pain Assessment 0-10   Pain Level 3   Pain Location Head;Neck   Pain Descriptors Stabbing   Non-Pharmaceutical Pain Intervention(s) Emotional support  (and giving tylenol now)     
  Problem: Pain  Goal: Verbalizes/displays adequate comfort level or baseline comfort level  Outcome: Progressing      02/10/25 0656   Pain Assessment   Pain Assessment 0-10   Pain Level 7   Pain Location Head;Neck   Pain Descriptors Stabbing   Pain Type Acute pain   Pain Frequency Continuous   Pain Onset On-going   Non-Pharmaceutical Pain Intervention(s) Emotional support  (and giving fioricet now)     
  Problem: Pain  Goal: Verbalizes/displays adequate comfort level or baseline comfort level  Outcome: Progressing     Problem: Hematologic - Adult  Goal: Maintains hematologic stability  Outcome: Progressing      02/10/25 0429   Vitals   Temp 98.7 °F (37.1 °C)   Temp Source Oral   Pulse (!) 107   Heart Rate Source Monitor   Respirations 16   /83   MAP (Calculated) 98   BP Location Left upper arm   BP Upper/Lower Upper   BP Method Automatic   Patient Position Semi fowlers   Cardiac Rhythm Sinus tachy   Oxygen Therapy   SpO2 93 %   Pulse Oximetry Type Intermittent   Pulse Oximeter Device Location Finger   O2 Device None (Room air)     
Progressing  Flowsheets (Taken 2/10/2025 0800)  Skin Integrity Remains Intact: Monitor for areas of redness and/or skin breakdown     Problem: Infection - Adult  Goal: Absence of infection at discharge  2/10/2025 1505 by Hiral Bradley RN  Outcome: Progressing  2/10/2025 1504 by Hiral Bradley RN  Outcome: Progressing  Flowsheets (Taken 2/10/2025 0800)  Absence of infection at discharge:   Assess and monitor for signs and symptoms of infection   Monitor lab/diagnostic results     Problem: Decision Making  Goal: Pt/Family able to effectively weigh alternatives and participate in decision making related to treatment and care  Description: INTERVENTIONS:  1. Determine when there are differences between patient's view, family's view, and healthcare provider's view of condition  2. Facilitate patient and family articulation of goals for care  3. Help patient and family identify pros/cons of alternative solutions  4. Provide information as requested by patient/family  5. Respect patient/family right to receive or not to receive information  6. Serve as a liaison between patient and family and health care team  7. Initiate Consults from Ethics, Palliative Care or initiate Family Care Conference as is appropriate  2/10/2025 1505 by Hiral Bradley RN  Outcome: Progressing  2/10/2025 1504 by Hiral Brdaley RN  Outcome: Progressing     Problem: Hematologic - Adult  Goal: Maintains hematologic stability  2/10/2025 1505 by Hiral Bradley RN  Outcome: Progressing  2/10/2025 1504 by Hiral Bradley RN  Outcome: Progressing  Flowsheets (Taken 2/10/2025 0800)  Maintains hematologic stability: Assess for signs and symptoms of bleeding or hemorrhage

## 2025-02-11 NOTE — CARE COORDINATION
Discharge Planning:    CM called [facility] [phone], no answer, LVM with [facility] and to ask about pre-cert on confidential VM.    Update 1141: CM received a call from [facility] and he reports he is getting the pre-denial from [facility] and reports they can take the pt in with straight medicare.  CM informed that would be okay.   [facility]en reports he is going to call the wife and confirm with her and call CM back.    Update: ISABEL received a call from [facility] and wife is okay with pt going under medicare and they can take the pt whenever he is ready.  Pt will need to go with xanax and anti anxiety medication.  ISABEL informed the nurse    Electronically signed by Michael Singh on 2/11/2025 at 10:11 AM  Electronically signed by Michael Singh on 2/11/2025 at 11:51 AM  Electronically signed by Michael Singh on 2/11/2025 at 12:23 PM

## 2025-03-30 ENCOUNTER — HOSPITAL ENCOUNTER (INPATIENT)
Age: 75
LOS: 2 days | Discharge: HOME | DRG: 193 | End: 2025-04-01
Attending: STUDENT IN AN ORGANIZED HEALTH CARE EDUCATION/TRAINING PROGRAM | Admitting: FAMILY MEDICINE
Payer: COMMERCIAL

## 2025-03-30 ENCOUNTER — APPOINTMENT (OUTPATIENT)
Dept: CT IMAGING | Age: 75
DRG: 193 | End: 2025-03-30
Payer: COMMERCIAL

## 2025-03-30 ENCOUNTER — APPOINTMENT (OUTPATIENT)
Dept: GENERAL RADIOLOGY | Age: 75
DRG: 193 | End: 2025-03-30
Payer: COMMERCIAL

## 2025-03-30 DIAGNOSIS — A41.9 SEPSIS, DUE TO UNSPECIFIED ORGANISM, UNSPECIFIED WHETHER ACUTE ORGAN DYSFUNCTION PRESENT (HCC): Primary | ICD-10-CM

## 2025-03-30 DIAGNOSIS — R41.82 ALTERED MENTAL STATUS, UNSPECIFIED ALTERED MENTAL STATUS TYPE: ICD-10-CM

## 2025-03-30 DIAGNOSIS — J18.9 PNEUMONIA OF RIGHT LOWER LOBE DUE TO INFECTIOUS ORGANISM: ICD-10-CM

## 2025-03-30 LAB
ALBUMIN SERPL-MCNC: 3.8 G/DL (ref 3.4–5)
ALBUMIN/GLOB SERPL: 1.4 {RATIO} (ref 1.1–2.2)
ALP SERPL-CCNC: 112 U/L (ref 40–129)
ALT SERPL-CCNC: 16 U/L (ref 10–40)
ANION GAP SERPL CALCULATED.3IONS-SCNC: 9 MMOL/L (ref 3–16)
AST SERPL-CCNC: 28 U/L (ref 15–37)
BASE EXCESS BLDA CALC-SCNC: 11 MMOL/L (ref -3–3)
BASE EXCESS BLDA CALC-SCNC: 7.6 MMOL/L (ref -3–3)
BASOPHILS # BLD: 0.1 K/UL (ref 0–0.2)
BASOPHILS NFR BLD: 0.4 %
BILIRUB SERPL-MCNC: <0.2 MG/DL (ref 0–1)
BUN SERPL-MCNC: 6 MG/DL (ref 7–20)
CALCIUM SERPL-MCNC: 8.4 MG/DL (ref 8.3–10.6)
CHLORIDE SERPL-SCNC: 97 MMOL/L (ref 99–110)
CK SERPL-CCNC: 240 U/L (ref 39–308)
CO2 BLDA-SCNC: 38 MMOL/L
CO2 BLDA-SCNC: 91.4 MMOL/L
CO2 SERPL-SCNC: 31 MMOL/L (ref 21–32)
COHGB MFR BLDA: 2 % (ref 0–1.5)
CREAT SERPL-MCNC: 0.7 MG/DL (ref 0.8–1.3)
DEPRECATED RDW RBC AUTO: 14.5 % (ref 12.4–15.4)
EOSINOPHIL # BLD: 0 K/UL (ref 0–0.6)
EOSINOPHIL NFR BLD: 0.2 %
ETHANOLAMINE SERPL-MCNC: NORMAL MG/DL (ref 0–0.08)
FLUAV RNA RESP QL NAA+PROBE: NOT DETECTED
FLUBV RNA RESP QL NAA+PROBE: NOT DETECTED
GFR SERPLBLD CREATININE-BSD FMLA CKD-EPI: >90 ML/MIN/{1.73_M2}
GLUCOSE SERPL-MCNC: 113 MG/DL (ref 70–99)
HCO3 BLDA-SCNC: 36.3 MMOL/L (ref 21–29)
HCO3 BLDA-SCNC: 38.1 MMOL/L (ref 21–29)
HCT VFR BLD AUTO: 40.2 % (ref 40.5–52.5)
HGB BLD-MCNC: 13.2 G/DL (ref 13.5–17.5)
HGB BLDA-MCNC: 12.8 G/DL (ref 13.5–17.5)
LACTATE BLDV-SCNC: 1.7 MMOL/L (ref 0.4–1.9)
LYMPHOCYTES # BLD: 1 K/UL (ref 1–5.1)
LYMPHOCYTES NFR BLD: 6.6 %
MCH RBC QN AUTO: 30.9 PG (ref 26–34)
MCHC RBC AUTO-ENTMCNC: 32.8 G/DL (ref 31–36)
MCV RBC AUTO: 94.2 FL (ref 80–100)
METHGB MFR BLDA: 0.7 %
MONOCYTES # BLD: 0.9 K/UL (ref 0–1.3)
MONOCYTES NFR BLD: 6.4 %
NEUTROPHILS # BLD: 12.6 K/UL (ref 1.7–7.7)
NEUTROPHILS NFR BLD: 86.4 %
O2 THERAPY: ABNORMAL
PCO2 BLDA: 60.9 MM HG (ref 35–45)
PCO2 BLDA: 87.8 MMHG (ref 35–45)
PERFORMED ON: ABNORMAL
PH BLDA: 7.25 [PH] (ref 7.35–7.45)
PH BLDA: 7.38 [PH] (ref 7.35–7.45)
PLATELET # BLD AUTO: 133 K/UL (ref 135–450)
PMV BLD AUTO: 8.8 FL (ref 5–10.5)
PO2 BLDA: 86.3 MMHG (ref 75–108)
PO2 BLDA: 86.8 MM HG (ref 75–108)
POC SAMPLE TYPE: ABNORMAL
POTASSIUM SERPL-SCNC: 4.8 MMOL/L (ref 3.5–5.1)
PROCALCITONIN SERPL IA-MCNC: 0.07 NG/ML (ref 0–0.15)
PROT SERPL-MCNC: 6.6 G/DL (ref 6.4–8.2)
RBC # BLD AUTO: 4.27 M/UL (ref 4.2–5.9)
SAO2 % BLDA: 96 % (ref 93–100)
SAO2 % BLDA: 96.5 %
SARS-COV-2 RNA RESP QL NAA+PROBE: NOT DETECTED
SODIUM SERPL-SCNC: 137 MMOL/L (ref 136–145)
WBC # BLD AUTO: 14.6 K/UL (ref 4–11)

## 2025-03-30 PROCEDURE — 96374 THER/PROPH/DIAG INJ IV PUSH: CPT

## 2025-03-30 PROCEDURE — 87636 SARSCOV2 & INF A&B AMP PRB: CPT

## 2025-03-30 PROCEDURE — 99291 CRITICAL CARE FIRST HOUR: CPT | Performed by: STUDENT IN AN ORGANIZED HEALTH CARE EDUCATION/TRAINING PROGRAM

## 2025-03-30 PROCEDURE — 72170 X-RAY EXAM OF PELVIS: CPT

## 2025-03-30 PROCEDURE — 0202U NFCT DS 22 TRGT SARS-COV-2: CPT

## 2025-03-30 PROCEDURE — 6360000002 HC RX W HCPCS: Performed by: FAMILY MEDICINE

## 2025-03-30 PROCEDURE — 99285 EMERGENCY DEPT VISIT HI MDM: CPT

## 2025-03-30 PROCEDURE — 83605 ASSAY OF LACTIC ACID: CPT

## 2025-03-30 PROCEDURE — 71045 X-RAY EXAM CHEST 1 VIEW: CPT

## 2025-03-30 PROCEDURE — 71260 CT THORAX DX C+: CPT

## 2025-03-30 PROCEDURE — 87449 NOS EACH ORGANISM AG IA: CPT

## 2025-03-30 PROCEDURE — 87040 BLOOD CULTURE FOR BACTERIA: CPT

## 2025-03-30 PROCEDURE — 94761 N-INVAS EAR/PLS OXIMETRY MLT: CPT

## 2025-03-30 PROCEDURE — 81003 URINALYSIS AUTO W/O SCOPE: CPT

## 2025-03-30 PROCEDURE — 2580000003 HC RX 258: Performed by: STUDENT IN AN ORGANIZED HEALTH CARE EDUCATION/TRAINING PROGRAM

## 2025-03-30 PROCEDURE — 36415 COLL VENOUS BLD VENIPUNCTURE: CPT

## 2025-03-30 PROCEDURE — 2000000000 HC ICU R&B

## 2025-03-30 PROCEDURE — 6360000002 HC RX W HCPCS: Performed by: STUDENT IN AN ORGANIZED HEALTH CARE EDUCATION/TRAINING PROGRAM

## 2025-03-30 PROCEDURE — 72125 CT NECK SPINE W/O DYE: CPT

## 2025-03-30 PROCEDURE — 2580000003 HC RX 258: Performed by: NURSE PRACTITIONER

## 2025-03-30 PROCEDURE — 94660 CPAP INITIATION&MGMT: CPT

## 2025-03-30 PROCEDURE — 94640 AIRWAY INHALATION TREATMENT: CPT

## 2025-03-30 PROCEDURE — 82803 BLOOD GASES ANY COMBINATION: CPT

## 2025-03-30 PROCEDURE — 2500000003 HC RX 250 WO HCPCS: Performed by: STUDENT IN AN ORGANIZED HEALTH CARE EDUCATION/TRAINING PROGRAM

## 2025-03-30 PROCEDURE — 6360000004 HC RX CONTRAST MEDICATION: Performed by: STUDENT IN AN ORGANIZED HEALTH CARE EDUCATION/TRAINING PROGRAM

## 2025-03-30 PROCEDURE — 85025 COMPLETE CBC W/AUTO DIFF WBC: CPT

## 2025-03-30 PROCEDURE — 6370000000 HC RX 637 (ALT 250 FOR IP): Performed by: INTERNAL MEDICINE

## 2025-03-30 PROCEDURE — 82550 ASSAY OF CK (CPK): CPT

## 2025-03-30 PROCEDURE — 2700000000 HC OXYGEN THERAPY PER DAY

## 2025-03-30 PROCEDURE — 84145 PROCALCITONIN (PCT): CPT

## 2025-03-30 PROCEDURE — 5A09357 ASSISTANCE WITH RESPIRATORY VENTILATION, LESS THAN 24 CONSECUTIVE HOURS, CONTINUOUS POSITIVE AIRWAY PRESSURE: ICD-10-PCS | Performed by: INTERNAL MEDICINE

## 2025-03-30 PROCEDURE — 80053 COMPREHEN METABOLIC PANEL: CPT

## 2025-03-30 PROCEDURE — 6370000000 HC RX 637 (ALT 250 FOR IP): Performed by: FAMILY MEDICINE

## 2025-03-30 PROCEDURE — 36600 WITHDRAWAL OF ARTERIAL BLOOD: CPT

## 2025-03-30 PROCEDURE — 70450 CT HEAD/BRAIN W/O DYE: CPT

## 2025-03-30 PROCEDURE — 96375 TX/PRO/DX INJ NEW DRUG ADDON: CPT

## 2025-03-30 PROCEDURE — 82077 ASSAY SPEC XCP UR&BREATH IA: CPT

## 2025-03-30 RX ORDER — BUDESONIDE 0.5 MG/2ML
0.5 INHALANT ORAL
Status: DISCONTINUED | OUTPATIENT
Start: 2025-03-30 | End: 2025-04-01 | Stop reason: HOSPADM

## 2025-03-30 RX ORDER — FOLIC ACID 1 MG/1
1 TABLET ORAL DAILY
Status: DISCONTINUED | OUTPATIENT
Start: 2025-03-30 | End: 2025-04-01 | Stop reason: HOSPADM

## 2025-03-30 RX ORDER — ONDANSETRON 2 MG/ML
4 INJECTION INTRAMUSCULAR; INTRAVENOUS EVERY 6 HOURS PRN
Status: DISCONTINUED | OUTPATIENT
Start: 2025-03-30 | End: 2025-04-01 | Stop reason: HOSPADM

## 2025-03-30 RX ORDER — ACETAMINOPHEN 325 MG/1
650 TABLET ORAL EVERY 6 HOURS PRN
Status: DISCONTINUED | OUTPATIENT
Start: 2025-03-30 | End: 2025-04-01 | Stop reason: HOSPADM

## 2025-03-30 RX ORDER — POTASSIUM CHLORIDE 7.45 MG/ML
10 INJECTION INTRAVENOUS PRN
Status: DISCONTINUED | OUTPATIENT
Start: 2025-03-30 | End: 2025-04-01 | Stop reason: HOSPADM

## 2025-03-30 RX ORDER — PANTOPRAZOLE SODIUM 40 MG/1
40 TABLET, DELAYED RELEASE ORAL
Status: DISCONTINUED | OUTPATIENT
Start: 2025-03-30 | End: 2025-04-01 | Stop reason: HOSPADM

## 2025-03-30 RX ORDER — ALPRAZOLAM 0.5 MG
0.5 TABLET ORAL 4 TIMES DAILY
COMMUNITY

## 2025-03-30 RX ORDER — SODIUM CHLORIDE 9 MG/ML
INJECTION, SOLUTION INTRAVENOUS PRN
Status: DISCONTINUED | OUTPATIENT
Start: 2025-03-30 | End: 2025-04-01 | Stop reason: HOSPADM

## 2025-03-30 RX ORDER — SODIUM CHLORIDE, SODIUM LACTATE, POTASSIUM CHLORIDE, AND CALCIUM CHLORIDE .6; .31; .03; .02 G/100ML; G/100ML; G/100ML; G/100ML
1000 INJECTION, SOLUTION INTRAVENOUS ONCE
Status: COMPLETED | OUTPATIENT
Start: 2025-03-30 | End: 2025-03-30

## 2025-03-30 RX ORDER — SODIUM CHLORIDE 0.9 % (FLUSH) 0.9 %
5-40 SYRINGE (ML) INJECTION EVERY 12 HOURS SCHEDULED
Status: DISCONTINUED | OUTPATIENT
Start: 2025-03-30 | End: 2025-04-01 | Stop reason: HOSPADM

## 2025-03-30 RX ORDER — BUTALBITAL, ACETAMINOPHEN AND CAFFEINE 50; 325; 40 MG/1; MG/1; MG/1
1 TABLET ORAL 2 TIMES DAILY PRN
Status: DISCONTINUED | OUTPATIENT
Start: 2025-03-30 | End: 2025-04-01 | Stop reason: HOSPADM

## 2025-03-30 RX ORDER — DOXEPIN HYDROCHLORIDE 25 MG/1
25 CAPSULE ORAL NIGHTLY PRN
Status: DISCONTINUED | OUTPATIENT
Start: 2025-03-30 | End: 2025-04-01 | Stop reason: HOSPADM

## 2025-03-30 RX ORDER — POLYETHYLENE GLYCOL 3350 17 G/17G
17 POWDER, FOR SOLUTION ORAL DAILY PRN
Status: DISCONTINUED | OUTPATIENT
Start: 2025-03-30 | End: 2025-04-01 | Stop reason: HOSPADM

## 2025-03-30 RX ORDER — IOPAMIDOL 755 MG/ML
75 INJECTION, SOLUTION INTRAVASCULAR
Status: COMPLETED | OUTPATIENT
Start: 2025-03-30 | End: 2025-03-30

## 2025-03-30 RX ORDER — AZITHROMYCIN MONOHYDRATE 500 MG/5ML
500 INJECTION, POWDER, LYOPHILIZED, FOR SOLUTION INTRAVENOUS ONCE
Status: DISCONTINUED | OUTPATIENT
Start: 2025-03-30 | End: 2025-03-30 | Stop reason: SDUPTHER

## 2025-03-30 RX ORDER — ARFORMOTEROL TARTRATE 15 UG/2ML
15 SOLUTION RESPIRATORY (INHALATION)
Status: DISCONTINUED | OUTPATIENT
Start: 2025-03-30 | End: 2025-04-01 | Stop reason: HOSPADM

## 2025-03-30 RX ORDER — CETIRIZINE HYDROCHLORIDE 10 MG/1
10 TABLET ORAL DAILY PRN
Status: DISCONTINUED | OUTPATIENT
Start: 2025-03-30 | End: 2025-04-01 | Stop reason: HOSPADM

## 2025-03-30 RX ORDER — POTASSIUM CHLORIDE 1500 MG/1
40 TABLET, EXTENDED RELEASE ORAL PRN
Status: DISCONTINUED | OUTPATIENT
Start: 2025-03-30 | End: 2025-04-01 | Stop reason: HOSPADM

## 2025-03-30 RX ORDER — SODIUM CHLORIDE 9 MG/ML
INJECTION, SOLUTION INTRAVENOUS CONTINUOUS
Status: DISCONTINUED | OUTPATIENT
Start: 2025-03-30 | End: 2025-03-31

## 2025-03-30 RX ORDER — MAGNESIUM SULFATE IN WATER 40 MG/ML
2000 INJECTION, SOLUTION INTRAVENOUS PRN
Status: DISCONTINUED | OUTPATIENT
Start: 2025-03-30 | End: 2025-04-01 | Stop reason: HOSPADM

## 2025-03-30 RX ORDER — CYCLOBENZAPRINE HCL 10 MG
10 TABLET ORAL 3 TIMES DAILY PRN
Status: DISCONTINUED | OUTPATIENT
Start: 2025-03-30 | End: 2025-04-01 | Stop reason: HOSPADM

## 2025-03-30 RX ORDER — ACETAMINOPHEN 650 MG/1
650 SUPPOSITORY RECTAL EVERY 6 HOURS PRN
Status: DISCONTINUED | OUTPATIENT
Start: 2025-03-30 | End: 2025-04-01 | Stop reason: HOSPADM

## 2025-03-30 RX ORDER — IPRATROPIUM BROMIDE AND ALBUTEROL SULFATE 2.5; .5 MG/3ML; MG/3ML
1 SOLUTION RESPIRATORY (INHALATION)
Status: DISCONTINUED | OUTPATIENT
Start: 2025-03-30 | End: 2025-03-31

## 2025-03-30 RX ORDER — ENOXAPARIN SODIUM 100 MG/ML
40 INJECTION SUBCUTANEOUS DAILY
Status: DISCONTINUED | OUTPATIENT
Start: 2025-03-30 | End: 2025-04-01 | Stop reason: HOSPADM

## 2025-03-30 RX ORDER — ALPRAZOLAM 0.5 MG
0.5 TABLET ORAL 4 TIMES DAILY
Status: DISCONTINUED | OUTPATIENT
Start: 2025-03-30 | End: 2025-04-01 | Stop reason: HOSPADM

## 2025-03-30 RX ORDER — TAMSULOSIN HYDROCHLORIDE 0.4 MG/1
0.4 CAPSULE ORAL DAILY
Status: DISCONTINUED | OUTPATIENT
Start: 2025-03-30 | End: 2025-04-01 | Stop reason: HOSPADM

## 2025-03-30 RX ORDER — SODIUM CHLORIDE 0.9 % (FLUSH) 0.9 %
5-40 SYRINGE (ML) INJECTION PRN
Status: DISCONTINUED | OUTPATIENT
Start: 2025-03-30 | End: 2025-04-01 | Stop reason: HOSPADM

## 2025-03-30 RX ORDER — ONDANSETRON 4 MG/1
4 TABLET, ORALLY DISINTEGRATING ORAL EVERY 8 HOURS PRN
Status: DISCONTINUED | OUTPATIENT
Start: 2025-03-30 | End: 2025-04-01 | Stop reason: HOSPADM

## 2025-03-30 RX ADMIN — SODIUM CHLORIDE, POTASSIUM CHLORIDE, SODIUM LACTATE AND CALCIUM CHLORIDE 1000 ML: 600; 310; 30; 20 INJECTION, SOLUTION INTRAVENOUS at 05:45

## 2025-03-30 RX ADMIN — IPRATROPIUM BROMIDE AND ALBUTEROL SULFATE 1 DOSE: .5; 3 SOLUTION RESPIRATORY (INHALATION) at 16:46

## 2025-03-30 RX ADMIN — IPRATROPIUM BROMIDE AND ALBUTEROL SULFATE 1 DOSE: .5; 3 SOLUTION RESPIRATORY (INHALATION) at 20:37

## 2025-03-30 RX ADMIN — WATER 40 MG: 1 INJECTION INTRAMUSCULAR; INTRAVENOUS; SUBCUTANEOUS at 14:14

## 2025-03-30 RX ADMIN — IPRATROPIUM BROMIDE AND ALBUTEROL SULFATE 1 DOSE: .5; 3 SOLUTION RESPIRATORY (INHALATION) at 12:36

## 2025-03-30 RX ADMIN — ARFORMOTEROL TARTRATE 15 MCG: 15 SOLUTION RESPIRATORY (INHALATION) at 20:37

## 2025-03-30 RX ADMIN — BUTALBITAL, ACETAMINOPHEN, AND CAFFEINE 1 TABLET: 50; 325; 40 TABLET ORAL at 21:15

## 2025-03-30 RX ADMIN — CEFTRIAXONE SODIUM 1000 MG: 1 INJECTION, POWDER, FOR SOLUTION INTRAMUSCULAR; INTRAVENOUS at 04:58

## 2025-03-30 RX ADMIN — AZITHROMYCIN MONOHYDRATE 500 MG: 500 INJECTION, POWDER, LYOPHILIZED, FOR SOLUTION INTRAVENOUS at 05:10

## 2025-03-30 RX ADMIN — IOPAMIDOL 75 ML: 755 INJECTION, SOLUTION INTRAVENOUS at 11:13

## 2025-03-30 RX ADMIN — SODIUM CHLORIDE: 0.9 INJECTION, SOLUTION INTRAVENOUS at 18:26

## 2025-03-30 RX ADMIN — BUDESONIDE 500 MCG: 0.5 SUSPENSION RESPIRATORY (INHALATION) at 20:37

## 2025-03-30 RX ADMIN — IPRATROPIUM BROMIDE AND ALBUTEROL SULFATE 1 DOSE: .5; 3 SOLUTION RESPIRATORY (INHALATION) at 09:30

## 2025-03-30 RX ADMIN — ALPRAZOLAM 0.5 MG: 0.5 TABLET ORAL at 21:02

## 2025-03-30 ASSESSMENT — PAIN - FUNCTIONAL ASSESSMENT: PAIN_FUNCTIONAL_ASSESSMENT: 0-10

## 2025-03-30 ASSESSMENT — PAIN SCALES - WONG BAKER: WONGBAKER_NUMERICALRESPONSE: HURTS A LITTLE BIT

## 2025-03-30 ASSESSMENT — PAIN SCALES - GENERAL
PAINLEVEL_OUTOF10: 0
PAINLEVEL_OUTOF10: 9

## 2025-03-30 ASSESSMENT — PAIN DESCRIPTION - LOCATION: LOCATION: NECK

## 2025-03-30 ASSESSMENT — PAIN DESCRIPTION - DESCRIPTORS: DESCRIPTORS: ACHING;SORE

## 2025-03-30 NOTE — H&P
V2.0  History and Physical      Name:  Aki Pal /Age/Sex: 1950  (75 y.o. male)   MRN & CSN:  [id number] & [id number] Encounter Date/Time: 3/30/2025 6:22 AM EDT   Location:  ED- PCP: Paul Crane MD       Hospital Day: 1    Assessment and Plan:   Aki Pal is a 75 y.o. male with a pmh of COPD, anxiety, bipolar disorder, depression, PTSD, seizure who presents with Community acquired bacterial pneumonia    Hospital Problems           Last Modified POA    * (Principal) Community acquired bacterial pneumonia 3/30/2025 Yes       Plan:  Community-acquired pneumonia   Patient presents with fever, shortness of breath, tachycardia, leukocytosis  Imaging shows signs of right lower lobe pneumonia  Started on azithromycin 500 mg IV daily  Started on Rocephin 1 g IV daily  Budesonide breathing treatments  DuoNeb breathing treatments next      2.  Generalized weakness  Most likely in setting of pneumonia  PT OT to evaluate    3.  COPD with chronic respiratory failure  Chronically on 2 L nasal cannula  DuoNeb breathing treatments  Budesonide breathing treatments next    4.  BPH with obstruction  Flomax 0.4 mg daily    5.  GERD  Prilosec 40 mg    Will continue to follow, monitor and manage chronic medical condition with medication listed    Disposition:   Current Living situation: Home  Expected Disposition: Home  Estimated D/C: 2-3    Diet No diet orders on file   DVT Prophylaxis [x] Lovenox, []  Heparin, [] SCDs, [x] Ambulation,  [] Eliquis, [] Xarelto, [] Coumadin   Code Status Prior         Personally reviewed Lab Studies and Imaging           History from:     patient, ED physician, medical record    History of Present Illness:     Chief Complaint: Fall at home  Aki Pal is a 75 y.o. male with pmh of COPD with chronic respiratory failure on 2 L nasal cannula, anxiety, bipolar, depression, PTSD,  who presents with generalized weakness    Patient comes from home.  Wife

## 2025-03-30 NOTE — ED NOTES
Patient Name: Aki Pal  : 1950 75 y.o.  MRN: [id number]  ED Room #: ED-0005/05     Chief complaint:   Chief Complaint   Patient presents with    Fall     Pt to ED by EMS coming from home wife states pt \"slipped off the couch\" and was unable to help get him up, pt complaining of neck pain with movement, denies any other injuries other than feels \"sore all over\", Hx COPD, denies LOC. On 2L NC o2 wears at night for his COPD.     Hospital Problem/Diagnosis:   Hospital Problems           Last Modified POA    * (Principal) Community acquired bacterial pneumonia 3/30/2025 Yes         O2 Flow Rate:O2 Device: Nasal cannula O2 Flow Rate (L/min): 2 L/min (if applicable)  Cardiac Rhythm:   (if applicable)  Active LDA's:   Peripheral IV 25 Posterior;Right Hand (Active)   Site Assessment Clean, dry & intact 25 0410   Line Status Normal saline locked 25 0410            How does patient ambulate? Unknown, did not assess in the Emergency Department    2. How does patient take pills? Unknown, no oral medications were given in the Emergency Department    3. Is patient alert? Drowsy, but responds to voice    4. Is patient oriented? Confused    5.   Patient arrived from:  home  Facility Name: ___________________________________________    6. If patient is disoriented or from a [facility] Nursing Facility has family been notified of admission? No    7. Patient belongings? Belongings: Clothing    Disposition of belongings? Kept with Patient     8. Any specific patient or family belongings/needs/dynamics?   a. NA    9. Miscellaneous comments/pending orders?  a. NA      If there are any additional questions please reach out to the Emergency Department.

## 2025-03-30 NOTE — CONSULTS
on chronic hypoxic / hypercapnic respiratory failure   COPD with exacerbation   Tobacco use  ?PNA  Generalized weakness   AMS     Recommendations:  - ABG reviewed  - placed on bipap 14/6, with good tidal volumes  - obtain ABG in 1 hr  - brovana/pulmicort BID, duoneb q 4 hrs  - solu medrol 40 q 8 hrs  - continue azithromycin, CTX for PNA  - follow urine strep, leg  - send resp viral panel   - continue close monitoring in the ICU potential for worsening and needing mechanical ventilation     Critical Care time 31 (excluding procedures) - patient is at risk of significant morbidity / mortality from respiratory failure     Anthony Eddy MD  Pulmonary and Critical Care Medicine   [facility]

## 2025-03-30 NOTE — ED PROVIDER NOTES
EMERGENCY DEPARTMENT PROVIDER NOTE         PATIENT IDENTIFICATION     Name:   Aki Pal  MRN:   [id number]  YOB: 1950  Date of Evaluation:   3/30/2025  Provider:   Connie Hayes NP; Thang Marshall DO  PCP:   Paul Crane MD        CHIEF COMPLAINT       Fall (Pt to ED by EMS coming from home wife states pt \"slipped off the couch\" and was unable to help get him up, pt complaining of neck pain with movement, denies any other injuries other than feels \"sore all over\", Hx COPD, denies LOC. On 2L NC o2 wears at night for his COPD.)        HISTORY OF PRESENT ILLNESS     I independently interviewed patient and/or caretaker(s).  See Advanced Practice Provider (ISAIAH) note for full HPI.  In summary, Aki Pal  is a(n) 75 y.o. male who presents with generalized weakness and fall.  Patient's wife called EMS.  Patient lethargic and complaining of neck pain.        PHYSICAL EXAM     I reviewed physical exam performed and documented by ISAIAH.  I performed an independent physical examination with findings as follows:  Chronically ill-appearing early gentleman with tachycardia.        LAB RESULTS     Results for orders placed or performed during the hospital encounter of 03/30/25   COVID-19 & Influenza Combo    Specimen: Nasopharyngeal Swab   Result Value Ref Range    SARS-CoV-2 RNA, RT PCR NOT DETECTED NOT DETECTED    Influenza A NOT DETECTED NOT DETECTED    Influenza B NOT DETECTED NOT DETECTED   CBC with Auto Differential   Result Value Ref Range    WBC 14.6 (H) 4.0 - 11.0 K/uL    RBC 4.27 4.20 - 5.90 M/uL    Hemoglobin 13.2 (L) 13.5 - 17.5 g/dL    Hematocrit 40.2 (L) 40.5 - 52.5 %    MCV 94.2 80.0 - 100.0 fL    MCH 30.9 26.0 - 34.0 pg    MCHC 32.8 31.0 - 36.0 g/dL    RDW 14.5 12.4 - 15.4 %    Platelets 133 (L) 135 - 450 K/uL    MPV 8.8 5.0 - 10.5 fL    Neutrophils % 86.4 %    Lymphocytes % 6.6 %    Monocytes % 6.4 %    Eosinophils % 0.2 %    Basophils % 0.4 %    Neutrophils 
dysfunction present (HCC)    2. Pneumonia of right lower lobe due to infectious organism    3. Altered mental status, unspecified altered mental status type          DISPOSITION/PLAN     DISPOSITION Admitted 03/30/2025 05:55:56 AM               PATIENT REFERRED TO:  No follow-up provider specified.    DISCHARGE MEDICATIONS:  Current Discharge Medication List          DISCONTINUED MEDICATIONS:  Current Discharge Medication List                 (Please note that portions of this note were completed with a voice recognition program.  Efforts were made to edit the dictations but occasionally words are mis-transcribed.)    DANDRE Veloz - CNP (electronically signed)        Connie Hayes APRN - CNP  03/30/25 1944

## 2025-03-31 PROBLEM — B34.2 CORONAVIRUS INFECTION: Status: ACTIVE | Noted: 2025-03-31

## 2025-03-31 LAB
ANION GAP SERPL CALCULATED.3IONS-SCNC: 7 MMOL/L (ref 3–16)
BASE EXCESS BLDA CALC-SCNC: 9 MMOL/L (ref -3–3)
BASOPHILS # BLD: 0 K/UL (ref 0–0.2)
BASOPHILS NFR BLD: 0.4 %
BILIRUB UR QL STRIP.AUTO: NEGATIVE
BUN SERPL-MCNC: 6 MG/DL (ref 7–20)
CALCIUM SERPL-MCNC: 8.1 MG/DL (ref 8.3–10.6)
CHLORIDE SERPL-SCNC: 100 MMOL/L (ref 99–110)
CLARITY UR: CLEAR
CO2 BLDA-SCNC: 33 MMOL/L
CO2 SERPL-SCNC: 34 MMOL/L (ref 21–32)
COLOR UR: YELLOW
CREAT SERPL-MCNC: 0.5 MG/DL (ref 0.8–1.3)
DEPRECATED RDW RBC AUTO: 13.9 % (ref 12.4–15.4)
EOSINOPHIL # BLD: 0.1 K/UL (ref 0–0.6)
EOSINOPHIL NFR BLD: 0.9 %
GFR SERPLBLD CREATININE-BSD FMLA CKD-EPI: >90 ML/MIN/{1.73_M2}
GLUCOSE SERPL-MCNC: 124 MG/DL (ref 70–99)
GLUCOSE UR STRIP.AUTO-MCNC: NEGATIVE MG/DL
HCO3 BLDA-SCNC: 32 MMOL/L (ref 21–29)
HCT VFR BLD AUTO: 37.9 % (ref 40.5–52.5)
HGB BLD-MCNC: 12.6 G/DL (ref 13.5–17.5)
HGB UR QL STRIP.AUTO: NEGATIVE
KETONES UR STRIP.AUTO-MCNC: NEGATIVE MG/DL
LEGIONELLA AG UR QL: NORMAL
LEUKOCYTE ESTERASE UR QL STRIP.AUTO: NEGATIVE
LYMPHOCYTES # BLD: 0.8 K/UL (ref 1–5.1)
LYMPHOCYTES NFR BLD: 10.6 %
MCH RBC QN AUTO: 31.2 PG (ref 26–34)
MCHC RBC AUTO-ENTMCNC: 33.3 G/DL (ref 31–36)
MCV RBC AUTO: 93.7 FL (ref 80–100)
MONOCYTES # BLD: 0.5 K/UL (ref 0–1.3)
MONOCYTES NFR BLD: 5.8 %
NEUTROPHILS # BLD: 6.4 K/UL (ref 1.7–7.7)
NEUTROPHILS NFR BLD: 82.3 %
NITRITE UR QL STRIP.AUTO: NEGATIVE
ORGANISM: ABNORMAL
PCO2 BLDA: 43.1 MM HG (ref 35–45)
PERFORMED ON: ABNORMAL
PH BLDA: 7.48 [PH] (ref 7.35–7.45)
PH UR STRIP.AUTO: 6 [PH] (ref 5–8)
PLATELET # BLD AUTO: 118 K/UL (ref 135–450)
PMV BLD AUTO: 8.9 FL (ref 5–10.5)
PO2 BLDA: 45.5 MM HG (ref 75–108)
POC SAMPLE TYPE: ABNORMAL
POTASSIUM SERPL-SCNC: 4 MMOL/L (ref 3.5–5.1)
PROT UR STRIP.AUTO-MCNC: NEGATIVE MG/DL
RBC # BLD AUTO: 4.05 M/UL (ref 4.2–5.9)
REPORT: NORMAL
RESP PATH DNA+RNA PNL NPH NAA+NON-PROBE: ABNORMAL
S PNEUM AG UR QL: ABNORMAL
SAO2 % BLDA: 84 % (ref 93–100)
SODIUM SERPL-SCNC: 141 MMOL/L (ref 136–145)
SP GR UR STRIP.AUTO: >=1.03 (ref 1–1.03)
UA COMPLETE W REFLEX CULTURE PNL UR: NORMAL
UA DIPSTICK W REFLEX MICRO PNL UR: NORMAL
URN SPEC COLLECT METH UR: NORMAL
UROBILINOGEN UR STRIP-ACNC: 1 E.U./DL
WBC # BLD AUTO: 7.8 K/UL (ref 4–11)

## 2025-03-31 PROCEDURE — 6360000002 HC RX W HCPCS: Performed by: FAMILY MEDICINE

## 2025-03-31 PROCEDURE — 97535 SELF CARE MNGMENT TRAINING: CPT

## 2025-03-31 PROCEDURE — 2500000003 HC RX 250 WO HCPCS: Performed by: INTERNAL MEDICINE

## 2025-03-31 PROCEDURE — 6370000000 HC RX 637 (ALT 250 FOR IP): Performed by: INTERNAL MEDICINE

## 2025-03-31 PROCEDURE — 82803 BLOOD GASES ANY COMBINATION: CPT

## 2025-03-31 PROCEDURE — 6360000002 HC RX W HCPCS: Performed by: INTERNAL MEDICINE

## 2025-03-31 PROCEDURE — 6360000002 HC RX W HCPCS: Performed by: STUDENT IN AN ORGANIZED HEALTH CARE EDUCATION/TRAINING PROGRAM

## 2025-03-31 PROCEDURE — 92526 ORAL FUNCTION THERAPY: CPT

## 2025-03-31 PROCEDURE — 2500000003 HC RX 250 WO HCPCS: Performed by: STUDENT IN AN ORGANIZED HEALTH CARE EDUCATION/TRAINING PROGRAM

## 2025-03-31 PROCEDURE — 94640 AIRWAY INHALATION TREATMENT: CPT

## 2025-03-31 PROCEDURE — 2700000000 HC OXYGEN THERAPY PER DAY

## 2025-03-31 PROCEDURE — 97530 THERAPEUTIC ACTIVITIES: CPT

## 2025-03-31 PROCEDURE — 94660 CPAP INITIATION&MGMT: CPT

## 2025-03-31 PROCEDURE — 94761 N-INVAS EAR/PLS OXIMETRY MLT: CPT

## 2025-03-31 PROCEDURE — 2500000003 HC RX 250 WO HCPCS: Performed by: FAMILY MEDICINE

## 2025-03-31 PROCEDURE — 97116 GAIT TRAINING THERAPY: CPT

## 2025-03-31 PROCEDURE — 92610 EVALUATE SWALLOWING FUNCTION: CPT

## 2025-03-31 PROCEDURE — 1200000000 HC SEMI PRIVATE

## 2025-03-31 PROCEDURE — 97165 OT EVAL LOW COMPLEX 30 MIN: CPT

## 2025-03-31 PROCEDURE — 6370000000 HC RX 637 (ALT 250 FOR IP): Performed by: STUDENT IN AN ORGANIZED HEALTH CARE EDUCATION/TRAINING PROGRAM

## 2025-03-31 PROCEDURE — 6370000000 HC RX 637 (ALT 250 FOR IP): Performed by: FAMILY MEDICINE

## 2025-03-31 PROCEDURE — 85025 COMPLETE CBC W/AUTO DIFF WBC: CPT

## 2025-03-31 PROCEDURE — 99233 SBSQ HOSP IP/OBS HIGH 50: CPT | Performed by: INTERNAL MEDICINE

## 2025-03-31 PROCEDURE — 97161 PT EVAL LOW COMPLEX 20 MIN: CPT

## 2025-03-31 PROCEDURE — 2580000003 HC RX 258: Performed by: FAMILY MEDICINE

## 2025-03-31 PROCEDURE — 80048 BASIC METABOLIC PNL TOTAL CA: CPT

## 2025-03-31 PROCEDURE — 87449 NOS EACH ORGANISM AG IA: CPT

## 2025-03-31 RX ORDER — LEVOFLOXACIN 500 MG/1
750 TABLET, FILM COATED ORAL DAILY
Status: DISCONTINUED | OUTPATIENT
Start: 2025-04-01 | End: 2025-04-01 | Stop reason: HOSPADM

## 2025-03-31 RX ORDER — PREDNISONE 20 MG/1
40 TABLET ORAL DAILY
Status: DISCONTINUED | OUTPATIENT
Start: 2025-04-01 | End: 2025-04-01 | Stop reason: HOSPADM

## 2025-03-31 RX ORDER — IPRATROPIUM BROMIDE AND ALBUTEROL SULFATE 2.5; .5 MG/3ML; MG/3ML
1 SOLUTION RESPIRATORY (INHALATION)
Status: DISCONTINUED | OUTPATIENT
Start: 2025-03-31 | End: 2025-03-31

## 2025-03-31 RX ORDER — NICOTINE 21 MG/24HR
1 PATCH, TRANSDERMAL 24 HOURS TRANSDERMAL DAILY
Status: DISCONTINUED | OUTPATIENT
Start: 2025-03-31 | End: 2025-04-01 | Stop reason: HOSPADM

## 2025-03-31 RX ORDER — IPRATROPIUM BROMIDE AND ALBUTEROL SULFATE 2.5; .5 MG/3ML; MG/3ML
1 SOLUTION RESPIRATORY (INHALATION)
Status: DISCONTINUED | OUTPATIENT
Start: 2025-04-01 | End: 2025-04-01 | Stop reason: HOSPADM

## 2025-03-31 RX ORDER — BUTALBITAL, ACETAMINOPHEN AND CAFFEINE 50; 325; 40 MG/1; MG/1; MG/1
1 TABLET ORAL
Status: COMPLETED | OUTPATIENT
Start: 2025-03-31 | End: 2025-03-31

## 2025-03-31 RX ORDER — GUAIFENESIN/DEXTROMETHORPHAN 100-10MG/5
5 SYRUP ORAL EVERY 4 HOURS PRN
Status: DISCONTINUED | OUTPATIENT
Start: 2025-03-31 | End: 2025-04-01 | Stop reason: HOSPADM

## 2025-03-31 RX ADMIN — ACETAMINOPHEN 650 MG: 325 TABLET ORAL at 19:33

## 2025-03-31 RX ADMIN — ARFORMOTEROL TARTRATE 15 MCG: 15 SOLUTION RESPIRATORY (INHALATION) at 19:41

## 2025-03-31 RX ADMIN — IPRATROPIUM BROMIDE AND ALBUTEROL SULFATE 1 DOSE: .5; 3 SOLUTION RESPIRATORY (INHALATION) at 12:43

## 2025-03-31 RX ADMIN — AZITHROMYCIN MONOHYDRATE 500 MG: 500 INJECTION, POWDER, LYOPHILIZED, FOR SOLUTION INTRAVENOUS at 08:26

## 2025-03-31 RX ADMIN — BUTALBITAL, ACETAMINOPHEN, AND CAFFEINE 1 TABLET: 50; 325; 40 TABLET ORAL at 20:01

## 2025-03-31 RX ADMIN — FOLIC ACID 1 MG: 1 TABLET ORAL at 08:12

## 2025-03-31 RX ADMIN — ALPRAZOLAM 0.5 MG: 0.5 TABLET ORAL at 11:47

## 2025-03-31 RX ADMIN — IPRATROPIUM BROMIDE AND ALBUTEROL SULFATE 1 DOSE: .5; 3 SOLUTION RESPIRATORY (INHALATION) at 05:24

## 2025-03-31 RX ADMIN — ALPRAZOLAM 0.5 MG: 0.5 TABLET ORAL at 08:12

## 2025-03-31 RX ADMIN — ACETAMINOPHEN 650 MG: 325 TABLET ORAL at 09:08

## 2025-03-31 RX ADMIN — IPRATROPIUM BROMIDE AND ALBUTEROL SULFATE 1 DOSE: .5; 3 SOLUTION RESPIRATORY (INHALATION) at 07:56

## 2025-03-31 RX ADMIN — IPRATROPIUM BROMIDE AND ALBUTEROL SULFATE 1 DOSE: .5; 3 SOLUTION RESPIRATORY (INHALATION) at 16:34

## 2025-03-31 RX ADMIN — WATER 1000 MG: 1 INJECTION INTRAMUSCULAR; INTRAVENOUS; SUBCUTANEOUS at 08:13

## 2025-03-31 RX ADMIN — SODIUM CHLORIDE, PRESERVATIVE FREE 10 ML: 5 INJECTION INTRAVENOUS at 08:19

## 2025-03-31 RX ADMIN — BUTALBITAL, ACETAMINOPHEN, AND CAFFEINE 1 TABLET: 50; 325; 40 TABLET ORAL at 01:52

## 2025-03-31 RX ADMIN — ALPRAZOLAM 0.5 MG: 0.5 TABLET ORAL at 20:02

## 2025-03-31 RX ADMIN — ALPRAZOLAM 0.5 MG: 0.5 TABLET ORAL at 16:53

## 2025-03-31 RX ADMIN — BUDESONIDE 500 MCG: 0.5 SUSPENSION RESPIRATORY (INHALATION) at 19:41

## 2025-03-31 RX ADMIN — IPRATROPIUM BROMIDE AND ALBUTEROL SULFATE 1 DOSE: .5; 3 SOLUTION RESPIRATORY (INHALATION) at 19:41

## 2025-03-31 RX ADMIN — BUTALBITAL, ACETAMINOPHEN, AND CAFFEINE 1 TABLET: 50; 325; 40 TABLET ORAL at 11:47

## 2025-03-31 RX ADMIN — WATER 40 MG: 1 INJECTION INTRAMUSCULAR; INTRAVENOUS; SUBCUTANEOUS at 11:48

## 2025-03-31 RX ADMIN — TAMSULOSIN HYDROCHLORIDE 0.4 MG: 0.4 CAPSULE ORAL at 08:12

## 2025-03-31 RX ADMIN — PANTOPRAZOLE SODIUM 40 MG: 40 TABLET, DELAYED RELEASE ORAL at 08:12

## 2025-03-31 RX ADMIN — ENOXAPARIN SODIUM 40 MG: 100 INJECTION SUBCUTANEOUS at 08:12

## 2025-03-31 RX ADMIN — BUDESONIDE 500 MCG: 0.5 SUSPENSION RESPIRATORY (INHALATION) at 07:56

## 2025-03-31 RX ADMIN — ARFORMOTEROL TARTRATE 15 MCG: 15 SOLUTION RESPIRATORY (INHALATION) at 07:56

## 2025-03-31 RX ADMIN — WATER 40 MG: 1 INJECTION INTRAMUSCULAR; INTRAVENOUS; SUBCUTANEOUS at 01:53

## 2025-03-31 ASSESSMENT — PAIN SCALES - GENERAL
PAINLEVEL_OUTOF10: 6
PAINLEVEL_OUTOF10: 3
PAINLEVEL_OUTOF10: 10

## 2025-03-31 ASSESSMENT — PAIN DESCRIPTION - DESCRIPTORS
DESCRIPTORS: ACHING
DESCRIPTORS: ACHING

## 2025-03-31 ASSESSMENT — PAIN DESCRIPTION - LOCATION
LOCATION: HEAD
LOCATION: HEAD

## 2025-03-31 NOTE — RT PROTOCOL NOTE
RT Nebulizer Bronchodilator Protocol Note    There is a bronchodilator order in the chart from a provider indicating to follow the RT Bronchodilator Protocol and there is an “Initiate RT Bronchodilator Protocol” order as well (see protocol at bottom of note).    CXR Findings:  XR CHEST PORTABLE  Result Date: 3/30/2025  Low lung volumes with streaky right basilar airspace opacity, likely atelectasis.       The findings from the last RT Protocol Assessment were as follows:  Smoking: Chronic pulmonary disease  Respiratory Pattern: Dyspnea on exertion or RR 21-25 bpm  Breath Sounds: Slightly diminished and/or crackles  Cough: Strong, spontaneous, non-productive  Indication for Bronchodilator Therapy: On home bronchodilators  Bronchodilator Assessment Score: 6    Aerosolized bronchodilator medication orders have been revised according to the RT Nebulizer Bronchodilator Protocol below.    Respiratory Therapist to perform RT Therapy Protocol Assessment initially then follow the protocol.  Repeat RT Therapy Protocol Assessment PRN for score 0-3 or on second treatment, BID, and PRN for scores above 3.    No Indications - adjust the frequency to every 6 hours PRN wheezing or bronchospasm, if no treatments needed after 48 hours then discontinue using Per Protocol order mode.     If indication present, adjust the RT bronchodilator orders based on the Bronchodilator Assessment Score as indicated below.  If a patient is on this medication at home then do not decrease Frequency below that used at home.    0-3 - enter or revise RT bronchodilator order(s) to equivalent RT Bronchodilator order with Frequency of every 4 hours PRN for wheezing or increased work of breathing using Per Protocol order mode.       4-6 - enter or revise RT Bronchodilator order(s) to two equivalent RT bronchodilator orders with one order with BID Frequency and one order with Frequency of every 4 hours PRN wheezing or increased work of breathing using Per

## 2025-03-31 NOTE — PLAN OF CARE
Problem: Discharge Planning  Goal: Discharge to home or other facility with appropriate resources  3/31/2025 0607 by Cole Moe RN  Outcome: Progressing  3/30/2025 1900 by Ara Cope RN  Outcome: Progressing     Problem: Pain  Goal: Verbalizes/displays adequate comfort level or baseline comfort level  3/31/2025 0607 by Cole Moe RN  Outcome: Progressing  3/30/2025 1900 by Ara Cope, RN  Outcome: Progressing     Problem: Safety - Adult  Goal: Free from fall injury  3/31/2025 0607 by Cole Moe RN  Outcome: Progressing  3/30/2025 1900 by Ara Cope, RN  Outcome: Progressing

## 2025-03-31 NOTE — ACP (ADVANCE CARE PLANNING)
Advanced Care Planning Note.    Purpose of Encounter: Advanced care planning in light of acute on chronic respiratory failure with hypoxia and hypercapnia  Parties In Attendance: Patient  Decisional Capacity: Yes  Subjective: Patient is coughing  Objective: Cr 0.5  Goals of Care Determination: Patient wants limited support (No CPR, short vent, consider surgery or HD, no trach or PEG)  Plan:  IV steroids, IV Abx, nebs, PT/OT/SLP, Pulm consult  Code Status: DNR CCA   Time spent on Advanced care Plannin minutes  Advanced Care Planning Documents: Completed advanced directives on chart, wife is the POA.    Joshua Lewis MD  3/31/2025 7:24 AM

## 2025-03-31 NOTE — CARE COORDINATION
Case Management Assessment  Initial Evaluation    Date/Time of Evaluation: 3/31/2025 4:01 PM  Assessment Completed by: KAM Galindo    If patient is discharged prior to next notation, then this note serves as note for discharge by case management.    Patient Name: Aki Pal                   YOB: 1950  Diagnosis: Community acquired bacterial pneumonia [J15.9]  Altered mental status, unspecified altered mental status type [R41.82]  Pneumonia of right lower lobe due to infectious organism [J18.9]  Sepsis, due to unspecified organism, unspecified whether acute organ dysfunction present (HCC) [A41.9]                   Date / Time: 3/30/2025  3:46 AM    Patient Admission Status: Inpatient   Readmission Risk (Low < 19, Mod (19-27), High > 27): Readmission Risk Score: 22.3    Current PCP: Paul Crane MD  PCP verified by CM? (P) Yes    Chart Reviewed: Yes      History Provided by: (P) Patient  Patient Orientation: (P) Alert and Oriented    Patient Cognition:      Hospitalization in the last 30 days (Readmission):  No    If yes, Readmission Assessment in CM Navigator will be completed.    Advance Directives:      Code Status: DNR-CCA   Patient's Primary Decision Maker is: (P) Legal Next of Kin    Primary Decision Maker: KaeBeba mckee - Spouse - [phone]    Secondary Decision Maker: Shahab Pal - Child - [phone]    Secondary Decision Maker: Prabhjot Pal - Child - [phone]    Discharge Planning:    Patient lives with: (P) Spouse/Significant Other Type of Home: (P) House  Primary Care Giver: (P) Self  Patient Support Systems include: (P) Spouse/Significant Other, Family Members   Current Financial resources: (P) None  Current community resources: (P) None  Current services prior to admission: (P) Durable Medical Equipment            Current DME: (P) Oxygen Therapy (Comment) (2L)            Type of Home Care services:  (P) PT, OT    ADLS  Prior functional level:

## 2025-04-01 VITALS
DIASTOLIC BLOOD PRESSURE: 91 MMHG | HEIGHT: 66 IN | BODY MASS INDEX: 26.61 KG/M2 | HEART RATE: 94 BPM | SYSTOLIC BLOOD PRESSURE: 150 MMHG | OXYGEN SATURATION: 96 % | RESPIRATION RATE: 18 BRPM | TEMPERATURE: 97.6 F | WEIGHT: 165.57 LBS

## 2025-04-01 LAB
ANION GAP SERPL CALCULATED.3IONS-SCNC: 9 MMOL/L (ref 3–16)
BASOPHILS # BLD: 0 K/UL (ref 0–0.2)
BASOPHILS NFR BLD: 0.2 %
BUN SERPL-MCNC: 7 MG/DL (ref 7–20)
CALCIUM SERPL-MCNC: 8.8 MG/DL (ref 8.3–10.6)
CHLORIDE SERPL-SCNC: 98 MMOL/L (ref 99–110)
CO2 SERPL-SCNC: 31 MMOL/L (ref 21–32)
CREAT SERPL-MCNC: 0.7 MG/DL (ref 0.8–1.3)
DEPRECATED RDW RBC AUTO: 14.1 % (ref 12.4–15.4)
EOSINOPHIL # BLD: 0.1 K/UL (ref 0–0.6)
EOSINOPHIL NFR BLD: 0.8 %
GFR SERPLBLD CREATININE-BSD FMLA CKD-EPI: >90 ML/MIN/{1.73_M2}
GLUCOSE SERPL-MCNC: 93 MG/DL (ref 70–99)
HCT VFR BLD AUTO: 37.6 % (ref 40.5–52.5)
HGB BLD-MCNC: 12.6 G/DL (ref 13.5–17.5)
LYMPHOCYTES # BLD: 2.3 K/UL (ref 1–5.1)
LYMPHOCYTES NFR BLD: 22.4 %
MCH RBC QN AUTO: 31.1 PG (ref 26–34)
MCHC RBC AUTO-ENTMCNC: 33.6 G/DL (ref 31–36)
MCV RBC AUTO: 92.6 FL (ref 80–100)
MONOCYTES # BLD: 0.8 K/UL (ref 0–1.3)
MONOCYTES NFR BLD: 8.1 %
NEUTROPHILS # BLD: 7.1 K/UL (ref 1.7–7.7)
NEUTROPHILS NFR BLD: 68.5 %
PLATELET # BLD AUTO: 139 K/UL (ref 135–450)
PMV BLD AUTO: 8.8 FL (ref 5–10.5)
POTASSIUM SERPL-SCNC: 3.9 MMOL/L (ref 3.5–5.1)
RBC # BLD AUTO: 4.06 M/UL (ref 4.2–5.9)
SODIUM SERPL-SCNC: 138 MMOL/L (ref 136–145)
WBC # BLD AUTO: 10.4 K/UL (ref 4–11)

## 2025-04-01 PROCEDURE — 6370000000 HC RX 637 (ALT 250 FOR IP): Performed by: INTERNAL MEDICINE

## 2025-04-01 PROCEDURE — 2500000003 HC RX 250 WO HCPCS: Performed by: INTERNAL MEDICINE

## 2025-04-01 PROCEDURE — 2700000000 HC OXYGEN THERAPY PER DAY

## 2025-04-01 PROCEDURE — 6360000002 HC RX W HCPCS: Performed by: NURSE PRACTITIONER

## 2025-04-01 PROCEDURE — 85025 COMPLETE CBC W/AUTO DIFF WBC: CPT

## 2025-04-01 PROCEDURE — 94761 N-INVAS EAR/PLS OXIMETRY MLT: CPT

## 2025-04-01 PROCEDURE — 94640 AIRWAY INHALATION TREATMENT: CPT

## 2025-04-01 PROCEDURE — 6360000002 HC RX W HCPCS: Performed by: INTERNAL MEDICINE

## 2025-04-01 PROCEDURE — 80048 BASIC METABOLIC PNL TOTAL CA: CPT

## 2025-04-01 PROCEDURE — 36415 COLL VENOUS BLD VENIPUNCTURE: CPT

## 2025-04-01 RX ORDER — PREDNISONE 10 MG/1
TABLET ORAL
Qty: 27 TABLET | Refills: 0 | Status: SHIPPED | OUTPATIENT
Start: 2025-04-02

## 2025-04-01 RX ORDER — DIAZEPAM 10 MG/2ML
2.5 INJECTION, SOLUTION INTRAMUSCULAR; INTRAVENOUS
Status: DISCONTINUED | OUTPATIENT
Start: 2025-04-01 | End: 2025-04-01 | Stop reason: HOSPADM

## 2025-04-01 RX ORDER — LEVOFLOXACIN 750 MG/1
750 TABLET, FILM COATED ORAL DAILY
Qty: 4 TABLET | Refills: 0 | Status: SHIPPED | OUTPATIENT
Start: 2025-04-02 | End: 2025-04-06

## 2025-04-01 RX ORDER — ALBUTEROL SULFATE 90 UG/1
2 INHALANT RESPIRATORY (INHALATION) 4 TIMES DAILY PRN
Qty: 18 G | Refills: 0 | Status: SHIPPED | OUTPATIENT
Start: 2025-04-01

## 2025-04-01 RX ORDER — NICOTINE 21 MG/24HR
1 PATCH, TRANSDERMAL 24 HOURS TRANSDERMAL DAILY
Qty: 30 PATCH | Refills: 0 | Status: SHIPPED | OUTPATIENT
Start: 2025-04-02

## 2025-04-01 RX ADMIN — PANTOPRAZOLE SODIUM 40 MG: 40 TABLET, DELAYED RELEASE ORAL at 06:15

## 2025-04-01 RX ADMIN — ENOXAPARIN SODIUM 40 MG: 100 INJECTION SUBCUTANEOUS at 08:18

## 2025-04-01 RX ADMIN — LEVOFLOXACIN 750 MG: 500 TABLET, FILM COATED ORAL at 08:18

## 2025-04-01 RX ADMIN — DOXEPIN HYDROCHLORIDE 25 MG: 25 CAPSULE ORAL at 00:18

## 2025-04-01 RX ADMIN — TAMSULOSIN HYDROCHLORIDE 0.4 MG: 0.4 CAPSULE ORAL at 08:18

## 2025-04-01 RX ADMIN — ACETAMINOPHEN 650 MG: 325 TABLET ORAL at 02:47

## 2025-04-01 RX ADMIN — ALPRAZOLAM 0.5 MG: 0.5 TABLET ORAL at 08:18

## 2025-04-01 RX ADMIN — DIAZEPAM 2.5 MG: 5 INJECTION, SOLUTION INTRAMUSCULAR; INTRAVENOUS at 03:55

## 2025-04-01 RX ADMIN — FOLIC ACID 1 MG: 1 TABLET ORAL at 08:18

## 2025-04-01 RX ADMIN — PREDNISONE 40 MG: 20 TABLET ORAL at 08:26

## 2025-04-01 RX ADMIN — BUDESONIDE 500 MCG: 0.5 SUSPENSION RESPIRATORY (INHALATION) at 07:32

## 2025-04-01 RX ADMIN — SODIUM CHLORIDE, PRESERVATIVE FREE 10 ML: 5 INJECTION INTRAVENOUS at 03:55

## 2025-04-01 RX ADMIN — IPRATROPIUM BROMIDE AND ALBUTEROL SULFATE 1 DOSE: .5; 3 SOLUTION RESPIRATORY (INHALATION) at 07:32

## 2025-04-01 RX ADMIN — ARFORMOTEROL TARTRATE 15 MCG: 15 SOLUTION RESPIRATORY (INHALATION) at 07:32

## 2025-04-01 RX ADMIN — SODIUM CHLORIDE, PRESERVATIVE FREE 10 ML: 5 INJECTION INTRAVENOUS at 08:26

## 2025-04-01 ASSESSMENT — PAIN SCALES - WONG BAKER: WONGBAKER_NUMERICALRESPONSE: HURTS A LITTLE BIT

## 2025-04-01 ASSESSMENT — PAIN SCALES - GENERAL
PAINLEVEL_OUTOF10: 3
PAINLEVEL_OUTOF10: 7

## 2025-04-01 ASSESSMENT — PAIN DESCRIPTION - DESCRIPTORS: DESCRIPTORS: ACHING

## 2025-04-01 ASSESSMENT — PAIN DESCRIPTION - LOCATION: LOCATION: HEAD

## 2025-04-01 NOTE — PROGRESS NOTES
Hospitalist Progress Note      PCP: Paul Crane MD    Date of Admission: 3/30/2025    Chief Complaint: Generalized weakness    Hospital Course:   This morning patient was a little drowsy hence transferred to ICU since blood gases showed CO2 retention  I saw the patient in the ICU at bedside  Patient awake alert responding appropriately to commands feeling a bit thirsty  Was not put on BiPAP yet  Saturating well on 2 L        Medications:  Reviewed      Exam:    /86   Pulse 97   Temp 98.9 °F (37.2 °C) (Oral)   Resp 14   Ht 1.676 m (5' 6\")   Wt 74.8 kg (165 lb)   SpO2 94%   BMI 26.63 kg/m²     General appearance: No apparent distress, appears stated age and cooperative.  HEENT: Pupils equal, round, and reactive to light. Conjunctivae/corneas clear.  Neck: Supple, with full range of motion. No jugular venous distention. Trachea midline.  Respiratory:  Normal respiratory effort. Clear to auscultation, bilaterally without RALES/WHEEZES/Rhonchi.  Cardiovascular: Regular rate and rhythm with normal S1/S2 without MURMURS, rubs or gallops.  Abdomen: Soft, non-tender, non-distended with normal bowel sounds.  Musculoskeletal: No clubbing, cyanosis or EDEMA bilaterally.  Full range of motion without deformity.  Skin: Skin color, texture, turgor normal.  No rashes or lesions.  Neurologic:  Neurovascularly intact without any focal sensory/motor deficits. Cranial nerves: II-XII intact, grossly non-focal.        Labs:   Recent Labs     03/30/25 0406   WBC 14.6*   HGB 13.2*   HCT 40.2*   *     Recent Labs     03/30/25  0406      K 4.8   CL 97*   CO2 31   BUN 6*   CREATININE 0.7*   CALCIUM 8.4     Recent Labs     03/30/25  0406   AST 28   ALT 16   BILITOT <0.2   ALKPHOS 112     No results for input(s): \"INR\" in the last 72 hours.  Recent Labs     03/30/25  0406   CKTOTAL 240       Urinalysis:      Lab Results   Component Value Date/Time    NITRU Negative 02/03/2025 07:55 PM    WBCUA 1 
       PULMONARY AND CRITICAL CARE MEDICINE PROGRESS NOTE      SUBJECTIVE: Patient sitting in bed in no apparent respiratory distress.  Reports that his breathing has significantly improved.  Minimal expectoration.  Oxygen requirement stable at 2 L/min.  Patient wants to get out of bed and do physical therapy.    REVIEW OF SYSTEMS:   8 point ROS is negative beside mentioned in subjective section.       MEDICATIONS:      ipratropium 0.5 mg-albuterol 2.5 mg  1 Dose Inhalation Q4H RT    nicotine  1 patch TransDERmal Daily    [START ON 4/1/2025] levoFLOXacin  750 mg Oral Daily    [START ON 4/1/2025] predniSONE  40 mg Oral Daily    sodium chloride flush  5-40 mL IntraVENous 2 times per day    enoxaparin  40 mg SubCUTAneous Daily    budesonide  0.5 mg Nebulization BID RT    pantoprazole  40 mg Oral QAM AC    tamsulosin  0.4 mg Oral Daily    arformoterol tartrate  15 mcg Nebulization BID RT    ALPRAZolam  0.5 mg Oral 4x Daily    folic acid  1 mg Oral Daily      sodium chloride       sodium chloride flush, sodium chloride, potassium chloride **OR** potassium alternative oral replacement **OR** potassium chloride, magnesium sulfate, ondansetron **OR** ondansetron, polyethylene glycol, acetaminophen **OR** acetaminophen, doxepin, butalbital-acetaminophen-caffeine, cetirizine, cyclobenzaprine     ALLERGIES:   Allergies as of 03/30/2025 - Fully Reviewed 03/30/2025   Allergen Reaction Noted    Prednisone Anxiety 03/15/2019    Serotonin reuptake inhibitors (ssris) Anxiety 03/02/2014    Tricyclic antidepressants Anxiety 03/02/2014        OBJECTIVE:   height is 1.676 m (5' 6\") and weight is 74.8 kg (165 lb). His temporal temperature is 98.4 °F (36.9 °C). His blood pressure is 117/73 and his pulse is 86. His respiration is 18 and oxygen saturation is 93%.   I/O this shift:  In: 1232.7 [I.V.:982.7; IV Piggyback:250]  Out: 400 [Urine:400]     PHYSICAL EXAM:    CONSTITUTIONAL: He is a 75 y.o.-year-old who appears his stated age. He is 
      Hospitalist Progress Note      PCP: Paul Crane MD    Date of Admission: 3/30/2025    Chief Complaint: SOB    Hospital Course: 74 yo M with COPD, chronic respiratory failure with hypoxia on 2 L O2 via NC at all times, bipolar disorder, seizure disorder, tobacco abuse came to ER with SOB.  Admitted as inpatient for acute COPD exacerbation, acute on chronic respiratory failure with hypoxia and hypercapnia and community acquired PNA.  Started on IV steroids, nebs and IV Abx.  Respiratory panel positive for Coronavirus HKU1.  Patient was ordered Bipap but did not use.  Followed by Pulm.  SLP recommend easy chew diet.  Patient declined MBS.  PT/OT recommend home PT/OT.  Code status is DNR-CCA.    Subjective:  Patient is less SOB.  Coughing.  No CP, HA or abdominal pain.       Medications:  Reviewed    Infusion Medications    sodium chloride       Scheduled Medications    ipratropium 0.5 mg-albuterol 2.5 mg  1 Dose Inhalation Q4H RT    nicotine  1 patch TransDERmal Daily    sodium chloride flush  5-40 mL IntraVENous 2 times per day    enoxaparin  40 mg SubCUTAneous Daily    cefTRIAXone (ROCEPHIN) IV  1,000 mg IntraVENous Q24H    azithromycin  500 mg IntraVENous Q24H    budesonide  0.5 mg Nebulization BID RT    pantoprazole  40 mg Oral QAM AC    tamsulosin  0.4 mg Oral Daily    arformoterol tartrate  15 mcg Nebulization BID RT    methylPREDNISolone  40 mg IntraVENous Q12H    ALPRAZolam  0.5 mg Oral 4x Daily    folic acid  1 mg Oral Daily     PRN Meds: sodium chloride flush, sodium chloride, potassium chloride **OR** potassium alternative oral replacement **OR** potassium chloride, magnesium sulfate, ondansetron **OR** ondansetron, polyethylene glycol, acetaminophen **OR** acetaminophen, doxepin, butalbital-acetaminophen-caffeine, cetirizine, cyclobenzaprine      Intake/Output Summary (Last 24 hours) at 3/31/2025 0734  Last data filed at 3/31/2025 0646  Gross per 24 hour   Intake 0 ml   Output 1200 ml   Net 
   03/31/25 0028   RT Protocol   History Pulmonary Disease 2   Respiratory pattern 2   Breath sounds 2   Cough 0   Indications for Bronchodilator Therapy On home bronchodilators   Bronchodilator Assessment Score 6       
   03/31/25 0305   NIV Type   NIV Started/Stopped (S)    (pt. refuse to put bipap back on at this time-stating he can't tolerate it anymore tonight)       
   04/01/25 0732   Oxygen Therapy/Pulse Ox   O2 Device Nasal cannula   O2 Flow Rate (L/min) 2.5 L/min  (Baseline (On Home O2 24/7) per pt)   SpO2 96 %       
  [facility] - Inpatient Rehabilitation Department   Phone: [phone]    Occupational Therapy    [x] Initial Evaluation            [] Daily Treatment Note         [] Discharge Summary      Patient: Aki Pal   : 1950   MRN: [id number]   Date of Service:  3/31/2025    Admitting Diagnosis:  Community acquired bacterial pneumonia  Current Admission Summary: Per EMR: This is a 74 y/o M w/ a hx of tobacco use, bipolar, anxiety, depression, PTSD, seizure, on home oxygen therapy 2L who presented with generalized weakness, EMS was called after the patient slipped of the couch and was unable to get back up. IN the ER he was drowsy. He was admitted to the medical floor with CAP. His course was complicated by worsening mentation, thus ABG was obtained which showed 7.. He was taken to CT-PE and admitted to the ICU, on my exam he was on 3L, he awake and oriented, asking for water.      Past Medical History:  has a past medical history of Anxiety, Bipolar 1 disorder (HCC), Bipolar 2 disorder (HCC), Community acquired bacterial pneumonia, COPD (chronic obstructive pulmonary disease) (HCC), Depression, Fibromyalgia, Headache(784.0), Hyperlipidemia, Manic depressive disease manic phase (HCC), On home O2, PTSD (post-traumatic stress disorder), Seizures (HCC), and Tardive dyskinesia.  Past Surgical History:  has a past surgical history that includes Mouth surgery (2013).    Discharge Recommendations: Aki Pal scored a 21/24 on the AM-PAC ADL Inpatient form. Current research shows that an AM-PAC score of 18 or greater is typically associated with a discharge to the patient's home setting. Based on the patient's AM-PAC score, and their current ADL deficits, it is recommended that the patient have 2-3 sessions per week of Occupational Therapy at d/c to increase the patient's independence.  At this time, this patient demonstrates the endurance and safety to discharge home with home 
  Speech Language Pathology  [facility] - Inpatient Rehabilitation Services  [phone]  SLP Clinical Swallow Evaluation       Patient: Aki Pal   : 1950   MRN: [id number]      Evaluation Date: 3/31/2025      Admitting Dx: Community acquired bacterial pneumonia [J15.9]  Altered mental status, unspecified altered mental status type [R41.82]  Pneumonia of right lower lobe due to infectious organism [J18.9]  Sepsis, due to unspecified organism, unspecified whether acute organ dysfunction present (HCC) [A41.9]  Treatment Diagnosis: Oropharyngeal Dysphagia   Pain: Did not state                                  Recommendations      Recommended Diet and Intervention 3/31/2025:  Diet Solids Recommendation:  Easy to chew  Liquid Consistency Recommendation:  Thin liquids, No straws  Recommended form of Meds: Meds whole with water or Meds in puree     **Recommend completion of Modified Barium Swallow study to further assess pharyngeal phase of swallow However, Pt verbally declined recommended MBS **     Compensatory strategies: Upright as possible with all PO intake , No straws , Eat/feed slowly, Remain upright 30-45 min , Aspiration Precautions     Discharge Recommendations:  Discharge recommendations to be determined pending ongoing follow-up during acute care stay    History/Course of Treatment     H&P: Aki Pal is a 75 y.o. male with a pmh of COPD, anxiety, bipolar disorder, depression, PTSD, seizure who presents with Community acquired bacterial pneumonia     Imaging:  CT Chest:   IMPRESSION:  No pulmonary embolism.  Mucous plugging in the lower lobes along with  dependent consolidation that could be due to atelectasis and/or aspiration.    CHEST XR:   IMPRESSION:  Low lung volumes with streaky right basilar airspace opacity, likely  atelectasis.    Head CT:   IMPRESSION:  No acute intracranial abnormality.     Prior Modified Barium Swallow Study: 20  Impressions:  Treatment 
4 Eyes Skin Assessment     NAME:  Aki Pal  YOB: 1950  MEDICAL RECORD NUMBER:  [id number]    The patient is being assessed for  Transfer to New Unit    I agree that at least one RN has performed a thorough Head to Toe Skin Assessment on the patient. ALL assessment sites listed below have been assessed.      Areas assessed by both nurses:    Head, Face, Ears, Shoulders, Back, Chest, Arms, Elbows, Hands, Sacrum. Buttock, Coccyx, Ischium, Legs. Feet and Heels, and Under Medical Devices         Does the Patient have a Wound? Yes wound(s) were present on assessment. LDA wound assessment was Initiated and completed by RN       Kip Prevention initiated by RN: Yes  Wound Care Orders initiated by RN: Yes    Pressure Injury (Stage 3,4, Unstageable, DTI, NWPT, and Complex wounds) if present, place Wound referral order by RN under : Yes    New Ostomies, if present place, Ostomy referral order under : No     Nurse 1 eSignature: Electronically signed by Ara Cope RN on 3/30/25 at 11:27 AM EDT    **SHARE this note so that the co-signing nurse can place an eSignature**    Nurse 2 eSignature: Electronically signed by SKYLER MCGEE RN on 3/30/25 at 7:11 PM EDT   
CLINICAL PHARMACY NOTE: MEDS TO BEDS    Total # of Prescriptions Filled: 5   The following medications were delivered to the patient:  Budesonide - formoterol 160 - 4.5aero  Levofloxacin 750mg tabs  Nicotine 14mg/24hr pt24  Albuterol sulfate hfa hfa 108 aers  Prednisone 10mg tabs    Additional Documentation: Edwige GERARDO approved to deliver medications to patient room=signed  Prednisone allergy on file, pt stated he is okay to take Prednisone   Jami Zamarrip[employer] Pharmacy Tech  
Called to room for lethargy and panic ABG PCO2 87.  Patient is oriented, but lethargic.  Discussed case with Dr. Holm, will transfer to ICU.  Called wife and confirmed Full code and updated.  Dr. Contreras came to bedside, ordered stat CTA chest.  He will transfer to ICU after for Bipap and possible intubation if no improvement.      Reviewed with attending, will defer treatment to physician.     Consult pulmonology critical care.     Loni John, DANDRE-CNP      
I certify this patient under my care for Home Health with a face-to-face encounter on 4/1/2025.     I further certify that my clinical findings support that this patient is confined to home due to:  [] Fall Risk   [x] Dyspnea with minimal exertion   [] Confined to wheelchair   [] Angina with minimal exertion  [] Angina at rest   [] Amputation   [] Bowel/Bladder incontinence   [] Contractures  [] CVA/Hemiparalysis/Dysphonia   [] Hearing impairment   [] Legally Blind  [] Mental status impairment   [] Paralysis   [] Speech impairment   [] Other:    Initial Plan of Care: The patient’s Home Care needs include:  [] Administration of Medications   [x] Complex care plan management  [x] PT  [x] OT  [x] SLP   [] Teaching/Training  [] Wound Care   [] Observe/Assess   [] NG and Trach Aspiration/Care  [] Catheter Placement/Care   [] Ostomy Care   [] Psychiatric Eval/Therapy  [] Rehabilitation Nursing  [] Tube Feeding  [] Other: _    Due to COPD.     Ongoing plan development and review by PCP - Paul Crane MD       
Patient arrived to ICU with RT at bedside. Sinus Tach on beside monitor in the 110's. O2 94% on 2L NC. Patient is alert and oriented, spontaneously opens eyes but then closes them again. CHG bath given and 4eyes skin assessment completed (see separate note). Awaiting transport to CT at this time. Call light education provided, standard safety precautions in place. Care ongoing.  
Patient complained of migraine headache unrelieved with Tylenol. Rates pain 10/10. Patient not due for Fioricet until 11:47 pm. Hosp notified. OK to give it early.   
Patient from ICU via WC, he is pleasant and cooperative with plan of care so far, A&O x 4, 2 liters via NC, education provided r/t call light and fall prevention, therapy staff at bedside at this time.   
Patient taken off of BiPAP and placed on 2 Lpm nasal cannula to maintain SpO2 96%.  
Patients head to toe assessment completed. Vital signs WNL.  call light within reach. Scheduled medications given per MAR. Patient complained of migraine headache, PRN Fioricet given. Patient resting in bed.     
Pills given in pudding. No coughing noted. Patient ate remaining pudding cup without coughing.  
Pt refused bipap.  
Report given to 3A  nurse Claudette.POC explained to patient who voiced understanding.Patient transported to 3313 by wheelchair on portable TELE.  
Speech Language Pathology  Attempt/Hold    Aki Pal  1950    SLP attempted to see pt this date for dysphagia intervention. Chart reviewed, spoke with RN who cleared SLP entry. Pt awake and alert but declined participation in therapy or PO trials at this time. Will hold and attempt again as pt is appropriate and agreeable.    Alma Daniels MA CCC-SLP  Speech-Language Pathologist  SP. [phone]      
Spouse Beba updated at this time at [phone]  
Interventions  See OT notes for ADL activities  Pt will benefit from DOS SANTOS BALANCE assessment  Functional Outcomes  -PAC Inpatient Mobility Raw Score : 20              Cognition  WFL  Orientation:    alert and oriented x 4  Command Following:   WFL    Education  Barriers To Learning: none  Patient Education: patient educated on goals, PT role and benefits, plan of care, general safety, functional mobility training, proper use of assistive device/equipment, transfer training, discharge recommendations  Learning Assessment:  patient verbalizes and demonstrates understanding    Assessment  Activity Tolerance: Good; pt tolerates session well this date without adverse event. SPO2 stays >90 with exertion.  Impairments Requiring Therapeutic Intervention: decreased functional mobility, decreased ADL status, decreased ROM, decreased strength, decreased balance  Prognosis: good  Clinical Assessment: Pt is 75 y.o male presenting secondary to dx of community acquired bacterial pneumonia. At baseline pt is IND with transfers and ambulation without AD. Pt presents slightly below baseline with decreased balance and functional mobility. Pt is able to ambulate 60ft with CGA without AD and can navigate stairs at CGA requiring hand held support to descend stairs without becoming unsteady. Pt will benefit from continued skilled PT to assist pt in progression to PLOF.  Safety Interventions: patient left in chair, call light within reach, gait belt, patient at risk for falls, and nurse notified    Plan  Frequency: 3-5 x/per week  Current Treatment Recommendations: strengthening, ROM, balance training, functional mobility training, transfer training, gait training, stair training, and endurance training    Goals  Patient Goals: To go home   Short Term Goals:  Time Frame: Until time of d/c  Patient will complete transfers at supervision   Patient will ambulate 100 ft with use of no device at stand by assistance  Patient will

## 2025-04-01 NOTE — CARE COORDINATION
Discharge Planning Note:     Chart reviewed: IP LOS day 2   Risk Score: 21%       Primary Care Physician is: Paul Crane MD    Primary insurance is: Medicare A&B    Patient here for PNA. Care managed by Pulm and Hosp MD. PO ABX. Therapy pending. [facility] order placed. [facility] referral>Pt had prior services.     Please notify case management if any discharge needs are identified.      Case management will continue to follow progress and update discharge plan as needed    Kathleen Silver RN [phone]

## 2025-04-01 NOTE — DISCHARGE SUMMARY
Hospital Medicine Discharge Summary    Patient: Aki Pal     Gender: male  : 1950   Age: 75 y.o.  MRN: [id number]    Admitting Physician: Donna Concepcion MD  Discharge Physician: Joshua Lewis MD     Code Status: DNR-CCA     Admit Date: 3/30/2025   Discharge Date: 2025      Disposition:  Home    Discharge Diagnoses:    Active Hospital Problems    Diagnosis Date Noted    Coronavirus infection [B34.2] 2025    Community acquired bacterial pneumonia [J15.9] 2015       Follow-up appointments:  one week    Outpatient to do list: F/U with PCP and Pulm    Condition at Discharge:  Stable    Hospital Course:   74 yo M with COPD, chronic respiratory failure with hypoxia on 2 L O2 via NC at all times, bipolar disorder, seizure disorder, tobacco abuse came to ER with SOB. Admitted as inpatient for acute COPD exacerbation, acute on chronic respiratory failure with hypoxia and hypercapnia and community acquired PNA. Started on IV steroids, nebs and IV Abx. Respiratory panel positive for Coronavirus HKU1. Patient was ordered Bipap but did not use. Followed by Pulm. SLP recommend easy chew diet. Patient declined MBS. PT/OT recommend home PT/OT. Code status is DNR-CCA.  Will finish course of PO Levaquin and Prednisone taper at home.  Counseled for 11 minutes on smoking cessation during admission.  Written for nicotine patches.  Written for Albuterol PRN.  Written for home care/home PT/OT/SLP.  F/U with PCP and Pulm.    Discharge Medications:   Discharge Medication List as of 2025 11:16 AM        START taking these medications    Details   levoFLOXacin (LEVAQUIN) 750 MG tablet Take 1 tablet by mouth daily for 4 doses, Disp-4 tablet, R-0Normal      nicotine (NICODERM CQ) 14 MG/24HR Place 1 patch onto the skin daily, Disp-30 patch, R-0Normal      predniSONE (DELTASONE) 10 MG tablet Days 1-3:  4 tabs PO daily, Days 4-6:  3 tabs PO daily,  Days 7-8:  2 tabs PO daily,  Days 9-10:  1 tab PO

## 2025-04-01 NOTE — DISCHARGE INSTR - COC
Continuity of Care Form    Patient Name: Aki Pal   :  1950  MRN:  [id number]    Admit date:  3/30/2025  Discharge date:  2025    Code Status Order: DNR-CCA   Advance Directives:     Admitting Physician:  Donna Concepcion MD  PCP: Paul Crane MD    Discharging Nurse: Edwige GERARDO  Discharging Hospital Unit/Room#: 3AN-3313/3313-01  Discharging Unit Phone Number: [phone]    Emergency Contact:   Extended Emergency Contact Information  Primary Emergency Contact: Beba Pal  Address: [address] [place] [address]            [place] [address] [facility]  Home Phone: [phone]  Relation: Spouse  Secondary Emergency Contact: Shahab Pal  Home Phone: [phone]  Mobile Phone: [phone]  Relation: Child    Past Surgical History:  Past Surgical History:   Procedure Laterality Date    MOUTH SURGERY  2013       Immunization History:   Immunization History   Administered Date(s) Administered    COVID-19, J&J, (age 18y+), IM, 0.5 mL 2021    COVID-19, PFIZER PURPLE top, DILUTE for use, (age 12 y+), 30mcg/0.3mL 2022       Active Problems:  Patient Active Problem List   Diagnosis Code    Tardive dyskinesia G24.01    Community acquired bacterial pneumonia J15.9    Esophageal dysphagia R13.19    Hoarseness, chronic R49.0    Acute respiratory failure with hypoxia J96.01    Community acquired pneumonia of right lower lobe of lung J18.9    Centrilobular emphysema (HCC) J43.2    Lactic acidosis E87.20    Postnasal drip R09.82    Tobacco use Z72.0    Sepsis (HCC) A41.9    Pneumonia J18.9    Generalized weakness R53.1    PNA (pneumonia) J18.9    COPD exacerbation (HCC) J44.1    Mood disorder F39    COPD (chronic obstructive pulmonary disease) (HCC) J44.9    Migraines G43.909    PTSD (post-traumatic stress disorder) F43.10    Chronic respiratory failure with hypoxia J96.11    Fall, initial encounter W19.XXXA    Hyponatremia E87.1    Electrolyte imbalance E87.8    Alcohol use

## 2025-04-03 LAB
BACTERIA BLD CULT ORG #2: NORMAL
BACTERIA BLD CULT: NORMAL

## 2025-07-02 ENCOUNTER — APPOINTMENT (OUTPATIENT)
Dept: CT IMAGING | Age: 75
End: 2025-07-02
Payer: MEDICARE

## 2025-07-02 ENCOUNTER — HOSPITAL ENCOUNTER (EMERGENCY)
Age: 75
Discharge: HOME | End: 2025-07-03
Payer: MEDICARE

## 2025-07-02 VITALS
HEART RATE: 99 BPM | RESPIRATION RATE: 19 BRPM | SYSTOLIC BLOOD PRESSURE: 152 MMHG | DIASTOLIC BLOOD PRESSURE: 89 MMHG | OXYGEN SATURATION: 96 % | TEMPERATURE: 98.2 F

## 2025-07-02 DIAGNOSIS — R51.9 ACUTE NONINTRACTABLE HEADACHE, UNSPECIFIED HEADACHE TYPE: Primary | ICD-10-CM

## 2025-07-02 PROCEDURE — 70450 CT HEAD/BRAIN W/O DYE: CPT

## 2025-07-02 PROCEDURE — 99284 EMERGENCY DEPT VISIT MOD MDM: CPT

## 2025-07-02 RX ORDER — PROCHLORPERAZINE EDISYLATE 5 MG/ML
10 INJECTION INTRAMUSCULAR; INTRAVENOUS ONCE
Status: COMPLETED | OUTPATIENT
Start: 2025-07-02 | End: 2025-07-03

## 2025-07-02 RX ORDER — 0.9 % SODIUM CHLORIDE 0.9 %
1000 INTRAVENOUS SOLUTION INTRAVENOUS ONCE
Status: COMPLETED | OUTPATIENT
Start: 2025-07-02 | End: 2025-07-03

## 2025-07-02 ASSESSMENT — PAIN DESCRIPTION - LOCATION: LOCATION: HEAD

## 2025-07-02 ASSESSMENT — PAIN DESCRIPTION - FREQUENCY: FREQUENCY: CONTINUOUS

## 2025-07-02 ASSESSMENT — PAIN - FUNCTIONAL ASSESSMENT
PAIN_FUNCTIONAL_ASSESSMENT: ACTIVITIES ARE NOT PREVENTED
PAIN_FUNCTIONAL_ASSESSMENT: 0-10

## 2025-07-02 ASSESSMENT — PAIN SCALES - GENERAL: PAINLEVEL_OUTOF10: 9

## 2025-07-02 ASSESSMENT — PAIN DESCRIPTION - ONSET: ONSET: PROGRESSIVE

## 2025-07-02 ASSESSMENT — PAIN DESCRIPTION - DESCRIPTORS: DESCRIPTORS: ACHING

## 2025-07-02 ASSESSMENT — PAIN DESCRIPTION - PAIN TYPE: TYPE: ACUTE PAIN

## 2025-07-03 PROCEDURE — 96374 THER/PROPH/DIAG INJ IV PUSH: CPT

## 2025-07-03 PROCEDURE — 2580000003 HC RX 258

## 2025-07-03 PROCEDURE — 96361 HYDRATE IV INFUSION ADD-ON: CPT

## 2025-07-03 PROCEDURE — 6360000002 HC RX W HCPCS

## 2025-07-03 RX ADMIN — PROCHLORPERAZINE EDISYLATE 10 MG: 5 INJECTION INTRAMUSCULAR; INTRAVENOUS at 00:30

## 2025-07-03 RX ADMIN — SODIUM CHLORIDE 1000 ML: 9 INJECTION, SOLUTION INTRAVENOUS at 00:29

## 2025-07-03 NOTE — ED PROVIDER NOTES
Depression, Fibromyalgia, Headache(784.0), Hyperlipidemia, Manic depressive disease manic phase (HCC), On home O2, PTSD (post-traumatic stress disorder), Seizures (HCC), and Tardive dyskinesia.     EMERGENCY DEPARTMENT COURSE and DIFFERENTIAL DIAGNOSIS/MDM:   Vitals:    Vitals:    07/02/25 2331   BP: (!) 152/89   Pulse: 99   Resp: 19   Temp: 98.2 °F (36.8 °C)   TempSrc: Oral   SpO2: 96%       Is this patient to be included in the SEP-1 Core Measure due to severe sepsis or septic shock?   {Phk7BdxAwPe:03036}    Patient was given the following medications:  Medications   sodium chloride 0.9 % bolus 1,000 mL (0 mLs IntraVENous Stopped 7/3/25 0133)   prochlorperazine (COMPAZINE) injection 10 mg (10 mg IntraVENous Given 7/3/25 0030)       ED Course as of 07/03/25 0448   u Jul 03, 2025   0058 Patient is a headache resolved, no neurologic deficits noted [GE]      ED Course User Index  [GE] Ann Marie Ward PA-C        Chronic Conditions affecting care: ***   has a past medical history of Anxiety, Bipolar 1 disorder (HCC), Bipolar 2 disorder (HCC), Community acquired bacterial pneumonia (06/12/2015), COPD (chronic obstructive pulmonary disease) (HCC), Depression, Fibromyalgia, Headache(784.0), Hyperlipidemia, Manic depressive disease manic phase (HCC), On home O2, PTSD (post-traumatic stress disorder), Seizures (HCC), and Tardive dyskinesia.    CONSULTS: (Who and What was discussed)  None  ***    {Social Determinants Significantly Affecting Health (Optional):67013}    Records Reviewed (External, Source and Summary) ***    CC/HPI Summary, DDx, ED Course, and Reassessment: ***    Disposition Considerations (tests considered but not done, Admit vs D/C, Shared Decision Making, Pt Expectation of Test or Tx.): ***       I am the Primary Clinician of Record.  FINAL IMPRESSION      1. Acute nonintractable headache, unspecified headache type          DISPOSITION/PLAN     DISPOSITION Decision To Discharge 07/03/2025 12:57:33 AM  occasionally words are mis-transcribed.)    Ann Marie Ward PA-C (electronically signed)       Ann Marie Ward PA-C  07/07/25 1048

## 2025-07-03 NOTE — ED NOTES
Patient informed this RN that he does not have portable oxygen tank to go home with. RN called pt wife who stated they do not own one and pt always transfers home from hospital without one. Pt and wife informed to call oxygen company to request portable tank for hospital visits. Both Pt and Pt spouse verbalized understanding.

## 2025-07-03 NOTE — ED NOTES
Discharge and education instructions reviewed. Patient verbalized understanding, teach-back successful. Patient denied questions at this time. No acute distress noted. Vitals signs obtained and pain level at desired goal prior to departure. Patient instructed to follow-up as noted - return to emergency department if symptoms worsen. Patient verbalized understanding. Discharged per EDMD with discharge instructions.

## 2025-07-03 NOTE — DISCHARGE INSTRUCTIONS
Please make sure you drink plenty of fluids, follow-up with your PCP for reassessment during the visit    Referral for neurology was provided, please call establish    Return to the ED if experience new or worsening symptoms

## 2025-07-26 ENCOUNTER — APPOINTMENT (OUTPATIENT)
Dept: CT IMAGING | Age: 75
DRG: 871 | End: 2025-07-26
Payer: COMMERCIAL

## 2025-07-26 ENCOUNTER — HOSPITAL ENCOUNTER (INPATIENT)
Age: 75
LOS: 1 days | Discharge: LEFT BEFORE DISCHARGE | DRG: 871 | End: 2025-07-27
Attending: STUDENT IN AN ORGANIZED HEALTH CARE EDUCATION/TRAINING PROGRAM
Payer: COMMERCIAL

## 2025-07-26 DIAGNOSIS — R53.1 GENERALIZED WEAKNESS: ICD-10-CM

## 2025-07-26 DIAGNOSIS — J96.22 ACUTE ON CHRONIC RESPIRATORY FAILURE WITH HYPOXIA AND HYPERCAPNIA (HCC): Primary | ICD-10-CM

## 2025-07-26 DIAGNOSIS — J18.9 MULTIFOCAL PNEUMONIA: ICD-10-CM

## 2025-07-26 DIAGNOSIS — A41.9 SEPTICEMIA (HCC): ICD-10-CM

## 2025-07-26 DIAGNOSIS — J96.21 ACUTE ON CHRONIC RESPIRATORY FAILURE WITH HYPOXIA AND HYPERCAPNIA (HCC): Primary | ICD-10-CM

## 2025-07-26 LAB
ALBUMIN SERPL-MCNC: 3.8 G/DL (ref 3.4–5)
ALP SERPL-CCNC: 83 U/L (ref 40–129)
ALT SERPL-CCNC: 18 U/L (ref 10–40)
AMPHETAMINES UR QL SCN>1000 NG/ML: ABNORMAL
ANION GAP SERPL CALCULATED.3IONS-SCNC: 7 MMOL/L (ref 3–16)
AST SERPL-CCNC: 30 U/L (ref 15–37)
BACTERIA URNS QL MICRO: ABNORMAL /HPF
BARBITURATES UR QL SCN>200 NG/ML: POSITIVE
BASE EXCESS BLDV CALC-SCNC: 12 MMOL/L
BASOPHILS # BLD: 0.1 K/UL (ref 0–0.2)
BASOPHILS NFR BLD: 0.4 %
BENZODIAZ UR QL SCN>200 NG/ML: POSITIVE
BILIRUB DIRECT SERPL-MCNC: <0.1 MG/DL (ref 0–0.3)
BILIRUB INDIRECT SERPL-MCNC: 0.3 MG/DL (ref 0–1)
BILIRUB SERPL-MCNC: 0.4 MG/DL (ref 0–1)
BILIRUB UR QL STRIP.AUTO: NEGATIVE
BUN SERPL-MCNC: 14 MG/DL (ref 7–20)
CALCIUM SERPL-MCNC: 8.3 MG/DL (ref 8.3–10.6)
CANNABINOIDS UR QL SCN>50 NG/ML: ABNORMAL
CHLORIDE SERPL-SCNC: 99 MMOL/L (ref 99–110)
CK SERPL-CCNC: 459 U/L (ref 39–308)
CLARITY UR: CLEAR
CO2 BLDV-SCNC: 47 MMOL/L
CO2 SERPL-SCNC: 37 MMOL/L (ref 21–32)
COCAINE UR QL SCN: ABNORMAL
COHGB MFR BLDV: 2.5 %
COLOR UR: YELLOW
CREAT SERPL-MCNC: 0.6 MG/DL (ref 0.8–1.3)
DEPRECATED RDW RBC AUTO: 14.5 % (ref 12.4–15.4)
DRUG SCREEN COMMENT UR-IMP: ABNORMAL
EOSINOPHIL # BLD: 0 K/UL (ref 0–0.6)
EOSINOPHIL NFR BLD: 0.1 %
EPI CELLS #/AREA URNS AUTO: 1 /HPF (ref 0–5)
ETHANOLAMINE SERPL-MCNC: NORMAL MG/DL (ref 0–0.08)
FENTANYL SCREEN, URINE: ABNORMAL
FLUAV + FLUBV AG NOSE IA.RAPID: NOT DETECTED
FLUAV + FLUBV AG NOSE IA.RAPID: NOT DETECTED
GFR SERPLBLD CREATININE-BSD FMLA CKD-EPI: >90 ML/MIN/{1.73_M2}
GLUCOSE SERPL-MCNC: 143 MG/DL (ref 70–99)
GLUCOSE UR STRIP.AUTO-MCNC: NEGATIVE MG/DL
HCO3 BLDV-SCNC: 44 MMOL/L (ref 23–29)
HCT VFR BLD AUTO: 39 % (ref 40.5–52.5)
HGB BLD-MCNC: 12.5 G/DL (ref 13.5–17.5)
HGB UR QL STRIP.AUTO: NEGATIVE
HYALINE CASTS #/AREA URNS AUTO: 0 /LPF (ref 0–8)
KETONES UR STRIP.AUTO-MCNC: NEGATIVE MG/DL
LACTATE BLDV-SCNC: 1.4 MMOL/L (ref 0.4–1.9)
LEUKOCYTE ESTERASE UR QL STRIP.AUTO: ABNORMAL
LIPASE SERPL-CCNC: 12 U/L (ref 13–60)
LYMPHOCYTES # BLD: 1 K/UL (ref 1–5.1)
LYMPHOCYTES NFR BLD: 5.4 %
MAGNESIUM SERPL-MCNC: 2.04 MG/DL (ref 1.8–2.4)
MCH RBC QN AUTO: 31.6 PG (ref 26–34)
MCHC RBC AUTO-ENTMCNC: 32 G/DL (ref 31–36)
MCV RBC AUTO: 98.8 FL (ref 80–100)
METHADONE UR QL SCN>300 NG/ML: ABNORMAL
METHGB MFR BLDV: 0.4 %
MONOCYTES # BLD: 1.2 K/UL (ref 0–1.3)
MONOCYTES NFR BLD: 6.7 %
NEUTROPHILS # BLD: 15.5 K/UL (ref 1.7–7.7)
NEUTROPHILS NFR BLD: 87.4 %
NITRITE UR QL STRIP.AUTO: NEGATIVE
O2 THERAPY: ABNORMAL
OPIATES UR QL SCN>300 NG/ML: POSITIVE
OXYCODONE UR QL SCN: ABNORMAL
PCO2 BLDV: 95.9 MMHG (ref 40–50)
PCP UR QL SCN>25 NG/ML: ABNORMAL
PH BLDV: 7.27 [PH] (ref 7.35–7.45)
PH UR STRIP.AUTO: 6.5 [PH] (ref 5–8)
PH UR STRIP: 6.5 [PH]
PLATELET # BLD AUTO: 103 K/UL (ref 135–450)
PMV BLD AUTO: 8.9 FL (ref 5–10.5)
PO2 BLDV: <30 MMHG
POTASSIUM SERPL-SCNC: 4.3 MMOL/L (ref 3.5–5.1)
PROCALCITONIN SERPL IA-MCNC: 0.36 NG/ML (ref 0–0.15)
PROT SERPL-MCNC: 6.7 G/DL (ref 6.4–8.2)
PROT UR STRIP.AUTO-MCNC: NEGATIVE MG/DL
RBC # BLD AUTO: 3.95 M/UL (ref 4.2–5.9)
RBC CLUMPS #/AREA URNS AUTO: 3 /HPF (ref 0–4)
SAO2 % BLDV: 27 %
SARS-COV-2 RDRP RESP QL NAA+PROBE: NOT DETECTED
SODIUM SERPL-SCNC: 143 MMOL/L (ref 136–145)
SP GR UR STRIP.AUTO: 1.02 (ref 1–1.03)
TROPONIN, HIGH SENSITIVITY: 14 NG/L (ref 0–22)
TROPONIN, HIGH SENSITIVITY: 15 NG/L (ref 0–22)
TSH SERPL DL<=0.005 MIU/L-ACNC: 0.38 UIU/ML (ref 0.27–4.2)
UA DIPSTICK W REFLEX MICRO PNL UR: YES
URN SPEC COLLECT METH UR: ABNORMAL
UROBILINOGEN UR STRIP-ACNC: 1 E.U./DL
WBC # BLD AUTO: 17.7 K/UL (ref 4–11)
WBC #/AREA URNS AUTO: 1 /HPF (ref 0–5)

## 2025-07-26 PROCEDURE — 94660 CPAP INITIATION&MGMT: CPT

## 2025-07-26 PROCEDURE — 80048 BASIC METABOLIC PNL TOTAL CA: CPT

## 2025-07-26 PROCEDURE — 80307 DRUG TEST PRSMV CHEM ANLYZR: CPT

## 2025-07-26 PROCEDURE — 83735 ASSAY OF MAGNESIUM: CPT

## 2025-07-26 PROCEDURE — 80076 HEPATIC FUNCTION PANEL: CPT

## 2025-07-26 PROCEDURE — 83605 ASSAY OF LACTIC ACID: CPT

## 2025-07-26 PROCEDURE — 36415 COLL VENOUS BLD VENIPUNCTURE: CPT

## 2025-07-26 PROCEDURE — 72125 CT NECK SPINE W/O DYE: CPT

## 2025-07-26 PROCEDURE — 83690 ASSAY OF LIPASE: CPT

## 2025-07-26 PROCEDURE — 82550 ASSAY OF CK (CPK): CPT

## 2025-07-26 PROCEDURE — 84145 PROCALCITONIN (PCT): CPT

## 2025-07-26 PROCEDURE — 70450 CT HEAD/BRAIN W/O DYE: CPT

## 2025-07-26 PROCEDURE — 82803 BLOOD GASES ANY COMBINATION: CPT

## 2025-07-26 PROCEDURE — 6360000002 HC RX W HCPCS: Performed by: PHYSICIAN ASSISTANT

## 2025-07-26 PROCEDURE — 87502 INFLUENZA DNA AMP PROBE: CPT

## 2025-07-26 PROCEDURE — 82077 ASSAY SPEC XCP UR&BREATH IA: CPT

## 2025-07-26 PROCEDURE — 87040 BLOOD CULTURE FOR BACTERIA: CPT

## 2025-07-26 PROCEDURE — 85025 COMPLETE CBC W/AUTO DIFF WBC: CPT

## 2025-07-26 PROCEDURE — 84443 ASSAY THYROID STIM HORMONE: CPT

## 2025-07-26 PROCEDURE — 81001 URINALYSIS AUTO W/SCOPE: CPT

## 2025-07-26 PROCEDURE — 96365 THER/PROPH/DIAG IV INF INIT: CPT

## 2025-07-26 PROCEDURE — 87635 SARS-COV-2 COVID-19 AMP PRB: CPT

## 2025-07-26 PROCEDURE — 2580000003 HC RX 258: Performed by: PHYSICIAN ASSISTANT

## 2025-07-26 PROCEDURE — 84484 ASSAY OF TROPONIN QUANT: CPT

## 2025-07-26 PROCEDURE — 96367 TX/PROPH/DG ADDL SEQ IV INF: CPT

## 2025-07-26 PROCEDURE — 87449 NOS EACH ORGANISM AG IA: CPT

## 2025-07-26 PROCEDURE — 5A09357 ASSISTANCE WITH RESPIRATORY VENTILATION, LESS THAN 24 CONSECUTIVE HOURS, CONTINUOUS POSITIVE AIRWAY PRESSURE: ICD-10-PCS | Performed by: INTERNAL MEDICINE

## 2025-07-26 PROCEDURE — 6370000000 HC RX 637 (ALT 250 FOR IP): Performed by: PHYSICIAN ASSISTANT

## 2025-07-26 PROCEDURE — 2700000000 HC OXYGEN THERAPY PER DAY

## 2025-07-26 PROCEDURE — 71260 CT THORAX DX C+: CPT

## 2025-07-26 PROCEDURE — 6360000004 HC RX CONTRAST MEDICATION: Performed by: PHYSICIAN ASSISTANT

## 2025-07-26 PROCEDURE — 99285 EMERGENCY DEPT VISIT HI MDM: CPT

## 2025-07-26 PROCEDURE — 94761 N-INVAS EAR/PLS OXIMETRY MLT: CPT

## 2025-07-26 RX ORDER — ALPRAZOLAM 0.5 MG
0.5 TABLET ORAL ONCE
Status: COMPLETED | OUTPATIENT
Start: 2025-07-26 | End: 2025-07-26

## 2025-07-26 RX ORDER — IOPAMIDOL 755 MG/ML
75 INJECTION, SOLUTION INTRAVASCULAR
Status: COMPLETED | OUTPATIENT
Start: 2025-07-26 | End: 2025-07-26

## 2025-07-26 RX ORDER — DIAZEPAM 5 MG/1
5 TABLET ORAL EVERY 6 HOURS
COMMUNITY

## 2025-07-26 RX ORDER — 0.9 % SODIUM CHLORIDE 0.9 %
1000 INTRAVENOUS SOLUTION INTRAVENOUS ONCE
Status: COMPLETED | OUTPATIENT
Start: 2025-07-26 | End: 2025-07-26

## 2025-07-26 RX ADMIN — VANCOMYCIN HYDROCHLORIDE 2000 MG: 1 INJECTION, POWDER, LYOPHILIZED, FOR SOLUTION INTRAVENOUS at 23:50

## 2025-07-26 RX ADMIN — SODIUM CHLORIDE 1000 ML: 0.9 INJECTION, SOLUTION INTRAVENOUS at 21:35

## 2025-07-26 RX ADMIN — CEFEPIME 2000 MG: 2 INJECTION, POWDER, FOR SOLUTION INTRAVENOUS at 23:11

## 2025-07-26 RX ADMIN — ALPRAZOLAM 0.5 MG: 0.5 TABLET ORAL at 21:07

## 2025-07-26 RX ADMIN — IOPAMIDOL 75 ML: 755 INJECTION, SOLUTION INTRAVENOUS at 22:13

## 2025-07-26 NOTE — ED TRIAGE NOTES
Lives at home with his wife. Fell this AM at 07:30AM, he thinks he hit is head, but he isn't completely sure. He went back to sleep and then woke up with a migraine and dizziness. Hx: of COPD/bad disc vertebrae, PTSD, anxiety. He wears 2-2.5LNC at baseline and saturates around 92%. BG via squad was 127. He states he doesn't take any blood thinners. Smokes 1/2 pack a day and drinks a 1/2 beer every day.

## 2025-07-27 VITALS
TEMPERATURE: 98.2 F | OXYGEN SATURATION: 96 % | BODY MASS INDEX: 26.82 KG/M2 | DIASTOLIC BLOOD PRESSURE: 78 MMHG | HEIGHT: 67 IN | WEIGHT: 170.86 LBS | SYSTOLIC BLOOD PRESSURE: 125 MMHG | RESPIRATION RATE: 21 BRPM | HEART RATE: 94 BPM

## 2025-07-27 LAB
BASE EXCESS BLDV CALC-SCNC: 10.6 MMOL/L
BASE EXCESS BLDV CALC-SCNC: 2.6 MMOL/L
CO2 BLDV-SCNC: 33 MMOL/L
CO2 BLDV-SCNC: 41 MMOL/L
COHGB MFR BLDV: 2.7 %
COHGB MFR BLDV: 2.8 %
HCO3 BLDV-SCNC: 31 MMOL/L (ref 23–29)
HCO3 BLDV-SCNC: 39 MMOL/L (ref 23–29)
LACTATE BLDV-SCNC: 1.1 MMOL/L (ref 0.4–1.9)
METHGB MFR BLDV: 0.6 %
METHGB MFR BLDV: 1.1 %
O2 THERAPY: ABNORMAL
O2 THERAPY: ABNORMAL
PCO2 BLDV: 65.9 MMHG (ref 40–50)
PCO2 BLDV: 70 MMHG (ref 40–50)
PH BLDV: 7.27 [PH] (ref 7.35–7.45)
PH BLDV: 7.35 [PH] (ref 7.35–7.45)
PO2 BLDV: 41 MMHG
PO2 BLDV: <30 MMHG
SAO2 % BLDV: 58 %
SAO2 % BLDV: 79 %

## 2025-07-27 PROCEDURE — 36415 COLL VENOUS BLD VENIPUNCTURE: CPT

## 2025-07-27 PROCEDURE — 6370000000 HC RX 637 (ALT 250 FOR IP)

## 2025-07-27 PROCEDURE — 83605 ASSAY OF LACTIC ACID: CPT

## 2025-07-27 PROCEDURE — 94660 CPAP INITIATION&MGMT: CPT

## 2025-07-27 PROCEDURE — 94761 N-INVAS EAR/PLS OXIMETRY MLT: CPT

## 2025-07-27 PROCEDURE — 94640 AIRWAY INHALATION TREATMENT: CPT

## 2025-07-27 PROCEDURE — 2580000003 HC RX 258

## 2025-07-27 PROCEDURE — 82803 BLOOD GASES ANY COMBINATION: CPT

## 2025-07-27 PROCEDURE — 1200000000 HC SEMI PRIVATE

## 2025-07-27 PROCEDURE — 2700000000 HC OXYGEN THERAPY PER DAY

## 2025-07-27 RX ORDER — BUTALBITAL, ACETAMINOPHEN, CAFFEINE AND CODEINE PHOSPHATE 50; 325; 40; 30 MG/1; MG/1; MG/1; MG/1
1 CAPSULE ORAL 2 TIMES DAILY
COMMUNITY
Start: 2025-07-22 | End: 2025-08-21

## 2025-07-27 RX ORDER — IPRATROPIUM BROMIDE AND ALBUTEROL SULFATE 2.5; .5 MG/3ML; MG/3ML
1 SOLUTION RESPIRATORY (INHALATION)
Status: DISCONTINUED | OUTPATIENT
Start: 2025-07-27 | End: 2025-07-27 | Stop reason: HOSPADM

## 2025-07-27 RX ORDER — ONDANSETRON 2 MG/ML
4 INJECTION INTRAMUSCULAR; INTRAVENOUS EVERY 6 HOURS PRN
Status: DISCONTINUED | OUTPATIENT
Start: 2025-07-27 | End: 2025-07-27 | Stop reason: HOSPADM

## 2025-07-27 RX ORDER — SODIUM CHLORIDE 9 MG/ML
INJECTION, SOLUTION INTRAVENOUS PRN
Status: DISCONTINUED | OUTPATIENT
Start: 2025-07-27 | End: 2025-07-27 | Stop reason: HOSPADM

## 2025-07-27 RX ORDER — DIAZEPAM 5 MG/1
5 TABLET ORAL EVERY 6 HOURS SCHEDULED
Status: DISCONTINUED | OUTPATIENT
Start: 2025-07-27 | End: 2025-07-27 | Stop reason: HOSPADM

## 2025-07-27 RX ORDER — BUDESONIDE AND FORMOTEROL FUMARATE DIHYDRATE 160; 4.5 UG/1; UG/1
2 AEROSOL RESPIRATORY (INHALATION)
Status: DISCONTINUED | OUTPATIENT
Start: 2025-07-27 | End: 2025-07-27 | Stop reason: HOSPADM

## 2025-07-27 RX ORDER — ONDANSETRON 4 MG/1
4 TABLET, ORALLY DISINTEGRATING ORAL EVERY 8 HOURS PRN
Status: DISCONTINUED | OUTPATIENT
Start: 2025-07-27 | End: 2025-07-27 | Stop reason: HOSPADM

## 2025-07-27 RX ORDER — BENZONATATE 100 MG/1
100 CAPSULE ORAL 3 TIMES DAILY PRN
Status: DISCONTINUED | OUTPATIENT
Start: 2025-07-27 | End: 2025-07-27 | Stop reason: HOSPADM

## 2025-07-27 RX ORDER — ENOXAPARIN SODIUM 100 MG/ML
40 INJECTION SUBCUTANEOUS DAILY
Status: DISCONTINUED | OUTPATIENT
Start: 2025-07-27 | End: 2025-07-27 | Stop reason: HOSPADM

## 2025-07-27 RX ORDER — DOXEPIN HYDROCHLORIDE 25 MG/1
75 CAPSULE ORAL NIGHTLY PRN
Status: DISCONTINUED | OUTPATIENT
Start: 2025-07-27 | End: 2025-07-27 | Stop reason: HOSPADM

## 2025-07-27 RX ORDER — FOLIC ACID 1 MG/1
1 TABLET ORAL DAILY
Status: DISCONTINUED | OUTPATIENT
Start: 2025-07-27 | End: 2025-07-27 | Stop reason: HOSPADM

## 2025-07-27 RX ORDER — DIAZEPAM 5 MG/1
5 TABLET ORAL EVERY 6 HOURS SCHEDULED
Status: DISCONTINUED | OUTPATIENT
Start: 2025-07-27 | End: 2025-07-27

## 2025-07-27 RX ORDER — SODIUM CHLORIDE 0.9 % (FLUSH) 0.9 %
5-40 SYRINGE (ML) INJECTION PRN
Status: DISCONTINUED | OUTPATIENT
Start: 2025-07-27 | End: 2025-07-27 | Stop reason: HOSPADM

## 2025-07-27 RX ORDER — POLYETHYLENE GLYCOL 3350 17 G/17G
17 POWDER, FOR SOLUTION ORAL DAILY PRN
Status: DISCONTINUED | OUTPATIENT
Start: 2025-07-27 | End: 2025-07-27 | Stop reason: HOSPADM

## 2025-07-27 RX ORDER — TAMSULOSIN HYDROCHLORIDE 0.4 MG/1
0.4 CAPSULE ORAL DAILY
Status: DISCONTINUED | OUTPATIENT
Start: 2025-07-27 | End: 2025-07-27 | Stop reason: HOSPADM

## 2025-07-27 RX ORDER — ACETAMINOPHEN 650 MG/1
650 SUPPOSITORY RECTAL EVERY 6 HOURS PRN
Status: DISCONTINUED | OUTPATIENT
Start: 2025-07-27 | End: 2025-07-27 | Stop reason: HOSPADM

## 2025-07-27 RX ORDER — SODIUM CHLORIDE 0.9 % (FLUSH) 0.9 %
5-40 SYRINGE (ML) INJECTION EVERY 12 HOURS SCHEDULED
Status: DISCONTINUED | OUTPATIENT
Start: 2025-07-27 | End: 2025-07-27 | Stop reason: HOSPADM

## 2025-07-27 RX ORDER — METHYLPHENIDATE HYDROCHLORIDE 5 MG/1
20 TABLET ORAL 3 TIMES DAILY
Refills: 0 | Status: DISCONTINUED | OUTPATIENT
Start: 2025-07-27 | End: 2025-07-27 | Stop reason: HOSPADM

## 2025-07-27 RX ORDER — MELOXICAM 7.5 MG/1
7.5 TABLET ORAL DAILY
COMMUNITY
Start: 2024-08-05

## 2025-07-27 RX ORDER — ACETAMINOPHEN 325 MG/1
650 TABLET ORAL EVERY 6 HOURS PRN
Status: DISCONTINUED | OUTPATIENT
Start: 2025-07-27 | End: 2025-07-27 | Stop reason: HOSPADM

## 2025-07-27 RX ORDER — ALBUTEROL SULFATE 90 UG/1
2 INHALANT RESPIRATORY (INHALATION) 4 TIMES DAILY PRN
Status: DISCONTINUED | OUTPATIENT
Start: 2025-07-27 | End: 2025-07-27 | Stop reason: HOSPADM

## 2025-07-27 RX ORDER — NICOTINE 21 MG/24HR
1 PATCH, TRANSDERMAL 24 HOURS TRANSDERMAL DAILY
Status: DISCONTINUED | OUTPATIENT
Start: 2025-07-27 | End: 2025-07-27 | Stop reason: HOSPADM

## 2025-07-27 RX ORDER — CYCLOBENZAPRINE HCL 10 MG
10 TABLET ORAL 3 TIMES DAILY PRN
Status: DISCONTINUED | OUTPATIENT
Start: 2025-07-27 | End: 2025-07-27 | Stop reason: HOSPADM

## 2025-07-27 RX ORDER — PANTOPRAZOLE SODIUM 40 MG/1
40 TABLET, DELAYED RELEASE ORAL
Status: DISCONTINUED | OUTPATIENT
Start: 2025-07-27 | End: 2025-07-27 | Stop reason: HOSPADM

## 2025-07-27 RX ORDER — SODIUM CHLORIDE 9 MG/ML
INJECTION, SOLUTION INTRAVENOUS CONTINUOUS
Status: DISCONTINUED | OUTPATIENT
Start: 2025-07-27 | End: 2025-07-27 | Stop reason: HOSPADM

## 2025-07-27 RX ADMIN — METHYLPHENIDATE HYDROCHLORIDE 20 MG: 10 TABLET ORAL at 10:19

## 2025-07-27 RX ADMIN — CYCLOBENZAPRINE 10 MG: 10 TABLET, FILM COATED ORAL at 02:39

## 2025-07-27 RX ADMIN — SODIUM CHLORIDE: 0.9 INJECTION, SOLUTION INTRAVENOUS at 02:21

## 2025-07-27 RX ADMIN — IPRATROPIUM BROMIDE AND ALBUTEROL SULFATE 1 DOSE: .5; 2.5 SOLUTION RESPIRATORY (INHALATION) at 08:06

## 2025-07-27 RX ADMIN — PANTOPRAZOLE SODIUM 40 MG: 40 TABLET, DELAYED RELEASE ORAL at 10:29

## 2025-07-27 RX ADMIN — TAMSULOSIN HYDROCHLORIDE 0.4 MG: 0.4 CAPSULE ORAL at 10:19

## 2025-07-27 RX ADMIN — DIAZEPAM 5 MG: 5 TABLET ORAL at 10:19

## 2025-07-27 RX ADMIN — METHYLPHENIDATE HYDROCHLORIDE 20 MG: 10 TABLET ORAL at 02:21

## 2025-07-27 RX ADMIN — FOLIC ACID 1 MG: 1 TABLET ORAL at 10:19

## 2025-07-27 RX ADMIN — DIAZEPAM 5 MG: 5 TABLET ORAL at 02:21

## 2025-07-27 ASSESSMENT — PAIN SCALES - GENERAL
PAINLEVEL_OUTOF10: 10
PAINLEVEL_OUTOF10: 10

## 2025-07-27 ASSESSMENT — PAIN DESCRIPTION - LOCATION: LOCATION: NECK

## 2025-07-27 NOTE — CONSULTS
Clinical Pharmacy Note  Vancomycin Consult    Pharmacy consult received for one-time dose of vancomycin in the Emergency Department per  AMANDA Rodrigez.    Ht Readings from Last 1 Encounters:   07/26/25 1.702 m (5' 7\")        Wt Readings from Last 1 Encounters:   07/26/25 76.9 kg (169 lb 8.5 oz)         Assessment/Plan:  Vancomycin 2000 x 1 in ED.  If vancomycin is to continue on admission and pharmacy is to manage dosing, please re-consult with admission orders.    Ligia Marquez, PharmD  [facility]

## 2025-07-27 NOTE — ED PROVIDER NOTES
[facility] EMERGENCY DEPARTMENT  EMERGENCY DEPARTMENT ENCOUNTER        Pt Name: Aki Pal  MRN: [id number]  Birthdate 1950  Date of evaluation: 7/26/2025  Provider: AMANDA Ceballos  PCP: Paul Crane MD  Note Started: 8:05 PM EDT 7/26/25       I have seen and evaluated this patient with my supervising physician ELIZABETH SPANN       CHIEF COMPLAINT       Chief Complaint   Patient presents with    Fall     Lives at home with his wife. Fell this AM at 07:30AM, he thinks he hit is head, but he isn't completely sure. He went back to sleep and then woke up with a migraine and dizziness. Hx: of COPD/bad disc vertebrae, PTSD, anxiety. He wears 2-2.5LNC at baseline and saturates around 92%. BG via squad was 127. He states he doesn't take any blood thinners. Smokes 1/2 pack a day and drinks a 1/2 beer every day.        HISTORY OF PRESENT ILLNESS: 1 or more Elements     History From: patient       Aki Pal is a 75 y.o. male with past medical history of tardive dyskinesia, emphysema, tobacco abuse, chronic respiratory failure with hypoxia, bipolar disorder, hyperlipidemia, daily alcohol use who presents via EMS from home for fall.     Nursing Notes were all reviewed and agreed with or any disagreements were addressed in the HPI.    REVIEW OF SYSTEMS :      Review of Systems    Positives and Pertinent negatives as per HPI.     SURGICAL HISTORY     Past Surgical History:   Procedure Laterality Date    MOUTH SURGERY  05/2013       CURRENTMEDICATIONS       Previous Medications    ALBUTEROL SULFATE HFA (VENTOLIN HFA) 108 (90 BASE) MCG/ACT INHALER    Inhale 2 puffs into the lungs 4 times daily as needed for Wheezing    ALPRAZOLAM (XANAX) 0.5 MG TABLET    Take 1 tablet by mouth 4 times daily. Max Daily Amount: 2 mg    BUTALBITAL-APAP-CAFFEINE (FIORICET) -40 MG CAPS PER CAPSULE    Take 1 capsule by mouth 2 times daily as needed for Headaches (CAUTION: Can cause

## 2025-07-27 NOTE — PROGRESS NOTES
Pt arrived via stretcher from ED to room 5238.  Heart monitor connected and verified with [facility]. VS, assessment, and admission complete. 4 eyes assessment complete. Pt oriented to unit and room. Call light and bedside table in reach. All questions answered. Pt resting quietly in bed with no complaints or voiced needs at this time.     Electronically signed by Lavell Rico RN on 7/27/2025 at 2:34 AM

## 2025-07-27 NOTE — ED PROVIDER NOTES
ED Attending Staffing Note  Patient was seen with the AMANDA Thayer. I was present for the significant portions of the H&P as well as performance and interpretation of procedures and EKGs.     I have personally performed a face to face evaluation on this patient. I have reviewed and agree with history and physical examination patient management and disposition. I have personally saw the patient and take full responsibility for patient management.     CHIEF COMPLAINT    Fall (Lives at home with his wife. Fell this AM at 07:30AM, he thinks he hit is head, but he isn't completely sure. He went back to sleep and then woke up with a migraine and dizziness. Hx: of COPD/bad disc vertebrae, PTSD, anxiety. He wears 2-2.5LNC at baseline and saturates around 92%. BG via squad was 127. He states he doesn't take any blood thinners. Smokes 1/2 pack a day and drinks a 1/2 beer every day. )      PAST MEDICAL HISTORY    Past Medical History:   Diagnosis Date   • Anxiety    • Bipolar 1 disorder (HCC)    • Bipolar 2 disorder (HCC)    • Community acquired bacterial pneumonia 06/12/2015   • COPD (chronic obstructive pulmonary disease) (HCC)    • Depression    • Fibromyalgia    • Headache(784.0)    • Hyperlipidemia    • Manic depressive disease manic phase ([facility])    • On home O2    • PTSD (post-traumatic stress disorder)    • Seizures ([facility])    • Tardive dyskinesia        Vitals  /65   Pulse (!) 113   Temp 98.5 °F (36.9 °C) (Oral)   Resp 22   Ht 1.702 m (5' 7\")   Wt 76.9 kg (169 lb 8.5 oz)   SpO2 100%   BMI 26.55 kg/m²     HPI:  Aki Pal is a 75 y.o. male with history of COPD, CAP, Headache who presents with fall. Briefly the salient points of the case are as follows:  Patient here today with a fall, occurred earlier this morning, he is in bed and not feeling well throughout the day    MDM:  This is an ill-appearing 75-year-old male here today with difficulty with ambulation frequent falls and failure to thrive  His

## 2025-07-27 NOTE — CONSULTS
Clinical Pharmacy Note  Vancomycin Consult    Aki Pal is a 75 y.o. male ordered Vancomycin for Pneumonia; consult received from Dr. Gant to manage therapy. Also receiving cefepime.    Allergies:  Prednisone, Serotonin reuptake inhibitors (ssris), and Tricyclic antidepressants     Temp max:  Temp (24hrs), Av.6 °F (37 °C), Min:98.5 °F (36.9 °C), Max:98.6 °F (37 °C)      Recent Labs     25   WBC 17.7*       Recent Labs     25   BUN 14   CREATININE 0.6*       No intake or output data in the 24 hours ending 25 0458    Culture Results:      Ht Readings from Last 1 Encounters:   25 1.702 m (5' 7\")        Wt Readings from Last 1 Encounters:   25 76.9 kg (169 lb 8.5 oz)         Estimated Creatinine Clearance: 103 mL/min (A) (based on SCr of 0.6 mg/dL (L)).    Assessment/Plan:  Day # 1 of vancomycin.  Vancomycin 1250 mg IV every 12 hours ordered.    Goal -600  Predicted AUC 24-48hrs: 509  Predicted AUCss: 544    Thank you for the consult.   Earnest Brody, LiamD

## 2025-07-27 NOTE — PROGRESS NOTES
4 Eyes Skin Assessment     NAME:  Aki Pal  YOB: 1950  MEDICAL RECORD NUMBER:  [id number]    The patient is being assessed for  Admission    I agree that at least one RN has performed a thorough Head to Toe Skin Assessment on the patient. ALL assessment sites listed below have been assessed.      Areas assessed by both nurses:    Head, Face, Ears, Shoulders, Back, Chest, Arms, Elbows, Hands, Sacrum. Buttock, Coccyx, Ischium, and Legs. Feet and Heels        Does the Patient have a Wound? Yes wound(s) were present on assessment. LDA wound assessment was Initiated and completed by RN       Kip Prevention initiated by RN: Yes  Wound Care Orders initiated by RN: No    For hospital-acquired stage 1 & 2 and ALL Stage 3,4, Unstageable, DTI, NWPT, and Complex wounds: place order “IP Wound Care/Ostomy Nurse Eval and Treat” by RN under : Yes    New Ostomies, if present place, Ostomy referral order under : No     Nurse 1 eSignature: Electronically signed by Lavell Rico RN on 7/27/25 at 2:35 AM EDT    **SHARE this note so that the co-signing nurse can place an eSignature**    Nurse 2 eSignature: Electronically signed by Tigre Zuñiga RN on 7/27/25 at 5:19 AM EDT

## 2025-07-27 NOTE — ED NOTES
ED TO INPATIENT SBAR HANDOFF    Patient Name: Aki Pal   Preferred Name: Aki  : 1950  75 y.o.   Family/Caregiver Present: no   Code Status Order: DNR-CC  PO Status: NPO:No  Telemetry Order: Yes  C-SSRS:    Sitter no none  Restraints:     Sepsis Risk Score      Situation  Chief Complaint   Patient presents with    Fall     Lives at home with his wife. Fell this AM at 07:30AM, he thinks he hit is head, but he isn't completely sure. He went back to sleep and then woke up with a migraine and dizziness. Hx: of COPD/bad disc vertebrae, PTSD, anxiety. He wears 2-2.5LNC at baseline and saturates around 92%. BG via squad was 127. He states he doesn't take any blood thinners. Smokes 1/2 pack a day and drinks a 1/2 beer every day.      Brief Description of Patient's Condition: pneumonia, and hypercapnic resp failure. Placed on BiPap in ED, CO2 got better. Antibiotics given. From home, has a wife. Has had frequent falls. Needs OT/PT consult  Mental Status: oriented, alert, coherent, logical, thought processes intact, and able to concentrate and follow conversation  Arrived from:Home  Imaging:   CT CHEST PULMONARY EMBOLISM W CONTRAST   Preliminary Result   1. No evidence of pulmonary embolism.   2. Multifocal pneumonia.   3. Moderate emphysema.         CT CERVICAL SPINE WO CONTRAST   Final Result   1. No acute intracranial abnormality.   2. No acute osseous abnormality of the cervical spine.   3. Multilevel severe endplate and facet arthrosis.         CT HEAD WO CONTRAST   Final Result   1. No acute intracranial abnormality.   2. No acute osseous abnormality of the cervical spine.   3. Multilevel severe endplate and facet arthrosis.           Abnormal labs:   Abnormal Labs Reviewed   BASIC METABOLIC PANEL - Abnormal; Notable for the following components:       Result Value    CO2 37 (*)     Glucose 143 (*)     Creatinine 0.6 (*)     All other components within normal limits   CBC WITH AUTO DIFFERENTIAL -

## 2025-07-27 NOTE — PROGRESS NOTES
Patient repeatedly asking for his Fioricet which he takes at home. When asked if patient had a headache or a migraine, patient replied saying \"Fioricet is for my neck.\" This RN educated patient on the use of Fioricet for headaches/migraines. Patient  refused education and remains adamant that Fioricet is for his neck pain. Patient was given PRN Flexeril 10mg for pain at 0239. MD Gant messaged about patient's Fioricet. No reply at this time. Will pass on do AM nurse.     Electronically signed by Lavell Rico RN on 7/27/2025 at 7:23 AM

## 2025-07-27 NOTE — PROGRESS NOTES
D: pt still insisting on leaving AMA A: this RN signed AMA paperwork with pt and Amadeo Ivey RN, ivs removed R: waiting on pt's wife to arrive

## 2025-07-27 NOTE — PROGRESS NOTES
Pt's wife called concerning plan of care. Pt's wife understands that hospital cannot keep pt against his will. He is alert and oriented x 4 and able to make his own decisions. Pt's wife will be here shortly to  pt.

## 2025-07-27 NOTE — PROGRESS NOTES
Dr. Saldaña aware of pt's desire to leave AMA. Physician alerted pt to fact that he will not receive antibiotics if he leaves AMA. This RN shared this plan of care with pt. Pt aware of this and still wants to leave AMA.

## 2025-07-27 NOTE — H&P
V2.0  History and Physical      Name:  Aki Pal /Age/Sex: 1950  (75 y.o. male)   MRN & CSN:  [id number] & [id number] Encounter Date/Time: 2025 12:18 AM EDT   Location:   PCP: Paul Crane MD       Hospital Day: 2    Assessment and Plan:   Aki Pal is a 75 y.o. male with a pmh of COPD, chronic respiratory failure on 2 to 3 L oxygen at home, PTSD, anxiety, depression, tobacco dependency, drinks daily beer who presents with Sepsis ([facility])    Hospital Problems           Last Modified POA    * (Principal) Sepsis ([facility]) 2025 Yes       Plan:  #Sepsis secondary to pneumonia  #Multifocal community-acquired pneumonia  Patient does COPD with chronic respiratory failure on 2 to 3 L oxygen.  He continues to smoke  - HR greater than 100, WBC 17.7, Pro-Vipul 0.06.  Normal lactic acid  - CT chest with contrast no evidence of PE.  Multifocal pneumonia.  Moderate emphysema  - ED started on Vanco and cefepime will continue   - Will provide breathing treatment  - Continue Mucinex and Robitussin for secretion clearance and cough  - Send sputum culture if available.  Ordered Legionella, pneumococcal antigen.  Ordered MRSA nares all pending follow-up    #Acute on chronic hypoxic hypercapnic respiratory failure  #Respiratory acidosis  -Patient chronically uses 2 to 3 L of oxygen daily at home.  Currently is on BiPAP  - Initial VBG pH 7.266, pCO2 95.9, after BiPAP repeat VBG pH 7.274, pCO2 65.9 trending down patient is tolerating well  - Will provide breathing treatment  - Continue Mucinex and Robitussin for secretion clearance and cough  - Will repeat ABG    #Tobacco dependency  Smokes 1/2 pack of cigarette, patient was educated and encouraged tobacco cessation.  Risk factor was explained that the risk of smoking poses which could be an increased risk of developing COPD, emphysema as well as cancer.  Smoking also increases the risk for coronary artery disease, smoker and lung cancer.  reasonably achievable.; CT of the cervical spine was performed without the administration of intravenous contrast. Multiplanar reformatted images are provided for review. Automated exposure control, iterative reconstruction, and/or weight based adjustment of the mA/kV was utilized to reduce the radiation dose to as low as reasonably achievable. COMPARISON: None. HISTORY: ORDERING SYSTEM PROVIDED HISTORY: fall this am TECHNOLOGIST PROVIDED HISTORY: If patient is on cardiac monitor and/or pulse ox, they may be taken off cardiac monitor and pulse ox, left on O2 if currently on. All monitors reattached when patient returns to room. Has a \"code stroke\" or \"stroke alert\" been called?->No Reason for exam:->fall this am Decision Support Exception - unselect if not a suspected or confirmed emergency medical condition->Emergency Medical Condition (MA) Reason for Exam: fall this am; ORDERING SYSTEM PROVIDED HISTORY: fall this am, neck pain chronic TECHNOLOGIST PROVIDED HISTORY: Reason for exam:->fall this am, neck pain chronic Decision Support Exception - unselect if not a suspected or confirmed emergency medical condition->Emergency Medical Condition (MA) Reason for Exam: fall this am neck pain chronic FINDINGS: BRAIN/VENTRICLES: There is no acute intracranial hemorrhage, mass effect or midline shift. No abnormal extra-axial fluid collection.  The gray-white differentiation is maintained without evidence of an acute infarct. There is prominence of the ventricles and sulci due to global parenchymal volume loss. There are nonspecific areas of hypoattenuation within the periventricular and subcortical white matter, which likely represent chronic microvascular ischemic change. ORBITS: The visualized portion of the orbits demonstrate no acute abnormality. SINUSES: The visualized paranasal sinuses and mastoid air cells demonstrate no acute abnormality. SOFT TISSUES/SKULL: No acute abnormality of the visualized skull or soft

## 2025-07-27 NOTE — PROGRESS NOTES
D: pt is asking to leave. he wants to sign AMA papers. pt is alert and oriented x 4. vitals stable. pt is on 3.5 L of oxygen and tolerating well. (Home oxygen) pt refused bipap this morning and doesn't want mask back on. pt is asking if he can take oral antiobiotics and leave. A: this RN sent secure message to Dr. Saldaña concerning this R: will continue to monitor pt

## 2025-07-27 NOTE — PROGRESS NOTES
D: pt is asking to remove bipap. A: Loni Charge RN assisted this RN in helping to remove bipap as this RN was with another pt, pt placed on Nasal Cannula, pt encouraged to keep bipap on and pt also taking nasal cannula on and off per will of pt R: pt reported to Charge RN, \"I do this all the time. Don't you dare place that mask on me again.\"     This RN to report to physician.

## 2025-07-29 LAB
LEGIONELLA AG UR QL: NORMAL
S PNEUM AG UR QL: NORMAL

## 2025-07-31 LAB
BACTERIA BLD CULT ORG #2: NORMAL
BACTERIA BLD CULT: NORMAL

## 2025-08-20 NOTE — PROGRESS NOTES
Physician Progress Note      PATIENT:               KEI SALINAS  CSN #:                  [id number]  :                       1950  ADMIT DATE:       2025 7:29 PM  DISCH DATE:        2025 1:12 PM  RESPONDING  PROVIDER #:        Ashely Saldaña MD          QUERY TEXT:    Multifocal community-acquired pneumonia is documented in the H/P 25.   Please specify the type of pneumonia and the causative organism:    The clinical indicators include:  -H/P \" pmh of COPD, chronic respiratory failure on 2 to 3 L oxygen at home\"  -Chest CT \"Multifocal pneumonia.\"  -CBC, CMP, CXR/CT, O2 therapy  (protocol)  Meds: IV Cefepime, IV Vancomycin  Options provided:  -- PNA treated as Gram negative pneumonia and Gram positive PNA  -- Other - I will add my own diagnosis  -- Disagree - Not applicable / Not valid  -- Disagree - Clinically unable to determine / Unknown  -- Refer to Clinical Documentation Reviewer    PROVIDER RESPONSE TEXT:    PNA treated as Gram negative pneumonia and Gram positive PNA    Query created by: Zeenat Kaur on 8/15/2025 2:16 PM      Electronically signed by:  Ashely Saldaña MD 2025 10:26 AM